# Patient Record
Sex: MALE | Race: WHITE | NOT HISPANIC OR LATINO | Employment: OTHER | URBAN - METROPOLITAN AREA
[De-identification: names, ages, dates, MRNs, and addresses within clinical notes are randomized per-mention and may not be internally consistent; named-entity substitution may affect disease eponyms.]

---

## 2017-06-01 ENCOUNTER — ALLSCRIPTS OFFICE VISIT (OUTPATIENT)
Dept: OTHER | Facility: OTHER | Age: 72
End: 2017-06-01

## 2017-06-29 ENCOUNTER — ANESTHESIA EVENT (OUTPATIENT)
Dept: PERIOP | Facility: HOSPITAL | Age: 72
End: 2017-06-29
Payer: MEDICARE

## 2017-06-29 RX ORDER — ASPIRIN 81 MG/1
162 TABLET ORAL ONCE
COMMUNITY
End: 2020-08-22 | Stop reason: HOSPADM

## 2017-06-29 RX ORDER — AMLODIPINE BESYLATE 10 MG/1
10 TABLET ORAL DAILY
COMMUNITY
End: 2018-09-11 | Stop reason: SDDI

## 2017-06-29 RX ORDER — MULTIVIT-MIN/IRON/FOLIC ACID/K 18-600-40
CAPSULE ORAL
COMMUNITY
End: 2021-11-01 | Stop reason: SDUPTHER

## 2017-06-29 RX ORDER — FOLIC ACID/MULTIVIT,IRON,MINER .4-18-35
1 TABLET,CHEWABLE ORAL DAILY
COMMUNITY

## 2017-06-29 RX ORDER — FENOFIBRATE 54 MG/1
54 TABLET ORAL DAILY
COMMUNITY
End: 2018-09-11 | Stop reason: ALTCHOICE

## 2017-06-29 RX ORDER — SIMVASTATIN 40 MG
40 TABLET ORAL
COMMUNITY
End: 2021-11-03 | Stop reason: SDUPTHER

## 2017-06-30 ENCOUNTER — HOSPITAL ENCOUNTER (OUTPATIENT)
Facility: HOSPITAL | Age: 72
Setting detail: OUTPATIENT SURGERY
Discharge: HOME | End: 2017-06-30
Attending: SURGERY | Admitting: SURGERY
Payer: MEDICARE

## 2017-06-30 ENCOUNTER — ANESTHESIA (OUTPATIENT)
Dept: PERIOP | Facility: HOSPITAL | Age: 72
End: 2017-06-30
Payer: MEDICARE

## 2017-06-30 VITALS
HEART RATE: 57 BPM | SYSTOLIC BLOOD PRESSURE: 131 MMHG | TEMPERATURE: 98 F | OXYGEN SATURATION: 94 % | RESPIRATION RATE: 18 BRPM | DIASTOLIC BLOOD PRESSURE: 79 MMHG | BODY MASS INDEX: 39.23 KG/M2 | HEIGHT: 71 IN | WEIGHT: 280.2 LBS

## 2017-06-30 DIAGNOSIS — K92.1 MELANOTIC STOOLS: ICD-10-CM

## 2017-06-30 DIAGNOSIS — R19.4 CHANGE IN BOWEL HABITS: ICD-10-CM

## 2017-06-30 PROBLEM — K21.00 GASTROESOPHAGEAL REFLUX DISEASE WITH ESOPHAGITIS: Chronic | Status: ACTIVE | Noted: 2017-06-30

## 2017-06-30 PROBLEM — D12.4 ADENOMATOUS POLYP OF DESCENDING COLON: Chronic | Status: ACTIVE | Noted: 2017-06-30

## 2017-06-30 PROBLEM — K29.31 CHRONIC SUPERFICIAL GASTRITIS WITH BLEEDING: Chronic | Status: ACTIVE | Noted: 2017-06-30

## 2017-06-30 LAB — GLUCOSE SERPL-MCNC: 129 MG/DL (ref 65–140)

## 2017-06-30 PROCEDURE — 88305 TISSUE EXAM BY PATHOLOGIST: CPT | Performed by: SURGERY

## 2017-06-30 PROCEDURE — 82948 REAGENT STRIP/BLOOD GLUCOSE: CPT

## 2017-06-30 PROCEDURE — 88342 IMHCHEM/IMCYTCHM 1ST ANTB: CPT | Performed by: SURGERY

## 2017-06-30 RX ORDER — LIDOCAINE HYDROCHLORIDE 10 MG/ML
INJECTION, SOLUTION INFILTRATION; PERINEURAL AS NEEDED
Status: DISCONTINUED | OUTPATIENT
Start: 2017-06-30 | End: 2017-06-30 | Stop reason: SURG

## 2017-06-30 RX ORDER — PROPOFOL 10 MG/ML
INJECTION, EMULSION INTRAVENOUS AS NEEDED
Status: DISCONTINUED | OUTPATIENT
Start: 2017-06-30 | End: 2017-06-30 | Stop reason: SURG

## 2017-06-30 RX ORDER — SODIUM CHLORIDE, SODIUM LACTATE, POTASSIUM CHLORIDE, CALCIUM CHLORIDE 600; 310; 30; 20 MG/100ML; MG/100ML; MG/100ML; MG/100ML
75 INJECTION, SOLUTION INTRAVENOUS CONTINUOUS
Status: DISCONTINUED | OUTPATIENT
Start: 2017-06-30 | End: 2017-06-30 | Stop reason: HOSPADM

## 2017-06-30 RX ORDER — ONDANSETRON 2 MG/ML
4 INJECTION INTRAMUSCULAR; INTRAVENOUS ONCE AS NEEDED
Status: DISCONTINUED | OUTPATIENT
Start: 2017-06-30 | End: 2017-06-30 | Stop reason: HOSPADM

## 2017-06-30 RX ORDER — METOCLOPRAMIDE HYDROCHLORIDE 5 MG/ML
10 INJECTION INTRAMUSCULAR; INTRAVENOUS ONCE AS NEEDED
Status: DISCONTINUED | OUTPATIENT
Start: 2017-06-30 | End: 2017-06-30 | Stop reason: HOSPADM

## 2017-06-30 RX ADMIN — PROPOFOL 100 MG: 10 INJECTION, EMULSION INTRAVENOUS at 07:34

## 2017-06-30 RX ADMIN — PROPOFOL 50 MG: 10 INJECTION, EMULSION INTRAVENOUS at 07:50

## 2017-06-30 RX ADMIN — PROPOFOL 50 MG: 10 INJECTION, EMULSION INTRAVENOUS at 07:45

## 2017-06-30 RX ADMIN — LIDOCAINE HYDROCHLORIDE 50 MG: 10 INJECTION, SOLUTION INFILTRATION; PERINEURAL at 07:34

## 2017-06-30 RX ADMIN — PROPOFOL 50 MG: 10 INJECTION, EMULSION INTRAVENOUS at 07:55

## 2017-06-30 RX ADMIN — SODIUM CHLORIDE, SODIUM LACTATE, POTASSIUM CHLORIDE, AND CALCIUM CHLORIDE 75 ML/HR: .6; .31; .03; .02 INJECTION, SOLUTION INTRAVENOUS at 06:56

## 2017-06-30 RX ADMIN — PROPOFOL 50 MG: 10 INJECTION, EMULSION INTRAVENOUS at 07:40

## 2017-07-18 ENCOUNTER — ALLSCRIPTS OFFICE VISIT (OUTPATIENT)
Dept: OTHER | Facility: OTHER | Age: 72
End: 2017-07-18

## 2018-01-13 VITALS
HEIGHT: 69 IN | WEIGHT: 272 LBS | HEART RATE: 80 BPM | TEMPERATURE: 97.9 F | RESPIRATION RATE: 14 BRPM | DIASTOLIC BLOOD PRESSURE: 78 MMHG | BODY MASS INDEX: 40.29 KG/M2 | SYSTOLIC BLOOD PRESSURE: 130 MMHG

## 2018-01-13 VITALS
TEMPERATURE: 98 F | BODY MASS INDEX: 40.73 KG/M2 | DIASTOLIC BLOOD PRESSURE: 80 MMHG | HEIGHT: 69 IN | WEIGHT: 275 LBS | SYSTOLIC BLOOD PRESSURE: 132 MMHG

## 2018-04-04 ENCOUNTER — TRANSCRIBE ORDERS (OUTPATIENT)
Dept: ADMINISTRATIVE | Facility: HOSPITAL | Age: 73
End: 2018-04-04

## 2018-04-04 DIAGNOSIS — J32.9 SINUSITIS, UNSPECIFIED CHRONICITY, UNSPECIFIED LOCATION: ICD-10-CM

## 2018-04-04 DIAGNOSIS — R06.02 SOB (SHORTNESS OF BREATH): Primary | ICD-10-CM

## 2018-04-04 DIAGNOSIS — R05.9 COUGHING: ICD-10-CM

## 2018-04-09 ENCOUNTER — HOSPITAL ENCOUNTER (OUTPATIENT)
Dept: CT IMAGING | Facility: HOSPITAL | Age: 73
Discharge: HOME | End: 2018-04-09
Payer: MEDICARE

## 2018-04-09 DIAGNOSIS — R06.02 SOB (SHORTNESS OF BREATH): ICD-10-CM

## 2018-04-09 DIAGNOSIS — J32.9 SINUSITIS, UNSPECIFIED CHRONICITY, UNSPECIFIED LOCATION: ICD-10-CM

## 2018-04-09 DIAGNOSIS — R05.9 COUGHING: ICD-10-CM

## 2018-04-09 PROCEDURE — 71260 CT THORAX DX C+: CPT

## 2018-04-09 PROCEDURE — 70486 CT MAXILLOFACIAL W/O DYE: CPT

## 2018-04-09 RX ADMIN — IOHEXOL 85 ML: 350 INJECTION, SOLUTION INTRAVENOUS at 16:55

## 2018-05-29 ENCOUNTER — APPOINTMENT (EMERGENCY)
Dept: RADIOLOGY | Facility: HOSPITAL | Age: 73
End: 2018-05-29
Payer: MEDICARE

## 2018-05-29 ENCOUNTER — HOSPITAL ENCOUNTER (EMERGENCY)
Facility: HOSPITAL | Age: 73
Discharge: HOME | End: 2018-05-29
Attending: EMERGENCY MEDICINE | Admitting: EMERGENCY MEDICINE
Payer: MEDICARE

## 2018-05-29 VITALS
BODY MASS INDEX: 37.22 KG/M2 | SYSTOLIC BLOOD PRESSURE: 166 MMHG | HEART RATE: 83 BPM | WEIGHT: 260 LBS | RESPIRATION RATE: 16 BRPM | DIASTOLIC BLOOD PRESSURE: 78 MMHG | OXYGEN SATURATION: 92 % | HEIGHT: 70 IN | TEMPERATURE: 98.8 F

## 2018-05-29 DIAGNOSIS — S20.212A CONTUSION OF LEFT CHEST WALL, INITIAL ENCOUNTER: Primary | ICD-10-CM

## 2018-05-29 PROCEDURE — 71101 X-RAY EXAM UNILAT RIBS/CHEST: CPT

## 2018-05-29 PROCEDURE — 99283 EMERGENCY DEPT VISIT LOW MDM: CPT

## 2018-05-29 PROCEDURE — 93005 ELECTROCARDIOGRAM TRACING: CPT

## 2018-05-29 RX ORDER — NAPROXEN 250 MG/1
250 TABLET ORAL ONCE
Status: DISCONTINUED | OUTPATIENT
Start: 2018-05-29 | End: 2018-05-29 | Stop reason: HOSPADM

## 2018-05-29 RX ORDER — LIDOCAINE 50 MG/G
1 PATCH TOPICAL ONCE
Status: DISCONTINUED | OUTPATIENT
Start: 2018-05-29 | End: 2018-05-29 | Stop reason: HOSPADM

## 2018-05-29 RX ORDER — ACETAMINOPHEN 325 MG/1
975 TABLET ORAL ONCE
Status: DISCONTINUED | OUTPATIENT
Start: 2018-05-29 | End: 2018-05-29 | Stop reason: HOSPADM

## 2018-05-29 RX ORDER — CYCLOBENZAPRINE HCL 5 MG
5 TABLET ORAL 3 TIMES DAILY PRN
Qty: 21 TABLET | Refills: 0 | Status: SHIPPED | OUTPATIENT
Start: 2018-05-29 | End: 2018-09-11 | Stop reason: SDDI

## 2018-05-29 RX ORDER — NAPROXEN 375 MG/1
375 TABLET ORAL 2 TIMES DAILY WITH MEALS
Qty: 20 TABLET | Refills: 0 | Status: SHIPPED | OUTPATIENT
Start: 2018-05-29 | End: 2018-09-11 | Stop reason: SDDI

## 2018-05-29 NOTE — ED NOTES
D/c reviewed with pt, pt verbalized understanding and has no further questions at this time  Pt ambulatory off unit with steady gait        Susana Sumner RN  05/29/18 0020

## 2018-05-29 NOTE — DISCHARGE INSTRUCTIONS
Please return if he developed fevers cough shortness of breath worsening symptoms or other concerning symptoms otherwise follow up as instructed as discussed      Rib Contusion   WHAT YOU NEED TO KNOW:   A rib contusion is a bruise on one or more of your ribs  DISCHARGE INSTRUCTIONS:   Return to the emergency department if:   · You have increased chest pain  · You have shortness of breath  · You start to cough up blood  · Your pain does not improve with pain medicine  Contact your healthcare provider if:   · You have a cough  · You have a fever  · You have questions or concerns about your condition or care  Medicines: You may need any of the following:  · NSAIDs , such as ibuprofen, help decrease swelling, pain, and fever  This medicine is available with or without a doctor's order  NSAIDs can cause stomach bleeding or kidney problems in certain people  If you take blood thinner medicine, always ask if NSAIDs are safe for you  Always read the medicine label and follow directions  Do not give these medicines to children under 10months of age without direction from your child's healthcare provider  · Prescription pain medicine  may be given  Ask how to take this medicine safely  · Take your medicine as directed  Contact your healthcare provider if you think your medicine is not helping or if you have side effects  Tell him of her if you are allergic to any medicine  Keep a list of the medicines, vitamins, and herbs you take  Include the amounts, and when and why you take them  Bring the list or the pill bottles to follow-up visits  Carry your medicine list with you in case of an emergency  Deep breathing:   · To help prevent pneumonia, take 10 deep breaths every hour, even when you wake up during the night  Brace your ribs with your hands or a pillow while you take deep breaths or cough  This will help decrease your pain      · You may need to use an incentive spirometer to help you take deeper breaths  Put the plastic piece into your mouth and take a very deep breath  Hold your breath as long as you can  Then let out your breath  Do this 10 times in a row every hour while you are awake  Rest:  Rest your ribs to decrease swelling and allow the injury to heal faster  Avoid activities that may cause more pain or damage to your ribs  As your pain decreases, begin movements slowly  Ice:  Ice helps decrease swelling and pain  Ice may also help prevent tissue damage  Use an ice pack or put crushed ice in a plastic bag  Cover it with a towel and place it on your bruised area for 15 to 20 minutes every hour as directed  Follow up with your healthcare provider as directed:  Write down your questions so you remember to ask them during your visits  © 2017 [facility] Information is for End User's use only and may not be sold, redistributed or otherwise used for commercial purposes  All illustrations and images included in CareNotes® are the copyrighted property of [employer]  or [employer]  The above information is an  only  It is not intended as medical advice for individual conditions or treatments  Talk to your doctor, nurse or pharmacist before following any medical regimen to see if it is safe and effective for you  Contusion in Adults   WHAT YOU NEED TO KNOW:   A contusion is a bruise that appears on your skin after an injury  A bruise happens when small blood vessels tear but skin does not  When blood vessels tear, blood leaks into nearby tissue, such as soft tissue or muscle  DISCHARGE INSTRUCTIONS:   Return to the emergency department if:   · You have new trouble moving the injured area  · You have tingling or numbness in or near the injured area  · Your hand or foot below the bruise gets cold or turns pale  Contact your healthcare provider if:   · You find a new lump in the injured area      · Your symptoms do not improve with treatment after 4 to 5 days  · You have questions or concerns about your condition or care  Medicines: You may need any of the following:  · NSAIDs  help decrease swelling and pain or fever  This medicine is available with or without a doctor's order  NSAIDs can cause stomach bleeding or kidney problems in certain people  If you take blood thinner medicine, always ask your healthcare provider if NSAIDs are safe for you  Always read the medicine label and follow directions  · Prescription pain medicine  may be given  Do not wait until the pain is severe before you take your medicine  · Take your medicine as directed  Contact your healthcare provider if you think your medicine is not helping or if you have side effects  Tell him of her if you are allergic to any medicine  Keep a list of the medicines, vitamins, and herbs you take  Include the amounts, and when and why you take them  Bring the list or the pill bottles to follow-up visits  Carry your medicine list with you in case of an emergency  Follow up with your healthcare provider as directed: You may need to return within a week to check your injury again  Write down your questions so you remember to ask them during your visits  Help a contusion heal:   · Rest the injured area  or use it less than usual  If you bruised your leg or foot, you may need crutches or a cane to help you walk  This will help you keep weight off your injured body part  · Apply ice  to decrease swelling and pain  Ice may also help prevent tissue damage  Use an ice pack, or put crushed ice in a plastic bag  Cover it with a towel and place it on your bruise for 15 to 20 minutes every hour or as directed  · Use compression  to support the area and decrease swelling  Wrap an elastic bandage around the area over the bruised muscle  Make sure the bandage is not too tight  You should be able to fit 1 finger between the bandage and your skin      · Elevate (raise) your injured body part above the level of your heart to help decrease pain and swelling  Use pillows, blankets, or rolled towels to elevate the area as often as you can  · Do not drink alcohol  as directed  Alcohol may slow healing  · Do not stretch injured muscles  right after your injury  Ask your healthcare provider when and how you may safely stretch after your injury  Gentle stretches can help increase your flexibility  · Do not massage the area or put heating pads  on the bruise right after your injury  Heat and massage may slow healing  Your healthcare provider may tell you to apply heat after several days  At that time, heat will start to help the injury heal   Prevent another contusion:   · Stretch and warm up before you play sports or exercise  · Wear protective gear when you play sports  Examples are shin guards and padding  · If you begin a new physical activity, start slowly to give your body a chance to adjust   © 2017 [address] Information is for End User's use only and may not be sold, redistributed or otherwise used for commercial purposes  All illustrations and images included in CareNotes® are the copyrighted property of A D [employer] M [employer] Inc  or Abdias Montaño  The above information is an  only  It is not intended as medical advice for individual conditions or treatments  Talk to your doctor, nurse or pharmacist before following any medical regimen to see if it is safe and effective for you

## 2018-05-30 LAB
ATRIAL RATE: 71 BPM
P AXIS: 56 DEGREES
PR INTERVAL: 164 MS
QRS AXIS: 141 DEGREES
QRSD INTERVAL: 110 MS
QT INTERVAL: 412 MS
QTC INTERVAL: 447 MS
T WAVE AXIS: 41 DEGREES
VENTRICULAR RATE: 71 BPM

## 2018-05-30 PROCEDURE — 93010 ELECTROCARDIOGRAM REPORT: CPT | Performed by: INTERNAL MEDICINE

## 2018-09-11 ENCOUNTER — APPOINTMENT (EMERGENCY)
Dept: RADIOLOGY | Facility: HOSPITAL | Age: 73
End: 2018-09-11
Payer: MEDICARE

## 2018-09-11 ENCOUNTER — HOSPITAL ENCOUNTER (EMERGENCY)
Facility: HOSPITAL | Age: 73
Discharge: HOME | End: 2018-09-11
Attending: EMERGENCY MEDICINE | Admitting: EMERGENCY MEDICINE
Payer: MEDICARE

## 2018-09-11 ENCOUNTER — APPOINTMENT (EMERGENCY)
Dept: ULTRASOUND IMAGING | Facility: HOSPITAL | Age: 73
End: 2018-09-11
Payer: MEDICARE

## 2018-09-11 VITALS
DIASTOLIC BLOOD PRESSURE: 63 MMHG | WEIGHT: 288.36 LBS | OXYGEN SATURATION: 93 % | BODY MASS INDEX: 41.28 KG/M2 | RESPIRATION RATE: 19 BRPM | TEMPERATURE: 98.6 F | SYSTOLIC BLOOD PRESSURE: 134 MMHG | HEIGHT: 70 IN | HEART RATE: 63 BPM

## 2018-09-11 DIAGNOSIS — M25.572 PAIN AND SWELLING OF LEFT ANKLE: Primary | ICD-10-CM

## 2018-09-11 DIAGNOSIS — M25.472 PAIN AND SWELLING OF LEFT ANKLE: Primary | ICD-10-CM

## 2018-09-11 PROCEDURE — 93971 EXTREMITY STUDY: CPT

## 2018-09-11 PROCEDURE — 93971 EXTREMITY STUDY: CPT | Performed by: SURGERY

## 2018-09-11 PROCEDURE — 99284 EMERGENCY DEPT VISIT MOD MDM: CPT

## 2018-09-11 PROCEDURE — 73610 X-RAY EXAM OF ANKLE: CPT

## 2018-09-11 RX ORDER — TRAMADOL HYDROCHLORIDE 50 MG/1
50 TABLET ORAL EVERY 6 HOURS PRN
Qty: 12 TABLET | Refills: 0 | Status: SHIPPED | OUTPATIENT
Start: 2018-09-11 | End: 2018-09-14

## 2018-09-11 NOTE — DISCHARGE INSTRUCTIONS
Please return if he developed worsening or other concerning symptoms or leg becomes much more swollen discolored developed redness fevers or other concerning symptoms otherwise please follow up with doctor in 3 days for re-evaluation as well as Orthopedics in 1 week if persistent symptoms as discussed her leg is persistently or progressively more swollen you will require repeat ultrasound in 1 week for re-evaluation as instructed      Arthralgia   WHAT YOU NEED TO KNOW:   Arthralgia is pain in one or more joints, with no inflammation  It may be short-term and get better within 6 to 8 weeks  Arthralgia can be an early sign of arthritis  Arthralgia may be caused by a medical condition, such as a hormone disorder or a tumor  It may also be caused by an infection or injury  DISCHARGE INSTRUCTIONS:   Medicines: The following medicines may  be ordered for you:  · Acetaminophen  decreases pain  Ask how much to take and how often to take it  Follow directions  Acetaminophen can cause liver damage if not taken correctly  · NSAIDs  decrease pain and prevent swelling  Ask your healthcare provider which medicine is right for you  Ask how much to take and when to take it  Take as directed  NSAIDs can cause stomach bleeding and kidney problems if not taken correctly  · Pain relief cream  decreases pain  Use this cream as directed  · Take your medicine as directed  Contact your healthcare provider if you think your medicine is not helping or if you have side effects  Tell him of her if you are allergic to any medicine  Keep a list of the medicines, vitamins, and herbs you take  Include the amounts, and when and why you take them  Bring the list or the pill bottles to follow-up visits  Carry your medicine list with you in case of an emergency  Follow up with your healthcare provider or specialist as directed:  Write down your questions so you remember to ask them during your visits     Self-care:   · Apply heat  to help decrease pain  Use a heating pad or heat wrap  Apply heat for 20 to 30 minutes every 2 hours for as many days as directed  · Rest  as much as possible  Avoid activities that cause joint pain  · Apply ice  to help decrease swelling and pain  Ice may also help prevent tissue damage  Use an ice pack, or put crushed ice in a plastic bag  Cover it with a towel and place it on your painful joint for 15 to 20 minutes every hour or as directed  · Support  the joint with a brace or elastic wrap as directed  · Elevate  your joint above the level of your heart as often as you can to help decrease swelling and pain  Prop your painful joint on pillows or blankets to keep it elevated comfortably  · Lose weight  if you are overweight  Extra weight can put pressure on your joints and cause more pain  Ask your healthcare provider how much you should weigh  Ask him to help you create a weight loss plan  · Exercise  regularly to help improve joint movement and to decrease pain  Ask about the best exercise plan for you  Low-impact exercises can help take the pressure off your joints  Examples are walking, swimming, and water aerobics  Physical therapy:  A physical therapist teaches you exercises to help improve movement and strength, and to decrease pain  Ask your healthcare provider if physical therapy is right for you  Contact your healthcare provider or specialist if:   · You have a fever  · You continue to have joint pain that cannot be relieved with heat, ice, or medicine  · You have pain and inflammation around your joint  · You have questions or concerns about your condition or care  Return to the emergency department if:   · You have sudden, severe pain when you move your joint  · You have a fever and shaking chills  · You cannot move your joint  · You lose feeling on the side of your body where you have the painful joint    © 2017 [address] Information is for End User's use only and may not be sold, redistributed or otherwise used for commercial purposes  All illustrations and images included in CareNotes® are the copyrighted property of [employer]  or Abdias Montaño  The above information is an  only  It is not intended as medical advice for individual conditions or treatments  Talk to your doctor, nurse or pharmacist before following any medical regimen to see if it is safe and effective for you  Leg Edema   WHAT YOU NEED TO KNOW:   Leg edema is swelling caused by fluid buildup  Your legs may swell if you sit or stand for long periods of time, are pregnant, or are injured  Swelling may also occur if you have heart failure or circulation problems  This means that your heart does not pump blood through your body as it should  DISCHARGE INSTRUCTIONS:   Self-care:   · Elevate your legs:  Raise your legs above the level of your heart as often as you can  This will help decrease swelling and pain  Prop your legs on pillows or blankets to keep them elevated comfortably  · Wear pressure stockings: These tight stockings put pressure on your legs to promote blood flow and prevent blood clots  Wear the stockings during the day  Do not wear them while you sleep  · Apply heat:  Heat helps decrease pain and swelling  Apply heat on the area for 20 to 30 minutes every 2 hours for as many days as directed  · Stay active:  Do not stand or sit for long periods of time  Ask your healthcare provider about the best exercise plan for you  · Eat healthy foods:  Healthy foods include fruits, vegetables, whole-grain breads, low-fat dairy products, beans, lean meats, and fish  Ask if you need to be on a special diet  Limit salt  Salt will make your body hold even more fluid  Follow up with your healthcare provider as directed:  Write down your questions so you remember to ask them during your visits     Contact your healthcare provider if:   · You have a fever or feel more tired than usual     · The veins in your legs look larger than usual  They may look full or bulging  · Your legs itch or feel heavy  · You have red or white areas or sores on your legs  The skin may also appear dimpled or have indentations  · You are gaining weight  · You have trouble moving your ankles  · The swelling does not go away, or other parts of your body swell  · You have questions or concerns about your condition or care  Return to the emergency department if:   · You cannot walk  · You feel faint or confused  · Your skin turns blue or gray  · Your leg feels warm, tender, and painful  It may be swollen and red  · You have chest pain or trouble breathing that is worse when you lie down  · You suddenly feel lightheaded and have trouble breathing  · You have new and sudden chest pain  You may have more pain when you take deep breaths or cough  You may also cough up blood  © 2017 [address]  Information is for End User's use only and may not be sold, redistributed or otherwise used for commercial purposes  All illustrations and images included in CareNotes® are the copyrighted property of [employer]  or [address]  The above information is an  only  It is not intended as medical advice for individual conditions or treatments  Talk to your doctor, nurse or pharmacist before following any medical regimen to see if it is safe and effective for you

## 2018-09-11 NOTE — ED NOTES
Pt verbalized understanding of discharge instructions, medications and f/u care if needed     Edgardo Salazar RN  09/11/18 5785

## 2018-09-11 NOTE — ED PROVIDER NOTES
History  Chief Complaint   Patient presents with    Ankle Swelling     Pt reports slamming the car door on his left ankle approx a month ago, pain worsening in the last few days, along with swelling  69-year-old male with a history of hyperlipidemia, diabetes presenting with chief complaint of left ankle swelling  Patient reports approximately ago is getting into his car and the car door accidentally closed catching his left ankle in the car door frame and had some mild discomfort localized to his left medial ankle since that time he states over the last week or so he has been on his feet more often than his left leg is been more swollen than usual and the pain in his left medial ankle and calf is gotten progressively worse over last 3 days or so  Patient localize his area of discomfort again to his left medial ankle, he notes some mild adjacent swelling to the distal leg and calf he has no weakness paresthesias or anesthesia no color changes however he notes again the distal leg is mildly swollen, he otherwise denies fevers chills headache chest pain cough shortness of breath nausea vomiting anorexia other joint involvement other rashes joint pain swelling or other associated symptoms denies all risk factors or other signs of symptoms of DVT or PE            Prior to Admission Medications   Prescriptions Last Dose Informant Patient Reported? Taking?    Cholecalciferol (VITAMIN D) 2000 units CAPS   Yes Yes   Sig: Take by mouth   Empagliflozin (JARDIANCE) 25 MG TABS   Yes Yes   Sig: Take 25 mg by mouth every morning   Insulin Degludec (TRESIBA FLEXTOUCH SC)   Yes Yes   Sig: Inject 20 Units under the skin daily at bedtime   Omega-3 Fatty Acids (FISH OIL CONCENTRATE PO)  Self Yes Yes   Sig: Take by mouth   aspirin (ECOTRIN LOW STRENGTH) 81 mg EC tablet   Yes Yes   Sig: Take by mouth 2 (two) times a day     insulin regular (HumuLIN R,NovoLIN R) 100 units/mL injection   Yes Yes   Sig: Inject 12 Units under the skin once   multivitamin-iron-minerals-folic acid (CENTRUM) chewable tablet   Yes Yes   Sig: Chew 1 tablet daily   simvastatin (ZOCOR) 40 mg tablet   Yes Yes   Sig: Take 40 mg by mouth daily at bedtime      Facility-Administered Medications: None       Past Medical History:   Diagnosis Date    Diabetes mellitus ([address] )     Hiatal hernia     Hyperlipidemia     Hypertension     Shortness of breath        Past Surgical History:   Procedure Laterality Date    JOINT REPLACEMENT Bilateral     knees    HI COLONOSCOPY FLX DX W/COLLJ SPEC WHEN PFRMD N/A 6/30/2017    Procedure: EGD AND COLONOSCOPY;  Surgeon: Nanci Carmichael MD;  Location: [facility]; Service: General    TONSILLECTOMY         History reviewed  No pertinent family history  I have reviewed and agree with the history as documented  Social History   Substance Use Topics    Smoking status: Never Smoker    Smokeless tobacco: Never Used    Alcohol use No        Review of Systems   Constitutional: Negative for activity change, appetite change, chills and fever  HENT: Negative for rhinorrhea and sore throat  Respiratory: Negative for cough and shortness of breath  Cardiovascular: Negative for chest pain and palpitations  Gastrointestinal: Negative for abdominal pain, diarrhea, nausea and vomiting  Genitourinary: Negative for dysuria, frequency and urgency  Musculoskeletal: Positive for joint swelling  Negative for arthralgias, back pain and myalgias  Left ankle pain and swelling   Skin: Negative for color change, rash and wound  Neurological: Negative for dizziness, weakness and numbness  Hematological: Negative for adenopathy  Does not bruise/bleed easily  Psychiatric/Behavioral: Negative for agitation and behavioral problems  All other systems reviewed and are negative  Physical Exam  Physical Exam   Constitutional: He is oriented to person, place, and time  He appears well-developed and well-nourished  No distress  Very well-appearing in no acute distress   HENT:   Head: Normocephalic and atraumatic  Eyes: EOM are normal  Pupils are equal, round, and reactive to light  Neck: Normal range of motion  Neck supple  No tracheal deviation present  Cardiovascular: Normal rate, regular rhythm and normal heart sounds  Exam reveals no gallop and no friction rub  No murmur heard  Pulmonary/Chest: Effort normal and breath sounds normal  He has no wheezes  He has no rales  Abdominal: Soft  Bowel sounds are normal  He exhibits no distension  There is no tenderness  There is no rebound and no guarding  Musculoskeletal:   Patient has mild tenderness over his medial ankle only he has no tenderness over the remainder of the midfoot or distal foot no plantar ecchymosis he has 2+ symmetric pulses, there is mild adjacent edema to the immediate medial ankle and the very distal tibia and fibula, there is no warmth no color changes, no lymphangitis erythema or induration there is no calf pain tenderness varicosities no other acute ischemic infectious inflammatory traumatic findings on exam   Neurological: He is alert and oriented to person, place, and time  No cranial nerve deficit  He exhibits normal muscle tone  Coordination normal    Skin: Skin is warm and dry  No rash noted  Psychiatric: He has a normal mood and affect  His behavior is normal    Nursing note and vitals reviewed        Vital Signs  ED Triage Vitals [09/11/18 1107]   Temperature Pulse Respirations Blood Pressure SpO2   98 6 °F (37 °C) 65 19 147/68 93 %      Temp Source Heart Rate Source Patient Position - Orthostatic VS BP Location FiO2 (%)   Oral Monitor Lying Right arm --      Pain Score       5           Vitals:    09/11/18 1107 09/11/18 1318   BP: 147/68 134/63   Pulse: 65 63   Patient Position - Orthostatic VS: Lying Sitting       Visual Acuity      ED Medications  Medications - No data to display    Diagnostic Studies  Results Reviewed     None VAS lower limb venous duplex study, unilateral/limited   ED Interpretation by Ana Moncada DO (09/11 1314)   Resulted as negative      Final Result by Grace Ganser, MD (09/11 1534)      XR ankle 3+ views LEFT   Final Result by Neo Obrien MD (09/11 1220)      No acute osseous abnormality              Workstation performed: [id number]                    Procedures  Procedures       Phone Contacts  ED Phone Contact    ED Course                               MDM  Number of Diagnoses or Management Options  Pain and swelling of left ankle:   Diagnosis management comments: 69-year-old male history of hyperlipidemia diabetes with chief complaint of left ankle swelling, relates this to closing his left foot in the door accidentally approximately a month ago had some pain and discomfort localized to his left ankle since that time progressively worse over the last 3 days with some mild adjacent swelling that he relates to being on his feet more than usual, no risk factors or other signs of symptoms of DVT or PE he has no weakness paresthesias or anesthesia no fevers chills or constitutional symptoms on exam he is afebrile normal vital signs clinically well-appearing he has some mild tenderness and mild adjacent edema is noted there are no other acute ischemic infectious inflammatory traumatic findings on exam he is able to bear weight, he has 2+ symmetric pulses, his right lower extremity is unremarkable without edema swelling or other acute findings, will check x-ray of his left ankle, duplex of his left lower extremity, if negative will discharge with orthopedic follow-up close return instructions will recommend repeat ultrasound of progressive symptoms or continued symptoms in 1 week, disposition pending further evaluation reassessment    CritCare Time    Disposition  Final diagnoses:   Pain and swelling of left ankle     Time reflects when diagnosis was documented in both MDM as applicable and the Disposition within this note     Time User Action Codes Description Comment    9/11/2018  1:15 PM Vinod Vera Add [[id number],  [id number]] Pain and swelling of left ankle       ED Disposition     ED Disposition Condition Comment    Discharge  Leonardovandana Cliff discharge to home/self care      Condition at discharge: Good        Follow-up Information     Follow up With Specialties Details Why Contact Info Additional Information    [address] [facility] Emergency Medicine  If symptoms worsen [address] [facility] [place] [facility], [address] [facility] Mellissa Ordaz MD Internal Medicine In 3 days  [address] [address]  [address] [facility] In 1 week  [address] [facility] [place] [place] [address]  [phone]           Discharge Medication List as of 9/11/2018  1:16 PM      START taking these medications    Details   traMADol (ULTRAM) 50 mg tablet Take 1 tablet (50 mg total) by mouth every 6 (six) hours as needed for moderate pain for up to 3 days, Starting Tue 9/11/2018, Until Fri 9/14/2018, Print         CONTINUE these medications which have NOT CHANGED    Details   aspirin (ECOTRIN LOW STRENGTH) 81 mg EC tablet Take by mouth 2 (two) times a day  , Historical Med      Cholecalciferol (VITAMIN D) 2000 units CAPS Take by mouth, Historical Med      Empagliflozin (JARDIANCE) 25 MG TABS Take 25 mg by mouth every morning, Historical Med      Insulin Degludec (TRESIBA FLEXTOUCH SC) Inject 20 Units under the skin daily at bedtime, Historical Med      insulin regular (HumuLIN R,NovoLIN R) 100 units/mL injection Inject 12 Units under the skin once, Historical Med      multivitamin-iron-minerals-folic acid (CENTRUM) chewable tablet Chew 1 tablet daily, Historical Med      Omega-3 Fatty Acids (FISH OIL CONCENTRATE PO) Take by mouth, Historical Med      simvastatin (ZOCOR) 40 mg tablet Take 40 mg by mouth daily at bedtime, Historical Med           No discharge procedures on file      ED Provider  Electronically Signed by           Paty Mcfarlane DO  09/12/18 0393

## 2018-09-17 ENCOUNTER — TRANSCRIBE ORDERS (OUTPATIENT)
Dept: ADMINISTRATIVE | Facility: HOSPITAL | Age: 73
End: 2018-09-17

## 2018-09-17 DIAGNOSIS — R60.0 LOCALIZED EDEMA: Primary | ICD-10-CM

## 2018-09-27 ENCOUNTER — HOSPITAL ENCOUNTER (OUTPATIENT)
Dept: MRI IMAGING | Facility: CLINIC | Age: 73
Discharge: HOME | End: 2018-09-27
Payer: MEDICARE

## 2018-09-27 DIAGNOSIS — R60.0 LOCALIZED EDEMA: ICD-10-CM

## 2018-09-27 PROCEDURE — 73721 MRI JNT OF LWR EXTRE W/O DYE: CPT

## 2019-02-22 ENCOUNTER — TRANSCRIBE ORDERS (OUTPATIENT)
Dept: ADMINISTRATIVE | Facility: HOSPITAL | Age: 74
End: 2019-02-22

## 2019-02-22 DIAGNOSIS — J18.9 UNRESOLVED PNEUMONIA: Primary | ICD-10-CM

## 2019-03-02 ENCOUNTER — APPOINTMENT (EMERGENCY)
Dept: RADIOLOGY | Facility: HOSPITAL | Age: 74
End: 2019-03-02
Payer: MEDICARE

## 2019-03-02 ENCOUNTER — HOSPITAL ENCOUNTER (OUTPATIENT)
Facility: HOSPITAL | Age: 74
Setting detail: OBSERVATION
Discharge: HOME | End: 2019-03-05
Attending: EMERGENCY MEDICINE | Admitting: GENERAL PRACTICE
Payer: MEDICARE

## 2019-03-02 ENCOUNTER — APPOINTMENT (EMERGENCY)
Dept: CT IMAGING | Facility: HOSPITAL | Age: 74
End: 2019-03-02
Payer: MEDICARE

## 2019-03-02 DIAGNOSIS — R07.9 CHEST PAIN: Primary | ICD-10-CM

## 2019-03-02 PROBLEM — D72.829 LEUKOCYTOSIS: Status: ACTIVE | Noted: 2019-03-02

## 2019-03-02 PROBLEM — R77.8 ELEVATED TROPONIN: Status: ACTIVE | Noted: 2019-03-02

## 2019-03-02 PROBLEM — G47.33 OSA (OBSTRUCTIVE SLEEP APNEA): Status: ACTIVE | Noted: 2019-03-02

## 2019-03-02 PROBLEM — I10 ESSENTIAL HYPERTENSION: Status: ACTIVE | Noted: 2019-03-02

## 2019-03-02 PROBLEM — E11.9 DIABETES MELLITUS (HCC): Status: ACTIVE | Noted: 2019-03-02

## 2019-03-02 PROBLEM — R05.9 COUGH: Status: ACTIVE | Noted: 2019-03-02

## 2019-03-02 PROBLEM — R79.89 ELEVATED TROPONIN: Status: ACTIVE | Noted: 2019-03-02

## 2019-03-02 PROBLEM — R05.3 CHRONIC COUGH: Status: ACTIVE | Noted: 2019-03-02

## 2019-03-02 LAB
ALBUMIN SERPL BCP-MCNC: 3.5 G/DL (ref 3.5–5)
ALP SERPL-CCNC: 58 U/L (ref 46–116)
ALT SERPL W P-5'-P-CCNC: 33 U/L (ref 12–78)
ANION GAP SERPL CALCULATED.3IONS-SCNC: 9 MMOL/L (ref 4–13)
AST SERPL W P-5'-P-CCNC: 21 U/L (ref 5–45)
BASOPHILS # BLD AUTO: 0.05 THOUSANDS/ΜL (ref 0–0.1)
BASOPHILS NFR BLD AUTO: 0 % (ref 0–1)
BILIRUB DIRECT SERPL-MCNC: 0.22 MG/DL (ref 0–0.2)
BILIRUB SERPL-MCNC: 1 MG/DL (ref 0.2–1)
BUN SERPL-MCNC: 12 MG/DL (ref 5–25)
CALCIUM SERPL-MCNC: 9.3 MG/DL (ref 8.3–10.1)
CHLORIDE SERPL-SCNC: 104 MMOL/L (ref 100–108)
CO2 SERPL-SCNC: 29 MMOL/L (ref 21–32)
CREAT SERPL-MCNC: 0.69 MG/DL (ref 0.6–1.3)
EOSINOPHIL # BLD AUTO: 0.45 THOUSAND/ΜL (ref 0–0.61)
EOSINOPHIL NFR BLD AUTO: 4 % (ref 0–6)
ERYTHROCYTE [DISTWIDTH] IN BLOOD BY AUTOMATED COUNT: 15 % (ref 11.6–15.1)
GFR SERPL CREATININE-BSD FRML MDRD: 94 ML/MIN/1.73SQ M
GLUCOSE SERPL-MCNC: 103 MG/DL (ref 65–140)
GLUCOSE SERPL-MCNC: 184 MG/DL (ref 65–140)
HCT VFR BLD AUTO: 49.2 % (ref 36.5–49.3)
HGB BLD-MCNC: 16 G/DL (ref 12–17)
IMM GRANULOCYTES # BLD AUTO: 0.02 THOUSAND/UL (ref 0–0.2)
IMM GRANULOCYTES NFR BLD AUTO: 0 % (ref 0–2)
LACTATE SERPL-SCNC: 0.8 MMOL/L (ref 0.5–2)
LYMPHOCYTES # BLD AUTO: 4.22 THOUSANDS/ΜL (ref 0.6–4.47)
LYMPHOCYTES NFR BLD AUTO: 36 % (ref 14–44)
MCH RBC QN AUTO: 29.7 PG (ref 26.8–34.3)
MCHC RBC AUTO-ENTMCNC: 32.5 G/DL (ref 31.4–37.4)
MCV RBC AUTO: 91 FL (ref 82–98)
MONOCYTES # BLD AUTO: 1.34 THOUSAND/ΜL (ref 0.17–1.22)
MONOCYTES NFR BLD AUTO: 12 % (ref 4–12)
NEUTROPHILS # BLD AUTO: 5.56 THOUSANDS/ΜL (ref 1.85–7.62)
NEUTS SEG NFR BLD AUTO: 48 % (ref 43–75)
NRBC BLD AUTO-RTO: 0 /100 WBCS
PLATELET # BLD AUTO: 171 THOUSANDS/UL (ref 149–390)
PLATELET # BLD AUTO: 185 THOUSANDS/UL (ref 149–390)
PMV BLD AUTO: 11.1 FL (ref 8.9–12.7)
PMV BLD AUTO: 11.2 FL (ref 8.9–12.7)
POTASSIUM SERPL-SCNC: 4.2 MMOL/L (ref 3.5–5.3)
PROT SERPL-MCNC: 7.5 G/DL (ref 6.4–8.2)
RBC # BLD AUTO: 5.38 MILLION/UL (ref 3.88–5.62)
SODIUM SERPL-SCNC: 142 MMOL/L (ref 136–145)
TROPONIN I SERPL-MCNC: 0.04 NG/ML
TROPONIN I SERPL-MCNC: 0.05 NG/ML
TROPONIN I SERPL-MCNC: 0.05 NG/ML
WBC # BLD AUTO: 11.64 THOUSAND/UL (ref 4.31–10.16)

## 2019-03-02 PROCEDURE — 80048 BASIC METABOLIC PNL TOTAL CA: CPT | Performed by: PHYSICIAN ASSISTANT

## 2019-03-02 PROCEDURE — 85049 AUTOMATED PLATELET COUNT: CPT | Performed by: PHYSICIAN ASSISTANT

## 2019-03-02 PROCEDURE — 84484 ASSAY OF TROPONIN QUANT: CPT | Performed by: PHYSICIAN ASSISTANT

## 2019-03-02 PROCEDURE — 87633 RESP VIRUS 12-25 TARGETS: CPT | Performed by: PHYSICIAN ASSISTANT

## 2019-03-02 PROCEDURE — 71275 CT ANGIOGRAPHY CHEST: CPT

## 2019-03-02 PROCEDURE — 85025 COMPLETE CBC W/AUTO DIFF WBC: CPT | Performed by: PHYSICIAN ASSISTANT

## 2019-03-02 PROCEDURE — 83605 ASSAY OF LACTIC ACID: CPT | Performed by: PHYSICIAN ASSISTANT

## 2019-03-02 PROCEDURE — 71046 X-RAY EXAM CHEST 2 VIEWS: CPT

## 2019-03-02 PROCEDURE — 82948 REAGENT STRIP/BLOOD GLUCOSE: CPT

## 2019-03-02 PROCEDURE — 99219 PR INITIAL OBSERVATION CARE/DAY 50 MINUTES: CPT | Performed by: PHYSICIAN ASSISTANT

## 2019-03-02 PROCEDURE — 36415 COLL VENOUS BLD VENIPUNCTURE: CPT | Performed by: PHYSICIAN ASSISTANT

## 2019-03-02 PROCEDURE — 83036 HEMOGLOBIN GLYCOSYLATED A1C: CPT | Performed by: PHYSICIAN ASSISTANT

## 2019-03-02 PROCEDURE — 80076 HEPATIC FUNCTION PANEL: CPT | Performed by: PHYSICIAN ASSISTANT

## 2019-03-02 PROCEDURE — 84145 PROCALCITONIN (PCT): CPT | Performed by: PHYSICIAN ASSISTANT

## 2019-03-02 PROCEDURE — 99285 EMERGENCY DEPT VISIT HI MDM: CPT

## 2019-03-02 PROCEDURE — 93005 ELECTROCARDIOGRAM TRACING: CPT

## 2019-03-02 RX ORDER — INSULIN GLARGINE 100 [IU]/ML
20 INJECTION, SOLUTION SUBCUTANEOUS
Status: CANCELLED | OUTPATIENT
Start: 2019-03-02

## 2019-03-02 RX ORDER — ASPIRIN 81 MG/1
81 TABLET ORAL DAILY
Status: DISCONTINUED | OUTPATIENT
Start: 2019-03-03 | End: 2019-03-05 | Stop reason: HOSPADM

## 2019-03-02 RX ORDER — CHLORAL HYDRATE 500 MG
1000 CAPSULE ORAL DAILY
Status: CANCELLED | OUTPATIENT
Start: 2019-03-03

## 2019-03-02 RX ORDER — PRAVASTATIN SODIUM 80 MG/1
80 TABLET ORAL
Status: CANCELLED | OUTPATIENT
Start: 2019-03-03

## 2019-03-02 RX ORDER — ECHINACEA PURPUREA EXTRACT 125 MG
1 TABLET ORAL AS NEEDED
Status: DISCONTINUED | OUTPATIENT
Start: 2019-03-02 | End: 2019-03-05 | Stop reason: HOSPADM

## 2019-03-02 RX ORDER — PRAVASTATIN SODIUM 80 MG/1
80 TABLET ORAL
Status: DISCONTINUED | OUTPATIENT
Start: 2019-03-03 | End: 2019-03-05 | Stop reason: HOSPADM

## 2019-03-02 RX ORDER — ASPIRIN 81 MG/1
81 TABLET ORAL DAILY
Status: CANCELLED | OUTPATIENT
Start: 2019-03-03

## 2019-03-02 RX ORDER — PANTOPRAZOLE SODIUM 40 MG/1
40 TABLET, DELAYED RELEASE ORAL
Status: DISCONTINUED | OUTPATIENT
Start: 2019-03-03 | End: 2019-03-05 | Stop reason: HOSPADM

## 2019-03-02 RX ORDER — PROMETHAZINE HYDROCHLORIDE AND CODEINE PHOSPHATE 6.25; 1 MG/5ML; MG/5ML
5 SYRUP ORAL EVERY 4 HOURS PRN
COMMUNITY
End: 2020-08-22 | Stop reason: HOSPADM

## 2019-03-02 RX ORDER — CHLORAL HYDRATE 500 MG
1000 CAPSULE ORAL DAILY
Status: DISCONTINUED | OUTPATIENT
Start: 2019-03-03 | End: 2019-03-05 | Stop reason: HOSPADM

## 2019-03-02 RX ORDER — INSULIN GLARGINE 100 [IU]/ML
26 INJECTION, SOLUTION SUBCUTANEOUS
Status: DISCONTINUED | OUTPATIENT
Start: 2019-03-02 | End: 2019-03-04

## 2019-03-02 RX ORDER — MELATONIN
2000 DAILY
Status: CANCELLED | OUTPATIENT
Start: 2019-03-03

## 2019-03-02 RX ORDER — ACETAMINOPHEN 325 MG/1
650 TABLET ORAL EVERY 6 HOURS PRN
Status: DISCONTINUED | OUTPATIENT
Start: 2019-03-02 | End: 2019-03-05 | Stop reason: HOSPADM

## 2019-03-02 RX ORDER — MELATONIN
2000 DAILY
Status: DISCONTINUED | OUTPATIENT
Start: 2019-03-03 | End: 2019-03-05 | Stop reason: HOSPADM

## 2019-03-02 RX ADMIN — IOHEXOL 100 ML: 350 INJECTION, SOLUTION INTRAVENOUS at 17:49

## 2019-03-02 RX ADMIN — INSULIN LISPRO 1 UNITS: 100 INJECTION, SOLUTION INTRAVENOUS; SUBCUTANEOUS at 22:01

## 2019-03-02 RX ADMIN — INSULIN GLARGINE 26 UNITS: 100 INJECTION, SOLUTION SUBCUTANEOUS at 21:50

## 2019-03-02 NOTE — ED PROVIDER NOTES
History  Chief Complaint   Patient presents with    Cough     pt reports having a "lung infection" for 1 month  pt was on levaquin for 10 days  a repeat xray was done after the medication and it was still there  pt has a CT scheduled 0900 Monday morning  pt started coughing last night  Kevin Russo is a 75 y o  male w PMH DM, HTN, HLD who presents for evaluation of shortness of breath  Pt reports he has a lung infection ongoing for the past month  Reports he has had 2 recent cxr that have showed a persistent infection  He was placed on levaquin for a 7 days course and felt slightly better but cxr continued to look abnormal  Overall he does not feel much better  He is intermittently having chest pain on the L side of the chest  Has been having twinges of pain there for the past few years but recently it has been more frequent  Seems to come on at random  There are no alleviating or exacerbating factors  Intermittently is having some episodes of sweating, not related to chest pain which he thinks are indicative of fever  He has not checked his temp  He has a dry cough  No prior hx of DVT / PE but reports recently did have some L side leg swelling that has now resolved  Denies asthma or copd but does wear cpap at night  Prior to Admission Medications   Prescriptions Last Dose Informant Patient Reported? Taking? Cholecalciferol (VITAMIN D) 2000 units CAPS   Yes No   Sig: Take by mouth   Empagliflozin (JARDIANCE) 25 MG TABS   Yes No   Sig: Take 25 mg by mouth every morning   Insulin Degludec (TRESIBA FLEXTOUCH SC)   Yes No   Sig: Inject 20 Units under the skin daily at bedtime   Omega-3 Fatty Acids (FISH OIL CONCENTRATE PO)  Self Yes No   Sig: Take by mouth   aspirin (ECOTRIN LOW STRENGTH) 81 mg EC tablet   Yes Yes   Sig: Take by mouth 2 (two) times a day     fluticasone-umeclidinium-vilanterol (TRELEGY ELLIPTA) 100-62 5-25 MCG/INH inhaler   Yes Yes   Sig: Inhale 1 puff daily Rinse mouth after use  insulin regular (HumuLIN R,NovoLIN R) 100 units/mL injection   Yes No   Sig: Inject 12 Units under the skin once   multivitamin-iron-minerals-folic acid (CENTRUM) chewable tablet   Yes No   Sig: Chew 1 tablet daily   promethazine-codeine (PHENERGAN WITH CODEINE) 6 25-10 mg/5 mL syrup   Yes Yes   Sig: Take 5 mL by mouth every 4 (four) hours as needed for cough   simvastatin (ZOCOR) 40 mg tablet   Yes No   Sig: Take 40 mg by mouth daily at bedtime      Facility-Administered Medications: None       Past Medical History:   Diagnosis Date    Diabetes mellitus ([facility] [address] )     Hiatal hernia     Hyperlipidemia     Hypertension     Shortness of breath        Past Surgical History:   Procedure Laterality Date    JOINT REPLACEMENT Bilateral     knees    LA COLONOSCOPY FLX DX W/COLLJ SPEC WHEN PFRMD N/A 6/30/2017    Procedure: EGD AND COLONOSCOPY;  Surgeon: Ellen Andrade MD;  Location: [facility]; Service: General    TONSILLECTOMY         History reviewed  No pertinent family history  I have reviewed and agree with the history as documented  Social History     Tobacco Use    Smoking status: Never Smoker    Smokeless tobacco: Never Used   Substance Use Topics    Alcohol use: No    Drug use: No        Review of Systems   Constitutional: Negative for activity change, chills, diaphoresis, fatigue and fever  HENT: Negative for congestion and rhinorrhea  Eyes: Negative for pain  Respiratory: Positive for cough, chest tightness and shortness of breath  Negative for wheezing  Cardiovascular: Negative for chest pain and palpitations  Gastrointestinal: Negative for abdominal distention, constipation, diarrhea, nausea and vomiting  Genitourinary: Negative for difficulty urinating and dysuria  Musculoskeletal: Negative for arthralgias and myalgias  Neurological: Negative for dizziness, weakness, light-headedness and headaches  Psychiatric/Behavioral: The patient is not nervous/anxious          Physical Exam  Physical Exam   Constitutional: He is oriented to person, place, and time  He appears well-developed and well-nourished  No distress  HENT:   Head: Normocephalic and atraumatic  Eyes: Pupils are equal, round, and reactive to light  Neck: Neck supple  No tracheal deviation present  Cardiovascular: Normal rate, regular rhythm and intact distal pulses  Exam reveals no gallop and no friction rub  No murmur heard  Pulmonary/Chest: Effort normal and breath sounds normal  No respiratory distress  He has no wheezes  He has no rales  Abdominal: Soft  Bowel sounds are normal  He exhibits no distension and no mass  There is no tenderness  There is no guarding  Musculoskeletal: He exhibits no edema or deformity  Neurological: He is alert and oriented to person, place, and time  Skin: Skin is warm and dry  He is not diaphoretic  Psychiatric: He has a normal mood and affect  His behavior is normal    Nursing note and vitals reviewed        Vital Signs  ED Triage Vitals [03/02/19 1453]   Temperature Pulse Respirations Blood Pressure SpO2   98 6 °F (37 °C) 64 16 154/72 91 %      Temp Source Heart Rate Source Patient Position - Orthostatic VS BP Location FiO2 (%)   Oral Monitor Sitting Left arm --      Pain Score       No Pain           Vitals:    03/02/19 1906 03/02/19 2130 03/02/19 2154 03/02/19 2325   BP: 146/67 112/62  145/65   Pulse: 61 62 60 66   Patient Position - Orthostatic VS: Lying Lying  Sitting       Visual Acuity      ED Medications  Medications   aspirin (ECOTRIN LOW STRENGTH) EC tablet 81 mg (has no administration in time range)   cholecalciferol (VITAMIN D3) tablet 2,000 Units (has no administration in time range)   insulin glargine (LANTUS) subcutaneous injection 26 Units 0 26 mL (26 Units Subcutaneous Given 3/2/19 2150)   multivitamin-minerals (CENTRUM) tablet 1 tablet (has no administration in time range)   fish oil capsule 1,000 mg (has no administration in time range)   pravastatin (PRAVACHOL) tablet 80 mg (has no administration in time range)   pantoprazole (PROTONIX) EC tablet 40 mg (has no administration in time range)   sodium chloride (OCEAN) 0 65 % nasal spray 1 spray (has no administration in time range)   insulin lispro (HumaLOG) 100 units/mL subcutaneous injection 2-12 Units (has no administration in time range)   insulin lispro (HumaLOG) 100 units/mL subcutaneous injection 1-6 Units (1 Units Subcutaneous Given 3/2/19 2201)   acetaminophen (TYLENOL) tablet 650 mg (has no administration in time range)   iohexol (OMNIPAQUE) 350 MG/ML injection (MULTI-DOSE) 100 mL (100 mL Intravenous Given 3/2/19 1749)       Diagnostic Studies  Results Reviewed     Procedure Component Value Units Date/Time    Troponin I [[id number]]  (Normal) Collected:  03/02/19 2128    Lab Status:  Final result Specimen:  Blood from Arm, Right Updated:  03/02/19 2158     Troponin I 0 04 ng/mL     Fingerstick Glucose (POCT) [[id number]]  (Abnormal) Collected:  03/02/19 2149    Lab Status:  Final result Updated:  03/02/19 2152     POC Glucose 184 mg/dl     Platelet count [[id number]]  (Normal) Collected:  03/02/19 2128    Lab Status:  Final result Specimen:  Blood from Arm, Right Updated:  03/02/19 2138     Platelets 934 Thousands/uL      MPV 11 1 fL     Procalcitonin [[id number]] Collected:  03/02/19 2128    Lab Status: In process Specimen:  Blood from Arm, Right Updated:  03/02/19 2135    Hemoglobin A1c w/EAG Estimation (Orders if not completed within the last 90 days) [[id number]] Collected:  03/02/19 2128    Lab Status: In process Specimen:  Blood from Arm, Right Updated:  03/02/19 2135    Troponin I [[id number]]  (Abnormal) Collected:  03/02/19 1900    Lab Status:  Final result Specimen:  Blood from Arm, Right Updated:  03/02/19 1940     Troponin I 0 05 ng/mL     Influenza A/B and RSV by PCR [[id number]] Collected:  03/02/19 1900    Lab Status:   In process Specimen:  Nasopharyngeal Swab Updated:  03/02/19 1916    Lactic acid, plasma [[id number]]  (Normal) Collected:  03/02/19 1552    Lab Status:  Final result Specimen:  Blood from Arm, Right Updated:  03/02/19 1629     LACTIC ACID 0 8 mmol/L     Narrative:       Result may be elevated if tourniquet was used during collection  Troponin I [[id number]]  (Abnormal) Collected:  03/02/19 1552    Lab Status:  Final result Specimen:  Blood from Arm, Right Updated:  03/02/19 1627     Troponin I 0 05 ng/mL     Basic metabolic panel [[id number]] Collected:  03/02/19 1552    Lab Status:  Final result Specimen:  Blood from Arm, Right Updated:  03/02/19 1625     Sodium 142 mmol/L      Potassium 4 2 mmol/L      Chloride 104 mmol/L      CO2 29 mmol/L      ANION GAP 9 mmol/L      BUN 12 mg/dL      Creatinine 0 69 mg/dL      Glucose 103 mg/dL      Calcium 9 3 mg/dL      eGFR 94 ml/min/1 73sq m     Narrative:       National Kidney Disease Education Program recommendations are as follows:  GFR calculation is accurate only with a steady state creatinine  Chronic Kidney disease less than 60 ml/min/1 73 sq  meters  Kidney failure less than 15 ml/min/1 73 sq  meters  Hepatic function panel [[id number]]  (Abnormal) Collected:  03/02/19 1552    Lab Status:  Final result Specimen:  Blood from Arm, Right Updated:  03/02/19 1625     Total Bilirubin 1 00 mg/dL      Bilirubin, Direct 0 22 mg/dL      Alkaline Phosphatase 58 U/L      AST 21 U/L      ALT 33 U/L      Total Protein 7 5 g/dL      Albumin 3 5 g/dL     Respiratory Pathogen Profile, PCR [[id number]] Collected:  03/02/19 1622    Lab Status:   In process Specimen:  Nasopharyngeal Updated:  03/02/19 1622    CBC and differential [[id number]]  (Abnormal) Collected:  03/02/19 1552    Lab Status:  Final result Specimen:  Blood from Arm, Right Updated:  03/02/19 1600     WBC 11 64 Thousand/uL      RBC 5 38 Million/uL      Hemoglobin 16 0 g/dL      Hematocrit 49 2 %      MCV 91 fL      MCH 29 7 pg      MCHC 32 5 g/dL      RDW 15 0 %      MPV 11 2 fL      Platelets 418 Thousands/uL      nRBC 0 /100 WBCs      Neutrophils Relative 48 %      Immat GRANS % 0 %      Lymphocytes Relative 36 %      Monocytes Relative 12 %      Eosinophils Relative 4 %      Basophils Relative 0 %      Neutrophils Absolute 5 56 Thousands/µL      Immature Grans Absolute 0 02 Thousand/uL      Lymphocytes Absolute 4 22 Thousands/µL      Monocytes Absolute 1 34 Thousand/µL      Eosinophils Absolute 0 45 Thousand/µL      Basophils Absolute 0 05 Thousands/µL                  CTA ED chest PE study   Final Result by Kali Menendez MD (03/02 1800)      No evidence of pulmonary embolus  Workstation performed: [id number]         XR chest 2 views   Final Result by Jeff Power MD (03/02 1556)      No acute consolidation or congestion   Cardiomegaly            Workstation performed: [id number]                    Procedures  Procedures       Phone Contacts  ED Phone Contact    ED Course  ED Course as of Mar 02 2332   Sat Mar 02, 2019   1821 EKG Interpretation    Rate: 60 BPM  Rhythm: NSR  Axis: normal  Intervals: Normal, no blocks, QTc 428ms  Q waves: III  T waves: flattened in III   ST segments: no dep / elevation     Impression: abnormal EKG  EKG for comparison: compared to 5/29/18 there are T wave changes in lead III   EKG interpreted by me                 HEART Risk Score      Most Recent Value   History  0 Filed at: 03/02/2019 1823   ECG  1 Filed at: 03/02/2019 1823   Age  2 Filed at: 03/02/2019 1823   Risk Factors  2 Filed at: 03/02/2019 1823   Troponin  1 Filed at: 03/02/2019 1823   Heart Score Risk Calculator   History  0 Filed at: 03/02/2019 1823   ECG  1 Filed at: 03/02/2019 1823   Age  2 Filed at: 03/02/2019 1823   Risk Factors  2 Filed at: 03/02/2019 1823   Troponin  1 Filed at: 03/02/2019 1823   HEART Score  6 Filed at: 03/02/2019 1823   HEART Score  6 Filed at: 03/02/2019 1823        Identification of Seniors at Risk      Most Recent Value   (ISAR) Identification of Seniors at Risk Before the illness or injury that brought you to the Emergency, did you need someone to help you on a regular basis? 0 Filed at: 03/02/2019 1454   In the last 24 hours, have you needed more help than usual?  0 Filed at: 03/02/2019 1454   Have you been hospitalized for one or more nights during the past 6 months? 0 Filed at: 03/02/2019 1454   In general, do you see well?  0 Filed at: 03/02/2019 1454   In general, do you have serious problems with your memory? 0 Filed at: 03/02/2019 1454   Do you take more than three different medications every day?   1 Filed at: 03/02/2019 1454   ISAR Score  1 Filed at: 03/02/2019 1454                    Wells' Criteria for PE      Most Recent Value   Wells' Criteria for PE   Clinical signs and symptoms of DVT  3 Filed at: 03/02/2019 1536   PE is primary diagnosis or equally likely  3 Filed at: 03/02/2019 1536   HR >100  0 Filed at: 03/02/2019 1536   Immobilization at least 3 days or Surgery in the previous 4 weeks  0 Filed at: 03/02/2019 1536   Previous, objectively diagnosed PE or DVT  0 Filed at: 03/02/2019 1536   Hemoptysis  1 Filed at: 03/02/2019 1536   Malignancy with treatment within 6 months or palliative  0 Filed at: 03/02/2019 1536   Wells' Criteria Total  7 Filed at: 03/02/2019 1536            MDM  Number of Diagnoses or Management Options  Chest pain:   Diagnosis management comments: DDX includes but not ltd to:   Consider pna / bronchitis / flu   Consider acs although chest pain seems somewhat more chronic   Consider PE     Plan is to obtain:  CBC to check for anemia, leukocytosis, hydration status  Chemistry panel to check for lyte abnormalities, organ function   EKG/trop to check for ischemic changes   CTA PE study for PE     Based on results:  Admit given elevated trop / heart score / chest pain and intermittent diaphoresis that may be related to cardiac etiology as does not seem to be an obvious infectious etiology at this time           Disposition  Final diagnoses:   Chest pain     Time reflects when diagnosis was documented in both MDM as applicable and the Disposition within this note     Time User Action Codes Description Comment    3/2/2019  6:50 PM Ngozi Sánchez [R07 [id number]] Chest pain       ED Disposition     ED Disposition Condition Date/Time Comment    Admit Stable Sat Mar 2, 2019  6:50 PM Case was discussed with Jaime Dove and the patient's admission status was agreed to be Admission Status: observation status to the service of Dr Jaime Dove   Follow-up Information    None         Patient's Medications   Discharge Prescriptions    No medications on file     No discharge procedures on file      ED Provider  Electronically Signed by           Radha Khan PA-C  03/02/19 4150

## 2019-03-03 ENCOUNTER — APPOINTMENT (OUTPATIENT)
Dept: NON INVASIVE DIAGNOSTICS | Facility: HOSPITAL | Age: 74
End: 2019-03-03
Payer: MEDICARE

## 2019-03-03 LAB
ANION GAP SERPL CALCULATED.3IONS-SCNC: 8 MMOL/L (ref 4–13)
BUN SERPL-MCNC: 12 MG/DL (ref 5–25)
CALCIUM SERPL-MCNC: 9.3 MG/DL (ref 8.3–10.1)
CHLORIDE SERPL-SCNC: 105 MMOL/L (ref 100–108)
CO2 SERPL-SCNC: 29 MMOL/L (ref 21–32)
CREAT SERPL-MCNC: 0.68 MG/DL (ref 0.6–1.3)
ERYTHROCYTE [DISTWIDTH] IN BLOOD BY AUTOMATED COUNT: 14.9 % (ref 11.6–15.1)
EST. AVERAGE GLUCOSE BLD GHB EST-MCNC: 160 MG/DL
FLUAV AG SPEC QL: NOT DETECTED
FLUBV AG SPEC QL: NOT DETECTED
GFR SERPL CREATININE-BSD FRML MDRD: 94 ML/MIN/1.73SQ M
GLUCOSE P FAST SERPL-MCNC: 121 MG/DL (ref 65–99)
GLUCOSE SERPL-MCNC: 104 MG/DL (ref 65–140)
GLUCOSE SERPL-MCNC: 119 MG/DL (ref 65–140)
GLUCOSE SERPL-MCNC: 121 MG/DL (ref 65–140)
GLUCOSE SERPL-MCNC: 142 MG/DL (ref 65–140)
GLUCOSE SERPL-MCNC: 176 MG/DL (ref 65–140)
GLUCOSE SERPL-MCNC: 38 MG/DL (ref 65–140)
GLUCOSE SERPL-MCNC: 57 MG/DL (ref 65–140)
GLUCOSE SERPL-MCNC: 74 MG/DL (ref 65–140)
HBA1C MFR BLD: 7.2 % (ref 4.2–6.3)
HCT VFR BLD AUTO: 49.6 % (ref 36.5–49.3)
HGB BLD-MCNC: 15.9 G/DL (ref 12–17)
MCH RBC QN AUTO: 29.6 PG (ref 26.8–34.3)
MCHC RBC AUTO-ENTMCNC: 32.1 G/DL (ref 31.4–37.4)
MCV RBC AUTO: 92 FL (ref 82–98)
PLATELET # BLD AUTO: 170 THOUSANDS/UL (ref 149–390)
PMV BLD AUTO: 11 FL (ref 8.9–12.7)
POTASSIUM SERPL-SCNC: 4 MMOL/L (ref 3.5–5.3)
PROCALCITONIN SERPL-MCNC: <0.05 NG/ML
RBC # BLD AUTO: 5.38 MILLION/UL (ref 3.88–5.62)
RSV B RNA SPEC QL NAA+PROBE: NOT DETECTED
SODIUM SERPL-SCNC: 142 MMOL/L (ref 136–145)
WBC # BLD AUTO: 10.25 THOUSAND/UL (ref 4.31–10.16)

## 2019-03-03 PROCEDURE — 85027 COMPLETE CBC AUTOMATED: CPT | Performed by: PHYSICIAN ASSISTANT

## 2019-03-03 PROCEDURE — 93306 TTE W/DOPPLER COMPLETE: CPT | Performed by: INTERNAL MEDICINE

## 2019-03-03 PROCEDURE — 82948 REAGENT STRIP/BLOOD GLUCOSE: CPT

## 2019-03-03 PROCEDURE — 93306 TTE W/DOPPLER COMPLETE: CPT

## 2019-03-03 PROCEDURE — 36415 COLL VENOUS BLD VENIPUNCTURE: CPT | Performed by: PHYSICIAN ASSISTANT

## 2019-03-03 PROCEDURE — 80048 BASIC METABOLIC PNL TOTAL CA: CPT | Performed by: PHYSICIAN ASSISTANT

## 2019-03-03 PROCEDURE — 99225 PR SBSQ OBSERVATION CARE/DAY 25 MINUTES: CPT | Performed by: INTERNAL MEDICINE

## 2019-03-03 PROCEDURE — 99204 OFFICE O/P NEW MOD 45 MIN: CPT | Performed by: INTERNAL MEDICINE

## 2019-03-03 RX ADMIN — Medication 1 TABLET: at 09:07

## 2019-03-03 RX ADMIN — INSULIN LISPRO 2 UNITS: 100 INJECTION, SOLUTION INTRAVENOUS; SUBCUTANEOUS at 17:04

## 2019-03-03 RX ADMIN — PRAVASTATIN SODIUM 80 MG: 80 TABLET ORAL at 17:03

## 2019-03-03 RX ADMIN — PANTOPRAZOLE SODIUM 40 MG: 40 TABLET, DELAYED RELEASE ORAL at 05:34

## 2019-03-03 RX ADMIN — VITAMIN D, TAB 1000IU (100/BT) 2000 UNITS: 25 TAB at 09:07

## 2019-03-03 RX ADMIN — INSULIN GLARGINE 26 UNITS: 100 INJECTION, SOLUTION SUBCUTANEOUS at 22:14

## 2019-03-03 RX ADMIN — Medication 1000 MG: at 09:08

## 2019-03-03 RX ADMIN — ASPIRIN 81 MG: 81 TABLET, COATED ORAL at 09:07

## 2019-03-03 NOTE — H&P
H&P- Hernán Dhaliwal 1945, 75 y o  male MRN: [id number]    Unit/Bed#: ED 10 Encounter: [id number]    Primary Care Provider: Tennie Krabbe, MD   Date and time admitted to hospital: 3/2/2019  3:18 PM       DOS: 3/2/2019    * Chest pain  Assessment & Plan  · Pt reports intermittent chest pain that feels like a "dull aching" x 3-4 months   · Not exertional, denies additional shortness of breath or diaphoresis  · EKG NSR without ST elevations noted  · Denies any previous personal history of heart disease, denies any fhx of heart disease  · Troponin x 2 (0 05 x 2, flatly elevated) continue to trend  · Telemetry monitoring  · Obtain ECHO  · Cardiology consultation   · Continue ASA, statin     Chronic cough  Assessment & Plan  · Pt reports history of chronic cough x many years  · Reports one month ago he was treated with Levaquin for a "lung infection", CXR and CT PE scan negative for consolidation/infiltrate, no need for abx at this time  · Obtain procalcitonin   · Pt with underlying COPD/JED, may have underlying pulmonary hypertension, obtain ECHO  · Does not appear to be in acute exacerbation of COPD at this time  · Respiratory protocol  · Supplemental O2 PRN  · Influenza/respiratory pathogen panel pending  · Trial PPI, could be secondary to reflux?   · PRN nasal spray    JED (obstructive sleep apnea)  Assessment & Plan  · Cpap at night    Elevated troponin  Assessment & Plan  · Troponin 0 05/0 05  · Continue to trend troponin  · Obtain ECHO  · Likely NSTEMI type II  · Cardiology consultation    Leukocytosis  Assessment & Plan  · Mild, WBC 11 64  · Likely reactive, no signs of infectious etiology currently on imaging   · Lactic acid negative  · Obtain procalcitonin  · Trend CBC    Essential hypertension  Assessment & Plan  · BP appears to be fairly controlled here  · Not currently on any medications, if sustained elevations may need to consider addition of antihypertensive   · Monitor    Diabetes mellitus [facility])  Assessment & Plan  · No results found for: HGBA1C    No results for input(s): POCGLU in the last 72 hours  Blood Sugar Average: Last 72 hrs:  · Obtain hgbA1c  · Pt is on Triseba 26 units at home, PRN humulin R   · Triseba 26 units has 1:1 ratio with lantus, continue Lantus while here  · SSI coverage  · QID glucose checks  · Hold PO medications while inpatient  · Monitor, adjust regimen as necessary      VTE Prophylaxis: Enoxaparin (Lovenox)  / sequential compression device   Code Status: Level I - full code, discussed with patient and patient's wife at bedside   POLST: There is no POLST form on file for this patient (pre-hospital)  Discussion with family:  Discussed with patient and patient's wife at bedside    Anticipated Length of Stay:  Patient will be admitted on an Observation basis with an anticipated length of stay of  < 2 midnights  Justification for Hospital Stay:  Chest pain and cough with elevated troponin, obtain echo and trend serial troponins    Total Time for Visit, including Counseling / Coordination of Care: 45 minutes  Greater than 50% of this total time spent on direct patient counseling and coordination of care  Chief Complaint:   "I was having a cough"    History of Present Illness:    Sandi Olson is a 75 y o  male with significant past medical history of diabetes mellitus, hyperlipidemia, hypertension JED on CPAP who presents with chronic cough and chest pain  Patient reports that he has had a chronic cough for many years  He report that it got worse over the last month when he was diagnosed with a "lung infection"for which she was treated with Levaquin and repeat imaging was taken 2 weeks later that showed the same lung infection  He reports he was not put on any additional antibiotics at that time  He reports history of chronic obstructive pulmonic disease and JED for which he is on CPAP at night    He reports he follows up with Pulmonary as an outpatient and recently saw his pulmonologist about 4 months ago  He continues to have significant cough, reports that it is a dry cough and at times will have some small production with white mucus  Denies any significant shortness of breath  Denies any use of oxygen or nebulizers at home  He also reports a few months history of chest pain that is intermittent  He reports that feels like a dull aching pain and states that is vertical starting from his shoulder down to the top of his abdomen  He denies any pain currently  He reports the reason he came in today was for his cough  Review of Systems:    Review of Systems   Constitutional: Negative for chills, diaphoresis, fatigue and fever  HENT: Negative for congestion, sinus pressure, sinus pain, sneezing, sore throat, tinnitus and trouble swallowing  Eyes: Negative  Negative for visual disturbance  Respiratory: Positive for cough  Negative for chest tightness, shortness of breath and wheezing  Cardiovascular: Positive for chest pain  Negative for palpitations and leg swelling  Gastrointestinal: Negative for abdominal pain, constipation, diarrhea, nausea and vomiting  Genitourinary: Negative for difficulty urinating, dysuria, hematuria and urgency  Musculoskeletal: Negative for back pain, gait problem, joint swelling and myalgias  Skin: Negative for color change, pallor and rash  Neurological: Negative for dizziness, syncope, light-headedness and headaches  Past Medical and Surgical History:     Past Medical History:   Diagnosis Date    Diabetes mellitus ([address] )     Hiatal hernia     Hyperlipidemia     Hypertension     Shortness of breath        Past Surgical History:   Procedure Laterality Date    JOINT REPLACEMENT Bilateral     knees    [place] COLONOSCOPY FLX DX W/COLLJ SPEC WHEN PFRMD N/A 6/30/2017    Procedure: EGD AND COLONOSCOPY;  Surgeon: Abdirahman Loomis MD;  Location: [facility];   Service: General    TONSILLECTOMY Meds/Allergies:    Prior to Admission medications    Medication Sig Start Date End Date Taking? Authorizing Provider   aspirin (ECOTRIN LOW STRENGTH) 81 mg EC tablet Take by mouth 2 (two) times a day     Yes Historical Provider, MD   fluticasone-umeclidinium-vilanterol (TRELEGY ELLIPTA) 100-62 5-25 MCG/INH inhaler Inhale 1 puff daily Rinse mouth after use  Yes Historical Provider, MD   promethazine-codeine (PHENERGAN WITH CODEINE) 6 25-10 mg/5 mL syrup Take 5 mL by mouth every 4 (four) hours as needed for cough   Yes Historical Provider, MD   Cholecalciferol (VITAMIN D) 2000 units CAPS Take by mouth    Historical Provider, MD   Empagliflozin (JARDIANCE) 25 MG TABS Take 25 mg by mouth every morning    Historical Provider, MD   Insulin Degludec (TRESIBA FLEXTOUCH SC) Inject 20 Units under the skin daily at bedtime    Historical Provider, MD   insulin regular (HumuLIN R,NovoLIN R) 100 units/mL injection Inject 12 Units under the skin once    Historical Provider, MD   multivitamin-iron-minerals-folic acid (CENTRUM) chewable tablet Chew 1 tablet daily    Historical Provider, MD   Omega-3 Fatty Acids (FISH OIL CONCENTRATE PO) Take by mouth    Historical Provider, MD   simvastatin (ZOCOR) 40 mg tablet Take 40 mg by mouth daily at bedtime    Historical Provider, MD     I have reviewed home medications with patient personally      Allergies: No Known Allergies    Social History:     Marital Status: /Civil Union   Occupation:   Patient Pre-hospital Living Situation:  Patient lives with his wife at home  Patient Pre-hospital Level of Mobility:  Full mobility  Patient Pre-hospital Diet Restrictions:  Diabetic  Substance Use History:   Social History     Substance and Sexual Activity   Alcohol Use No     Social History     Tobacco Use   Smoking Status Never Smoker   Smokeless Tobacco Never Used     Social History     Substance and Sexual Activity   Drug Use No       Family History:    non-contributory patient denies any cardiac history in the family    Physical Exam:     Vitals:   Blood Pressure: 146/67 (03/02/19 1906)  Pulse: 61 (03/02/19 1906)  Temperature: 98 6 °F (37 °C) (03/02/19 1453)  Temp Source: Oral (03/02/19 1453)  Respirations: 20 (03/02/19 1906)  Height: 5' 10" (177 8 cm) (03/02/19 1453)  Weight - Scale: 116 kg (255 lb) (03/02/19 1453)  SpO2: 92 % (03/02/19 1906)    Physical Exam   Constitutional: He appears well-nourished  No distress  Patient is an obese male who is in no acute distress sitting in his hospital bed resting comfortably  No conversational dyspnea or increased work of breathing noted  Accompanied by his wife  HENT:   Head: Normocephalic and atraumatic  Eyes: Pupils are equal, round, and reactive to light  Conjunctivae are normal    Cardiovascular: Normal rate, regular rhythm and intact distal pulses  Pulmonary/Chest: Effort normal and breath sounds normal  No respiratory distress  He has no wheezes  He has no rales  Abdominal: Soft  Bowel sounds are normal  He exhibits no distension  There is no tenderness  There is no guarding  Musculoskeletal: He exhibits no edema  Appears euvolemic   Neurological: He is alert  Skin: Skin is warm and dry  He is not diaphoretic  No erythema  Vitals reviewed  Additional Data:     Lab Results: I have personally reviewed pertinent reports        Results from last 7 days   Lab Units 03/02/19  1552   WBC Thousand/uL 11 64*   HEMOGLOBIN g/dL 16 0   HEMATOCRIT % 49 2   PLATELETS Thousands/uL 185   NEUTROS PCT % 48   LYMPHS PCT % 36   MONOS PCT % 12   EOS PCT % 4     Results from last 7 days   Lab Units 03/02/19  1552   SODIUM mmol/L 142   POTASSIUM mmol/L 4 2   CHLORIDE mmol/L 104   CO2 mmol/L 29   BUN mg/dL 12   CREATININE mg/dL 0 69   ANION GAP mmol/L 9   CALCIUM mg/dL 9 3   ALBUMIN g/dL 3 5   TOTAL BILIRUBIN mg/dL 1 00   ALK PHOS U/L 58   ALT U/L 33   AST U/L 21   GLUCOSE RANDOM mg/dL 103                 Results from last 7 days   Lab Units 03/02/19  1552   LACTIC ACID mmol/L 0 8       Imaging: I have personally reviewed pertinent reports  CTA ED chest PE study   Final Result by Amilcar Ellis MD (03/02 1800)      No evidence of pulmonary embolus  Workstation performed: [id number]         XR chest 2 views   Final Result by Carolyn Maguire MD (03/02 1556)      No acute consolidation or congestion   Cardiomegaly            Workstation performed: [id number]             EKG, Pathology, and Other Studies Reviewed on Admission:   · CTA and chest x-ray reviewed  · EKG NSR, no ST elevations noted    Allscripts / Epic Records Reviewed: Yes     ** Please Note: This note has been constructed using a voice recognition system   **

## 2019-03-03 NOTE — ASSESSMENT & PLAN NOTE
· BP appears to be fairly controlled here  · Not currently on any medications, if sustained elevations may need to consider addition of antihypertensive   · Monitor

## 2019-03-03 NOTE — ASSESSMENT & PLAN NOTE
· Pt reports intermittent chest pain that feels like a "dull aching" x 3-4 months   · Not exertional, denies additional shortness of breath or diaphoresis  · EKG NSR without ST elevations noted  · Denies any previous personal history of heart disease, denies any fhx of heart disease  · Troponin x 2 (0 05 x 2, flatly elevated) continue to trend  · Telemetry monitoring  · Obtain ECHO  · Cardiology consultation   · Continue ASA, statin

## 2019-03-03 NOTE — CONSULTS
Consultation - Cardiology  Zenaida Canavan 75 y o  male MRN: [id number]  Unit/Bed#: ED 10 Encounter: [id number]    Consults    Physician Requesting Consult: Ron Florez MD  Reason for Consult / Principal Problem:  Chest discomfort and shortness of breath    Chief Complaint   Patient presents with    Cough     pt reports having a "lung infection" for 1 month  pt was on levaquin for 10 days  a repeat xray was done after the medication and it was still there  pt has a CT scheduled 0900 Monday morning  pt started coughing last night  HPI: Cardiologist Dr Asia Jerez is a 75y o  year old male who presents with symptoms of shortness of breath and chest pain  Patient has been treated for a pulmonary infection in the recent past   Patient was on antibiotics  Patient had a repeat chest x-ray done  Patient also has cough  Patient was scheduled to have further evaluation  Patient in the interim has had symptoms of intermittent chest discomfort as well as shortness of breath with exertion  Patient states that he has lost at least 30 lb in the past 1 to 2 years  Patient in spite of this has shortness of breath with exertion  Patient does not remember having a cardiac evaluation in the recent past   Last stress test was many years ago  Patient denies any history of coronary artery disease or MI in the past   Patient does have history of sleep apnea and is on CPAP        REVIEW OF SYSTEMS:  Constitutional:  Denies fever or chills   Eyes:  Denies change in visual acuity   HENT:  Denies nasal congestion or sore throat   Respiratory:  shortness of breath   Cardiovascular:  Chest discomfort  GI:  Denies abdominal pain, nausea, vomiting, bloody stools or diarrhea   :  Denies dysuria, frequency, difficulty in micturition and nocturia  Musculoskeletal:  Denies back pain or joint pain   Neurologic:  Denies headache, focal weakness or sensory changes   Endocrine:  Denies polyuria or polydipsia Lymphatic:  Denies swollen glands   Psychiatric:  Denies depression or anxiety     Historical Information   Past Medical History:   Diagnosis Date    Diabetes mellitus ([address] )     Hiatal hernia     Hyperlipidemia     Hypertension     Shortness of breath      Past Surgical History:   Procedure Laterality Date    JOINT REPLACEMENT Bilateral     knees    [place] COLONOSCOPY FLX DX W/COLLJ SPEC WHEN PFRMD N/A 6/30/2017    Procedure: EGD AND COLONOSCOPY;  Surgeon: Ellen Andrade MD;  Location: [facility]; Service: General    TONSILLECTOMY       Social History     Substance and Sexual Activity   Alcohol Use No     Social History     Substance and Sexual Activity   Drug Use No     Social History     Tobacco Use   Smoking Status Never Smoker   Smokeless Tobacco Never Used     Family History: non-contributory    MEDS & ALLERGIES:  all current active meds have been reviewed     No Known Allergies    OBJECTIVE:  Vitals:   Vitals:    03/03/19 1247   BP: 112/57   Pulse: 63   Resp: 16   Temp:    SpO2: 92%     Body mass index is 36 59 kg/m²  Systolic (79OGR), SBQ:432 , Min:112 , IIA:115     Diastolic (58OEB), SAW:09, Min:57, Max:79    No intake or output data in the 24 hours ending 03/03/19 1341  Weight (last 2 days)     Date/Time   Weight    03/02/19 1453   116 (255)            Invasive Devices     Peripheral Intravenous Line            Peripheral IV 03/03/19 Left Arm less than 1 day                PHYSICAL EXAMS:  General:  Patient is not in acute distress, laying in the bed comfortably, awake, alert responding to commands  Head: Normocephalic, Atraumatic  HEENT:  Both pupils normal-size atraumatic, normocephalic, nonicteric  Neck:  JVP not raised  Trachea central  Respiratory:  Decreased breath sounds bilaterally  Cardiovascular:  S1-S2 normal without any murmur   GI:  Abdomen soft nontender   Liver and spleen normal size    Integument:  No skin rashes or ulceration  Lymphatic:  No cervical or inguinal lymphadenopathy  Neurologic:  Patient is awake alert, responding to command, well-oriented to time and place and person  Extremities reveal trace edema    LABORATORY RESULTS:  Results from last 7 days   Lab Units 198 19  1900 19  1552   TROPONIN I ng/mL 0 04 0 05* 0 05*     CBC with diff:   Results from last 7 days   Lab Units 19  0540 198 19  1552   WBC Thousand/uL 10 25*  --  11 64*   HEMOGLOBIN g/dL 15 9  --  16 0   HEMATOCRIT % 49 6*  --  49 2   MCV fL 92  --  91   PLATELETS Thousands/uL 170 171 185   MCH pg 29 6  --  29 7   MCHC g/dL 32 1  --  32 5   RDW % 14 9  --  15 0   MPV fL 11 0 11 1 11 2   NRBC AUTO /100 WBCs  --   --  0       CMP:  Results from last 7 days   Lab Units 19  0541 19  1552   POTASSIUM mmol/L 4 0 4 2   CHLORIDE mmol/L 105 104   CO2 mmol/L 29 29   BUN mg/dL 12 12   CREATININE mg/dL 0 68 0 69   CALCIUM mg/dL 9 3 9 3   AST U/L  --  21   ALT U/L  --  33   ALK PHOS U/L  --  58   EGFR ml/min/1 73sq m 94 94       BMP:  Results from last 7 days   Lab Units 19  0541 19  1552   POTASSIUM mmol/L 4 0 4 2   CHLORIDE mmol/L 105 104   CO2 mmol/L 29 29   BUN mg/dL 12 12   CREATININE mg/dL 0 68 0 69   CALCIUM mg/dL 9 3 9 3                              Lipid Profile:   No results found for: CHOL  No results found for: HDL  No results found for: LDLCALC  No results found for: TRIG    Cardiac testing:   Results for orders placed during the hospital encounter of 19   Echo complete with contrast if indicated    Narrative [address] [place] [address] A [place], [place] [address] [phone]    Transthoracic Echocardiogram  2D, M-mode, and Color Doppler    Study date:  03-Mar-2019    Patient: Denny Simmons  MR number: [id number]  Account number: [de-identified]  : 1945  Age: 75 years  Gender: Male  Status: Outpatient  Location: Emergency room  Height: 70 in  Weight: 255 lb  BP: 146/ 67 mmHg    Indications: Dyspnea  Diagnoses: R06 00 - Dyspnea, unspecified    Sonographer:  Devine RCS  Referring Physician:  Claudia Ontiveros PA-C  Group:  [facility]  Interpreting Physician:  Eva Griffith MD    SUMMARY    LEFT VENTRICLE:  Ejection fraction was estimated to be 55 %  There were no regional wall motion abnormalities  RIGHT VENTRICLE:  The ventricle was dilated  Systolic function was mildly reduced  Wall thickness was mildly increased  LEFT ATRIUM:  The atrium was mildly dilated  MITRAL VALVE:  There was trace regurgitation  TRICUSPID VALVE:  There was trace regurgitation  PULMONIC VALVE:  There was trace regurgitation  PERICARDIUM:  A trace pericardial effusion was identified  HISTORY: PRIOR HISTORY: Chest pain  Elevated troponin  Risk factors: hypertension, diabetes, and morbid obesity  Chronic lung disease  PROCEDURE: The procedure was performed in the emergency room  This was a routine study  The transthoracic approach was used  The study included complete 2D imaging, M-mode, and color Doppler  The heart rate was 55 bpm, at the start of the  study  Images were obtained from the parasternal, apical, and subcostal acoustic windows  Image quality was adequate  LEFT VENTRICLE: Size was normal  Ejection fraction was estimated to be 55 %  There were no regional wall motion abnormalities  RIGHT VENTRICLE: The ventricle was dilated  Systolic function was mildly reduced  Wall thickness was mildly increased  LEFT ATRIUM: The atrium was mildly dilated  RIGHT ATRIUM: Size was normal     MITRAL VALVE: There was annular calcification  Valve structure was normal  There was normal leaflet separation  DOPPLER: The transmitral velocity was within the normal range  There was no evidence for stenosis  There was trace  regurgitation  AORTIC VALVE: The valve was not well visualized      TRICUSPID VALVE: The valve structure was normal  There was normal leaflet separation  DOPPLER: The transtricuspid velocity was within the normal range  There was no evidence for stenosis  There was trace regurgitation  PULMONIC VALVE: Leaflets exhibited normal thickness, no calcification, and normal cuspal separation  DOPPLER: The transpulmonic velocity was within the normal range  There was trace regurgitation  PERICARDIUM: A trace pericardial effusion was identified  AORTA: The root exhibited normal size  SYSTEM MEASUREMENT TABLES    2D  %FS: 37 %  Ao Diam: 3 7 cm  EDV(Teich): 128 3 ml  EF(Teich): 66 6 %  ESV(Teich): 42 9 ml  IVSd: 1 1 cm  LA Area: 20 3 cm2  LA Diam: 4 3 cm  LVEDV MOD A4C: 96 4 ml  LVEF MOD A4C: 65 8 %  LVESV MOD A4C: 33 ml  LVIDd: 5 2 cm  LVIDs: 3 3 cm  LVLd A4C: 7 4 cm  LVLs A4C: 5 7 cm  LVPWd: 1 3 cm  RA Area: 13 6 cm2  RVIDd: 3 6 cm  SV MOD A4C: 63 5 ml  SV(Teich): 85 4 ml    CW  AV Env  Ti: 315 9 ms  AV VTI: 40 8 cm  AV Vmax: 1 8 m/s  AV Vmean: 1 3 m/s  AV maxP 6 mmHg  AV meanP 2 mmHg    MM  TAPSE: 2 1 cm    PW  E': 0 1 m/s  E/E': 9 9  LVOT Env  Ti: 315 9 ms  LVOT VTI: 27 7 cm  LVOT Vmax: 1 3 m/s  LVOT Vmean: 0 9 m/s  LVOT maxP 4 mmHg  LVOT meanPG: 3 6 mmHg  MV A Paul: 0 8 m/s  MV Dec [place]: 4 4 m/s2  MV DecT: 192 5 ms  MV E Paul: 0 8 m/s  MV E/A Ratio: 1  MV PHT: 55 8 ms  MVA By PHT: 3 9 cm2    [employer] Accredited [facility]chocardi[facility]    Prepared and electronically signed by    Estrella Moreno MD  Signed 03-Mar-2019 09:03:08       No results found for this or any previous visit  No procedure found  No results found for this or any previous visit  Imaging: I have personally reviewed pertinent reports              Assessment/Plan:  Principal Problem:    Chest pain  Active Problems:    Diabetes mellitus (HCC)    Essential hypertension    Chronic cough    Leukocytosis    Elevated troponin    JED (obstructive sleep apnea)      Code Status: Level 1 - Full Code    Patient with recent respiratory illness with symptoms of intermittent chest pain and shortness of breath with exertion of unclear etiology  Patient has borderline elevated troponins of questionable significance  Patient does have significant risk factors for coronary artery disease  Shortness of breath with exertion in spite of losing weight in the recent past is of some concern  Patient will be scheduled for a pharmacological nuclear stress test to assess for ischemia  Sensitivity and specificity of stress test discussed with the patient  Symptoms to watch out from cardiac standpoint which would indicate the need for further cardiac evaluation also discussed with the patient  Patient is agreeable with the plan of care  Patient will follow up with his primary care physician on discharge  Patient had a few questions which were answered  Dietary and risk factor modification to continue  Counseling / Coordination of Care  Total floor / unit time spent today 35  minutes  Greater than 50% of total time was spent with the patient and / or family counseling and / or coordination of care  A description of the counseling / coordination of care: Review of history, current assessment, development of a plan  Malcolm Eagle MD  3/3/2019,1:41 PM    Portions of the record may have been created with voice recognition software   Occasional wrong word or "sound a like" substitutions may have occurred due to the inherent limitations of voice recognition software   Read the chart carefully and recognize, using context, where substitutions have occurred

## 2019-03-03 NOTE — ASSESSMENT & PLAN NOTE
· Mild, WBC 11 64  · Likely reactive, no signs of infectious etiology currently on imaging   · Lactic acid negative  · Obtain procalcitonin  · Trend CBC

## 2019-03-03 NOTE — ASSESSMENT & PLAN NOTE
· Troponin 0 05/0 05  · Continue to trend troponin  · Obtain ECHO  · Likely NSTEMI type II  · Cardiology consultation

## 2019-03-03 NOTE — QUICK NOTE
Patient seen and examined  Patient has had a 20 year history of cough with some shortness of breath he has over the last several months had some left-sided vertical chest pain  He has been on multiple medications for his cough seems to be worse today  He was noted on laboratory testing to have an elevated troponin  His chest x-ray is negative for infiltrate as a CT angio of the chest for pulmonary embolism  Given his elevated troponin will do serial cardiac enzymes monitor him on telemetry and check echocardiogram   Can consider cardiology consult

## 2019-03-03 NOTE — UTILIZATION REVIEW
Initial Clinical Review    Admission: Date/Time/Statement: 3/2/19 @ [facility] This Encounter   Procedures    Place in Observation     Standing Status:   Standing     Number of Occurrences:   1     Order Specific Question:   Admitting Physician     Answer:   Omar Ram [[id number]]     Order Specific Question:   Level of Care     Answer:   Med Surg [16]     ED: Date/Time/Mode of Arrival:   ED Arrival Information     Expected Arrival Acuity Means of Arrival Escorted By Service Admission Type    - 3/2/2019 14:38 Urgent Walk-In Family Member General Medicine Urgent    Arrival Complaint    COUGH        Chief Complaint:   Chief Complaint   Patient presents with    Cough     pt reports having a "lung infection" for 1 month  pt was on levaquin for 10 days  a repeat xray was done after the medication and it was still there  pt has a CT scheduled 0900 Monday morning  pt started coughing last night  History of Illness: Jacqui West is a 75 y o  male w PMH DM, HTN, HLD who presents for evaluation of shortness of breath  Pt reports he has a lung infection ongoing for the past month  Reports he has had 2 recent cxr that have showed a persistent infection  He was placed on levaquin for a 7 days course and felt slightly better but cxr continued to look abnormal  Overall he does not feel much better  He is intermittently having chest pain on the L side of the chest  Has been having twinges of pain there for the past few years but recently it has been more frequent  Denies asthma or copd but does wear cpap at night         ED Vital Signs:   ED Triage Vitals [03/02/19 1453]   Temperature Pulse Respirations Blood Pressure SpO2   98 6 °F (37 °C) 64 16 154/72 91 % on Room Air      Temp Source Heart Rate Source Patient Position - Orthostatic VS BP Location FiO2 (%)   Oral Monitor Sitting Left arm --      Pain Score       No Pain        Wt Readings from Last 1 Encounters:   03/02/19 116 kg (255 lb) Vital Signs (abnormal):     /Time  Temp  Pulse  Resp  BP  SpO2  O2 Device    03/02/19 2325  --  66  23Abnormal   145/65  92 %  Nasal cannula 2L O2    03/02/19 2132  --  --  --  --  91 %  Nasal cannula 2L O2    03/02/19 2130  --  62  16  112/62  89 %  None (Room air)    03/02/19 1906  --  61  20  146/67  92 %  None (Room air)    03/02/19 1748  --  58  19  139/65  95 %  Nasal cannula    03/02/19 1608  --  --  --  --  92 %  Nasal cannula 2L O2    03/02/19 1606  --  61  18  137/79  90 %   None (Room air)      Pertinent Labs/Diagnostic Test Results:     Troponin = 0 05, 0 05, 0 04    WBC = 11 64    CTA chest: no evidence of pulmonary embolus    Chest x-ray: Pleural thickening seen on the right side well-circumscribed margin, stable due to pleural lipomatosis  No acute consolidation or congestion  Cardiomegaly  EKG: NSR 60 BPM, T wave changes when compared with previous EKG      ED Treatment:   Medication Administration from 03/02/2019 1438 to 03/03/2019 1103       Date/Time Order Dose Route Action     03/02/2019 1749 iohexol (OMNIPAQUE) 350 MG/ML injection (MULTI-DOSE) 100 mL 100 mL Intravenous Given     03/03/2019 0907 aspirin (ECOTRIN LOW STRENGTH) EC tablet 81 mg 81 mg Oral Given     03/03/2019 0907 cholecalciferol (VITAMIN D3) tablet 2,000 Units 2,000 Units Oral Given     03/02/2019 2150 insulin glargine (LANTUS) subcutaneous injection 26 Units 0 26 mL 26 Units Subcutaneous Given     03/03/2019 0907 multivitamin-minerals (CENTRUM) tablet 1 tablet 1 tablet Oral Given     03/03/2019 0908 fish oil capsule 1,000 mg 1,000 mg Oral Given     03/03/2019 0534 pantoprazole (PROTONIX) EC tablet 40 mg 40 mg Oral Given     03/03/2019 0852 insulin lispro (HumaLOG) 100 units/mL subcutaneous injection 2-12 Units 0 Units Subcutaneous Not Given     03/02/2019 2201 insulin lispro (HumaLOG) 100 units/mL subcutaneous injection 1-6 Units 1 Units Subcutaneous Given        Past Medical/Surgical History:     Past Medical History: Diagnosis Date    Diabetes mellitus ([facility] [address] )     Hiatal hernia     Hyperlipidemia     Hypertension     Shortness of breath      Admitting Diagnosis: Cough [R05]  Age/Sex: 75 y o  male     Assessment/Plan:     75 y o  male with significant past medical history of diabetes mellitus, hyperlipidemia, hypertension JED on CPAP who presented to the ED from home  with chronic cough and chest pain  He reports history of chronic obstructive pulmonic disease and JED for which he is on CPAP at night  Denies any use of oxygen or nebulizers at home  Plan: trend troponin, ECHO, Cardiology consult, obtain procalcitonin, supplemental O2 PRN, influenza/respiratory pathogen panel pending, CPAP at night, QID accuchecks  Admission Orders: telemetry monitoring, OOB as tolerated, Cardiology consult, accu-checks QID, incentive spirometry        Scheduled Meds:   Current Facility-Administered Medications:  acetaminophen 650 mg Oral Q6H PRN   aspirin 81 mg Oral Daily   cholecalciferol 2,000 Units Oral Daily   fish oil 1,000 mg Oral Daily   insulin glargine 26 Units Subcutaneous HS   insulin lispro 1-6 Units Subcutaneous HS   insulin lispro 2-12 Units Subcutaneous TID AC   multivitamin-minerals 1 tablet Oral Daily   pantoprazole 40 mg Oral Early Morning   pravastatin 80 mg Oral Daily With Dinner   sodium chloride 1 spray Each Nare PRN     ==========================================================  Continued Stay Review    Date: 3/3/19 @ 1700    Vital Signs: /74 (BP Location: Right arm)   Pulse 68   Temp 98 6 °F (37 °C) (Oral)   Resp 20   Ht 5' 10" (1 778 m)   Wt 116 kg (255 lb)   SpO2 94% on Room Air  BMI 36 59 kg/m²  No Pain    Assessment/Plan:   Medications:   Scheduled Meds:   Current Facility-Administered Medications:  acetaminophen 650 mg Oral Q6H PRN   aspirin 81 mg Oral Daily   cholecalciferol 2,000 Units Oral Daily   fish oil 1,000 mg Oral Daily   insulin glargine 26 Units Subcutaneous HS   insulin lispro 1-6 Units Subcutaneous HS   insulin lispro 2-12 Units Subcutaneous TID AC   multivitamin-minerals 1 tablet Oral Daily   pantoprazole 40 mg Oral Early Morning   pravastatin 80 mg Oral Daily With Dinner   sodium chloride 1 spray Each Nare PRN     Pertinent Labs/Diagnostic Results:     Procalcitonin = <0 05    ECHO:LEFT VENTRICLE: Size was normal   Ejection fraction was estimated to be 55 %  There were no regional wall motion abnormalities  RIGHT VENTRICLE: The ventricle was dilated  Systolic function was mildly reduced  Wall thickness was mildly increased  PERICARDIUM: A trace pericardial effusion was identified  Age/Sex: 75 y o  male     Discharge Plan: TBD                  Network Utilization Review Department  Phone: [phone]; Fax [phone]  [email or web address]  ATTENTION: Please call with any questions or concerns to [phone]  and carefully listen to the prompts so that you are directed to the right person  Send all requests for admission clinical reviews, approved or denied determinations and any other requests to fax [phone]   All voicemails are confidential

## 2019-03-03 NOTE — ASSESSMENT & PLAN NOTE
· Pt reports history of chronic cough x many years  · Reports one month ago he was treated with Levaquin for a "lung infection", CXR and CT PE scan negative for consolidation/infiltrate, no need for abx at this time  · Obtain procalcitonin   · Pt with underlying COPD/JED, may have underlying pulmonary hypertension, obtain ECHO  · Does not appear to be in acute exacerbation of COPD at this time  · Respiratory protocol  · Supplemental O2 PRN  · Influenza/respiratory pathogen panel pending  · Trial PPI, could be secondary to reflux?   · PRN nasal spray

## 2019-03-03 NOTE — PLAN OF CARE
Problem: Potential for Falls  Goal: Patient will remain free of falls  Description  INTERVENTIONS:  - Assess patient frequently for physical needs  -  Identify cognitive and physical deficits and behaviors that affect risk of falls    -  [place] fall precautions as indicated by assessment   - Educate patient/family on patient safety including physical limitations  - Instruct patient to call for assistance with activity based on assessment  - Modify environment to reduce risk of injury  - Consider OT/PT consult to assist with strengthening/mobility  Outcome: Progressing     Problem: PAIN - ADULT  Goal: Verbalizes/displays adequate comfort level or baseline comfort level  Description  Interventions:  - Encourage patient to monitor pain and request assistance  - Assess pain using appropriate pain scale  - Administer analgesics based on type and severity of pain and evaluate response  - Implement non-pharmacological measures as appropriate and evaluate response  - Consider cultural and social influences on pain and pain management  - Notify physician/advanced practitioner if interventions unsuccessful or patient reports new pain  Outcome: Progressing     Problem: INFECTION - ADULT  Goal: Absence or prevention of progression during hospitalization  Description  INTERVENTIONS:  - Assess and monitor for signs and symptoms of infection  - Monitor lab/diagnostic results  - Monitor all insertion sites, i e  indwelling lines, tubes, and drains  - Monitor endotracheal (as able) and nasal secretions for changes in amount and color  - [place] appropriate cooling/warming therapies per order  - Administer medications as ordered  - Instruct and encourage patient and family to use good hand hygiene technique  - Identify and instruct in appropriate isolation precautions for identified infection/condition  Outcome: Progressing  Goal: Absence of fever/infection during neutropenic period  Description  INTERVENTIONS:  - Monitor WBC  - Implement neutropenic guidelines  Outcome: Progressing     Problem: SAFETY ADULT  Goal: Maintain or return to baseline ADL function  Description  INTERVENTIONS:  -  Assess patient's ability to carry out ADLs; assess patient's baseline for ADL function and identify physical deficits which impact ability to perform ADLs (bathing, care of mouth/teeth, toileting, grooming, dressing, etc )  - Assess/evaluate cause of self-care deficits   - Assess range of motion  - Assess patient's mobility; develop plan if impaired  - Assess patient's need for assistive devices and provide as appropriate  - Encourage maximum independence but intervene and supervise when necessary  ¯ Involve family in performance of ADLs  ¯ Assess for home care needs following discharge   ¯ Request OT consult to assist with ADL evaluation and planning for discharge  ¯ Provide patient education as appropriate  Outcome: Progressing  Goal: Maintain or return mobility status to optimal level  Description  INTERVENTIONS:  - Assess patient's baseline mobility status (ambulation, transfers, stairs, etc )    - Identify cognitive and physical deficits and behaviors that affect mobility  - Identify mobility aids required to assist with transfers and/or ambulation (gait belt, sit-to-stand, lift, walker, cane, etc )  - [place] fall precautions as indicated by assessment  - Record patient progress and toleration of activity level on Mobility SBAR; progress patient to next Phase/Stage  - Instruct patient to call for assistance with activity based on assessment  - Request Rehabilitation consult to assist with strengthening/weightbearing, etc   Outcome: Progressing     Problem: DISCHARGE PLANNING  Goal: Discharge to home or other facility with appropriate resources  Description  INTERVENTIONS:  - Identify barriers to discharge w/patient and caregiver  - Arrange for needed discharge resources and transportation as appropriate  - Identify discharge learning needs (meds, wound care, etc )  - Arrange for interpretive services to assist at discharge as needed  - Refer to Case Management Department for coordinating discharge planning if the patient needs post-hospital services based on physician/advanced practitioner order or complex needs related to functional status, cognitive ability, or social support system  Outcome: Progressing     Problem: Knowledge Deficit  Goal: Patient/family/caregiver demonstrates understanding of disease process, treatment plan, medications, and discharge instructions  Description  Complete learning assessment and assess knowledge base    Interventions:  - Provide teaching at level of understanding  - Provide teaching via preferred learning methods  Outcome: Progressing     Problem: RESPIRATORY - ADULT  Goal: Achieves optimal ventilation and oxygenation  Description  INTERVENTIONS:  - Assess for changes in respiratory status  - Assess for changes in mentation and behavior  - Position to facilitate oxygenation and minimize respiratory effort  - Oxygen administration by appropriate delivery method based on oxygen saturation (per order) or ABGs  - Initiate smoking cessation education as indicated  - Encourage broncho-pulmonary hygiene including cough, deep breathe, Incentive Spirometry  - Assess the need for suctioning and aspirate as needed  - Assess and instruct to report SOB or any respiratory difficulty  - Respiratory Therapy support as indicated  Outcome: Progressing

## 2019-03-03 NOTE — PROGRESS NOTES
[facility] Internal Medicine Progress Note  Patient: Man Anna 75 y o  male   MRN: [id number]  PCP: Chicho Womack MD  Unit/Bed#: ED 10 Encounter: [id number]  Date Of Visit: 19    Assessment:    Principal Problem:    Chest pain  Active Problems:    Diabetes mellitus ([address] )    Essential hypertension    Chronic cough    Leukocytosis    Elevated troponin    JED (obstructive sleep apnea)      Plan:    · 1  Cough/SOB- CXR negative  No signs of infection  Will r/o flu  · 2  Chest pain- trop 0 05  Will f/u 2d echo  Cardiology to evaluate  · 3  DM- on lantus ISS  · 4  JED- on CPAP  · 5  HTN- stable       VTE Pharmacologic Prophylaxis:   Pharmacologic:   Mechanical VTE Prophylaxis in Place: Yes    Patient Centered Rounds: I have performed bedside rounds with nursing staff today  Discussions with Specialists or Other Care Team Provider:     Education and Discussions with Family / Patient:     Time Spent for Care: 20 minutes  More than 50% of total time spent on counseling and coordination of care as described above  Current Length of Stay: 0 day(s)    Current Patient Status: Observation   Certification Statement: The patient will continue to require additional inpatient hospital stay due to SOB, chest pain    Discharge Plan / Estimated Discharge Date: am    Code Status: Level 1 - Full Code      Subjective:   Patient seen and examined at bedside  Patient has no new complaints  Objective:     Vitals:   Temp (24hrs), Av 3 °F (36 8 °C), Min:98 °F (36 7 °C), Max:98 6 °F (37 °C)    Temp:  [98 °F (36 7 °C)-98 6 °F (37 °C)] 98 °F (36 7 °C)  HR:  [58-67] 67  Resp:  [16-23] 18  BP: (112-154)/(61-79) 131/61  SpO2:  [89 %-95 %] 94 %  Body mass index is 36 59 kg/m²  Input and Output Summary (last 24 hours):     No intake or output data in the 24 hours ending 19 1244    Physical Exam:     Physical Exam   Constitutional: He is oriented to person, place, and time   He appears well-developed and well-nourished  HENT:   Head: Normocephalic and atraumatic  Eyes: Pupils are equal, round, and reactive to light  EOM are normal    Neck: Normal range of motion  Neck supple  No JVD present  No tracheal deviation present  No thyromegaly present  Cardiovascular: Normal rate, regular rhythm and normal heart sounds  Exam reveals no gallop and no friction rub  No murmur heard  Pulmonary/Chest: Effort normal and breath sounds normal  No stridor  No respiratory distress  He has no wheezes  Abdominal: Soft  Bowel sounds are normal  He exhibits no distension  There is no tenderness  There is no guarding  Musculoskeletal: Normal range of motion  He exhibits no edema  Neurological: He is alert and oriented to person, place, and time  Skin: Skin is warm and dry  Vitals reviewed  Additional Data:     Labs:    Results from last 7 days   Lab Units 03/03/19  0540  03/02/19  1552   WBC Thousand/uL 10 25*  --  11 64*   HEMOGLOBIN g/dL 15 9  --  16 0   HEMATOCRIT % 49 6*  --  49 2   PLATELETS Thousands/uL 170   < > 185   NEUTROS PCT %  --   --  48   LYMPHS PCT %  --   --  36   MONOS PCT %  --   --  12   EOS PCT %  --   --  4    < > = values in this interval not displayed  Results from last 7 days   Lab Units 03/03/19  0541 03/02/19  1552   POTASSIUM mmol/L 4 0 4 2   CHLORIDE mmol/L 105 104   CO2 mmol/L 29 29   BUN mg/dL 12 12   CREATININE mg/dL 0 68 0 69   CALCIUM mg/dL 9 3 9 3   ALK PHOS U/L  --  58   ALT U/L  --  33   AST U/L  --  21           * I Have Reviewed All Lab Data Listed Above  * Additional Pertinent Lab Tests Reviewed:  [facility] Admission Reviewed    Imaging:    Imaging Reports Reviewed Today Include:   Imaging Personally Reviewed by Myself Includes:      Recent Cultures (last 7 days):     Results from last 7 days   Lab Units 03/02/19  1900   INFLUENZA B PCR  Not Detected   RSV PCR  Not Detected       Last 24 Hours Medication List:     Current Facility-Administered Medications:  acetaminophen 650 mg Oral Q6H PRN Bluford Hashimoto, PA-C   aspirin 81 mg Oral Daily Bluford Hashimoto, PA-C   cholecalciferol 2,000 Units Oral Daily Praveena Barber PA-C   fish oil 1,000 mg Oral Daily Praveena Barber PA-C   insulin glargine 26 Units Subcutaneous HS Praveena Barber PA-C   insulin lispro 1-6 Units Subcutaneous HS Praveena Barber PA-C   insulin lispro 2-12 Units Subcutaneous TID AC Bluford Hashimoto, PA-C   multivitamin-minerals 1 tablet Oral Daily Praveena Barber PA-C   pantoprazole 40 mg Oral Early Morning Praveena Barber PA-C   pravastatin 80 mg Oral Daily With AutoLuna Barber PA-C   sodium chloride 1 spray Each Nare PRN Bluford Hashimoto, PA-C        Today, Patient Was Seen By: Jason Perry MD    ** Please Note: This note has been constructed using a voice recognition system   **

## 2019-03-03 NOTE — RESPIRATORY THERAPY NOTE
RT Protocol Note  Verónica Morocho 75 y o  male MRN: [id number]  Unit/Bed#: ED 10 Encounter: [id number]    Assessment    Principal Problem:    Chest pain  Active Problems:    Diabetes mellitus (HCC)    Essential hypertension    Chronic cough    Leukocytosis    Elevated troponin    JED (obstructive sleep apnea)      Home Pulmonary Medications:  trelegy  Home Devices/Therapy: BiPAP/CPAP   CPAP HS    Past Medical History:   Diagnosis Date    Diabetes mellitus ([address] )     Hiatal hernia     Hyperlipidemia     Hypertension     Shortness of breath      Social History     Socioeconomic History    Marital status: /Civil Union     Spouse name: None    Number of children: None    Years of education: None    Highest education level: None   Occupational History    None   Social Needs    Financial resource strain: None    Food insecurity:     Worry: None     Inability: None    Transportation needs:     Medical: None     Non-medical: None   Tobacco Use    Smoking status: Never Smoker    Smokeless tobacco: Never Used   Substance and Sexual Activity    Alcohol use: No    Drug use: No    Sexual activity: None   Lifestyle    Physical activity:     Days per week: None     Minutes per session: None    Stress: None   Relationships    Social connections:     Talks on phone: None     Gets together: None     Attends Sikh service: None     Active member of club or organization: None     Attends meetings of clubs or organizations: None     Relationship status: None    Intimate partner violence:     Fear of current or ex partner: None     Emotionally abused: None     Physically abused: None     Forced sexual activity: None   Other Topics Concern    None   Social History Narrative    None       Subjective  Pt offers no respiratory complaints at this time         Objective    Physical Exam:   Assessment Type: Assess only  General Appearance: Awake, Alert  Respiratory Pattern: Normal  Chest Assessment: Chest expansion symmetrical  Bilateral Breath Sounds: Clear, Diminished  O2 Device: 2L NC    Vitals:  Blood pressure 112/62, pulse 60, temperature 98 6 °F (37 °C), temperature source Oral, resp  rate 18, height 5' 10" (1 778 m), weight 116 kg (255 lb), SpO2 93 %  Imaging and other studies: I have personally reviewed pertinent reports  O2 Device: 2L NC     Plan    Respiratory Plan: Home Bronchodilator Patient pathway   Pt declining CPAP at this time  Will continue to monitor

## 2019-03-03 NOTE — ASSESSMENT & PLAN NOTE
· No results found for: HGBA1C    No results for input(s): POCGLU in the last 72 hours      Blood Sugar Average: Last 72 hrs:  · Obtain hgbA1c  · Pt is on Triseba 26 units at home, PRN humulin R   · Triseba 26 units has 1:1 ratio with lantus, continue Lantus while here  · SSI coverage  · QID glucose checks  · Hold PO medications while inpatient  · Monitor, adjust regimen as necessary

## 2019-03-04 ENCOUNTER — APPOINTMENT (OUTPATIENT)
Dept: NUCLEAR MEDICINE | Facility: HOSPITAL | Age: 74
End: 2019-03-04
Payer: MEDICARE

## 2019-03-04 ENCOUNTER — APPOINTMENT (OUTPATIENT)
Dept: NON INVASIVE DIAGNOSTICS | Facility: HOSPITAL | Age: 74
End: 2019-03-04
Payer: MEDICARE

## 2019-03-04 LAB
ARRHY DURING EX: NORMAL
CHEST PAIN STATEMENT: NORMAL
GLUCOSE SERPL-MCNC: 132 MG/DL (ref 65–140)
GLUCOSE SERPL-MCNC: 148 MG/DL (ref 65–140)
GLUCOSE SERPL-MCNC: 159 MG/DL (ref 65–140)
GLUCOSE SERPL-MCNC: 173 MG/DL (ref 65–140)
GLUCOSE SERPL-MCNC: 187 MG/DL (ref 65–140)
GLUCOSE SERPL-MCNC: 196 MG/DL (ref 65–140)
GLUCOSE SERPL-MCNC: 211 MG/DL (ref 65–140)
MAX DIASTOLIC BP: 67 MMHG
MAX HEART RATE: 87 BPM
MAX PREDICTED HEART RATE: 146 BPM
MAX. SYSTOLIC BP: 155 MMHG
PROTOCOL NAME: NORMAL
REASON FOR TERMINATION: NORMAL
TARGET HR FORMULA: NORMAL
TEST INDICATION: NORMAL
TIME IN EXERCISE PHASE: NORMAL

## 2019-03-04 PROCEDURE — 99214 OFFICE O/P EST MOD 30 MIN: CPT | Performed by: INTERNAL MEDICINE

## 2019-03-04 PROCEDURE — 82948 REAGENT STRIP/BLOOD GLUCOSE: CPT

## 2019-03-04 PROCEDURE — 93017 CV STRESS TEST TRACING ONLY: CPT

## 2019-03-04 PROCEDURE — 94660 CPAP INITIATION&MGMT: CPT

## 2019-03-04 PROCEDURE — 78452 HT MUSCLE IMAGE SPECT MULT: CPT | Performed by: INTERNAL MEDICINE

## 2019-03-04 PROCEDURE — 78452 HT MUSCLE IMAGE SPECT MULT: CPT

## 2019-03-04 PROCEDURE — 93018 CV STRESS TEST I&R ONLY: CPT | Performed by: INTERNAL MEDICINE

## 2019-03-04 PROCEDURE — 94760 N-INVAS EAR/PLS OXIMETRY 1: CPT

## 2019-03-04 PROCEDURE — 99225 PR SBSQ OBSERVATION CARE/DAY 25 MINUTES: CPT | Performed by: INTERNAL MEDICINE

## 2019-03-04 PROCEDURE — 93016 CV STRESS TEST SUPVJ ONLY: CPT | Performed by: INTERNAL MEDICINE

## 2019-03-04 PROCEDURE — A9502 TC99M TETROFOSMIN: HCPCS

## 2019-03-04 RX ORDER — INSULIN GLARGINE 100 [IU]/ML
12 INJECTION, SOLUTION SUBCUTANEOUS
Status: DISCONTINUED | OUTPATIENT
Start: 2019-03-04 | End: 2019-03-05 | Stop reason: HOSPADM

## 2019-03-04 RX ADMIN — REGADENOSON 0.4 MG: 0.08 INJECTION, SOLUTION INTRAVENOUS at 11:06

## 2019-03-04 RX ADMIN — INSULIN LISPRO 1 UNITS: 100 INJECTION, SOLUTION INTRAVENOUS; SUBCUTANEOUS at 21:27

## 2019-03-04 RX ADMIN — PRAVASTATIN SODIUM 80 MG: 80 TABLET ORAL at 17:18

## 2019-03-04 RX ADMIN — Medication 1000 MG: at 08:06

## 2019-03-04 RX ADMIN — INSULIN GLARGINE 12 UNITS: 100 INJECTION, SOLUTION SUBCUTANEOUS at 21:27

## 2019-03-04 RX ADMIN — VITAMIN D, TAB 1000IU (100/BT) 2000 UNITS: 25 TAB at 08:06

## 2019-03-04 RX ADMIN — INSULIN LISPRO 2 UNITS: 100 INJECTION, SOLUTION INTRAVENOUS; SUBCUTANEOUS at 17:20

## 2019-03-04 RX ADMIN — ASPIRIN 81 MG: 81 TABLET, COATED ORAL at 08:06

## 2019-03-04 RX ADMIN — Medication 1 TABLET: at 08:06

## 2019-03-04 RX ADMIN — PANTOPRAZOLE SODIUM 40 MG: 40 TABLET, DELAYED RELEASE ORAL at 06:11

## 2019-03-04 NOTE — PROGRESS NOTES
[facility] Progress Note  Patient: Octavio Petit 75 y o  male   MRN: [id number]  PCP: Letitia Camargo MD  Unit/Bed#: -01 Encounter: [id number]  Date Of Visit: 19    Assessment:    Principal Problem:    Chest pain  Active Problems:    Diabetes mellitus ([address] )    Essential hypertension    Chronic cough    Leukocytosis    Elevated troponin    JED (obstructive sleep apnea)      Plan:    · 1  Cough/SOB- CXR negative  No signs of infection  Flu negative  · 2  Chest pain- trop 0 05   2d echo EF 55%  Cardiology following  Patient for nuclear stress test today  · 3  DM- on lantus ISS  · 4  JED- on CPAP  · 5  HTN- stable       VTE Pharmacologic Prophylaxis:   Pharmacologic:   Mechanical VTE Prophylaxis in Place: Yes    Patient Centered Rounds: I have performed bedside rounds with nursing staff today  Discussions with Specialists or Other Care Team Provider:     Education and Discussions with Family / Patient:     Time Spent for Care: 20 minutes  More than 50% of total time spent on counseling and coordination of care as described above  Current Length of Stay: 0 day(s)    Current Patient Status: Observation   Certification Statement: The patient will continue to require additional inpatient hospital stay due to SOB, chest pain    Discharge Plan / Estimated Discharge Date: am    Code Status: Level 1 - Full Code      Subjective:   Patient seen and examined at bedside  Patient has no new complaints  Objective:     Vitals:   Temp (24hrs), Av 4 °F (36 9 °C), Min:97 4 °F (36 3 °C), Max:99 1 °F (37 3 °C)    Temp:  [97 4 °F (36 3 °C)-99 1 °F (37 3 °C)] 99 1 °F (37 3 °C)  HR:  [65-89] 68  Resp:  [17-20] 20  BP: (111-133)/(54-74) 122/61  SpO2:  [90 %-96 %] 90 %  Body mass index is 36 59 kg/m²  Input and Output Summary (last 24 hours):        Intake/Output Summary (Last 24 hours) at 3/4/2019 1313  Last data filed at 3/4/2019 0841  Gross per 24 hour   Intake 120 ml   Output --   Net 120 ml       Physical Exam:     Physical Exam   Constitutional: He is oriented to person, place, and time  He appears well-developed and well-nourished  HENT:   Head: Normocephalic and atraumatic  Eyes: Pupils are equal, round, and reactive to light  EOM are normal    Neck: Normal range of motion  Neck supple  No JVD present  No tracheal deviation present  No thyromegaly present  Cardiovascular: Normal rate, regular rhythm and normal heart sounds  Exam reveals no gallop and no friction rub  No murmur heard  Pulmonary/Chest: Effort normal and breath sounds normal  No stridor  No respiratory distress  He has no wheezes  Abdominal: Soft  Bowel sounds are normal  He exhibits no distension  There is no tenderness  There is no guarding  Musculoskeletal: Normal range of motion  He exhibits no edema  Neurological: He is alert and oriented to person, place, and time  Skin: Skin is warm and dry  Vitals reviewed  Additional Data:     Labs:    Results from last 7 days   Lab Units 03/03/19  0540  03/02/19  1552   WBC Thousand/uL 10 25*  --  11 64*   HEMOGLOBIN g/dL 15 9  --  16 0   HEMATOCRIT % 49 6*  --  49 2   PLATELETS Thousands/uL 170   < > 185   NEUTROS PCT %  --   --  48   LYMPHS PCT %  --   --  36   MONOS PCT %  --   --  12   EOS PCT %  --   --  4    < > = values in this interval not displayed  Results from last 7 days   Lab Units 03/03/19  0541 03/02/19  1552   POTASSIUM mmol/L 4 0 4 2   CHLORIDE mmol/L 105 104   CO2 mmol/L 29 29   BUN mg/dL 12 12   CREATININE mg/dL 0 68 0 69   CALCIUM mg/dL 9 3 9 3   ALK PHOS U/L  --  58   ALT U/L  --  33   AST U/L  --  21           * I Have Reviewed All Lab Data Listed Above  * Additional Pertinent Lab Tests Reviewed:  [facility] Admission Reviewed    Imaging:    Imaging Reports Reviewed Today Include:   Imaging Personally Reviewed by Myself Includes:      Recent Cultures (last 7 days):     Results from last 7 days   Lab Units 03/02/19  1900   INFLUENZA B PCR  Not Detected   RSV PCR  Not Detected       Last 24 Hours Medication List:     Current Facility-Administered Medications:  acetaminophen 650 mg Oral Q6H PRN Gae Meter, STANLEY   aspirin 81 mg Oral Daily JONATHAN Guzman-MARTINEZ   cholecalciferol 2,000 Units Oral Daily Praveena Barber, PA-MARTINEZ   fish oil 1,000 mg Oral Daily Praveena Barber PA-C   insulin glargine 26 Units Subcutaneous HS JONATHAN Guzman-MARTINEZ   insulin lispro 1-6 Units Subcutaneous HS Praveena Barber, STANLEY   insulin lispro 2-12 Units Subcutaneous TID AC Gae Meter, PAAMADO   multivitamin-minerals 1 tablet Oral Daily Praveena Barber PA-C   pantoprazole 40 mg Oral Early Morning Praveena Barber PA-C   pravastatin 80 mg Oral Daily With [employer] CLARE Barber, STANLEY   sodium chloride 1 spray Each Nare PRN Gae Meter, STANLEY        Today, Patient Was Seen By: Corrina Spicer MD    ** Please Note: This note has been constructed using a voice recognition system   **

## 2019-03-04 NOTE — PLAN OF CARE
Problem: Potential for Falls  Goal: Patient will remain free of falls  Description  INTERVENTIONS:  - Assess patient frequently for physical needs  -  Identify cognitive and physical deficits and behaviors that affect risk of falls    -  Jacob fall precautions as indicated by assessment   - Educate patient/family on patient safety including physical limitations  - Instruct patient to call for assistance with activity based on assessment  - Modify environment to reduce risk of injury  - Consider OT/PT consult to assist with strengthening/mobility  Outcome: Progressing     Problem: PAIN - ADULT  Goal: Verbalizes/displays adequate comfort level or baseline comfort level  Description  Interventions:  - Encourage patient to monitor pain and request assistance  - Assess pain using appropriate pain scale  - Administer analgesics based on type and severity of pain and evaluate response  - Implement non-pharmacological measures as appropriate and evaluate response  - Consider cultural and social influences on pain and pain management  - Notify physician/advanced practitioner if interventions unsuccessful or patient reports new pain  Outcome: Progressing     Problem: INFECTION - ADULT  Goal: Absence or prevention of progression during hospitalization  Description  INTERVENTIONS:  - Assess and monitor for signs and symptoms of infection  - Monitor lab/diagnostic results  - Monitor all insertion sites, i e  indwelling lines, tubes, and drains  - Monitor endotracheal (as able) and nasal secretions for changes in amount and color  - Jacob appropriate cooling/warming therapies per order  - Administer medications as ordered  - Instruct and encourage patient and family to use good hand hygiene technique  - Identify and instruct in appropriate isolation precautions for identified infection/condition  Outcome: Progressing  Goal: Absence of fever/infection during neutropenic period  Description  INTERVENTIONS:  - Monitor WBC  - Implement neutropenic guidelines  Outcome: Progressing     Problem: SAFETY ADULT  Goal: Maintain or return to baseline ADL function  Description  INTERVENTIONS:  -  Assess patient's ability to carry out ADLs; assess patient's baseline for ADL function and identify physical deficits which impact ability to perform ADLs (bathing, care of mouth/teeth, toileting, grooming, dressing, etc )  - Assess/evaluate cause of self-care deficits   - Assess range of motion  - Assess patient's mobility; develop plan if impaired  - Assess patient's need for assistive devices and provide as appropriate  - Encourage maximum independence but intervene and supervise when necessary  ¯ Involve family in performance of ADLs  ¯ Assess for home care needs following discharge   ¯ Request OT consult to assist with ADL evaluation and planning for discharge  ¯ Provide patient education as appropriate  Outcome: Progressing  Goal: Maintain or return mobility status to optimal level  Description  INTERVENTIONS:  - Assess patient's baseline mobility status (ambulation, transfers, stairs, etc )    - Identify cognitive and physical deficits and behaviors that affect mobility  - Identify mobility aids required to assist with transfers and/or ambulation (gait belt, sit-to-stand, lift, walker, cane, etc )  - Mineral fall precautions as indicated by assessment  - Record patient progress and toleration of activity level on Mobility SBAR; progress patient to next Phase/Stage  - Instruct patient to call for assistance with activity based on assessment  - Request Rehabilitation consult to assist with strengthening/weightbearing, etc   Outcome: Progressing     Problem: DISCHARGE PLANNING  Goal: Discharge to home or other facility with appropriate resources  Description  INTERVENTIONS:  - Identify barriers to discharge w/patient and caregiver  - Arrange for needed discharge resources and transportation as appropriate  - Identify discharge learning needs (meds, wound care, etc )  - Arrange for interpretive services to assist at discharge as needed  - Refer to Case Management Department for coordinating discharge planning if the patient needs post-hospital services based on physician/advanced practitioner order or complex needs related to functional status, cognitive ability, or social support system  Outcome: Progressing     Problem: Knowledge Deficit  Goal: Patient/family/caregiver demonstrates understanding of disease process, treatment plan, medications, and discharge instructions  Description  Complete learning assessment and assess knowledge base    Interventions:  - Provide teaching at level of understanding  - Provide teaching via preferred learning methods  Outcome: Progressing     Problem: RESPIRATORY - ADULT  Goal: Achieves optimal ventilation and oxygenation  Description  INTERVENTIONS:  - Assess for changes in respiratory status  - Assess for changes in mentation and behavior  - Position to facilitate oxygenation and minimize respiratory effort  - Oxygen administration by appropriate delivery method based on oxygen saturation (per order) or ABGs  - Initiate smoking cessation education as indicated  - Encourage broncho-pulmonary hygiene including cough, deep breathe, Incentive Spirometry  - Assess the need for suctioning and aspirate as needed  - Assess and instruct to report SOB or any respiratory difficulty  - Respiratory Therapy support as indicated  Outcome: Progressing

## 2019-03-04 NOTE — UTILIZATION REVIEW
Continued Stay Review    Date: 3/4  Vital Signs: /61   Pulse 68   Temp 99 1 °F (37 3 °C) (Oral)   Resp 20   Ht 5' 10" (1 778 m)   Wt 116 kg (255 lb)   SpO2 90%   BMI 36 59 kg/m²   Assessment/Plan: 76 yo male admitted w/ cough and SOB   Cp trop  0 5, Echo EF 55 % , cardiology following , for nuclear stress test today   D< SSI coverage   JED Cpap , HTN stable   Medications:   Scheduled Meds:   Current Facility-Administered Medications:  acetaminophen 650 mg Oral Q6H PRN    aspirin 81 mg Oral Daily    cholecalciferol 2,000 Units Oral Daily    fish oil 1,000 mg Oral Daily    insulin glargine 26 Units Subcutaneous HS    insulin lispro 1-6 Units Subcutaneous HS    insulin lispro 2-12 Units Subcutaneous TID AC    multivitamin-minerals 1 tablet Oral Daily    pantoprazole 40 mg Oral Early Morning    pravastatin 80 mg Oral Daily With Dinner    sodium chloride 1 spray Each Nare PRN      Pertinent Labs/Diagnostic Results: stress test - pending   Age/Sex: 75 y o  male   Discharge Plan: TBD       Cardiology note  3/4  - Chest pain, unknown etiology  Peak troponin 0 05; on telemetry without arrhythmias  ECHO performed on 03/03/2019 shows EF of 55%, dilated right ventricle with mildly reduced systolic function, trace MR, trace TR, trace VA  Patient received pharmacological stress test to assess for reversible ischemia  Further recommendation will be based upon pharmacologic stress test results  Continue aspirin, statin   2- Hypertension  Stable, continue all medications   2- Hyperlipidemia  Continue statin   3- Diabetes  Hemoglobin A1c 7 2%  Management per [facility]   4- JED  Confirms CPAP use daily

## 2019-03-04 NOTE — PROGRESS NOTES
General Cardiology   Progress Note   Zenaida Canavan 75 y o  male MRN: [id number]  Unit/Bed#: -01 Encounter: [id number]        Subjective:   Chest pain and shortness of breath getting better  Denies any headache, blurred vision, dizziness, syncope, pain or swelling extremities  REVIEW OF SYSTEMS:  Constitutional:  Denies fever or chills   Eyes:  Denies change in visual acuity   HENT:  Denies nasal congestion or sore throat   Respiratory:  Denies cough or shortness of breath   Cardiovascular:  Denies chest pain or edema   GI:  Denies abdominal pain, nausea, vomiting, bloody stools or diarrhea   :  Denies dysuria, frequency, difficulty in micturition and nocturia  Musculoskeletal:  Denies back pain or joint pain   Neurologic:  Denies headache, focal weakness or sensory changes   Endocrine:  Denies polyuria or polydipsia   Lymphatic:  Denies swollen glands   Psychiatric:  Denies depression or anxiety     Objective:   Vitals:  Vitals:    03/04/19 0700   BP: 122/61   Pulse: 68   Resp: 20   Temp: 99 1 °F (37 3 °C)   SpO2: 90%       Body mass index is 36 59 kg/m²  Systolic (08VGQ), OYV:616 , Min:111 , FWP:048     Diastolic (57NVF), MEO:43, Min:54, Max:74      Intake/Output Summary (Last 24 hours) at 3/4/2019 1241  Last data filed at 3/4/2019 0841  Gross per 24 hour   Intake 120 ml   Output --   Net 120 ml     Weight (last 2 days)     Date/Time   Weight    03/02/19 1453   116 (255)            PHYSICAL EXAMS:  General:  Patient is not in acute distress, laying in the bed comfortably, awake, alert responding to commands  Head: Normocephalic, Atraumatic  HEENT: White sclera, pink conjunctiva,  PERRLA,pharynx benign  Neck:  Supple, no neck vein distention, carotids+2/+2 no bruits, thyromegaly, adenopathy  Respiratory: clear to P/A  Cardiovascular:  PMI normal, S1-S2 normal, No  Murmurs, thrills, gallops, rubs   Regular rhythm  GI:  Abdomen soft nontender   No hepatosplenomegaly, adenopathy, ascites,or rebound tenderness  Extremities: No edema, normal pulses, no calf tenderness, no joint deformities, no venous disease   Integument:  No skin rashes or ulceration  Lymphatic:  No cervical or inguinal lymphadenopathy  Neurologic:  Patient is awake alert, responding to command, well-oriented to time and place and person moving all extremities      LABORATORY RESULTS:  Results from last 7 days   Lab Units 03/02/19 2128 03/02/19  1900 03/02/19  1552   TROPONIN I ng/mL 0 04 0 05* 0 05*     CBC with diff:   Results from last 7 days   Lab Units 03/03/19  0540 03/02/19 2128 03/02/19  1552   WBC Thousand/uL 10 25*  --  11 64*   HEMOGLOBIN g/dL 15 9  --  16 0   HEMATOCRIT % 49 6*  --  49 2   MCV fL 92  --  91   PLATELETS Thousands/uL 170 171 185   MCH pg 29 6  --  29 7   MCHC g/dL 32 1  --  32 5   RDW % 14 9  --  15 0   MPV fL 11 0 11 1 11 2   NRBC AUTO /100 WBCs  --   --  0       CMP:  Results from last 7 days   Lab Units 03/03/19  0541 03/02/19  1552   POTASSIUM mmol/L 4 0 4 2   CHLORIDE mmol/L 105 104   CO2 mmol/L 29 29   BUN mg/dL 12 12   CREATININE mg/dL 0 68 0 69   CALCIUM mg/dL 9 3 9 3   AST U/L  --  21   ALT U/L  --  33   ALK PHOS U/L  --  58   EGFR ml/min/1 73sq m 94 94       BMP:  Results from last 7 days   Lab Units 03/03/19  0541 03/02/19  1552   POTASSIUM mmol/L 4 0 4 2   CHLORIDE mmol/L 105 104   CO2 mmol/L 29 29   BUN mg/dL 12 12   CREATININE mg/dL 0 68 0 69   CALCIUM mg/dL 9 3 9 3                  Results from last 7 days   Lab Units 03/02/19 2128   HEMOGLOBIN A1C % 7 2*               Lipid Profile:   No results found for: CHOL  No results found for: HDL  No results found for: LDLCALC  No results found for: TRIG    Cardiac testing:   Results for orders placed during the hospital encounter of 03/02/19   Echo complete with contrast if indicated    Narrative [address] [place], [place] [address] [phone]    Transthoracic Echocardiogram  2D, M-mode, and Color Doppler    Study date:  03-Mar-2019    Patient: Sachin Jara  MR number: [id number]  Account number: [de-identified]  : 1945  Age: 75 years  Gender: Male  Status: Outpatient  Location: Emergency room  Height: 70 in  Weight: 255 lb  BP: 146/ 67 mmHg    Indications: Dyspnea  Diagnoses: R06 00 - Dyspnea, unspecified    Sonographer:  Tucker RCS  Referring Physician:  Amy Montelongo PA-C  Group:  [facility]  Interpreting Physician:  Susan Giles MD    SUMMARY    LEFT VENTRICLE:  Ejection fraction was estimated to be 55 %  There were no regional wall motion abnormalities  RIGHT VENTRICLE:  The ventricle was dilated  Systolic function was mildly reduced  Wall thickness was mildly increased  LEFT ATRIUM:  The atrium was mildly dilated  MITRAL VALVE:  There was trace regurgitation  TRICUSPID VALVE:  There was trace regurgitation  PULMONIC VALVE:  There was trace regurgitation  PERICARDIUM:  A trace pericardial effusion was identified  HISTORY: PRIOR HISTORY: Chest pain  Elevated troponin  Risk factors: hypertension, diabetes, and morbid obesity  Chronic lung disease  PROCEDURE: The procedure was performed in the emergency room  This was a routine study  The transthoracic approach was used  The study included complete 2D imaging, M-mode, and color Doppler  The heart rate was 55 bpm, at the start of the  study  Images were obtained from the parasternal, apical, and subcostal acoustic windows  Image quality was adequate  LEFT VENTRICLE: Size was normal  Ejection fraction was estimated to be 55 %  There were no regional wall motion abnormalities  RIGHT VENTRICLE: The ventricle was dilated  Systolic function was mildly reduced  Wall thickness was mildly increased  LEFT ATRIUM: The atrium was mildly dilated  RIGHT ATRIUM: Size was normal     MITRAL VALVE: There was annular calcification   Valve structure was normal  There was normal leaflet separation  DOPPLER: The transmitral velocity was within the normal range  There was no evidence for stenosis  There was trace  regurgitation  AORTIC VALVE: The valve was not well visualized  TRICUSPID VALVE: The valve structure was normal  There was normal leaflet separation  DOPPLER: The transtricuspid velocity was within the normal range  There was no evidence for stenosis  There was trace regurgitation  PULMONIC VALVE: Leaflets exhibited normal thickness, no calcification, and normal cuspal separation  DOPPLER: The transpulmonic velocity was within the normal range  There was trace regurgitation  PERICARDIUM: A trace pericardial effusion was identified  AORTA: The root exhibited normal size  SYSTEM MEASUREMENT TABLES    2D  %FS: 37 %  Ao Diam: 3 7 cm  EDV(Teich): 128 3 ml  EF(Teich): 66 6 %  ESV(Teich): 42 9 ml  IVSd: 1 1 cm  LA Area: 20 3 cm2  LA Diam: 4 3 cm  LVEDV MOD A4C: 96 4 ml  LVEF MOD A4C: 65 8 %  LVESV MOD A4C: 33 ml  LVIDd: 5 2 cm  LVIDs: 3 3 cm  LVLd A4C: 7 4 cm  LVLs A4C: 5 7 cm  LVPWd: 1 3 cm  RA Area: 13 6 cm2  RVIDd: 3 6 cm  SV MOD A4C: 63 5 ml  SV(Teich): 85 4 ml    CW  AV Env  Ti: 315 9 ms  AV VTI: 40 8 cm  AV Vmax: 1 8 m/s  AV Vmean: 1 3 m/s  AV maxP 6 mmHg  AV meanP 2 mmHg    MM  TAPSE: 2 1 cm    PW  E': 0 1 m/s  E/E': 9 9  LVOT Env  Ti: 315 9 ms  LVOT VTI: 27 7 cm  LVOT Vmax: 1 3 m/s  LVOT Vmean: 0 9 m/s  LVOT maxP 4 mmHg  LVOT meanPG: 3 6 mmHg  MV A Paul: 0 8 m/s  MV Dec [place]: 4 4 m/s2  MV DecT: 192 5 ms  MV E Paul: 0 8 m/s  MV E/A Ratio: 1  MV PHT: 55 8 ms  MVA By PHT: 3 9 cm2    [employer] Accredited Echocardiography Laboratory    Prepared and electronically signed by    Camille Antoine MD  Signed 03-Mar-2019 09:03:08       Meds/Allergies   current meds:   Current Facility-Administered Medications   Medication Dose Route Frequency    acetaminophen (TYLENOL) tablet 650 mg  650 mg Oral Q6H PRN    aspirin (ECOTRIN LOW STRENGTH) EC tablet 81 mg 81 mg Oral Daily    cholecalciferol (VITAMIN D3) tablet 2,000 Units  2,000 Units Oral Daily    fish oil capsule 1,000 mg  1,000 mg Oral Daily    insulin glargine (LANTUS) subcutaneous injection 26 Units 0 26 mL  26 Units Subcutaneous HS    insulin lispro (HumaLOG) 100 units/mL subcutaneous injection 1-6 Units  1-6 Units Subcutaneous HS    insulin lispro (HumaLOG) 100 units/mL subcutaneous injection 2-12 Units  2-12 Units Subcutaneous TID AC    multivitamin-minerals (CENTRUM) tablet 1 tablet  1 tablet Oral Daily    pantoprazole (PROTONIX) EC tablet 40 mg  40 mg Oral Early Morning    pravastatin (PRAVACHOL) tablet 80 mg  80 mg Oral Daily With Dinner    sodium chloride (OCEAN) 0 65 % nasal spray 1 spray  1 spray Each Nare PRN     Medications Prior to Admission   Medication    aspirin (ECOTRIN LOW STRENGTH) 81 mg EC tablet    fluticasone-umeclidinium-vilanterol (TRELEGY ELLIPTA) 100-62 5-25 MCG/INH inhaler    promethazine-codeine (PHENERGAN WITH CODEINE) 6 25-10 mg/5 mL syrup    Cholecalciferol (VITAMIN D) 2000 units CAPS    Empagliflozin (JARDIANCE) 25 MG TABS    Insulin Degludec (TRESIBA FLEXTOUCH SC)    insulin regular (HumuLIN R,NovoLIN R) 100 units/mL injection    multivitamin-iron-minerals-folic acid (CENTRUM) chewable tablet    Omega-3 Fatty Acids (FISH OIL CONCENTRATE PO)    simvastatin (ZOCOR) 40 mg tablet            Assessment/Plan:  1- Chest pain, unknown etiology  Peak troponin 0 05; on telemetry without arrhythmias  ECHO performed on 03/03/2019 shows EF of 55%, dilated right ventricle with mildly reduced systolic function, trace MR, trace TR, trace MT  Patient received pharmacological stress test to assess for reversible ischemia  Further recommendation will be based upon pharmacologic stress test results  Continue aspirin, statin    2- Hypertension  Stable, continue all medications    2- Hyperlipidemia  Continue statin    3- Diabetes  Hemoglobin A1c 7 2%  Management per [facility]    4- JED  Confirms CPAP use daily    Counseling / Coordination of Care  Total floor / unit time spent today 20 minutes  Greater than 50% of total time was spent with the patient and / or family counseling and / or coordination of care  ** Please Note: Dragon 360 Dictation voice to text software may have been used in the creation of this document   **

## 2019-03-04 NOTE — NURSING NOTE
Patient received Lantus 26 units at bedtime glucose 142, patient later called and stated he felt like his sugar was low  Patient alert and oriented, diaphoretic in bed,  Fingerstick glucose  result 38, treated with orange juice and snack,repeated 57 - 74   SLIM provider notify will monitor Q1H until > 100 twice  Patient is resting in bed without distress, glucose currently 173

## 2019-03-05 VITALS
WEIGHT: 255 LBS | TEMPERATURE: 98 F | RESPIRATION RATE: 18 BRPM | SYSTOLIC BLOOD PRESSURE: 116 MMHG | OXYGEN SATURATION: 92 % | HEART RATE: 62 BPM | HEIGHT: 70 IN | DIASTOLIC BLOOD PRESSURE: 62 MMHG | BODY MASS INDEX: 36.51 KG/M2

## 2019-03-05 LAB
ADENOVIRUS: NOT DETECTED
ANION GAP SERPL CALCULATED.3IONS-SCNC: 4 MMOL/L (ref 4–13)
ATRIAL RATE: 60 BPM
BASOPHILS # BLD AUTO: 0.03 THOUSANDS/ΜL (ref 0–0.1)
BASOPHILS NFR BLD AUTO: 0 % (ref 0–1)
BUN SERPL-MCNC: 14 MG/DL (ref 5–25)
C PNEUM DNA SPEC QL NAA+PROBE: NOT DETECTED
CALCIUM SERPL-MCNC: 9.1 MG/DL (ref 8.3–10.1)
CHLORIDE SERPL-SCNC: 103 MMOL/L (ref 100–108)
CO2 SERPL-SCNC: 33 MMOL/L (ref 21–32)
CREAT SERPL-MCNC: 0.76 MG/DL (ref 0.6–1.3)
EOSINOPHIL # BLD AUTO: 0.44 THOUSAND/ΜL (ref 0–0.61)
EOSINOPHIL NFR BLD AUTO: 5 % (ref 0–6)
ERYTHROCYTE [DISTWIDTH] IN BLOOD BY AUTOMATED COUNT: 14.6 % (ref 11.6–15.1)
FLUAV H1 RNA SPEC QL NAA+PROBE: NOT DETECTED
FLUAV H3 RNA SPEC QL NAA+PROBE: NOT DETECTED
FLUAV RNA SPEC QL NAA+PROBE: NOT DETECTED
FLUBV RNA SPEC QL NAA+PROBE: NOT DETECTED
GFR SERPL CREATININE-BSD FRML MDRD: 90 ML/MIN/1.73SQ M
GLUCOSE P FAST SERPL-MCNC: 145 MG/DL (ref 65–99)
GLUCOSE SERPL-MCNC: 145 MG/DL (ref 65–140)
GLUCOSE SERPL-MCNC: 146 MG/DL (ref 65–140)
GLUCOSE SERPL-MCNC: 173 MG/DL (ref 65–140)
HBOV DNA SPEC QL NAA+PROBE: NOT DETECTED
HCOV 229E RNA SPEC QL NAA+PROBE: NOT DETECTED
HCOV HKU1 RNA SPEC QL NAA+PROBE: NOT DETECTED
HCOV NL63 RNA SPEC QL NAA+PROBE: NOT DETECTED
HCOV OC43 RNA SPEC QL NAA+PROBE: NOT DETECTED
HCT VFR BLD AUTO: 50.9 % (ref 36.5–49.3)
HGB BLD-MCNC: 16.5 G/DL (ref 12–17)
HPIV1 RNA SPEC QL NAA+PROBE: NOT DETECTED
HPIV2 RNA SPEC QL NAA+PROBE: NOT DETECTED
HPIV3 RNA SPEC QL NAA+PROBE: NOT DETECTED
HPIV4 RNA SPEC QL NAA+PROBE: NOT DETECTED
IMM GRANULOCYTES # BLD AUTO: 0.02 THOUSAND/UL (ref 0–0.2)
IMM GRANULOCYTES NFR BLD AUTO: 0 % (ref 0–2)
LYMPHOCYTES # BLD AUTO: 3.65 THOUSANDS/ΜL (ref 0.6–4.47)
LYMPHOCYTES NFR BLD AUTO: 46 % (ref 14–44)
M PNEUMO DNA SPEC QL NAA+PROBE: NOT DETECTED
MCH RBC QN AUTO: 29.7 PG (ref 26.8–34.3)
MCHC RBC AUTO-ENTMCNC: 32.4 G/DL (ref 31.4–37.4)
MCV RBC AUTO: 92 FL (ref 82–98)
METAPNEUMOVIRUS: NOT DETECTED
MONOCYTES # BLD AUTO: 0.89 THOUSAND/ΜL (ref 0.17–1.22)
MONOCYTES NFR BLD AUTO: 11 % (ref 4–12)
NEUTROPHILS # BLD AUTO: 3.14 THOUSANDS/ΜL (ref 1.85–7.62)
NEUTS SEG NFR BLD AUTO: 38 % (ref 43–75)
NRBC BLD AUTO-RTO: 0 /100 WBCS
P AXIS: 68 DEGREES
PLATELET # BLD AUTO: 172 THOUSANDS/UL (ref 149–390)
PMV BLD AUTO: 11 FL (ref 8.9–12.7)
POTASSIUM SERPL-SCNC: 4.4 MMOL/L (ref 3.5–5.3)
PR INTERVAL: 162 MS
QRS AXIS: 85 DEGREES
QRSD INTERVAL: 116 MS
QT INTERVAL: 428 MS
QTC INTERVAL: 428 MS
RBC # BLD AUTO: 5.55 MILLION/UL (ref 3.88–5.62)
RHINOVIRUS RNA SPEC QL NAA+PROBE: DETECTED
RSV A RNA SPEC QL NAA+PROBE: NOT DETECTED
RSV B RNA SPEC QL NAA+PROBE: NOT DETECTED
SODIUM SERPL-SCNC: 140 MMOL/L (ref 136–145)
T WAVE AXIS: 43 DEGREES
VENTRICULAR RATE: 60 BPM
WBC # BLD AUTO: 8.17 THOUSAND/UL (ref 4.31–10.16)

## 2019-03-05 PROCEDURE — 99214 OFFICE O/P EST MOD 30 MIN: CPT | Performed by: INTERNAL MEDICINE

## 2019-03-05 PROCEDURE — 80048 BASIC METABOLIC PNL TOTAL CA: CPT | Performed by: INTERNAL MEDICINE

## 2019-03-05 PROCEDURE — 85025 COMPLETE CBC W/AUTO DIFF WBC: CPT | Performed by: INTERNAL MEDICINE

## 2019-03-05 PROCEDURE — 93010 ELECTROCARDIOGRAM REPORT: CPT | Performed by: INTERNAL MEDICINE

## 2019-03-05 PROCEDURE — 82948 REAGENT STRIP/BLOOD GLUCOSE: CPT

## 2019-03-05 PROCEDURE — 99217 PR OBSERVATION CARE DISCHARGE MANAGEMENT: CPT | Performed by: INTERNAL MEDICINE

## 2019-03-05 RX ADMIN — Medication 1000 MG: at 08:42

## 2019-03-05 RX ADMIN — INSULIN LISPRO 2 UNITS: 100 INJECTION, SOLUTION INTRAVENOUS; SUBCUTANEOUS at 11:51

## 2019-03-05 RX ADMIN — ASPIRIN 81 MG: 81 TABLET, COATED ORAL at 08:42

## 2019-03-05 RX ADMIN — VITAMIN D, TAB 1000IU (100/BT) 2000 UNITS: 25 TAB at 08:42

## 2019-03-05 RX ADMIN — PANTOPRAZOLE SODIUM 40 MG: 40 TABLET, DELAYED RELEASE ORAL at 05:05

## 2019-03-05 RX ADMIN — Medication 1 TABLET: at 08:42

## 2019-03-05 NOTE — DISCHARGE SUMMARY
Discharge Summary - [address] [facility]    Patient Information: Ashvin Huertas 75 y o  male MRN: [id number]  Unit/Bed#: -01 Encounter: [id number]    Discharging Physician / Practitioner: Yunior Jang MD  PCP: Mike Haile MD  Admission Date: 3/2/2019  Discharge Date: 03/05/19    Disposition:     Home    Reason for Admission:  Chest pain    Discharge Diagnoses:     Principal Problem:    Chest pain  Active Problems:    Diabetes mellitus ([address] )    Essential hypertension    Chronic cough    Leukocytosis    Elevated troponin    JED (obstructive sleep apnea)  Resolved Problems:    * No resolved hospital problems  *      Consultations During Hospital Stay:  · Cardiology    Procedures Performed:  None    Significant Findings / Test Results:   CT of the chest PE study no evidence of PE  Chest x-ray no acute consolidation or congestion  Cardiomegaly    Nuclear stress test  Impression copied from the report  Equivocal study after pharmacologic stress due to poor quality of images  Images were of poor quality making it difficult for the study to be interpreted  Multiple mixed (predminantly fixed) perfusion defects were seen in the  anterior, septal and inferior walls of the LV  Most of these defects were not seen on the prone stress images and also the LV was seen to be denis normally in the gated stress images indicating that most likely these were attenuation  artifacts  But a small reversible perfusion defect can not be ruled out  Please correlate clinically  Left ventricular systolic function was normal      Incidental Findings:   · none    Test Results Pending at Discharge (will require follow up):   · none     Outpatient Tests Requested:  · none    Complications:  none    Hospital Course:      Ashvin Huertas is a 75 y o  male patient with past medical history of diabetes, hypertension sleep apnea on CPAP who originally presented to the hospital on 3/2/2019 due to symptoms of shortness of breath and chest pain  He also had intermittent chest discomfort as well as shortness of breath on exertion  CTA of the chest was performed and negative for PE   EKG showed no evidence of acute ischemia  He had borderline elevated troponins  Patient was evaluated by Cardiology, later underwent nuclear stress test   However the nuclear stress test was equivovocal due to poor quality of images  I did discuss with Cardiology who recommended to continue medical management at this time, have a close outpatient (2 weeks)  follow-up after discharge  Patient's symptoms resolved  Other chronic medical problems have been stable  Condition at Discharge: stable     Discharge Day Visit / Exam:     Subjective:  Patient seen and examined  No complaints at this  No chest pain  Vitals: Blood Pressure: 116/62 (03/05/19 0722)  Pulse: 62 (03/05/19 0722)  Temperature: 98 °F (36 7 °C) (03/05/19 0722)  Temp Source: Oral (03/05/19 0722)  Respirations: 18 (03/05/19 0722)  Height: 5' 10" (177 8 cm) (03/02/19 1453)  Weight - Scale: 116 kg (255 lb) (03/02/19 1453)  SpO2: 92 % (03/05/19 0722)  Exam:   Physical Exam   Constitutional: He is oriented to person, place, and time  He appears well-developed and well-nourished  No distress  HENT:   Head: Normocephalic and atraumatic  Eyes: Pupils are equal, round, and reactive to light  EOM are normal    Neck: Normal range of motion  Neck supple  Cardiovascular: Normal rate, regular rhythm and normal heart sounds  Pulmonary/Chest: Effort normal and breath sounds normal    Abdominal: Soft  Bowel sounds are normal    Musculoskeletal: Normal range of motion  Neurological: He is alert and oriented to person, place, and time  Skin: Skin is warm and dry  Discussion with Family: patient    Discharge instructions/Information to patient and family:   See after visit summary for information provided to patient and family        Provisions for Follow-Up Care:  See after visit summary for information related to follow-up care and any pertinent home health orders  Planned Readmission: none     Discharge Statement:  I spent 25 minutes discharging the patient  This time was spent on the day of discharge  I had direct contact with the patient on the day of discharge  Greater than 50% of the total time was spent examining patient, answering all patient questions, arranging and discussing plan of care with patient as well as directly providing post-discharge instructions  Additional time then spent on discharge activities  Discharge Medications:  See after visit summary for reconciled discharge medications provided to patient and family        ** Please Note: This note has been constructed using a voice recognition system **

## 2019-03-05 NOTE — PROGRESS NOTES
General Cardiology   Progress Note   Killian Vargas 75 y o  male MRN: [id number]  Unit/Bed#: -01 Encounter: [id number]        Subjective:   Denies chest pain or discomfort, shortness of breath, orthopnea, PND, headache, blurred vision, syncope, pain or swelling extremities  REVIEW OF SYSTEMS:  Constitutional:  Denies fever or chills   Eyes:  Denies change in visual acuity   HENT:  Denies nasal congestion or sore throat   Respiratory:  Denies cough or shortness of breath   Cardiovascular:  Denies chest pain or edema   GI:  Denies abdominal pain, nausea, vomiting, bloody stools or diarrhea   :  Denies dysuria, frequency, difficulty in micturition and nocturia  Musculoskeletal:  Denies back pain or joint pain   Neurologic:  Denies headache, focal weakness or sensory changes   Endocrine:  Denies polyuria or polydipsia   Lymphatic:  Denies swollen glands   Psychiatric:  Denies depression or anxiety     Objective:   Vitals:  Vitals:    03/05/19 0722   BP: 116/62   Pulse: 62   Resp: 18   Temp: 98 °F (36 7 °C)   SpO2: 92%       Body mass index is 36 59 kg/m²  Systolic (02RBD), QPQ:814 , Min:116 , UQZ:492     Diastolic (61IHT), FCB:09, Min:62, Max:67      Intake/Output Summary (Last 24 hours) at 3/5/2019 1457  Last data filed at 3/5/2019 0900  Gross per 24 hour   Intake 120 ml   Output --   Net 120 ml     Weight (last 2 days) before discharge     None          Telemetry Review: No significant arrhythmias seen on telemetry review  PHYSICAL EXAMS:  General:  Patient is not in acute distress, laying in the bed comfortably, awake, alert responding to commands  Head: Normocephalic, Atraumatic  HEENT: White sclera, pink conjunctiva,  PERRLA,pharynx benign  Neck:  Supple, no neck vein distention, carotids+2/+2 no bruits, thyromegaly, adenopathy  Respiratory: clear to P/A  Cardiovascular:  PMI normal, S1-S2 normal, No  Murmurs, thrills, gallops, rubs   Regular rhythm  GI:  Abdomen soft nontender   No hepatosplenomegaly, adenopathy, ascites,or rebound tenderness  Extremities: No edema, normal pulses, no calf tenderness, no joint deformities, no venous disease   Integument:  No skin rashes or ulceration  Lymphatic:  No cervical or inguinal lymphadenopathy  Neurologic:  Patient is awake alert, responding to command, well-oriented to time and place and person moving all extremities      LABORATORY RESULTS:  Results from last 7 days   Lab Units 03/02/19 2128 03/02/19  1900 03/02/19  1552   TROPONIN I ng/mL 0 04 0 05* 0 05*     CBC with diff:   Results from last 7 days   Lab Units 03/05/19 0426 03/03/19  0540 03/02/19 2128 03/02/19  1552   WBC Thousand/uL 8 17 10 25*  --  11 64*   HEMOGLOBIN g/dL 16 5 15 9  --  16 0   HEMATOCRIT % 50 9* 49 6*  --  49 2   MCV fL 92 92  --  91   PLATELETS Thousands/uL 172 170 171 185   MCH pg 29 7 29 6  --  29 7   MCHC g/dL 32 4 32 1  --  32 5   RDW % 14 6 14 9  --  15 0   MPV fL 11 0 11 0 11 1 11 2   NRBC AUTO /100 WBCs 0  --   --  0       CMP:  Results from last 7 days   Lab Units 03/05/19 0426 03/03/19 0541 03/02/19  1552   POTASSIUM mmol/L 4 4 4 0 4 2   CHLORIDE mmol/L 103 105 104   CO2 mmol/L 33* 29 29   BUN mg/dL 14 12 12   CREATININE mg/dL 0 76 0 68 0 69   CALCIUM mg/dL 9 1 9 3 9 3   AST U/L  --   --  21   ALT U/L  --   --  33   ALK PHOS U/L  --   --  58   EGFR ml/min/1 73sq m 90 94 94       BMP:  Results from last 7 days   Lab Units 03/05/19 0426 03/03/19  0541 03/02/19  1552   POTASSIUM mmol/L 4 4 4 0 4 2   CHLORIDE mmol/L 103 105 104   CO2 mmol/L 33* 29 29   BUN mg/dL 14 12 12   CREATININE mg/dL 0 76 0 68 0 69   CALCIUM mg/dL 9 1 9 3 9 3                  Results from last 7 days   Lab Units 03/02/19 2128   HEMOGLOBIN A1C % 7 2*               Lipid Profile:   No results found for: CHOL  No results found for: HDL  No results found for: LDLCALC  No results found for: TRIG    Cardiac testing:   Results for orders placed during the hospital encounter of 03/02/19   Echo complete with contrast if indicated    Narrative [address] [place] [id number]  [phone]    Transthoracic Echocardiogram  2D, M-mode, and Color Doppler    Study date:  03-Mar-2019    Patient: Hortensia Mercado  MR number: [id number]  Account number: [de-identified]  : 1945  Age: 75 years  Gender: Male  Status: Outpatient  Location: Emergency room  Height: 70 in  Weight: 255 lb  BP: 146/ 67 mmHg    Indications: Dyspnea  Diagnoses: R06 00 - Dyspnea, unspecified    Sonographer:  Shukla RCS  Referring Physician:  Amelia Garnica PA-C  Group:  [facility]  Interpreting Physician:  Karlene Robb MD    SUMMARY    LEFT VENTRICLE:  Ejection fraction was estimated to be 55 %  There were no regional wall motion abnormalities  RIGHT VENTRICLE:  The ventricle was dilated  Systolic function was mildly reduced  Wall thickness was mildly increased  LEFT ATRIUM:  The atrium was mildly dilated  MITRAL VALVE:  There was trace regurgitation  TRICUSPID VALVE:  There was trace regurgitation  PULMONIC VALVE:  There was trace regurgitation  PERICARDIUM:  A trace pericardial effusion was identified  HISTORY: PRIOR HISTORY: Chest pain  Elevated troponin  Risk factors: hypertension, diabetes, and morbid obesity  Chronic lung disease  PROCEDURE: The procedure was performed in the emergency room  This was a routine study  The transthoracic approach was used  The study included complete 2D imaging, M-mode, and color Doppler  The heart rate was 55 bpm, at the start of the  study  Images were obtained from the parasternal, apical, and subcostal acoustic windows  Image quality was adequate  LEFT VENTRICLE: Size was normal  Ejection fraction was estimated to be 55 %  There were no regional wall motion abnormalities  RIGHT VENTRICLE: The ventricle was dilated  Systolic function was mildly reduced   Wall thickness was mildly increased  LEFT ATRIUM: The atrium was mildly dilated  RIGHT ATRIUM: Size was normal     MITRAL VALVE: There was annular calcification  Valve structure was normal  There was normal leaflet separation  DOPPLER: The transmitral velocity was within the normal range  There was no evidence for stenosis  There was trace  regurgitation  AORTIC VALVE: The valve was not well visualized  TRICUSPID VALVE: The valve structure was normal  There was normal leaflet separation  DOPPLER: The transtricuspid velocity was within the normal range  There was no evidence for stenosis  There was trace regurgitation  PULMONIC VALVE: Leaflets exhibited normal thickness, no calcification, and normal cuspal separation  DOPPLER: The transpulmonic velocity was within the normal range  There was trace regurgitation  PERICARDIUM: A trace pericardial effusion was identified  AORTA: The root exhibited normal size  SYSTEM MEASUREMENT TABLES    2D  %FS: 37 %  Ao Diam: 3 7 cm  EDV(Teich): 128 3 ml  EF(Teich): 66 6 %  ESV(Teich): 42 9 ml  IVSd: 1 1 cm  LA Area: 20 3 cm2  LA Diam: 4 3 cm  LVEDV MOD A4C: 96 4 ml  LVEF MOD A4C: 65 8 %  LVESV MOD A4C: 33 ml  LVIDd: 5 2 cm  LVIDs: 3 3 cm  LVLd A4C: 7 4 cm  LVLs A4C: 5 7 cm  LVPWd: 1 3 cm  RA Area: 13 6 cm2  RVIDd: 3 6 cm  SV MOD A4C: 63 5 ml  SV(Teich): 85 4 ml    CW  AV Env  Ti: 315 9 ms  AV VTI: 40 8 cm  AV Vmax: 1 8 m/s  AV Vmean: 1 3 m/s  AV maxP 6 mmHg  AV meanP 2 mmHg    MM  TAPSE: 2 1 cm    PW  E': 0 1 m/s  E/E': 9 9  LVOT Env  Ti: 315 9 ms  LVOT VTI: 27 7 cm  LVOT Vmax: 1 3 m/s  LVOT Vmean: 0 9 m/s  LVOT maxP 4 mmHg  LVOT meanPG: 3 6 mmHg  MV A Paul: 0 8 m/s  MV Dec [place]: 4 4 m/s2  MV DecT: 192 5 ms  MV E Paul: 0 8 m/s  MV E/A Ratio: 1  MV PHT: 55 8 ms  MVA By PHT: 3 9 cm2    [facility] Accredited Echocardiography Laboratory    Prepared and electronically signed by    Amol Arroyo MD  Signed 03-Mar-2019 09:03:08       Meds/Allergies current meds:   Current Facility-Administered Medications   Medication Dose Route Frequency    acetaminophen (TYLENOL) tablet 650 mg  650 mg Oral Q6H PRN    aspirin (ECOTRIN LOW STRENGTH) EC tablet 81 mg  81 mg Oral Daily    cholecalciferol (VITAMIN D3) tablet 2,000 Units  2,000 Units Oral Daily    fish oil capsule 1,000 mg  1,000 mg Oral Daily    insulin glargine (LANTUS) subcutaneous injection 12 Units 0 12 mL  12 Units Subcutaneous HS    insulin lispro (HumaLOG) 100 units/mL subcutaneous injection 1-6 Units  1-6 Units Subcutaneous HS    insulin lispro (HumaLOG) 100 units/mL subcutaneous injection 2-12 Units  2-12 Units Subcutaneous TID AC    multivitamin-minerals (CENTRUM) tablet 1 tablet  1 tablet Oral Daily    pantoprazole (PROTONIX) EC tablet 40 mg  40 mg Oral Early Morning    pravastatin (PRAVACHOL) tablet 80 mg  80 mg Oral Daily With Dinner    sodium chloride (OCEAN) 0 65 % nasal spray 1 spray  1 spray Each Nare PRN     No medications prior to admission  Assessment/Plan:  1-  Chest pain  Troponins essentially negative - 0 05/0 05/0 04  No arrhythmias on telemetry; denies any further episodes of chest pain since admission  ECHO performed on 03/03/2019 shows EF of 55%, dilated right ventricle with mildly reduced systolic function, trace MR, trace TR, trace ND  Pharmacologic stress test shows equivocal study after pharmacologic stress due to poor quality of images  Multiple mixed predominantly fixed perfusion defects were seen in the anterior, septal and inferior walls of the LV  Most of these defects were not seen on the prone stress images and also the LV was seen to be denis normally in the gated stress images indicating that most likely these were attenuation artifacts  However a small reversible perfusion defect cannot be ruled out  Does not wish to have cardiac catheterization    No further cardiac workup is necessary at this time will follow up with Dr Trice Ocampo in 2 weeks to determine necessity to repeat stress testing  Continue aspirin, statin     2- Hypertension  Stable, continue all medications     2- Hyperlipidemia  Continue statin     3- Diabetes  Hemoglobin A1c 7 2%  Management per [facility]     4- JED  Confirms CPAP use daily    Counseling / Coordination of Care  Total floor / unit time spent today 20 minutes  Greater than 50% of total time was spent with the patient and / or family counseling and / or coordination of care  ** Please Note: Dragon 360 Dictation voice to text software may have been used in the creation of this document   **

## 2019-03-05 NOTE — NURSING NOTE
Discharge order placed, IV removed    Instructions reviewed and given to patient, all questions answered

## 2019-03-06 NOTE — PHYSICIAN ADVISOR
Current patient class: Observation  The patient is currently on Hospital Day: 4 at [address]        The patient was admitted to the hospital  on N/A at N/A for the following diagnosis:  Cough [R05]  Chest pain [R07 9]     After review of the relevant documentation, labs, vital signs and test results, the patient is most appropriate for OBSERVATION STATUS  The patient was admitted to the hospital without an expected length of stay of at least 2 midnights  Given that the patient is subject to the 2 midnight benchmark as a CMS patient, they are appropriate for observation status at this time  Should the patient remain hospitalized for a second midnight the status should be reevaluated for medical necessity  Rationale is as follows: The patient is a 75 yrs   Male who presented to the ED at 3/2/2019  3:18 PM with a chief complaint of Cough (pt reports having a "lung infection" for 1 month  pt was on levaquin for 10 days  a repeat xray was done after the medication and it was still there  pt has a CT scheduled 0900 Monday morning  pt started coughing last night  )     The patients vitals on arrival were ED Triage Vitals [03/02/19 1453]   Temperature Pulse Respirations Blood Pressure SpO2   98 6 °F (37 °C) 64 16 154/72 91 %      Temp Source Heart Rate Source Patient Position - Orthostatic VS BP Location FiO2 (%)   Oral Monitor Sitting Left arm --      Pain Score       No Pain           Past Medical History:   Diagnosis Date    Diabetes mellitus ([address] )     Hiatal hernia     Hyperlipidemia     Hypertension     Shortness of breath      Past Surgical History:   Procedure Laterality Date    JOINT REPLACEMENT Bilateral     knees    AK COLONOSCOPY FLX DX W/COLLJ SPEC WHEN PFRMD N/A 6/30/2017    Procedure: EGD AND COLONOSCOPY;  Surgeon: Ed Smith MD;  Location: [facility];   Service: General    TONSILLECTOMY             Consults have been placed to:   IP CONSULT TO CARDIOLOGY    Vitals:    03/04/19 0700 03/04/19 1500 03/04/19 2300 03/05/19 0722   BP: 122/61 126/64 127/67 116/62   BP Location:  Left arm Right arm Right arm   Pulse: 68 65 58 62   Resp: 20 18 17 18   Temp: 99 1 °F (37 3 °C) 98 5 °F (36 9 °C) 97 9 °F (36 6 °C) 98 °F (36 7 °C)   TempSrc: Oral Oral Oral Oral   SpO2: 90% 90% 96% 92%   Weight:       Height:           Most recent labs:    Recent Labs     03/02/19 2128 03/05/19  0426   WBC  --    < > 8 17   HGB  --    < > 16 5   HCT  --    < > 50 9*      < > 172   K  --    < > 4 4   CALCIUM  --    < > 9 1   BUN  --    < > 14   CREATININE  --    < > 0 76   TROPONINI 0 04  --   --     < > = values in this interval not displayed  Scheduled Meds:  Continuous Infusions:  No current facility-administered medications for this encounter  PRN Meds:      Surgical procedures (if appropriate):

## 2019-06-04 ENCOUNTER — OFFICE VISIT (OUTPATIENT)
Dept: PODIATRY | Facility: CLINIC | Age: 74
End: 2019-06-04
Payer: MEDICARE

## 2019-06-04 VITALS
HEIGHT: 70 IN | BODY MASS INDEX: 36.65 KG/M2 | WEIGHT: 256 LBS | DIASTOLIC BLOOD PRESSURE: 65 MMHG | SYSTOLIC BLOOD PRESSURE: 132 MMHG

## 2019-06-04 DIAGNOSIS — E11.40 TYPE 2 DIABETES MELLITUS WITH DIABETIC NEUROPATHY, WITH LONG-TERM CURRENT USE OF INSULIN (HCC): Primary | ICD-10-CM

## 2019-06-04 DIAGNOSIS — M20.41 HAMMER TOES OF BOTH FEET: ICD-10-CM

## 2019-06-04 DIAGNOSIS — M20.42 HAMMER TOES OF BOTH FEET: ICD-10-CM

## 2019-06-04 DIAGNOSIS — Z79.4 TYPE 2 DIABETES MELLITUS WITH DIABETIC NEUROPATHY, WITH LONG-TERM CURRENT USE OF INSULIN (HCC): Primary | ICD-10-CM

## 2019-06-04 DIAGNOSIS — B35.3 TINEA PEDIS OF BOTH FEET: ICD-10-CM

## 2019-06-04 PROCEDURE — 99213 OFFICE O/P EST LOW 20 MIN: CPT | Performed by: PODIATRIST

## 2019-06-04 RX ORDER — BLOOD SUGAR DIAGNOSTIC
STRIP MISCELLANEOUS
Refills: 6 | COMMUNITY
Start: 2019-05-24

## 2019-06-04 RX ORDER — KETOCONAZOLE 20 MG/G
CREAM TOPICAL 2 TIMES DAILY
Qty: 60 G | Refills: 3 | Status: SHIPPED | OUTPATIENT
Start: 2019-06-04 | End: 2020-08-22 | Stop reason: HOSPADM

## 2019-06-04 RX ORDER — ASPIRIN 81 MG/1
81 TABLET ORAL DAILY
COMMUNITY
End: 2019-06-04 | Stop reason: SDUPTHER

## 2020-02-11 ENCOUNTER — OFFICE VISIT (OUTPATIENT)
Dept: PODIATRY | Facility: CLINIC | Age: 75
End: 2020-02-11
Payer: MEDICARE

## 2020-02-11 VITALS
WEIGHT: 264.2 LBS | SYSTOLIC BLOOD PRESSURE: 158 MMHG | HEART RATE: 64 BPM | BODY MASS INDEX: 37.82 KG/M2 | DIASTOLIC BLOOD PRESSURE: 77 MMHG | HEIGHT: 70 IN

## 2020-02-11 DIAGNOSIS — E11.40 TYPE 2 DIABETES MELLITUS WITH DIABETIC NEUROPATHY, WITH LONG-TERM CURRENT USE OF INSULIN (HCC): Primary | ICD-10-CM

## 2020-02-11 DIAGNOSIS — L97.929 CHRONIC VENOUS HYPERTENSION (IDIOPATHIC) WITH ULCER OF LEFT LOWER EXTREMITY (HCC): ICD-10-CM

## 2020-02-11 DIAGNOSIS — I87.312 CHRONIC VENOUS HYPERTENSION (IDIOPATHIC) WITH ULCER OF LEFT LOWER EXTREMITY (HCC): ICD-10-CM

## 2020-02-11 DIAGNOSIS — Z79.4 TYPE 2 DIABETES MELLITUS WITH DIABETIC NEUROPATHY, WITH LONG-TERM CURRENT USE OF INSULIN (HCC): Primary | ICD-10-CM

## 2020-02-11 DIAGNOSIS — M20.42 HAMMER TOES OF BOTH FEET: ICD-10-CM

## 2020-02-11 DIAGNOSIS — M20.41 HAMMER TOES OF BOTH FEET: ICD-10-CM

## 2020-02-11 PROCEDURE — 99214 OFFICE O/P EST MOD 30 MIN: CPT | Performed by: PODIATRIST

## 2020-02-11 RX ORDER — AMOXICILLIN 500 MG/1
2000 CAPSULE ORAL AS NEEDED
COMMUNITY
Start: 2019-11-25 | End: 2020-08-22 | Stop reason: HOSPADM

## 2020-02-11 RX ORDER — PEN NEEDLE, DIABETIC 31 GX5/16"
NEEDLE, DISPOSABLE MISCELLANEOUS
COMMUNITY
Start: 2020-01-31 | End: 2022-04-04

## 2020-02-11 NOTE — PROGRESS NOTES
Sandy [place]    1945    ASSESSMENT:     1  Type 2 diabetes mellitus with diabetic neuropathy, with long-term current use of insulin ([address] )     2  Hammer toes of both feet     3  Chronic venous hypertension (idiopathic) with ulcer of left lower extremity (HCC)           PLAN:  Patient was counseled on the condition and diagnosis  Educated disease prevention and risks related to diabetes  Educated proper daily foot care and exam   Instructed proper skin care / protection and footwear  Instructed to identify any signs of infection and related foot problem  Discussed proper blood glucose control with diet and exercise  He has non-healing wound on left leg for 3 months  Instructed him not to use non-adhesive bandaid due to excessive moist in the area  Instructed zinc oxide paste to periwound and use dry gauze to the wound  Tubigrip G was applied for compression  Discussed LE edema management with compression, elevation, and diet  Instructed him to consult with his PCP whether he needs wound care consultation  He will call my office if his PCP refers him to wound care  Otherwise, he will return in 6 months  Subjective:         HPI  The patient presents for diabetic foot evaluation  The blood glucose is under control  He does not recall the last HbA1c  The patient denied any history of diabetic foot ulcer, related foot infection, or amputation  Some paresthesia on his feet  Denied weakness or significant functional deficit  He has a wound on left shin in the last 3 months  It started after removing a skin lesion by his dermatologist   He had a trouble healing the wound  He had multiple courses of oral antibiotics for infection  Now it starts to heal better  He has compression stockings, but is not able to wear them due to the wound left leg           The following portions of the patient's history were reviewed and updated as appropriate: allergies, current medications, past family history, past medical history, past social history, past surgical history and problem list   All pertinent labs and images were reviewed  Past Medical History  Past Medical History:   Diagnosis Date    Diabetes mellitus ([address] )     Hiatal hernia     Hyperlipidemia     Hypertension     Neuropathy     Shortness of breath     Venous insufficiency (chronic) (peripheral)        Past Surgical History  Past Surgical History:   Procedure Laterality Date    JOINT REPLACEMENT Bilateral     knees    AK COLONOSCOPY FLX DX W/COLLJ SPEC WHEN PFRMD N/A 6/30/2017    Procedure: EGD AND COLONOSCOPY;  Surgeon: Isa Siemens, MD;  Location: [facility]; Service: General    TONSILLECTOMY          Allergies:  Patient has no known allergies      Medications:  Current Outpatient Medications   Medication Sig Dispense Refill    amoxicillin (AMOXIL) 500 mg capsule Take 2,000 mg by mouth as needed 1 hour prior to dental appointment      aspirin (ECOTRIN LOW STRENGTH) 81 mg EC tablet Take by mouth 2 (two) times a day        B-D ULTRAFINE III SHORT PEN 31G X 8 MM MISC USE TO INJECT INSULIN THREE TIMES DAILY      Cholecalciferol (VITAMIN D) 2000 units CAPS Take by mouth      Empagliflozin (JARDIANCE) 25 MG TABS Take 25 mg by mouth every morning      HUMALOG 100 UNIT/ML injection INJECT DAILY AS DIRECTED PER SLIDING SCALE  3    Insulin Degludec (TRESIBA FLEXTOUCH SC) Inject 20 Units under the skin daily at bedtime      insulin regular (HumuLIN R,NovoLIN R) 100 units/mL injection Inject 12 Units under the skin once      ketoconazole (NIZORAL) 2 % cream Apply topically 2 (two) times a day 60 g 3    multivitamin-iron-minerals-folic acid (CENTRUM) chewable tablet Chew 1 tablet daily      Omega-3 Fatty Acids (FISH OIL CONCENTRATE PO) Take by mouth      ONETOUCH VERIO test strip TEST 2 TIMES A DAY DX E11 9  6    promethazine-codeine (PHENERGAN WITH CODEINE) 6 25-10 mg/5 mL syrup Take 5 mL by mouth every 4 (four) hours as needed for cough      simvastatin (ZOCOR) 40 mg tablet Take 40 mg by mouth daily at bedtime      fluticasone-umeclidinium-vilanterol (TRELEGY ELLIPTA) 100-62 5-25 MCG/INH inhaler Inhale 1 puff daily Rinse mouth after use  No current facility-administered medications for this visit  Social History:  Social History     Socioeconomic History    Marital status: /Civil Union     Spouse name: None    Number of children: None    Years of education: None    Highest education level: None   Occupational History    None   Social Needs    Financial resource strain: None    Food insecurity:     Worry: None     Inability: None    Transportation needs:     Medical: None     Non-medical: None   Tobacco Use    Smoking status: Never Smoker    Smokeless tobacco: Never Used   Substance and Sexual Activity    Alcohol use: Never     Frequency: Never    Drug use: No    Sexual activity: None   Lifestyle    Physical activity:     Days per week: None     Minutes per session: None    Stress: None   Relationships    Social connections:     Talks on phone: None     Gets together: None     Attends Sabianist service: None     Active member of club or organization: None     Attends meetings of clubs or organizations: None     Relationship status: None    Intimate partner violence:     Fear of current or ex partner: None     Emotionally abused: None     Physically abused: None     Forced sexual activity: None   Other Topics Concern    None   Social History Narrative    None        Review of Systems   Constitutional: Negative for chills and fever  Respiratory: Negative for cough and shortness of breath  Cardiovascular: Positive for leg swelling  Negative for chest pain  Gastrointestinal: Negative for constipation, diarrhea, nausea and vomiting  Skin: Negative for wound  Neurological: Negative for dizziness and weakness  Psychiatric/Behavioral: Negative for confusion  Objective:      /77   Pulse 64   Ht 5' 10" (1 778 m) Comment: verbal  Wt 120 kg (264 lb 3 2 oz)   BMI 37 91 kg/m²          Physical Exam   Constitutional: He is oriented to person, place, and time  He appears well-developed and well-nourished  No distress  Cardiovascular: Normal rate and regular rhythm  Pulses are no weak pulses  Pulses:       Dorsalis pedis pulses are 1+ on the right side, and 1+ on the left side  Posterior tibial pulses are 1+ on the right side, and 1+ on the left side  Chronic LE edema with venous stasis / varicosity, left worse than right  Pulmonary/Chest: Effort normal and breath sounds normal    Musculoskeletal: He exhibits no tenderness  No acute joint pain or edema  No redness  Decreased ROM bilateral foot  Bunion and hammertoe noted  Feet:   Right Foot:   Skin Integrity: Positive for dry skin  Negative for ulcer, skin breakdown, erythema, warmth or callus  Left Foot:   Skin Integrity: Positive for ulcer and dry skin  Negative for skin breakdown, erythema, warmth or callus  Neurological: He is alert and oriented to person, place, and time  He exhibits normal muscle tone  Sensation intact to 10 gm monofilament  Decreased vibratory sensation in his feet  Skin: Skin is warm  Wound presents left shin  It is partial thick  It is about 1 0 X 0 8 cm  Chronic venous stasis noted  Periwound rash presents  Psychiatric: His behavior is normal    Vitals reviewed  Diabetic Foot Exam    Patient's shoes and socks removed  Right Foot/Ankle   Right Foot Inspection  Skin Exam: skin normal, skin intact, dry skin and abnormal color no warmth, no callus, no erythema, no maceration, no pre-ulcer, no ulcer and no callus                          Toe Exam: right toe deformityno swelling, no tenderness and erythema  Sensory   Vibration: diminished  Proprioception: intact   Monofilament testing: intact  Vascular  Capillary refills: < 3 seconds  The right DP pulse is 1+  The right PT pulse is 1+  Left Foot/Ankle  Left Foot Inspection  Skin Exam: skin normal, skin intact, dry skin, abnormal color and ulcerno warmth, no erythema, no maceration, no pre-ulcer and no callus                         Toe Exam: left toe deformityno swelling, no tenderness and no erythema                   Sensory   Vibration: diminished  Proprioception: intact  Monofilament: intact  Vascular  Capillary refills: < 3 seconds  The left DP pulse is 1+  The left PT pulse is 1+  Assign Risk Category:  Deformity present; No loss of protective sensation;  No weak pulses       Risk: 1

## 2020-02-11 NOTE — LETTER
February 11, 2020     Toshia Bergman, [id number]    Patient: Micky Whittington   YOB: 1945   Date of Visit: 2/11/2020       Dear Dr Vicki Snow: Thank you for referring Micky Whittington to me for evaluation  Below are my notes for this consultation  If you have questions, please do not hesitate to call me  I look forward to following your patient along with you  Sincerely,        Slade Johnson DPM        CC: No Recipients  ULISES [facility]  2/11/2020  6:43 PM  Sign at close encounter        PATIENT:  Micky Whittington    1945    ASSESSMENT:     1  Type 2 diabetes mellitus with diabetic neuropathy, with long-term current use of insulin ([facility] [address] )     2  Hammer toes of both feet     3  Chronic venous hypertension (idiopathic) with ulcer of left lower extremity (HCC)           PLAN:  Patient was counseled on the condition and diagnosis  Educated disease prevention and risks related to diabetes  Educated proper daily foot care and exam   Instructed proper skin care / protection and footwear  Instructed to identify any signs of infection and related foot problem  Discussed proper blood glucose control with diet and exercise  He has non-healing wound on left leg for 3 months  Instructed him not to use non-adhesive bandaid due to excessive moist in the area  Instructed zinc oxide paste to periwound and use dry gauze to the wound  Tubigrip G was applied for compression  Discussed LE edema management with compression, elevation, and diet  Instructed him to consult with his PCP whether he needs wound care consultation  He will call my office if his PCP refers him to wound care  Otherwise, he will return in 6 months  Subjective:         HPI  The patient presents for diabetic foot evaluation  The blood glucose is under control  He does not recall the last HbA1c    The patient denied any history of diabetic foot ulcer, related foot infection, or amputation  Some paresthesia on his feet  Denied weakness or significant functional deficit  He has a wound on left shin in the last 3 months  It started after removing a skin lesion by his dermatologist   He had a trouble healing the wound  He had multiple courses of oral antibiotics for infection  Now it starts to heal better  He has compression stockings, but is not able to wear them due to the wound left leg  The following portions of the patient's history were reviewed and updated as appropriate: allergies, current medications, past family history, past medical history, past social history, past surgical history and problem list   All pertinent labs and images were reviewed  Past Medical History  Past Medical History:   Diagnosis Date    Diabetes mellitus ([address] )     Hiatal hernia     Hyperlipidemia     Hypertension     Neuropathy     Shortness of breath     Venous insufficiency (chronic) (peripheral)        Past Surgical History  Past Surgical History:   Procedure Laterality Date    JOINT REPLACEMENT Bilateral     knees    MT COLONOSCOPY FLX DX W/COLLJ SPEC WHEN PFRMD N/A 6/30/2017    Procedure: EGD AND COLONOSCOPY;  Surgeon: Luna Callahan MD;  Location: [facility]; Service: General    TONSILLECTOMY          Allergies:  Patient has no known allergies      Medications:  Current Outpatient Medications   Medication Sig Dispense Refill    amoxicillin (AMOXIL) 500 mg capsule Take 2,000 mg by mouth as needed 1 hour prior to dental appointment      aspirin (ECOTRIN LOW STRENGTH) 81 mg EC tablet Take by mouth 2 (two) times a day        B-D ULTRAFINE III SHORT PEN 31G X 8 MM MISC USE TO INJECT INSULIN THREE TIMES DAILY      Cholecalciferol (VITAMIN D) 2000 units CAPS Take by mouth      Empagliflozin (JARDIANCE) 25 MG TABS Take 25 mg by mouth every morning      HUMALOG 100 UNIT/ML injection INJECT DAILY AS DIRECTED PER SLIDING SCALE  3    Insulin Degludec (TRESIBA FLEXTOUCH SC) Inject 20 Units under the skin daily at bedtime      insulin regular (HumuLIN R,NovoLIN R) 100 units/mL injection Inject 12 Units under the skin once      ketoconazole (NIZORAL) 2 % cream Apply topically 2 (two) times a day 60 g 3    multivitamin-iron-minerals-folic acid (CENTRUM) chewable tablet Chew 1 tablet daily      Omega-3 Fatty Acids (FISH OIL CONCENTRATE PO) Take by mouth      ONETOUCH VERIO test strip TEST 2 TIMES A DAY DX E11 9  6    promethazine-codeine (PHENERGAN WITH CODEINE) 6 25-10 mg/5 mL syrup Take 5 mL by mouth every 4 (four) hours as needed for cough      simvastatin (ZOCOR) 40 mg tablet Take 40 mg by mouth daily at bedtime      fluticasone-umeclidinium-vilanterol (TRELEGY ELLIPTA) 100-62 5-25 MCG/INH inhaler Inhale 1 puff daily Rinse mouth after use  No current facility-administered medications for this visit          Social History:  Social History     Socioeconomic History    Marital status: /Civil Union     Spouse name: None    Number of children: None    Years of education: None    Highest education level: None   Occupational History    None   Social Needs    Financial resource strain: None    Food insecurity:     Worry: None     Inability: None    Transportation needs:     Medical: None     Non-medical: None   Tobacco Use    Smoking status: Never Smoker    Smokeless tobacco: Never Used   Substance and Sexual Activity    Alcohol use: Never     Frequency: Never    Drug use: No    Sexual activity: None   Lifestyle    Physical activity:     Days per week: None     Minutes per session: None    Stress: None   Relationships    Social connections:     Talks on phone: None     Gets together: None     Attends Caodaism service: None     Active member of club or organization: None     Attends meetings of clubs or organizations: None     Relationship status: None    Intimate partner violence:     Fear of current or ex partner: None     Emotionally abused: None Physically abused: None     Forced sexual activity: None   Other Topics Concern    None   Social History Narrative    None        Review of Systems   Constitutional: Negative for chills and fever  Respiratory: Negative for cough and shortness of breath  Cardiovascular: Positive for leg swelling  Negative for chest pain  Gastrointestinal: Negative for constipation, diarrhea, nausea and vomiting  Skin: Negative for wound  Neurological: Negative for dizziness and weakness  Psychiatric/Behavioral: Negative for confusion  Objective:      /77   Pulse 64   Ht 5' 10" (1 778 m) Comment: verbal  Wt 120 kg (264 lb 3 2 oz)   BMI 37 91 kg/m²           Physical Exam   Constitutional: He is oriented to person, place, and time  He appears well-developed and well-nourished  No distress  Cardiovascular: Normal rate and regular rhythm  Pulses are no weak pulses  Pulses:       Dorsalis pedis pulses are 1+ on the right side, and 1+ on the left side  Posterior tibial pulses are 1+ on the right side, and 1+ on the left side  Chronic LE edema with venous stasis / varicosity, left worse than right  Pulmonary/Chest: Effort normal and breath sounds normal    Musculoskeletal: He exhibits no tenderness  No acute joint pain or edema  No redness  Decreased ROM bilateral foot  Bunion and hammertoe noted  Feet:   Right Foot:   Skin Integrity: Positive for dry skin  Negative for ulcer, skin breakdown, erythema, warmth or callus  Left Foot:   Skin Integrity: Positive for ulcer and dry skin  Negative for skin breakdown, erythema, warmth or callus  Neurological: He is alert and oriented to person, place, and time  He exhibits normal muscle tone  Sensation intact to 10 gm monofilament  Decreased vibratory sensation in his feet  Skin: Skin is warm  Wound presents left shin  It is partial thick  It is about 1 0 X 0 8 cm  Chronic venous stasis noted  Periwound rash presents  Psychiatric: His behavior is normal    Vitals reviewed  Diabetic Foot Exam    Patient's shoes and socks removed  Right Foot/Ankle   Right Foot Inspection  Skin Exam: skin normal, skin intact, dry skin and abnormal color no warmth, no callus, no erythema, no maceration, no pre-ulcer, no ulcer and no callus                          Toe Exam: right toe deformityno swelling, no tenderness and erythema  Sensory   Vibration: diminished  Proprioception: intact   Monofilament testing: intact  Vascular  Capillary refills: < 3 seconds  The right DP pulse is 1+  The right PT pulse is 1+  Left Foot/Ankle  Left Foot Inspection  Skin Exam: skin normal, skin intact, dry skin, abnormal color and ulcerno warmth, no erythema, no maceration, no pre-ulcer and no callus                         Toe Exam: left toe deformityno swelling, no tenderness and no erythema                   Sensory   Vibration: diminished  Proprioception: intact  Monofilament: intact  Vascular  Capillary refills: < 3 seconds  The left DP pulse is 1+  The left PT pulse is 1+  Assign Risk Category:  Deformity present; No loss of protective sensation;  No weak pulses       Risk: 1

## 2020-02-27 ENCOUNTER — OFFICE VISIT (OUTPATIENT)
Dept: PODIATRY | Facility: CLINIC | Age: 75
End: 2020-02-27
Payer: MEDICARE

## 2020-02-27 VITALS
DIASTOLIC BLOOD PRESSURE: 71 MMHG | WEIGHT: 282.2 LBS | SYSTOLIC BLOOD PRESSURE: 148 MMHG | HEIGHT: 68 IN | BODY MASS INDEX: 42.77 KG/M2 | HEART RATE: 71 BPM

## 2020-02-27 DIAGNOSIS — L97.929 CHRONIC VENOUS HYPERTENSION (IDIOPATHIC) WITH ULCER OF LEFT LOWER EXTREMITY (HCC): Primary | ICD-10-CM

## 2020-02-27 DIAGNOSIS — Z79.4 TYPE 2 DIABETES MELLITUS WITH DIABETIC NEUROPATHY, WITH LONG-TERM CURRENT USE OF INSULIN (HCC): ICD-10-CM

## 2020-02-27 DIAGNOSIS — E11.40 TYPE 2 DIABETES MELLITUS WITH DIABETIC NEUROPATHY, WITH LONG-TERM CURRENT USE OF INSULIN (HCC): ICD-10-CM

## 2020-02-27 DIAGNOSIS — I87.312 CHRONIC VENOUS HYPERTENSION (IDIOPATHIC) WITH ULCER OF LEFT LOWER EXTREMITY (HCC): Primary | ICD-10-CM

## 2020-02-27 PROCEDURE — 99213 OFFICE O/P EST LOW 20 MIN: CPT | Performed by: PODIATRIST

## 2020-02-27 NOTE — PROGRESS NOTES
MARTÍNCualivia Rivera    1945    ASSESSMENT:     1  Chronic venous hypertension (idiopathic) with ulcer of left lower extremity (HCC)  cephalexin (KEFLEX) 500 mg capsule   2  Type 2 diabetes mellitus with diabetic neuropathy, with long-term current use of insulin ([address] )           PLAN:  Patient was counseled and educated on the condition and the diagnosis  The diagnosis, treatment options and prognosis were discussed with the patient  Previous ulcer is healed  He has new wound in the left calf  Will try unna boot compression  Start him on oral antibiotics to control bioburden  Adaptic and dry dressing applied  Then, compression dressing applied with econopaste  Instructed elevation of left leg  RA in 1 week  Subjective:         HPI  The patient presents for chief complaint of left leg ulcer  He developed new ulcer on left calf a few days ago  Previous wound is healed  He complained of some burning sensation  BS is under control  The following portions of the patient's history were reviewed and updated as appropriate: allergies, current medications, past family history, past medical history, past social history, past surgical history and problem list   All pertinent labs and images were reviewed  Past Medical History  Past Medical History:   Diagnosis Date    Diabetes mellitus ([address] )     Hiatal hernia     Hyperlipidemia     Hypertension     Neuropathy     Shortness of breath     Venous insufficiency (chronic) (peripheral)        Past Surgical History  Past Surgical History:   Procedure Laterality Date    JOINT REPLACEMENT Bilateral     knees    DE COLONOSCOPY FLX DX W/COLLJ SPEC WHEN PFRMD N/A 6/30/2017    Procedure: EGD AND COLONOSCOPY;  Surgeon: Kimmie Joshua MD;  Location: [facility]; Service: General    TONSILLECTOMY          Allergies:  Patient has no known allergies      Medications:  Current Outpatient Medications   Medication Sig Dispense Refill    amoxicillin (AMOXIL) 500 mg capsule Take 2,000 mg by mouth as needed 1 hour prior to dental appointment      aspirin (ECOTRIN LOW STRENGTH) 81 mg EC tablet Take by mouth 2 (two) times a day        B-D ULTRAFINE III SHORT PEN 31G X 8 MM MISC USE TO INJECT INSULIN THREE TIMES DAILY      Cholecalciferol (VITAMIN D) 2000 units CAPS Take by mouth      Empagliflozin (JARDIANCE) 25 MG TABS Take 25 mg by mouth every morning      fluticasone-umeclidinium-vilanterol (TRELEGY ELLIPTA) 100-62 5-25 MCG/INH inhaler Inhale 1 puff daily Rinse mouth after use   HUMALOG 100 UNIT/ML injection INJECT DAILY AS DIRECTED PER SLIDING SCALE  3    Insulin Degludec (TRESIBA FLEXTOUCH SC) Inject 20 Units under the skin daily at bedtime      insulin regular (HumuLIN R,NovoLIN R) 100 units/mL injection Inject 12 Units under the skin once      ketoconazole (NIZORAL) 2 % cream Apply topically 2 (two) times a day 60 g 3    multivitamin-iron-minerals-folic acid (CENTRUM) chewable tablet Chew 1 tablet daily      Omega-3 Fatty Acids (FISH OIL CONCENTRATE PO) Take by mouth      ONETOUCH VERIO test strip TEST 2 TIMES A DAY DX E11 9  6    promethazine-codeine (PHENERGAN WITH CODEINE) 6 25-10 mg/5 mL syrup Take 5 mL by mouth every 4 (four) hours as needed for cough      simvastatin (ZOCOR) 40 mg tablet Take 40 mg by mouth daily at bedtime      cephalexin (KEFLEX) 500 mg capsule Take 1 capsule (500 mg total) by mouth every 8 (eight) hours for 7 days 21 capsule 0     No current facility-administered medications for this visit          Social History:  Social History     Socioeconomic History    Marital status: /Civil Union     Spouse name: None    Number of children: None    Years of education: None    Highest education level: None   Occupational History    None   Social Needs    Financial resource strain: None    Food insecurity:     Worry: None     Inability: None    Transportation needs:     Medical: None     Non-medical: None Tobacco Use    Smoking status: Never Smoker    Smokeless tobacco: Never Used   Substance and Sexual Activity    Alcohol use: Never     Frequency: Never    Drug use: No    Sexual activity: None   Lifestyle    Physical activity:     Days per week: None     Minutes per session: None    Stress: None   Relationships    Social connections:     Talks on phone: None     Gets together: None     Attends Latter-day service: None     Active member of club or organization: None     Attends meetings of clubs or organizations: None     Relationship status: None    Intimate partner violence:     Fear of current or ex partner: None     Emotionally abused: None     Physically abused: None     Forced sexual activity: None   Other Topics Concern    None   Social History Narrative    None        Review of Systems   Constitutional: Negative for chills and fever  Respiratory: Negative for cough and shortness of breath  Cardiovascular: Positive for leg swelling  Negative for chest pain  Gastrointestinal: Negative for nausea and vomiting  Skin: Positive for wound  Neurological: Negative for weakness  Objective:      /71   Pulse 71   Ht 5' 8" (1 727 m)   Wt 128 kg (282 lb 3 2 oz)   BMI 42 91 kg/m²          Physical Exam   Constitutional: He is oriented to person, place, and time  He appears well-developed and well-nourished  No distress  Cardiovascular: Normal rate and regular rhythm  Pulses are no weak pulses  Pulses:       Dorsalis pedis pulses are 1+ on the right side, and 1+ on the left side  Posterior tibial pulses are 1+ on the right side, and 1+ on the left side  Chronic LE edema with venous stasis / varicosity, left worse than right  Pulmonary/Chest: Effort normal and breath sounds normal    Musculoskeletal: He exhibits no tenderness  No acute joint pain or edema  No redness  Decreased ROM bilateral foot  Bunion and hammertoe noted     Feet:   Right Foot:   Skin Integrity: Positive for dry skin  Negative for ulcer, skin breakdown, erythema, warmth or callus  Left Foot:   Skin Integrity: Positive for ulcer and dry skin  Negative for skin breakdown, erythema, warmth or callus  Neurological: He is alert and oriented to person, place, and time  He exhibits normal muscle tone  Sensation intact to 10 gm monofilament  Decreased vibratory sensation in his feet  Skin: Skin is warm  Wound on left shin is healed  New ulcer on left calf  It is 0 8 X 0 3 X 0 1 cm  Mild periwound redenss  No pus  Chronic venous stasis noted  Psychiatric: His behavior is normal    Vitals reviewed

## 2020-02-28 RX ORDER — CEPHALEXIN 500 MG/1
500 CAPSULE ORAL EVERY 8 HOURS SCHEDULED
Qty: 21 CAPSULE | Refills: 0 | Status: SHIPPED | OUTPATIENT
Start: 2020-02-28 | End: 2020-03-06

## 2020-03-05 ENCOUNTER — PROCEDURE VISIT (OUTPATIENT)
Dept: PODIATRY | Facility: CLINIC | Age: 75
End: 2020-03-05
Payer: MEDICARE

## 2020-03-05 VITALS
DIASTOLIC BLOOD PRESSURE: 69 MMHG | HEART RATE: 66 BPM | HEIGHT: 68 IN | BODY MASS INDEX: 42.89 KG/M2 | SYSTOLIC BLOOD PRESSURE: 137 MMHG | WEIGHT: 283 LBS

## 2020-03-05 DIAGNOSIS — Z79.4 TYPE 2 DIABETES MELLITUS WITH DIABETIC NEUROPATHY, WITH LONG-TERM CURRENT USE OF INSULIN (HCC): ICD-10-CM

## 2020-03-05 DIAGNOSIS — I87.312 CHRONIC VENOUS HYPERTENSION (IDIOPATHIC) WITH ULCER OF LEFT LOWER EXTREMITY (HCC): Primary | ICD-10-CM

## 2020-03-05 DIAGNOSIS — E11.40 TYPE 2 DIABETES MELLITUS WITH DIABETIC NEUROPATHY, WITH LONG-TERM CURRENT USE OF INSULIN (HCC): ICD-10-CM

## 2020-03-05 DIAGNOSIS — L97.929 CHRONIC VENOUS HYPERTENSION (IDIOPATHIC) WITH ULCER OF LEFT LOWER EXTREMITY (HCC): Primary | ICD-10-CM

## 2020-03-05 PROCEDURE — 99213 OFFICE O/P EST LOW 20 MIN: CPT | Performed by: PODIATRIST

## 2020-03-05 RX ORDER — COLLAGENASE SANTYL 250 [ARB'U]/G
OINTMENT TOPICAL
COMMUNITY
Start: 2020-02-17 | End: 2021-05-17 | Stop reason: ALTCHOICE

## 2020-03-05 NOTE — PROGRESS NOTES
PATIENTMora All    1945    ASSESSMENT:     1  Chronic venous hypertension (idiopathic) with ulcer of left lower extremity (HCC)     2  Type 2 diabetes mellitus with diabetic neuropathy, with long-term current use of insulin ([facility] )           PLAN:  Patient was counseled and educated on the condition and the diagnosis  The diagnosis, treatment options and prognosis were discussed with the patient  Wound is healed well  Educated management of venous disease and LE edema with compression, elevation, diet, and weight loss  Educated for proper skin care and protection  Instructed daily foot exam   RA in August for DM foot exam       Subjective:         HPI  The patient presents for left leg ulcer  He feels well with no pain  Tolerated compression dressing well without an issue  No new complaint  BS is under control  The following portions of the patient's history were reviewed and updated as appropriate: allergies, current medications, past family history, past medical history, past social history, past surgical history and problem list   All pertinent labs and images were reviewed  Past Medical History  Past Medical History:   Diagnosis Date    Diabetes mellitus ([facility] )     Hiatal hernia     Hyperlipidemia     Hypertension     Neuropathy     Shortness of breath     Venous insufficiency (chronic) (peripheral)        Past Surgical History  Past Surgical History:   Procedure Laterality Date    JOINT REPLACEMENT Bilateral     knees    [place] COLONOSCOPY FLX DX W/COLLJ SPEC WHEN PFRMD N/A 6/30/2017    Procedure: EGD AND COLONOSCOPY;  Surgeon: Rajinder Mills MD;  Location: [facility]; Service: General    TONSILLECTOMY          Allergies:  Patient has no known allergies      Medications:  Current Outpatient Medications   Medication Sig Dispense Refill    aspirin (ECOTRIN LOW STRENGTH) 81 mg EC tablet Take by mouth 2 (two) times a day        B-D ULTRAFINE III SHORT PEN 31G X 8 MM MISC USE TO INJECT INSULIN THREE TIMES DAILY      Cholecalciferol (VITAMIN D) 2000 units CAPS Take by mouth      Empagliflozin (JARDIANCE) 25 MG TABS Take 25 mg by mouth every morning      fluticasone-umeclidinium-vilanterol (TRELEGY ELLIPTA) 100-62 5-25 MCG/INH inhaler Inhale 1 puff daily Rinse mouth after use   HUMALOG 100 UNIT/ML injection INJECT DAILY AS DIRECTED PER SLIDING SCALE  3    Insulin Degludec (TRESIBA FLEXTOUCH SC) Inject 20 Units under the skin daily at bedtime      insulin regular (HumuLIN R,NovoLIN R) 100 units/mL injection Inject 12 Units under the skin once      ketoconazole (NIZORAL) 2 % cream Apply topically 2 (two) times a day 60 g 3    multivitamin-iron-minerals-folic acid (CENTRUM) chewable tablet Chew 1 tablet daily      Omega-3 Fatty Acids (FISH OIL CONCENTRATE PO) Take by mouth      ONETOUCH VERIO test strip TEST 2 TIMES A DAY DX E11 9  6    SANTYL ointment       simvastatin (ZOCOR) 40 mg tablet Take 40 mg by mouth daily at bedtime      amoxicillin (AMOXIL) 500 mg capsule Take 2,000 mg by mouth as needed 1 hour prior to dental appointment      cephalexin (KEFLEX) 500 mg capsule Take 1 capsule (500 mg total) by mouth every 8 (eight) hours for 7 days (Patient not taking: Reported on 3/5/2020) 21 capsule 0    promethazine-codeine (PHENERGAN WITH CODEINE) 6 25-10 mg/5 mL syrup Take 5 mL by mouth every 4 (four) hours as needed for cough        No current facility-administered medications for this visit          Social History:  Social History     Socioeconomic History    Marital status: /Civil Union     Spouse name: None    Number of children: None    Years of education: None    Highest education level: None   Occupational History    None   Social Needs    Financial resource strain: None    Food insecurity:     Worry: None     Inability: None    Transportation needs:     Medical: None     Non-medical: None   Tobacco Use    Smoking status: Never Smoker    Smokeless tobacco: Never Used   Substance and Sexual Activity    Alcohol use: Never     Frequency: Never    Drug use: No    Sexual activity: None   Lifestyle    Physical activity:     Days per week: None     Minutes per session: None    Stress: None   Relationships    Social connections:     Talks on phone: None     Gets together: None     Attends Religion service: None     Active member of club or organization: None     Attends meetings of clubs or organizations: None     Relationship status: None    Intimate partner violence:     Fear of current or ex partner: None     Emotionally abused: None     Physically abused: None     Forced sexual activity: None   Other Topics Concern    None   Social History Narrative    None        Review of Systems   Constitutional: Negative for chills and fever  Respiratory: Negative for cough and shortness of breath  Cardiovascular: Positive for leg swelling  Negative for chest pain  Gastrointestinal: Negative for nausea and vomiting  Neurological: Negative for weakness  Objective:      /69   Pulse 66   Ht 5' 8" (1 727 m)   Wt 128 kg (283 lb)   BMI 43 03 kg/m²          Physical Exam   Constitutional: He is oriented to person, place, and time  He appears well-developed and well-nourished  No distress  Cardiovascular: Normal rate and regular rhythm  Pulses:       Dorsalis pedis pulses are 1+ on the right side, and 1+ on the left side  Posterior tibial pulses are 1+ on the right side, and 1+ on the left side  Chronic LE edema with venous stasis / varicosity, left worse than right  Pulmonary/Chest: Effort normal and breath sounds normal    Musculoskeletal: He exhibits no tenderness  No acute joint pain or edema  No redness  Decreased ROM bilateral foot  Bunion and hammertoe noted  Feet:   Right Foot:   Skin Integrity: Positive for dry skin  Negative for ulcer, skin breakdown, erythema, warmth or callus     Left Foot:   Skin Integrity: Positive for ulcer and dry skin  Negative for skin breakdown, erythema, warmth or callus  Neurological: He is alert and oriented to person, place, and time  He exhibits normal muscle tone  Sensation intact to 10 gm monofilament  Decreased vibratory sensation in his feet  Skin: Skin is warm  Ulcer on left calf is healed  Decreased edema  No redness or warmth  No cellulitis  Psychiatric: His behavior is normal    Vitals reviewed

## 2020-08-17 ENCOUNTER — APPOINTMENT (EMERGENCY)
Dept: CT IMAGING | Facility: HOSPITAL | Age: 75
End: 2020-08-17
Payer: MEDICARE

## 2020-08-17 ENCOUNTER — HOSPITAL ENCOUNTER (EMERGENCY)
Facility: HOSPITAL | Age: 75
End: 2020-08-17
Attending: EMERGENCY MEDICINE | Admitting: EMERGENCY MEDICINE
Payer: MEDICARE

## 2020-08-17 ENCOUNTER — APPOINTMENT (EMERGENCY)
Dept: RADIOLOGY | Facility: HOSPITAL | Age: 75
End: 2020-08-17
Payer: MEDICARE

## 2020-08-17 ENCOUNTER — HOSPITAL ENCOUNTER (INPATIENT)
Facility: HOSPITAL | Age: 75
LOS: 5 days | Discharge: HOME | DRG: 175 | End: 2020-08-22
Attending: EMERGENCY MEDICINE | Admitting: EMERGENCY MEDICINE
Payer: MEDICARE

## 2020-08-17 VITALS
DIASTOLIC BLOOD PRESSURE: 62 MMHG | TEMPERATURE: 98.3 F | OXYGEN SATURATION: 95 % | RESPIRATION RATE: 25 BRPM | HEART RATE: 84 BPM | SYSTOLIC BLOOD PRESSURE: 125 MMHG

## 2020-08-17 DIAGNOSIS — I26.99 PE (PULMONARY THROMBOEMBOLISM) (HCC): Primary | ICD-10-CM

## 2020-08-17 DIAGNOSIS — S82.112A DISPLACED FRACTURE OF LEFT TIBIAL SPINE, INITIAL ENCOUNTER FOR CLOSED FRACTURE: ICD-10-CM

## 2020-08-17 DIAGNOSIS — I26.99 PULMONARY EMBOLISM (HCC): Primary | ICD-10-CM

## 2020-08-17 DIAGNOSIS — I26.99 ACUTE PULMONARY EMBOLISM, UNSPECIFIED PULMONARY EMBOLISM TYPE, UNSPECIFIED WHETHER ACUTE COR PULMONALE PRESENT (HCC): ICD-10-CM

## 2020-08-17 DIAGNOSIS — A41.9 SEPSIS (HCC): ICD-10-CM

## 2020-08-17 DIAGNOSIS — I27.20 PULMONARY HYPERTENSION (HCC): ICD-10-CM

## 2020-08-17 DIAGNOSIS — S82.302A CLOSED FRACTURE OF DISTAL END OF LEFT TIBIA, UNSPECIFIED FRACTURE MORPHOLOGY, INITIAL ENCOUNTER: ICD-10-CM

## 2020-08-17 DIAGNOSIS — M79.606 PAIN AND SWELLING OF LOWER EXTREMITY, UNSPECIFIED LATERALITY: ICD-10-CM

## 2020-08-17 DIAGNOSIS — R05.9 COUGH: ICD-10-CM

## 2020-08-17 DIAGNOSIS — L03.116 CELLULITIS OF LEFT LOWER EXTREMITY: ICD-10-CM

## 2020-08-17 DIAGNOSIS — M79.89 PAIN AND SWELLING OF LOWER EXTREMITY, UNSPECIFIED LATERALITY: ICD-10-CM

## 2020-08-17 LAB
ALBUMIN SERPL BCP-MCNC: 3.4 G/DL (ref 3.5–5)
ALP SERPL-CCNC: 63 U/L (ref 46–116)
ALT SERPL W P-5'-P-CCNC: 26 U/L (ref 12–78)
ANION GAP SERPL CALCULATED.3IONS-SCNC: 12 MMOL/L (ref 4–13)
APTT PPP: 33 SECONDS (ref 23–37)
AST SERPL W P-5'-P-CCNC: 19 U/L (ref 5–45)
BASOPHILS # BLD AUTO: 0.07 THOUSANDS/ΜL (ref 0–0.1)
BASOPHILS NFR BLD AUTO: 0 % (ref 0–1)
BILIRUB SERPL-MCNC: 1.8 MG/DL (ref 0.2–1)
BUN SERPL-MCNC: 16 MG/DL (ref 5–25)
CALCIUM SERPL-MCNC: 9.2 MG/DL (ref 8.3–10.1)
CHLORIDE SERPL-SCNC: 102 MMOL/L (ref 100–108)
CO2 SERPL-SCNC: 26 MMOL/L (ref 21–32)
CREAT SERPL-MCNC: 0.89 MG/DL (ref 0.6–1.3)
EOSINOPHIL # BLD AUTO: 0.07 THOUSAND/ΜL (ref 0–0.61)
EOSINOPHIL NFR BLD AUTO: 0 % (ref 0–6)
ERYTHROCYTE [DISTWIDTH] IN BLOOD BY AUTOMATED COUNT: 14.4 % (ref 11.6–15.1)
GFR SERPL CREATININE-BSD FRML MDRD: 84 ML/MIN/1.73SQ M
GLUCOSE SERPL-MCNC: 161 MG/DL (ref 65–140)
HCT VFR BLD AUTO: 50 % (ref 36.5–49.3)
HGB BLD-MCNC: 16.2 G/DL (ref 12–17)
IMM GRANULOCYTES # BLD AUTO: 0.08 THOUSAND/UL (ref 0–0.2)
IMM GRANULOCYTES NFR BLD AUTO: 1 % (ref 0–2)
INR PPP: 1.02 (ref 0.84–1.19)
LACTATE SERPL-SCNC: 1.6 MMOL/L (ref 0.5–2)
LYMPHOCYTES # BLD AUTO: 4.1 THOUSANDS/ΜL (ref 0.6–4.47)
LYMPHOCYTES NFR BLD AUTO: 25 % (ref 14–44)
MCH RBC QN AUTO: 30.1 PG (ref 26.8–34.3)
MCHC RBC AUTO-ENTMCNC: 32.4 G/DL (ref 31.4–37.4)
MCV RBC AUTO: 93 FL (ref 82–98)
MONOCYTES # BLD AUTO: 1.81 THOUSAND/ΜL (ref 0.17–1.22)
MONOCYTES NFR BLD AUTO: 11 % (ref 4–12)
NEUTROPHILS # BLD AUTO: 10.56 THOUSANDS/ΜL (ref 1.85–7.62)
NEUTS SEG NFR BLD AUTO: 63 % (ref 43–75)
NRBC BLD AUTO-RTO: 0 /100 WBCS
NT-PROBNP SERPL-MCNC: 1794 PG/ML
PLATELET # BLD AUTO: 177 THOUSANDS/UL (ref 149–390)
PMV BLD AUTO: 10.9 FL (ref 8.9–12.7)
POTASSIUM SERPL-SCNC: 4.5 MMOL/L (ref 3.5–5.3)
PROT SERPL-MCNC: 7.6 G/DL (ref 6.4–8.2)
PROTHROMBIN TIME: 13.6 SECONDS (ref 11.6–14.5)
RBC # BLD AUTO: 5.38 MILLION/UL (ref 3.88–5.62)
SARS-COV-2 RNA RESP QL NAA+PROBE: NEGATIVE
SODIUM SERPL-SCNC: 140 MMOL/L (ref 136–145)
TROPONIN I SERPL-MCNC: 2.14 NG/ML
TROPONIN I SERPL-MCNC: 2.21 NG/ML
WBC # BLD AUTO: 16.69 THOUSAND/UL (ref 4.31–10.16)

## 2020-08-17 PROCEDURE — 96367 TX/PROPH/DG ADDL SEQ IV INF: CPT

## 2020-08-17 PROCEDURE — 99291 CRITICAL CARE FIRST HOUR: CPT

## 2020-08-17 PROCEDURE — 99291 CRITICAL CARE FIRST HOUR: CPT | Performed by: EMERGENCY MEDICINE

## 2020-08-17 PROCEDURE — 85730 THROMBOPLASTIN TIME PARTIAL: CPT | Performed by: EMERGENCY MEDICINE

## 2020-08-17 PROCEDURE — 99291 CRITICAL CARE FIRST HOUR: CPT | Performed by: ANESTHESIOLOGY

## 2020-08-17 PROCEDURE — 83880 ASSAY OF NATRIURETIC PEPTIDE: CPT | Performed by: EMERGENCY MEDICINE

## 2020-08-17 PROCEDURE — 93010 ELECTROCARDIOGRAM REPORT: CPT | Performed by: INTERNAL MEDICINE

## 2020-08-17 PROCEDURE — 71045 X-RAY EXAM CHEST 1 VIEW: CPT

## 2020-08-17 PROCEDURE — 87635 SARS-COV-2 COVID-19 AMP PRB: CPT | Performed by: EMERGENCY MEDICINE

## 2020-08-17 PROCEDURE — 84145 PROCALCITONIN (PCT): CPT | Performed by: EMERGENCY MEDICINE

## 2020-08-17 PROCEDURE — 85025 COMPLETE CBC W/AUTO DIFF WBC: CPT | Performed by: EMERGENCY MEDICINE

## 2020-08-17 PROCEDURE — 83605 ASSAY OF LACTIC ACID: CPT | Performed by: EMERGENCY MEDICINE

## 2020-08-17 PROCEDURE — 93005 ELECTROCARDIOGRAM TRACING: CPT

## 2020-08-17 PROCEDURE — 84484 ASSAY OF TROPONIN QUANT: CPT | Performed by: EMERGENCY MEDICINE

## 2020-08-17 PROCEDURE — 87040 BLOOD CULTURE FOR BACTERIA: CPT | Performed by: EMERGENCY MEDICINE

## 2020-08-17 PROCEDURE — 71275 CT ANGIOGRAPHY CHEST: CPT

## 2020-08-17 PROCEDURE — 36415 COLL VENOUS BLD VENIPUNCTURE: CPT | Performed by: EMERGENCY MEDICINE

## 2020-08-17 PROCEDURE — 96375 TX/PRO/DX INJ NEW DRUG ADDON: CPT

## 2020-08-17 PROCEDURE — 85610 PROTHROMBIN TIME: CPT | Performed by: EMERGENCY MEDICINE

## 2020-08-17 PROCEDURE — G1004 CDSM NDSC: HCPCS

## 2020-08-17 PROCEDURE — 73590 X-RAY EXAM OF LOWER LEG: CPT

## 2020-08-17 PROCEDURE — 80053 COMPREHEN METABOLIC PANEL: CPT | Performed by: EMERGENCY MEDICINE

## 2020-08-17 PROCEDURE — 96365 THER/PROPH/DIAG IV INF INIT: CPT

## 2020-08-17 PROCEDURE — 99292 CRITICAL CARE ADDL 30 MIN: CPT | Performed by: EMERGENCY MEDICINE

## 2020-08-17 PROCEDURE — 70450 CT HEAD/BRAIN W/O DYE: CPT

## 2020-08-17 PROCEDURE — 96366 THER/PROPH/DIAG IV INF ADDON: CPT

## 2020-08-17 RX ORDER — HEPARIN SODIUM 1000 [USP'U]/ML
10000 INJECTION, SOLUTION INTRAVENOUS; SUBCUTANEOUS ONCE
Status: COMPLETED | OUTPATIENT
Start: 2020-08-17 | End: 2020-08-17

## 2020-08-17 RX ORDER — CHLORHEXIDINE GLUCONATE 0.12 MG/ML
15 RINSE ORAL EVERY 12 HOURS SCHEDULED
Status: DISCONTINUED | OUTPATIENT
Start: 2020-08-17 | End: 2020-08-18

## 2020-08-17 RX ORDER — HEPARIN SODIUM 10000 [USP'U]/100ML
3-30 INJECTION, SOLUTION INTRAVENOUS
Status: DISCONTINUED | OUTPATIENT
Start: 2020-08-17 | End: 2020-08-17 | Stop reason: HOSPADM

## 2020-08-17 RX ORDER — ACETAMINOPHEN 325 MG/1
975 TABLET ORAL ONCE
Status: COMPLETED | OUTPATIENT
Start: 2020-08-17 | End: 2020-08-17

## 2020-08-17 RX ORDER — HEPARIN SODIUM 1000 [USP'U]/ML
4600 INJECTION, SOLUTION INTRAVENOUS; SUBCUTANEOUS
Status: DISCONTINUED | OUTPATIENT
Start: 2020-08-17 | End: 2020-08-18

## 2020-08-17 RX ORDER — HEPARIN SODIUM 1000 [USP'U]/ML
9200 INJECTION, SOLUTION INTRAVENOUS; SUBCUTANEOUS ONCE
Status: DISCONTINUED | OUTPATIENT
Start: 2020-08-17 | End: 2020-08-18

## 2020-08-17 RX ORDER — HEPARIN SODIUM 10000 [USP'U]/100ML
3-30 INJECTION, SOLUTION INTRAVENOUS
Status: DISCONTINUED | OUTPATIENT
Start: 2020-08-17 | End: 2020-08-18

## 2020-08-17 RX ORDER — HEPARIN SODIUM 1000 [USP'U]/ML
9200 INJECTION, SOLUTION INTRAVENOUS; SUBCUTANEOUS
Status: DISCONTINUED | OUTPATIENT
Start: 2020-08-17 | End: 2020-08-18

## 2020-08-17 RX ORDER — ASPIRIN 81 MG/1
324 TABLET, CHEWABLE ORAL ONCE
Status: COMPLETED | OUTPATIENT
Start: 2020-08-17 | End: 2020-08-17

## 2020-08-17 RX ADMIN — ACETAMINOPHEN 975 MG: 325 TABLET, FILM COATED ORAL at 16:41

## 2020-08-17 RX ADMIN — VANCOMYCIN HYDROCHLORIDE 2000 MG: 1 INJECTION, POWDER, LYOPHILIZED, FOR SOLUTION INTRAVENOUS at 18:51

## 2020-08-17 RX ADMIN — ASPIRIN 81 MG 324 MG: 81 TABLET ORAL at 18:51

## 2020-08-17 RX ADMIN — HEPARIN SODIUM 10000 UNITS: 1000 INJECTION INTRAVENOUS; SUBCUTANEOUS at 19:38

## 2020-08-17 RX ADMIN — HEPARIN SODIUM AND DEXTROSE 18 UNITS/KG/HR: 10000; 5 INJECTION INTRAVENOUS at 19:37

## 2020-08-17 RX ADMIN — IOHEXOL 85 ML: 350 INJECTION, SOLUTION INTRAVENOUS at 18:25

## 2020-08-17 RX ADMIN — SODIUM CHLORIDE 1000 ML: 0.9 INJECTION, SOLUTION INTRAVENOUS at 16:59

## 2020-08-17 RX ADMIN — HEPARIN SODIUM AND DEXTROSE 18 UNITS/KG/HR: 10000; 5 INJECTION INTRAVENOUS at 23:20

## 2020-08-17 RX ADMIN — PIPERACILLIN AND TAZOBACTAM 4.5 G: 4; .5 INJECTION, POWDER, LYOPHILIZED, FOR SOLUTION INTRAVENOUS at 17:10

## 2020-08-17 NOTE — ED PROVIDER NOTES
Pt Name: Meera Alfaro  MRN: [id number]  [place] 1945  Age/Sex: 75 y o  male  Date of evaluation: 8/17/2020  PCP: Jacinta Alvarez MD    [address] [place] [address]    Chief Complaint   Patient presents with    Fever - 76 years or older     reports fever 101 starting last night, SOB, cough, eleveated BG  reports foot surgery on 7/12  HPI    75 y o  male presenting with fever  Patient has past medical history of diabetes, hypertension, obstructive sleep apnea  Recently had left distal tibia orthopedic repair at [facility] on July 15th  Since yesterday patient developed a fever, has had sweats  T-max of 102° F  No sick contacts  Denies runny nose, sore throat  Does have a chronic cough, not any worse  Mentions his oxygen level is normally around 89%, he states it has been like that since 4 years ago, he has had shortness of breath with exertion that is somewhat worse for the last 2 days  He states his oxygen has been on in the low 80s since yesterday as well  He measures at home with a pulse oximeter  Denies chest pain  He was on anticoagulants for 2 weeks after surgery, he did not miss any doses  Has been fairly sedentary due to recent lower extremity surgery and due to shortness of breath upon exertion  Past Medical and Surgical History    Past Medical History:   Diagnosis Date    Diabetes mellitus ([address] )     Hiatal hernia     Hyperlipidemia     Hypertension     Neuropathy     Shortness of breath     Venous insufficiency (chronic) (peripheral)        Past Surgical History:   Procedure Laterality Date    JOINT REPLACEMENT Bilateral     knees    LEG SURGERY      AR COLONOSCOPY FLX DX W/COLLJ SPEC WHEN PFRMD N/A 6/30/2017    Procedure: EGD AND COLONOSCOPY;  Surgeon: Jeff Edgar MD;  Location: [facility];   Service: General    TONSILLECTOMY         Family History   Problem Relation Age of Onset    Cancer Mother     Cancer Father        Social History     Tobacco Use    Smoking status: Never Smoker    Smokeless tobacco: Never Used   Substance Use Topics    Alcohol use: Never     Frequency: Never    Drug use: No           Allergies    No Known Allergies    Home Medications    Prior to Admission medications    Medication Sig Start Date End Date Taking? Authorizing Provider   amoxicillin (AMOXIL) 500 mg capsule Take 2,000 mg by mouth as needed 1 hour prior to dental appointment 11/25/19   Historical Provider, MD   aspirin (ECOTRIN LOW STRENGTH) 81 mg EC tablet Take by mouth 2 (two) times a day      Historical Provider, MD KOCH ULTRAFINE III SHORT PEN 31G X 8 MM MISC USE TO INJECT INSULIN THREE TIMES DAILY 1/31/20   Historical Provider, MD   Cholecalciferol (VITAMIN D) 2000 units CAPS Take by mouth    Historical Provider, MD   Empagliflozin (JARDIANCE) 25 MG TABS Take 25 mg by mouth every morning    Historical Provider, MD   fluticasone-umeclidinium-vilanterol (TRELEGY ELLIPTA) 100-62 5-25 MCG/INH inhaler Inhale 1 puff daily Rinse mouth after use      Historical Provider, MD   HUMALOG 100 UNIT/ML injection INJECT DAILY AS DIRECTED PER SLIDING SCALE 5/24/19   Historical Provider, MD   Insulin Degludec (TRESIBA FLEXTOUCH SC) Inject 20 Units under the skin daily at bedtime    Historical Provider, MD   insulin regular (HumuLIN R,NovoLIN R) 100 units/mL injection Inject 12 Units under the skin once    Historical Provider, MD   ketoconazole (NIZORAL) 2 % cream Apply topically 2 (two) times a day 6/4/19   Netta Ndiaye DPM   multivitamin-iron-minerals-folic acid (CENTRUM) chewable tablet Chew 1 tablet daily    Historical Provider, MD   Omega-3 Fatty Acids (FISH OIL CONCENTRATE PO) Take by mouth    Historical Provider, MD Ramon Polanco test strip TEST 2 TIMES A DAY DX E11 9 5/24/19   Historical Provider, MD   promethazine-codeine (PHENERGAN WITH CODEINE) 6 25-10 mg/5 mL syrup Take 5 mL by mouth every 4 (four) hours as needed for cough     Historical Provider, MD   SANTYL ointment  2/17/20 Historical Provider, MD   simvastatin (ZOCOR) 40 mg tablet Take 40 mg by mouth daily at bedtime    Historical Provider, MD           Review of Systems    Review of Systems   Constitutional: Positive for diaphoresis and fever  Negative for chills  HENT: Negative for rhinorrhea and sore throat  Eyes: Negative for pain and visual disturbance  Respiratory: Positive for shortness of breath  Chronic cough   Cardiovascular: Negative for chest pain and leg swelling  Gastrointestinal: Negative for abdominal pain, nausea and vomiting  Genitourinary: Negative for dysuria and hematuria  Musculoskeletal: Negative for back pain and myalgias  Skin: Positive for wound  Negative for rash  Neurological: Negative for syncope and headaches  All other systems reviewed and negative  Physical Exam      ED Triage Vitals   Temperature Pulse Respirations Blood Pressure SpO2   08/17/20 1618 08/17/20 1606 08/17/20 1606 08/17/20 1606 08/17/20 1615   (!) 102 1 °F (38 9 °C) (!) 106 18 143/74 (!) 86 %      Temp Source Heart Rate Source Patient Position - Orthostatic VS BP Location FiO2 (%)   08/17/20 1606 08/17/20 1606 08/17/20 1606 08/17/20 1606 --   Oral Monitor Sitting Left arm       Pain Score       08/17/20 1710       No Pain               Physical Exam  Constitutional:       General: He is not in acute distress  Appearance: He is ill-appearing  HENT:      Head: Normocephalic and atraumatic  Nose: Nose normal       Mouth/Throat:      Mouth: Mucous membranes are moist    Eyes:      Extraocular Movements: Extraocular movements intact  Pupils: Pupils are equal, round, and reactive to light  Neck:      Musculoskeletal: Normal range of motion and neck supple  Cardiovascular:      Rate and Rhythm: Normal rate and regular rhythm  Pulmonary:      Effort: No respiratory distress  Breath sounds: Normal breath sounds  No wheezing, rhonchi or rales     Abdominal:      General: Abdomen is flat  There is no distension  Tenderness: There is no abdominal tenderness  Musculoskeletal:         General: No tenderness  Comments: Mild left lower extremity swelling  Skin:     General: Skin is warm  Comments: Left lower extremity surgical incisions well healed, no purulence, minimal erythema the patient states has been there for a long time and is not any worse  Neurological:      Mental Status: He is alert and oriented to person, place, and time  Mental status is at baseline  Diagnostic Results      Labs:    Results Reviewed     Procedure Component Value Units Date/Time    Troponin I [[id number]]  (Abnormal) Collected:  08/17/20 1936    Lab Status:  Final result Specimen:  Blood from Arm, Left Updated:  08/17/20 2003     Troponin I 2 21 ng/mL     NT-BNP PRO [[id number]]  (Abnormal) Collected:  08/17/20 1654    Lab Status:  Final result Specimen:  Blood from Arm, Right Updated:  08/17/20 1815     NT-proBNP 1,794 pg/mL     Novel Coronavirus (Covid-19),PCR [facility] [[id number]]  (Normal) Collected:  08/17/20 1653    Lab Status:  Final result Specimen:  Nares from Nose Updated:  08/17/20 1800     SARS-CoV-2 Negative    Narrative: The specimen collection materials, transport medium, and/or testing methodology utilized in the production of these test results have been proven to be reliable in a limited validation with an abbreviated program under the Emergency Utilization Authorization provided by the FDA  Testing reported as "Presumptive positive" will be confirmed with secondary testing with a reference laboratory to ensure result accuracy  Clinical caution and judgement should be used with the interpretation of these results with consideration of the clinical impression and other laboratory testing  Testing reported as "Positive" or "Negative" has been proven to be accurate according to standard laboratory validation requirements    All testing is performed with control materials showing appropriate reactivity at standard intervals  Lactic acid [[id number]]  (Normal) Collected:  08/17/20 1654    Lab Status:  Final result Specimen:  Blood from Arm, Right Updated:  08/17/20 1740     LACTIC ACID 1 6 mmol/L     Narrative:       Result may be elevated if tourniquet was used during collection      Troponin I [[id number]]  (Abnormal) Collected:  08/17/20 1700    Lab Status:  Final result Specimen:  Blood from Arm, Right Updated:  08/17/20 1728     Troponin I 2 14 ng/mL     Comprehensive metabolic panel [[id number]]  (Abnormal) Collected:  08/17/20 1654    Lab Status:  Final result Specimen:  Blood from Arm, Right Updated:  08/17/20 1727     Sodium 140 mmol/L      Potassium 4 5 mmol/L      Chloride 102 mmol/L      CO2 26 mmol/L      ANION GAP 12 mmol/L      BUN 16 mg/dL      Creatinine 0 89 mg/dL      Glucose 161 mg/dL      Calcium 9 2 mg/dL      AST 19 U/L      ALT 26 U/L      Alkaline Phosphatase 63 U/L      Total Protein 7 6 g/dL      Albumin 3 4 g/dL      Total Bilirubin 1 80 mg/dL      eGFR 84 ml/min/1 73sq m     Narrative:       [place] guidelines for Chronic Kidney Disease (CKD):     Stage 1 with normal or high GFR (GFR > 90 mL/min/1 73 square meters)    Stage 2 Mild CKD (GFR = 60-89 mL/min/1 73 square meters)    Stage 3A Moderate CKD (GFR = 45-59 mL/min/1 73 square meters)    Stage 3B Moderate CKD (GFR = 30-44 mL/min/1 73 square meters)    Stage 4 Severe CKD (GFR = 15-29 mL/min/1 73 square meters)    Stage 5 End Stage CKD (GFR <15 mL/min/1 73 square meters)  Note: GFR calculation is accurate only with a steady state creatinine    Protime-INR [[id number]]  (Normal) Collected:  08/17/20 1654    Lab Status:  Final result Specimen:  Blood from Arm, Right Updated:  08/17/20 1721     Protime 13 6 seconds      INR 1 02    APTT [[id number]]  (Normal) Collected:  08/17/20 1654    Lab Status:  Final result Specimen:  Blood from Arm, Right Updated:  08/17/20 1721     PTT 33 seconds     CBC and differential [[id number]]  (Abnormal) Collected:  08/17/20 1654    Lab Status:  Final result Specimen:  Blood from Arm, Right Updated:  08/17/20 1708     WBC 16 69 Thousand/uL      RBC 5 38 Million/uL      Hemoglobin 16 2 g/dL      Hematocrit 50 0 %      MCV 93 fL      MCH 30 1 pg      MCHC 32 4 g/dL      RDW 14 4 %      MPV 10 9 fL      Platelets 755 Thousands/uL      nRBC 0 /100 WBCs      Neutrophils Relative 63 %      Immat GRANS % 1 %      Lymphocytes Relative 25 %      Monocytes Relative 11 %      Eosinophils Relative 0 %      Basophils Relative 0 %      Neutrophils Absolute 10 56 Thousands/µL      Immature Grans Absolute 0 08 Thousand/uL      Lymphocytes Absolute 4 10 Thousands/µL      Monocytes Absolute 1 81 Thousand/µL      Eosinophils Absolute 0 07 Thousand/µL      Basophils Absolute 0 07 Thousands/µL     Blood culture #1 [[id number]] Collected:  08/17/20 1654    Lab Status: In process Specimen:  Blood from Arm, Right Updated:  08/17/20 1705    Procalcitonin [[id number]] Collected:  08/17/20 1654    Lab Status: In process Specimen:  Blood from Arm, Right Updated:  08/17/20 1705    Blood culture #2 [[id number]] Collected:  08/17/20 1654    Lab Status: In process Specimen:  Blood from Arm, Left Updated:  08/17/20 1705    UA w Reflex to Microscopic w Reflex to Culture [[id number]]     Lab Status:  No result Specimen:  Urine           All labs reviewed and utilized in the medical decision making process    Radiology:    CT head without contrast   Final Result      No acute intracranial abnormality  Workstation performed: [id number]         CTA ED chest PE study   Final Result      Bilateral pulmonary emboli and right heart strain  I personally tiger this study with Alfred Nicholas on 8/17/2020 at 6:49 PM                            Workstation performed: [id number]         XR tibia fibula 2 views LEFT   Final Result      Displaced distal tibial fracture  Overlying medial plate and screws identified  The surgical screws second from the most superior/top appears inverted, possibly displaced  Overlying soft tissue swelling could relate to postsurgical or cellulitis  Orthopedic surgical consult recommended  Workstation performed: [id number]         XR chest portable   Final Result      No acute cardiopulmonary disease  Workstation performed: [id number]             All radiology studies independently viewed by me and interpreted by the radiologist     Procedure    CriticalCare Time  Performed by: Izzy Tom DO  Authorized by: Izzy Tom DO     Critical care provider statement:     Critical care time (minutes):  90    Critical care time was exclusive of:  Separately billable procedures and treating other patients    Critical care was necessary to treat or prevent imminent or life-threatening deterioration of the following conditions:  Respiratory failure, sepsis and cardiac failure    Critical care was time spent personally by me on the following activities:  Obtaining history from patient or surrogate, development of treatment plan with patient or surrogate, discussions with consultants, re-evaluation of patient's condition, evaluation of patient's response to treatment, ordering and performing treatments and interventions, ordering and review of laboratory studies, ordering and review of radiographic studies and examination of patient            MDM    Patient exam is concerning for sepsis, is febrile, requiring oxygen at 2 L via nasal cannula  Lungs clear to auscultation bilaterally  Unsure of source of infection, could be COVID-19, other viral respiratory infection, bacterial pneumonia, UTI, postsurgical infection of hardware  Obtain blood work and imaging  Will start antibiotics empirically  He is minimally tachycardic, blood pressure is stable        EKG shows sinus tachycardia heart rate 103  milliseconds, right bundle-branch block nonspecific ST-T changes, intervals within normal limits, overall fairly similar to prior EKG  ED Course as of Aug 17 2256   Mon Aug 17, 2020   1713 WBC(!): 16 69   1713 Hemoglobin: 16 2   1742 LACTIC ACID: 1 6   1753 This could be an NSTEMI or could be strain from a PE  Will obtain CT scan of chest for PE evaluation  Patient did not take any aspirin today  Will give aspirin  Troponin I(!): 2 14   1757 No acute cardiopulmonary processes  XR chest portable   1814 EXT SARS-COV-2: Negative   1817 NT-proBNP(!): 1,794   1850 CT scan is concerning for PE with strain  Discussed with internal medicine, recommending inpatient admission  Recommending heparin, will order  1916 IM will have ICU evaluate patient  1939 Left tib-fib x-ray shows displaced distal tibial fracture, the surgical screws 2nd from the most superior appears inverted, possibly displaced, overlying soft tissue swelling could relate to postsurgical changes or cellulitis  Will place orthopedic consult  Patient updated  1958 ICU attending discussing with critical care at [facility] to see if patient is an IR candidate for catheter directed thrombolysis  2054 Patient was accepted by Dr Andrei Molina and at [facility] critical care  Patient updated  EMTALA forms were signed  2112 Spoke with interventional radiologist at [facility], requesting CT head prior to transport, will obtain CT head  2141 IMPRESSION:     No acute intracranial abnormality  CT head without contrast      Patient's blood pressure has been stable throughout the emergency department course      Medications   vancomycin (VANCOCIN) 2,000 mg in sodium chloride 0 9 % 500 mL IVPB (0 mg/kg × 128 kg Intravenous Stopped 8/17/20 2059)   piperacillin-tazobactam (ZOSYN) 4 5 g in sodium chloride 0 9 % 100 mL IVPB (0 g Intravenous Stopped 8/17/20 1848)   acetaminophen (TYLENOL) tablet 975 mg (975 mg Oral Given 8/17/20 1641)   sodium chloride 0 9 % bolus 1,000 mL (0 mL Intravenous Stopped 8/17/20 1848)   aspirin chewable tablet 324 mg (324 mg Oral Given 8/17/20 1851)   iohexol (OMNIPAQUE) 350 MG/ML injection (MULTI-DOSE) 85 mL (85 mL Intravenous Given 8/17/20 1825)   heparin (porcine) injection 10,000 Units (10,000 Units Intravenous Given 8/17/20 1938)           FINAL IMPRESSION    Final diagnoses:   PE (pulmonary thromboembolism) ([facility] )   Sepsis ([place] [address] )   Cellulitis of left lower extremity   Closed fracture of distal end of left tibia, unspecified fracture morphology, initial encounter         DISPOSITION    Time reflects when diagnosis was documented in both MDM as applicable and the Disposition within this note     Time User Action Codes Description Comment    8/17/2020  6:56 PM Arvlux Nevarez [I26 99] PE (pulmonary thromboembolism) ([facility] )     8/17/2020  6:56 PM Loc Silver Add [A41 9] Sepsis ([place] [address] )     8/17/2020  7:51 PM Loc Silver Add [E56 580] Cellulitis of left lower extremity     8/17/2020  7:51 PM Loc Silver Add [S82 302A] Closed fracture of distal end of left tibia, unspecified fracture morphology, initial encounter       ED Disposition     ED Disposition Condition Date/Time Comment    Transfer to Another Facility-In Network  Mon Aug 17, 2020  8:45 PM         MD Documentation      Most Recent Value   Patient Condition  The patient has been stabilized such that within reasonable medical probability, no material deterioration of the patient condition or the condition of the unborn child(elaina) is likely to result from the transfer   Reason for Transfer  Level of Care needed not available at this facility   Benefits of Transfer  Specialized equipment and/or services available at the receiving facility (Include comment)________________________   Risks of Transfer  Potential for delay in receiving treatment   Accepting Physician  Dr Cleopatra Hurt Name, Leobardo Doyle   Provider Certification General risk, such as traffic hazards, adverse weather conditions, rough terrain or turbulence, possible failure of equipment (including vehicle or aircraft), or consequences of actions of persons outside the control of the transport personnel, Risk of worsening condition, Unanticipated needs of medical equipment and personnel during transport      RN Documentation      Most [address] Name, [address]      Follow-up Information    None           PATIENT REFERRED TO:    No follow-up provider specified      DISCHARGE MEDICATIONS:    Discharge Medication List as of 8/17/2020  9:50 PM      CONTINUE these medications which have NOT CHANGED    Details   aspirin (ECOTRIN LOW STRENGTH) 81 mg EC tablet Take by mouth 2 (two) times a day  , Historical Med      Cholecalciferol (VITAMIN D) 2000 units CAPS Take by mouth, Historical Med      Empagliflozin (JARDIANCE) 25 MG TABS Take 25 mg by mouth every morning, Historical Med      HUMALOG 100 UNIT/ML injection INJECT DAILY AS DIRECTED PER SLIDING SCALE, Historical Med      Insulin Degludec (TRESIBA FLEXTOUCH SC) Inject 20 Units under the skin daily at bedtime, Historical Med      multivitamin-iron-minerals-folic acid (CENTRUM) chewable tablet Chew 1 tablet daily, Historical Med      Omega-3 Fatty Acids (FISH OIL CONCENTRATE PO) Take by mouth, Historical Med      simvastatin (ZOCOR) 40 mg tablet Take 40 mg by mouth daily at bedtime, Historical Med      amoxicillin (AMOXIL) 500 mg capsule Take 2,000 mg by mouth as needed 1 hour prior to dental appointment, Starting Mon 11/25/2019, Historical Med      B-D ULTRAFINE III SHORT PEN 31G X 8 MM MISC USE TO INJECT INSULIN THREE TIMES DAILY, Historical Med      fluticasone-umeclidinium-vilanterol (TRELEGY ELLIPTA) 100-62 5-25 MCG/INH inhaler Inhale 1 puff daily Rinse mouth after use , Historical Med      insulin regular (HumuLIN R,NovoLIN R) 100 units/mL injection Inject 12 Units under the skin once, Historical Med      ketoconazole (NIZORAL) 2 % cream Apply topically 2 (two) times a day, Starting Tue 6/4/2019, Normal      ONETOUCH VERIO test strip TEST 2 TIMES A DAY DX E11 9, Historical Med      promethazine-codeine (PHENERGAN WITH CODEINE) 6 25-10 mg/5 mL syrup Take 5 mL by mouth every 4 (four) hours as needed for cough , Historical Med      SANTYL ointment Starting Mon 2/17/2020, Historical Med             No discharge procedures on file           Ronan Smith, [address] [facility], DO  08/17/20 1300

## 2020-08-17 NOTE — ED NOTES
Pt with spo2 86% in triage, brought to ED 15, primary nurse notified and pt placed on 2L NC per this RN         Calvin Thomas, EWA  08/17/20 3164

## 2020-08-17 NOTE — H&P (VIEW-ONLY)
Pt Name: Florian Elizabeth  MRN: [id number]  [place] 1945  Age/Sex: 75 y o  male  Date of evaluation: 8/17/2020  PCP: Ed Hernandez MD    [address] [place] [address]    Chief Complaint   Patient presents with    Fever - 76 years or older     reports fever 101 starting last night, SOB, cough, eleveated BG  reports foot surgery on 7/12  HPI    75 y o  male presenting with fever  Patient has past medical history of diabetes, hypertension, obstructive sleep apnea  Recently had left distal tibia orthopedic repair at [facility] on July 15th  Since yesterday patient developed a fever, has had sweats  T-max of 102° F  No sick contacts  Denies runny nose, sore throat  Does have a chronic cough, not any worse  Mentions his oxygen level is normally around 89%, he states it has been like that since 4 years ago, he has had shortness of breath with exertion that is somewhat worse for the last 2 days  He states his oxygen has been on in the low 80s since yesterday as well  He measures at home with a pulse oximeter  Denies chest pain  He was on anticoagulants for 2 weeks after surgery, he did not miss any doses  Has been fairly sedentary due to recent lower extremity surgery and due to shortness of breath upon exertion  Past Medical and Surgical History    Past Medical History:   Diagnosis Date    Diabetes mellitus ([address] )     Hiatal hernia     Hyperlipidemia     Hypertension     Neuropathy     Shortness of breath     Venous insufficiency (chronic) (peripheral)        Past Surgical History:   Procedure Laterality Date    JOINT REPLACEMENT Bilateral     knees    LEG SURGERY      CT COLONOSCOPY FLX DX W/COLLJ SPEC WHEN PFRMD N/A 6/30/2017    Procedure: EGD AND COLONOSCOPY;  Surgeon: Maki Scott MD;  Location: [facility];   Service: General    TONSILLECTOMY         Family History   Problem Relation Age of Onset    Cancer Mother     Cancer Father        Social History     Tobacco Use    Smoking status: Never Smoker    Smokeless tobacco: Never Used   Substance Use Topics    Alcohol use: Never     Frequency: Never    Drug use: No           Allergies    No Known Allergies    Home Medications    Prior to Admission medications    Medication Sig Start Date End Date Taking? Authorizing Provider   amoxicillin (AMOXIL) 500 mg capsule Take 2,000 mg by mouth as needed 1 hour prior to dental appointment 11/25/19   Historical Provider, MD   aspirin (ECOTRIN LOW STRENGTH) 81 mg EC tablet Take by mouth 2 (two) times a day      Historical Provider, MD KOCH ULTRAFINE III SHORT PEN 31G X 8 MM MISC USE TO INJECT INSULIN THREE TIMES DAILY 1/31/20   Historical Provider, MD   Cholecalciferol (VITAMIN D) 2000 units CAPS Take by mouth    Historical Provider, MD   Empagliflozin (JARDIANCE) 25 MG TABS Take 25 mg by mouth every morning    Historical Provider, MD   fluticasone-umeclidinium-vilanterol (TRELEGY ELLIPTA) 100-62 5-25 MCG/INH inhaler Inhale 1 puff daily Rinse mouth after use      Historical Provider, MD   HUMALOG 100 UNIT/ML injection INJECT DAILY AS DIRECTED PER SLIDING SCALE 5/24/19   Historical Provider, MD   Insulin Degludec (TRESIBA FLEXTOUCH SC) Inject 20 Units under the skin daily at bedtime    Historical Provider, MD   insulin regular (HumuLIN R,NovoLIN R) 100 units/mL injection Inject 12 Units under the skin once    Historical Provider, MD   ketoconazole (NIZORAL) 2 % cream Apply topically 2 (two) times a day 6/4/19   Shantanu Villafuerte, DPM   multivitamin-iron-minerals-folic acid (CENTRUM) chewable tablet Chew 1 tablet daily    Historical Provider, MD   Omega-3 Fatty Acids (FISH OIL CONCENTRATE PO) Take by mouth    Historical Provider, MD Meredith Lozoya test strip TEST 2 TIMES A DAY DX E11 9 5/24/19   Historical Provider, MD   promethazine-codeine (PHENERGAN WITH CODEINE) 6 25-10 mg/5 mL syrup Take 5 mL by mouth every 4 (four) hours as needed for cough     Historical Provider, MD   SANTYL ointment  2/17/20 Historical Provider, MD   simvastatin (ZOCOR) 40 mg tablet Take 40 mg by mouth daily at bedtime    Historical Provider, MD           Review of Systems    Review of Systems   Constitutional: Positive for diaphoresis and fever  Negative for chills  HENT: Negative for rhinorrhea and sore throat  Eyes: Negative for pain and visual disturbance  Respiratory: Positive for shortness of breath  Chronic cough   Cardiovascular: Negative for chest pain and leg swelling  Gastrointestinal: Negative for abdominal pain, nausea and vomiting  Genitourinary: Negative for dysuria and hematuria  Musculoskeletal: Negative for back pain and myalgias  Skin: Positive for wound  Negative for rash  Neurological: Negative for syncope and headaches  All other systems reviewed and negative  Physical Exam      ED Triage Vitals   Temperature Pulse Respirations Blood Pressure SpO2   08/17/20 1618 08/17/20 1606 08/17/20 1606 08/17/20 1606 08/17/20 1615   (!) 102 1 °F (38 9 °C) (!) 106 18 143/74 (!) 86 %      Temp Source Heart Rate Source Patient Position - Orthostatic VS BP Location FiO2 (%)   08/17/20 1606 08/17/20 1606 08/17/20 1606 08/17/20 1606 --   Oral Monitor Sitting Left arm       Pain Score       08/17/20 1710       No Pain               Physical Exam  Constitutional:       General: He is not in acute distress  Appearance: He is ill-appearing  HENT:      Head: Normocephalic and atraumatic  Nose: Nose normal       Mouth/Throat:      Mouth: Mucous membranes are moist    Eyes:      Extraocular Movements: Extraocular movements intact  Pupils: Pupils are equal, round, and reactive to light  Neck:      Musculoskeletal: Normal range of motion and neck supple  Cardiovascular:      Rate and Rhythm: Normal rate and regular rhythm  Pulmonary:      Effort: No respiratory distress  Breath sounds: Normal breath sounds  No wheezing, rhonchi or rales     Abdominal:      General: Abdomen is flat  There is no distension  Tenderness: There is no abdominal tenderness  Musculoskeletal:         General: No tenderness  Comments: Mild left lower extremity swelling  Skin:     General: Skin is warm  Comments: Left lower extremity surgical incisions well healed, no purulence, minimal erythema the patient states has been there for a long time and is not any worse  Neurological:      Mental Status: He is alert and oriented to person, place, and time  Mental status is at baseline  Diagnostic Results      Labs:    Results Reviewed     Procedure Component Value Units Date/Time    Troponin I [[id number]]  (Abnormal) Collected:  08/17/20 1936    Lab Status:  Final result Specimen:  Blood from Arm, Left Updated:  08/17/20 2003     Troponin I 2 21 ng/mL     NT-BNP PRO [[id number]]  (Abnormal) Collected:  08/17/20 1654    Lab Status:  Final result Specimen:  Blood from Arm, Right Updated:  08/17/20 1815     NT-proBNP 1,794 pg/mL     Novel Coronavirus (Covid-19),PCR [facility] [[id number]]  (Normal) Collected:  08/17/20 1653    Lab Status:  Final result Specimen:  Nares from Nose Updated:  08/17/20 1800     SARS-CoV-2 Negative    Narrative: The specimen collection materials, transport medium, and/or testing methodology utilized in the production of these test results have been proven to be reliable in a limited validation with an abbreviated program under the Emergency Utilization Authorization provided by the FDA  Testing reported as "Presumptive positive" will be confirmed with secondary testing with a reference laboratory to ensure result accuracy  Clinical caution and judgement should be used with the interpretation of these results with consideration of the clinical impression and other laboratory testing  Testing reported as "Positive" or "Negative" has been proven to be accurate according to standard laboratory validation requirements    All testing is performed with control materials showing appropriate reactivity at standard intervals  Lactic acid [[id number]]  (Normal) Collected:  08/17/20 1654    Lab Status:  Final result Specimen:  Blood from Arm, Right Updated:  08/17/20 1740     LACTIC ACID 1 6 mmol/L     Narrative:       Result may be elevated if tourniquet was used during collection      Troponin I [[id number]]  (Abnormal) Collected:  08/17/20 1700    Lab Status:  Final result Specimen:  Blood from Arm, Right Updated:  08/17/20 1728     Troponin I 2 14 ng/mL     Comprehensive metabolic panel [[id number]]  (Abnormal) Collected:  08/17/20 1654    Lab Status:  Final result Specimen:  Blood from Arm, Right Updated:  08/17/20 1727     Sodium 140 mmol/L      Potassium 4 5 mmol/L      Chloride 102 mmol/L      CO2 26 mmol/L      ANION GAP 12 mmol/L      BUN 16 mg/dL      Creatinine 0 89 mg/dL      Glucose 161 mg/dL      Calcium 9 2 mg/dL      AST 19 U/L      ALT 26 U/L      Alkaline Phosphatase 63 U/L      Total Protein 7 6 g/dL      Albumin 3 4 g/dL      Total Bilirubin 1 80 mg/dL      eGFR 84 ml/min/1 73sq m     Narrative:       [place] guidelines for Chronic Kidney Disease (CKD):     Stage 1 with normal or high GFR (GFR > 90 mL/min/1 73 square meters)    Stage 2 Mild CKD (GFR = 60-89 mL/min/1 73 square meters)    Stage 3A Moderate CKD (GFR = 45-59 mL/min/1 73 square meters)    Stage 3B Moderate CKD (GFR = 30-44 mL/min/1 73 square meters)    Stage 4 Severe CKD (GFR = 15-29 mL/min/1 73 square meters)    Stage 5 End Stage CKD (GFR <15 mL/min/1 73 square meters)  Note: GFR calculation is accurate only with a steady state creatinine    Protime-INR [[id number]]  (Normal) Collected:  08/17/20 1654    Lab Status:  Final result Specimen:  Blood from Arm, Right Updated:  08/17/20 1721     Protime 13 6 seconds      INR 1 02    APTT [[id number]]  (Normal) Collected:  08/17/20 1654    Lab Status:  Final result Specimen:  Blood from Arm, Right Updated:  08/17/20 1721     PTT 33 seconds     CBC and differential [[id number]]  (Abnormal) Collected:  08/17/20 1654    Lab Status:  Final result Specimen:  Blood from Arm, Right Updated:  08/17/20 1708     WBC 16 69 Thousand/uL      RBC 5 38 Million/uL      Hemoglobin 16 2 g/dL      Hematocrit 50 0 %      MCV 93 fL      MCH 30 1 pg      MCHC 32 4 g/dL      RDW 14 4 %      MPV 10 9 fL      Platelets 877 Thousands/uL      nRBC 0 /100 WBCs      Neutrophils Relative 63 %      Immat GRANS % 1 %      Lymphocytes Relative 25 %      Monocytes Relative 11 %      Eosinophils Relative 0 %      Basophils Relative 0 %      Neutrophils Absolute 10 56 Thousands/µL      Immature Grans Absolute 0 08 Thousand/uL      Lymphocytes Absolute 4 10 Thousands/µL      Monocytes Absolute 1 81 Thousand/µL      Eosinophils Absolute 0 07 Thousand/µL      Basophils Absolute 0 07 Thousands/µL     Blood culture #1 [[id number]] Collected:  08/17/20 1654    Lab Status: In process Specimen:  Blood from Arm, Right Updated:  08/17/20 1705    Procalcitonin [[id number]] Collected:  08/17/20 1654    Lab Status: In process Specimen:  Blood from Arm, Right Updated:  08/17/20 1705    Blood culture #2 [[id number]] Collected:  08/17/20 1654    Lab Status: In process Specimen:  Blood from Arm, Left Updated:  08/17/20 1705    UA w Reflex to Microscopic w Reflex to Culture [[id number]]     Lab Status:  No result Specimen:  Urine           All labs reviewed and utilized in the medical decision making process    Radiology:    CT head without contrast   Final Result      No acute intracranial abnormality  Workstation performed: [id number]         CTA ED chest PE study   Final Result      Bilateral pulmonary emboli and right heart strain  I personally tiger this study with Bruce Luna on 8/17/2020 at 6:49 PM                            Workstation performed: [id number]         XR tibia fibula 2 views LEFT   Final Result      Displaced distal tibial fracture  Overlying medial plate and screws identified  The surgical screws second from the most superior/top appears inverted, possibly displaced  Overlying soft tissue swelling could relate to postsurgical or cellulitis  Orthopedic surgical consult recommended  Workstation performed: [id number]         XR chest portable   Final Result      No acute cardiopulmonary disease  Workstation performed: [id number]             All radiology studies independently viewed by me and interpreted by the radiologist     Procedure    CriticalCare Time  Performed by: Kwame Gonsalves DO  Authorized by: Kwame Gonsalves DO     Critical care provider statement:     Critical care time (minutes):  90    Critical care time was exclusive of:  Separately billable procedures and treating other patients    Critical care was necessary to treat or prevent imminent or life-threatening deterioration of the following conditions:  Respiratory failure, sepsis and cardiac failure    Critical care was time spent personally by me on the following activities:  Obtaining history from patient or surrogate, development of treatment plan with patient or surrogate, discussions with consultants, re-evaluation of patient's condition, evaluation of patient's response to treatment, ordering and performing treatments and interventions, ordering and review of laboratory studies, ordering and review of radiographic studies and examination of patient            MDM    Patient exam is concerning for sepsis, is febrile, requiring oxygen at 2 L via nasal cannula  Lungs clear to auscultation bilaterally  Unsure of source of infection, could be COVID-19, other viral respiratory infection, bacterial pneumonia, UTI, postsurgical infection of hardware  Obtain blood work and imaging  Will start antibiotics empirically  He is minimally tachycardic, blood pressure is stable        EKG shows sinus tachycardia heart rate 103  milliseconds, right bundle-branch block nonspecific ST-T changes, intervals within normal limits, overall fairly similar to prior EKG  ED Course as of Aug 17 2256   Mon Aug 17, 2020   1713 WBC(!): 16 69   1713 Hemoglobin: 16 2   1742 LACTIC ACID: 1 6   1753 This could be an NSTEMI or could be strain from a PE  Will obtain CT scan of chest for PE evaluation  Patient did not take any aspirin today  Will give aspirin  Troponin I(!): 2 14   1757 No acute cardiopulmonary processes  XR chest portable   1814 EXT SARS-COV-2: Negative   1817 NT-proBNP(!): 1,794   1850 CT scan is concerning for PE with strain  Discussed with internal medicine, recommending inpatient admission  Recommending heparin, will order  1916 IM will have ICU evaluate patient  1939 Left tib-fib x-ray shows displaced distal tibial fracture, the surgical screws 2nd from the most superior appears inverted, possibly displaced, overlying soft tissue swelling could relate to postsurgical changes or cellulitis  Will place orthopedic consult  Patient updated  1958 ICU attending discussing with critical care at [facility] to see if patient is an IR candidate for catheter directed thrombolysis  2054 Patient was accepted by Dr America Crowe and at [facility] critical care  Patient updated  EMTALA forms were signed  2112 Spoke with interventional radiologist at [facility], requesting CT head prior to transport, will obtain CT head  2141 IMPRESSION:     No acute intracranial abnormality  CT head without contrast      Patient's blood pressure has been stable throughout the emergency department course      Medications   vancomycin (VANCOCIN) 2,000 mg in sodium chloride 0 9 % 500 mL IVPB (0 mg/kg × 128 kg Intravenous Stopped 8/17/20 2059)   piperacillin-tazobactam (ZOSYN) 4 5 g in sodium chloride 0 9 % 100 mL IVPB (0 g Intravenous Stopped 8/17/20 1848)   acetaminophen (TYLENOL) tablet 975 mg (975 mg Oral Given 8/17/20 1641)   sodium chloride 0 9 % bolus 1,000 mL (0 mL Intravenous Stopped 8/17/20 1848)   aspirin chewable tablet 324 mg (324 mg Oral Given 8/17/20 1851)   iohexol (OMNIPAQUE) 350 MG/ML injection (MULTI-DOSE) 85 mL (85 mL Intravenous Given 8/17/20 1825)   heparin (porcine) injection 10,000 Units (10,000 Units Intravenous Given 8/17/20 1938)           FINAL IMPRESSION    Final diagnoses:   PE (pulmonary thromboembolism) ([facility] )   Sepsis ([place] [address] )   Cellulitis of left lower extremity   Closed fracture of distal end of left tibia, unspecified fracture morphology, initial encounter         DISPOSITION    Time reflects when diagnosis was documented in both MDM as applicable and the Disposition within this note     Time User Action Codes Description Comment    8/17/2020  6:56 PM Elio Miracle [I26 99] PE (pulmonary thromboembolism) ([facility] )     8/17/2020  6:56 PM Ivana Tania Add [A41 9] Sepsis ([place] [address] )     8/17/2020  7:51 PM Ivana Tania Add [O45 378] Cellulitis of left lower extremity     8/17/2020  7:51 PM Ivana Tania Add [S82 302A] Closed fracture of distal end of left tibia, unspecified fracture morphology, initial encounter       ED Disposition     ED Disposition Condition Date/Time Comment    Transfer to Another Facility-In Network  Mon Aug 17, 2020  8:45 PM         MD Documentation      Most Recent Value   Patient Condition  The patient has been stabilized such that within reasonable medical probability, no material deterioration of the patient condition or the condition of the unborn child(elaina) is likely to result from the transfer   Reason for Transfer  Level of Care needed not available at this facility   Benefits of Transfer  Specialized equipment and/or services available at the receiving facility (Include comment)________________________   Risks of Transfer  Potential for delay in receiving treatment   Accepting Physician  Dr Carlos Albright Name, [address] [facility] MD Dr Virginia Knowles   Provider Certification General risk, such as traffic hazards, adverse weather conditions, rough terrain or turbulence, possible failure of equipment (including vehicle or aircraft), or consequences of actions of persons outside the control of the transport personnel, Risk of worsening condition, Unanticipated needs of medical equipment and personnel during transport      RN Documentation      Most [address] Name, [address]      Follow-up Information    None           PATIENT REFERRED TO:    No follow-up provider specified      DISCHARGE MEDICATIONS:    Discharge Medication List as of 8/17/2020  9:50 PM      CONTINUE these medications which have NOT CHANGED    Details   aspirin (ECOTRIN LOW STRENGTH) 81 mg EC tablet Take by mouth 2 (two) times a day  , Historical Med      Cholecalciferol (VITAMIN D) 2000 units CAPS Take by mouth, Historical Med      Empagliflozin (JARDIANCE) 25 MG TABS Take 25 mg by mouth every morning, Historical Med      HUMALOG 100 UNIT/ML injection INJECT DAILY AS DIRECTED PER SLIDING SCALE, Historical Med      Insulin Degludec (TRESIBA FLEXTOUCH SC) Inject 20 Units under the skin daily at bedtime, Historical Med      multivitamin-iron-minerals-folic acid (CENTRUM) chewable tablet Chew 1 tablet daily, Historical Med      Omega-3 Fatty Acids (FISH OIL CONCENTRATE PO) Take by mouth, Historical Med      simvastatin (ZOCOR) 40 mg tablet Take 40 mg by mouth daily at bedtime, Historical Med      amoxicillin (AMOXIL) 500 mg capsule Take 2,000 mg by mouth as needed 1 hour prior to dental appointment, Starting Mon 11/25/2019, Historical Med      B-D ULTRAFINE III SHORT PEN 31G X 8 MM MISC USE TO INJECT INSULIN THREE TIMES DAILY, Historical Med      fluticasone-umeclidinium-vilanterol (TRELEGY ELLIPTA) 100-62 5-25 MCG/INH inhaler Inhale 1 puff daily Rinse mouth after use , Historical Med      insulin regular (HumuLIN R,NovoLIN R) 100 units/mL injection Inject 12 Units under the skin once, Historical Med      ketoconazole (NIZORAL) 2 % cream Apply topically 2 (two) times a day, Starting Tue 6/4/2019, Normal      ONETOUCH VERIO test strip TEST 2 TIMES A DAY DX E11 9, Historical Med      promethazine-codeine (PHENERGAN WITH CODEINE) 6 25-10 mg/5 mL syrup Take 5 mL by mouth every 4 (four) hours as needed for cough , Historical Med      SANTYL ointment Starting Mon 2/17/2020, Historical Med             No discharge procedures on file           Rafiq Castilloe, [address] [facility] [address], DO  08/17/20 1958

## 2020-08-18 ENCOUNTER — APPOINTMENT (INPATIENT)
Dept: RADIOLOGY | Facility: HOSPITAL | Age: 75
DRG: 175 | End: 2020-08-18
Payer: MEDICARE

## 2020-08-18 ENCOUNTER — APPOINTMENT (INPATIENT)
Dept: NON INVASIVE DIAGNOSTICS | Facility: HOSPITAL | Age: 75
DRG: 175 | End: 2020-08-18
Payer: MEDICARE

## 2020-08-18 ENCOUNTER — ANESTHESIA EVENT (INPATIENT)
Dept: RADIOLOGY | Facility: HOSPITAL | Age: 75
DRG: 175 | End: 2020-08-18
Payer: MEDICARE

## 2020-08-18 ENCOUNTER — ANESTHESIA (INPATIENT)
Dept: RADIOLOGY | Facility: HOSPITAL | Age: 75
DRG: 175 | End: 2020-08-18
Payer: MEDICARE

## 2020-08-18 PROBLEM — E78.5 HLD (HYPERLIPIDEMIA): Status: ACTIVE | Noted: 2020-08-18

## 2020-08-18 PROBLEM — I26.99 ACUTE PULMONARY EMBOLISM (HCC): Status: ACTIVE | Noted: 2020-08-18

## 2020-08-18 LAB
ABO GROUP BLD: NORMAL
ABO GROUP BLD: NORMAL
APTT PPP: 170 SECONDS (ref 23–37)
APTT PPP: 35 SECONDS (ref 23–37)
APTT PPP: 36 SECONDS (ref 23–37)
APTT PPP: 40 SECONDS (ref 23–37)
APTT PPP: 81 SECONDS (ref 23–37)
ATRIAL RATE: 103 BPM
ATRIAL RATE: 81 BPM
BLD GP AB SCN SERPL QL: NEGATIVE
BLD GP AB SCN SERPL QL: NEGATIVE
ERYTHROCYTE [DISTWIDTH] IN BLOOD BY AUTOMATED COUNT: 14.4 % (ref 11.6–15.1)
ERYTHROCYTE [DISTWIDTH] IN BLOOD BY AUTOMATED COUNT: 14.5 % (ref 11.6–15.1)
ERYTHROCYTE [DISTWIDTH] IN BLOOD BY AUTOMATED COUNT: 14.6 % (ref 11.6–15.1)
ERYTHROCYTE [DISTWIDTH] IN BLOOD BY AUTOMATED COUNT: 14.6 % (ref 11.6–15.1)
FIBRINOGEN PPP-MCNC: 331 MG/DL (ref 227–495)
FIBRINOGEN PPP-MCNC: 388 MG/DL (ref 227–495)
FIBRINOGEN PPP-MCNC: 471 MG/DL (ref 227–495)
FIBRINOGEN PPP-MCNC: 583 MG/DL (ref 227–495)
FIBRINOGEN PPP-MCNC: 596 MG/DL (ref 227–495)
GLUCOSE SERPL-MCNC: 119 MG/DL (ref 65–140)
GLUCOSE SERPL-MCNC: 127 MG/DL (ref 65–140)
GLUCOSE SERPL-MCNC: 132 MG/DL (ref 65–140)
GLUCOSE SERPL-MCNC: 143 MG/DL (ref 65–140)
HCT VFR BLD AUTO: 43.3 % (ref 36.5–49.3)
HCT VFR BLD AUTO: 44.8 % (ref 36.5–49.3)
HCT VFR BLD AUTO: 45.5 % (ref 36.5–49.3)
HCT VFR BLD AUTO: 46 % (ref 36.5–49.3)
HGB BLD-MCNC: 14.3 G/DL (ref 12–17)
HGB BLD-MCNC: 14.8 G/DL (ref 12–17)
HGB BLD-MCNC: 15.1 G/DL (ref 12–17)
HGB BLD-MCNC: 15.2 G/DL (ref 12–17)
MCH RBC QN AUTO: 30.3 PG (ref 26.8–34.3)
MCH RBC QN AUTO: 30.6 PG (ref 26.8–34.3)
MCH RBC QN AUTO: 30.6 PG (ref 26.8–34.3)
MCH RBC QN AUTO: 30.8 PG (ref 26.8–34.3)
MCHC RBC AUTO-ENTMCNC: 32.8 G/DL (ref 31.4–37.4)
MCHC RBC AUTO-ENTMCNC: 33 G/DL (ref 31.4–37.4)
MCHC RBC AUTO-ENTMCNC: 33 G/DL (ref 31.4–37.4)
MCHC RBC AUTO-ENTMCNC: 33.4 G/DL (ref 31.4–37.4)
MCV RBC AUTO: 92 FL (ref 82–98)
MCV RBC AUTO: 92 FL (ref 82–98)
MCV RBC AUTO: 93 FL (ref 82–98)
MCV RBC AUTO: 93 FL (ref 82–98)
P AXIS: 78 DEGREES
P AXIS: 88 DEGREES
PLATELET # BLD AUTO: 149 THOUSANDS/UL (ref 149–390)
PLATELET # BLD AUTO: 154 THOUSANDS/UL (ref 149–390)
PLATELET # BLD AUTO: 155 THOUSANDS/UL (ref 149–390)
PLATELET # BLD AUTO: 163 THOUSANDS/UL (ref 149–390)
PMV BLD AUTO: 10.7 FL (ref 8.9–12.7)
PMV BLD AUTO: 10.7 FL (ref 8.9–12.7)
PMV BLD AUTO: 10.9 FL (ref 8.9–12.7)
PMV BLD AUTO: 10.9 FL (ref 8.9–12.7)
PR INTERVAL: 160 MS
PR INTERVAL: 167 MS
PROCALCITONIN SERPL-MCNC: 0.07 NG/ML
QRS AXIS: 131 DEGREES
QRS AXIS: 137 DEGREES
QRSD INTERVAL: 113 MS
QRSD INTERVAL: 118 MS
QT INTERVAL: 346 MS
QT INTERVAL: 425 MS
QTC INTERVAL: 453 MS
QTC INTERVAL: 494 MS
RBC # BLD AUTO: 4.67 MILLION/UL (ref 3.88–5.62)
RBC # BLD AUTO: 4.83 MILLION/UL (ref 3.88–5.62)
RBC # BLD AUTO: 4.94 MILLION/UL (ref 3.88–5.62)
RBC # BLD AUTO: 4.98 MILLION/UL (ref 3.88–5.62)
RH BLD: POSITIVE
RH BLD: POSITIVE
SPECIMEN EXPIRATION DATE: NORMAL
T WAVE AXIS: 122 DEGREES
T WAVE AXIS: 80 DEGREES
TROPONIN I SERPL-MCNC: 1.86 NG/ML
TROPONIN I SERPL-MCNC: 1.97 NG/ML
VENTRICULAR RATE: 103 BPM
VENTRICULAR RATE: 81 BPM
WBC # BLD AUTO: 11.59 THOUSAND/UL (ref 4.31–10.16)
WBC # BLD AUTO: 12.14 THOUSAND/UL (ref 4.31–10.16)
WBC # BLD AUTO: 14.09 THOUSAND/UL (ref 4.31–10.16)
WBC # BLD AUTO: 14.25 THOUSAND/UL (ref 4.31–10.16)

## 2020-08-18 PROCEDURE — C1751 CATH, INF, PER/CENT/MIDLINE: HCPCS

## 2020-08-18 PROCEDURE — 4A033B3 MEASUREMENT OF ARTERIAL PRESSURE, PULMONARY, PERCUTANEOUS APPROACH: ICD-10-PCS | Performed by: RADIOLOGY

## 2020-08-18 PROCEDURE — 93308 TTE F-UP OR LMTD: CPT | Performed by: EMERGENCY MEDICINE

## 2020-08-18 PROCEDURE — 84484 ASSAY OF TROPONIN QUANT: CPT | Performed by: EMERGENCY MEDICINE

## 2020-08-18 PROCEDURE — C1894 INTRO/SHEATH, NON-LASER: HCPCS

## 2020-08-18 PROCEDURE — 86850 RBC ANTIBODY SCREEN: CPT | Performed by: EMERGENCY MEDICINE

## 2020-08-18 PROCEDURE — C1769 GUIDE WIRE: HCPCS

## 2020-08-18 PROCEDURE — 94664 DEMO&/EVAL PT USE INHALER: CPT

## 2020-08-18 PROCEDURE — 85384 FIBRINOGEN ACTIVITY: CPT | Performed by: EMERGENCY MEDICINE

## 2020-08-18 PROCEDURE — 37211 THROMBOLYTIC ART THERAPY: CPT

## 2020-08-18 PROCEDURE — 02HQ33Z INSERTION OF INFUSION DEVICE INTO RIGHT PULMONARY ARTERY, PERCUTANEOUS APPROACH: ICD-10-PCS | Performed by: RADIOLOGY

## 2020-08-18 PROCEDURE — 37212 THROMBOLYTIC VENOUS THERAPY: CPT | Performed by: RADIOLOGY

## 2020-08-18 PROCEDURE — 94760 N-INVAS EAR/PLS OXIMETRY 1: CPT

## 2020-08-18 PROCEDURE — 93970 EXTREMITY STUDY: CPT | Performed by: SURGERY

## 2020-08-18 PROCEDURE — 76937 US GUIDE VASCULAR ACCESS: CPT | Performed by: RADIOLOGY

## 2020-08-18 PROCEDURE — 93010 ELECTROCARDIOGRAM REPORT: CPT | Performed by: INTERNAL MEDICINE

## 2020-08-18 PROCEDURE — 86900 BLOOD TYPING SEROLOGIC ABO: CPT | Performed by: EMERGENCY MEDICINE

## 2020-08-18 PROCEDURE — 99291 CRITICAL CARE FIRST HOUR: CPT | Performed by: EMERGENCY MEDICINE

## 2020-08-18 PROCEDURE — 02HR33Z INSERTION OF INFUSION DEVICE INTO LEFT PULMONARY ARTERY, PERCUTANEOUS APPROACH: ICD-10-PCS | Performed by: RADIOLOGY

## 2020-08-18 PROCEDURE — 84484 ASSAY OF TROPONIN QUANT: CPT | Performed by: NURSE PRACTITIONER

## 2020-08-18 PROCEDURE — 3E05317 INTRODUCTION OF OTHER THROMBOLYTIC INTO PERIPHERAL ARTERY, PERCUTANEOUS APPROACH: ICD-10-PCS | Performed by: RADIOLOGY

## 2020-08-18 PROCEDURE — 93970 EXTREMITY STUDY: CPT

## 2020-08-18 PROCEDURE — C8929 TTE W OR WO FOL WCON,DOPPLER: HCPCS

## 2020-08-18 PROCEDURE — 93306 TTE W/DOPPLER COMPLETE: CPT | Performed by: INTERNAL MEDICINE

## 2020-08-18 PROCEDURE — 36015 PLACE CATHETER IN ARTERY: CPT | Performed by: RADIOLOGY

## 2020-08-18 PROCEDURE — 86901 BLOOD TYPING SEROLOGIC RH(D): CPT | Performed by: EMERGENCY MEDICINE

## 2020-08-18 PROCEDURE — 85027 COMPLETE CBC AUTOMATED: CPT | Performed by: RADIOLOGY

## 2020-08-18 PROCEDURE — 93971 EXTREMITY STUDY: CPT | Performed by: EMERGENCY MEDICINE

## 2020-08-18 PROCEDURE — 99232 SBSQ HOSP IP/OBS MODERATE 35: CPT | Performed by: PHYSICIAN ASSISTANT

## 2020-08-18 PROCEDURE — 85384 FIBRINOGEN ACTIVITY: CPT | Performed by: RADIOLOGY

## 2020-08-18 PROCEDURE — 85730 THROMBOPLASTIN TIME PARTIAL: CPT | Performed by: EMERGENCY MEDICINE

## 2020-08-18 PROCEDURE — 85730 THROMBOPLASTIN TIME PARTIAL: CPT | Performed by: RADIOLOGY

## 2020-08-18 PROCEDURE — 82948 REAGENT STRIP/BLOOD GLUCOSE: CPT

## 2020-08-18 RX ORDER — LIDOCAINE HYDROCHLORIDE 10 MG/ML
INJECTION, SOLUTION EPIDURAL; INFILTRATION; INTRACAUDAL; PERINEURAL
Status: COMPLETED | OUTPATIENT
Start: 2020-08-18 | End: 2020-08-18

## 2020-08-18 RX ORDER — ONDANSETRON 2 MG/ML
4 INJECTION INTRAMUSCULAR; INTRAVENOUS EVERY 4 HOURS PRN
Status: DISCONTINUED | OUTPATIENT
Start: 2020-08-18 | End: 2020-08-22 | Stop reason: HOSPADM

## 2020-08-18 RX ORDER — LIDOCAINE WITH 8.4% SOD BICARB 0.9%(10ML)
SYRINGE (ML) INJECTION CODE/TRAUMA/SEDATION MEDICATION
Status: COMPLETED | OUTPATIENT
Start: 2020-08-18 | End: 2020-08-18

## 2020-08-18 RX ORDER — HEPARIN SODIUM 10000 [USP'U]/100ML
300-2000 INJECTION, SOLUTION INTRAVENOUS
Status: DISCONTINUED | OUTPATIENT
Start: 2020-08-18 | End: 2020-08-19

## 2020-08-18 RX ORDER — HEPARIN SODIUM 200 [USP'U]/100ML
20 INJECTION, SOLUTION INTRAVENOUS CONTINUOUS
Status: DISCONTINUED | OUTPATIENT
Start: 2020-08-18 | End: 2020-08-19

## 2020-08-18 RX ORDER — INSULIN GLARGINE 100 [IU]/ML
10 INJECTION, SOLUTION SUBCUTANEOUS
Status: DISCONTINUED | OUTPATIENT
Start: 2020-08-18 | End: 2020-08-20

## 2020-08-18 RX ORDER — ACETAMINOPHEN 325 MG/1
650 TABLET ORAL EVERY 4 HOURS PRN
Status: DISCONTINUED | OUTPATIENT
Start: 2020-08-18 | End: 2020-08-22 | Stop reason: HOSPADM

## 2020-08-18 RX ORDER — SODIUM CHLORIDE 9 MG/ML
INJECTION, SOLUTION INTRAVENOUS CONTINUOUS PRN
Status: DISCONTINUED | OUTPATIENT
Start: 2020-08-18 | End: 2020-08-18

## 2020-08-18 RX ADMIN — HEPARIN SODIUM IN SODIUM CHLORIDE 20 UNITS/HR: 200 INJECTION INTRAVENOUS at 02:45

## 2020-08-18 RX ADMIN — CHLORHEXIDINE GLUCONATE 0.12% ORAL RINSE 15 ML: 1.2 LIQUID ORAL at 08:54

## 2020-08-18 RX ADMIN — HEPARIN SODIUM IN SODIUM CHLORIDE 20 UNITS/HR: 200 INJECTION INTRAVENOUS at 02:51

## 2020-08-18 RX ADMIN — ALTEPLASE 0.3 MG/HR: 2.2 INJECTION, POWDER, LYOPHILIZED, FOR SOLUTION INTRAVENOUS at 11:10

## 2020-08-18 RX ADMIN — INSULIN GLARGINE 10 UNITS: 100 INJECTION, SOLUTION SUBCUTANEOUS at 22:58

## 2020-08-18 RX ADMIN — ALTEPLASE 0.3 MG/HR: 2.2 INJECTION, POWDER, LYOPHILIZED, FOR SOLUTION INTRAVENOUS at 02:45

## 2020-08-18 RX ADMIN — HEPARIN SODIUM AND DEXTROSE 350 UNITS/HR: 10000; 5 INJECTION INTRAVENOUS at 03:00

## 2020-08-18 RX ADMIN — SODIUM CHLORIDE: 9 INJECTION, SOLUTION INTRAVENOUS at 01:17

## 2020-08-18 RX ADMIN — PERFLUTREN 1 ML/MIN: 6.52 INJECTION, SUSPENSION INTRAVENOUS at 10:01

## 2020-08-18 RX ADMIN — IODIXANOL 15 ML: 320 INJECTION, SOLUTION INTRAVASCULAR at 08:16

## 2020-08-18 RX ADMIN — LIDOCAINE HYDROCHLORIDE 0.2 ML: 10 INJECTION, SOLUTION EPIDURAL; INFILTRATION; INTRACAUDAL; PERINEURAL at 01:35

## 2020-08-18 RX ADMIN — Medication 10 ML: at 01:46

## 2020-08-18 RX ADMIN — LIDOCAINE HYDROCHLORIDE 0.5 ML: 10 INJECTION, SOLUTION EPIDURAL; INFILTRATION; INTRACAUDAL; PERINEURAL at 01:52

## 2020-08-18 RX ADMIN — ALTEPLASE 0.3 MG/HR: 2.2 INJECTION, POWDER, LYOPHILIZED, FOR SOLUTION INTRAVENOUS at 12:41

## 2020-08-18 NOTE — SEDATION DOCUMENTATION
Pulmonary pressures  Left mean 43  Distal left PA 46/10, mean 24  Right PA 48/10, mean 25  Right distal 50/12, mean 24

## 2020-08-18 NOTE — QUICK NOTE
This is a 19-year-old male patient with past medical history significant for obstructive sleep apnea on CPAP, hypertension, diabetes who presented with complaints of shortness of breath and hypoxia  Bilateral PE ( R> L) with RV strain  A/P   Neuro:   · Pain controlled with: tylenol prn  · Delirium precautions  ? CAM-ICU daily  · Trend neuro exam     CV:   ? MAP goal > 65  · Rhythm: NSR  ? RBBB, right axis  ? Follow rhythm on telemetry  · Elevated troponin:  2 14-- 2 2--1 97  ? Likely due to submassive PE      Lung:   · Wean NC as able  · SpO2 goal >92%  · Sleep apnea  ? BiPap ordered qhs  1  Bilateral submassive PE-   · likely provoked in the setting of possible underlying hypercoagulable state given family history  ( sister had multiple PEs,grandmother blood clots )   · With RV strain on bedside echo, troponin leak  Plan  ? Right IJ access, catheter directed tPA running, started at 2:00 a m - for 24 hrs in both on right and left PA  ? IV heparin  ? Plan to transition to PO AC once tPA is finished and patient is stable      GI:   · Stress ulcer prophylaxis: Protonix IV and No prophylaxis needed  · Bowel regimen: PRN  · Zofran PRN for nausea     FEN:   · Nutrition/diet plan: Diabetic diet    · Replete electrolytes with goals: K >4 0, Mag >2 0, and Phos >3 0  ·    :   · Trend UOP and BUN/creat  · Strict I and O     ID:   · Trend temps and WBC count  · Maintain normothermia     Heme:   · Trend hgb and plts:  Hb 15, plt 163  ? Transfuse as needed for goal hgb >7     Endo:   · Glycemic control plan: maintain normoglycemia  · Diabetes- SubQ insulin, algorithm 4, start basal insulin lantus 10 units once p o  Intake resumes     MSK/Skin:   1  Left distal tibial fracture status post ORIF  XRAY- Displaced distal tibial fracture  Overlying medial plate and screws identified  The surgical screws second from the most superior/top appears inverted, possibly displaced    · Consult orthopedic team for possible displaced screws on X ray  · Frequent turning and pressure off-loading  · Local wound care as needed  · Surgical incisions on left lower extremity, well healed; monitor closely for bleeding

## 2020-08-18 NOTE — ED NOTES
[facility] ICU bed 9  [phone]  2115 SLETs pickup  Dr Robert Smith RN  08/17/20 2103

## 2020-08-18 NOTE — PLAN OF CARE
Problem: Potential for Falls  Goal: Patient will remain free of falls  Description: INTERVENTIONS:  - Assess patient frequently for physical needs  -  Identify cognitive and physical deficits and behaviors that affect risk of falls    -  [place] fall precautions as indicated by assessment   - Educate patient/family on patient safety including physical limitations  - Instruct patient to call for assistance with activity based on assessment  - Modify environment to reduce risk of injury  - Consider OT/PT consult to assist with strengthening/mobility  Outcome: Progressing     Problem: CARDIOVASCULAR - ADULT  Goal: Maintains optimal cardiac output and hemodynamic stability  Description: INTERVENTIONS:  - Monitor I/O, vital signs and rhythm  - Monitor for S/S and trends of decreased cardiac output  - Administer and titrate ordered vasoactive medications to optimize hemodynamic stability  - Assess quality of pulses, skin color and temperature  - Assess for signs of decreased coronary artery perfusion  - Instruct patient to report change in severity of symptoms  Outcome: Progressing     Problem: RESPIRATORY - ADULT  Goal: Achieves optimal ventilation and oxygenation  Description: INTERVENTIONS:  - Assess for changes in respiratory status  - Assess for changes in mentation and behavior  - Position to facilitate oxygenation and minimize respiratory effort  - Oxygen administered by appropriate delivery if ordered  - Initiate smoking cessation education as indicated  - Encourage broncho-pulmonary hygiene including cough, deep breathe, Incentive Spirometry  - Assess the need for suctioning and aspirate as needed  - Assess and instruct to report SOB or any respiratory difficulty  - Respiratory Therapy support as indicated  Outcome: Progressing     Problem: MUSCULOSKELETAL - ADULT  Goal: Maintain or return mobility to safest level of function  Description: INTERVENTIONS:  - Assess patient's ability to carry out ADLs; assess patient's baseline for ADL function and identify physical deficits which impact ability to perform ADLs (bathing, care of mouth/teeth, toileting, grooming, dressing, etc )  - Assess/evaluate cause of self-care deficits   - Assess range of motion  - Assess patient's mobility  - Assess patient's need for assistive devices and provide as appropriate  - Encourage maximum independence but intervene and supervise when necessary  - Involve family in performance of ADLs  - Assess for home care needs following discharge   - Consider OT consult to assist with ADL evaluation and planning for discharge  - Provide patient education as appropriate  Outcome: Progressing     Problem: MUSCULOSKELETAL - ADULT  Goal: Maintain proper alignment of affected body part  Description: INTERVENTIONS:  - Support, maintain and protect limb and body alignment  - Provide patient/ family with appropriate education  Outcome: Progressing     Problem: PAIN - ADULT  Goal: Verbalizes/displays adequate comfort level or baseline comfort level  Description: Interventions:  - Encourage patient to monitor pain and request assistance  - Assess pain using appropriate pain scale  - Administer analgesics based on type and severity of pain and evaluate response  - Implement non-pharmacological measures as appropriate and evaluate response  - Consider cultural and social influences on pain and pain management  - Notify physician/advanced practitioner if interventions unsuccessful or patient reports new pain  Outcome: Progressing     Problem: SAFETY ADULT  Goal: Maintain or return to baseline ADL function  Description: INTERVENTIONS:  -  Assess patient's ability to carry out ADLs; assess patient's baseline for ADL function and identify physical deficits which impact ability to perform ADLs (bathing, care of mouth/teeth, toileting, grooming, dressing, etc )  - Assess/evaluate cause of self-care deficits   - Assess range of motion  - Assess patient's mobility; develop plan if impaired  - Assess patient's need for assistive devices and provide as appropriate  - Encourage maximum independence but intervene and supervise when necessary  - Involve family in performance of ADLs  - Assess for home care needs following discharge   - Consider OT consult to assist with ADL evaluation and planning for discharge  - Provide patient education as appropriate  Outcome: Progressing
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
## 2020-08-18 NOTE — ANESTHESIA PROCEDURE NOTES
Arterial Line Insertion  Performed by: Joselito Teresa MD  Authorized by: Joselito Teresa MD   Consent: Verbal consent obtained  Written consent obtained  Consent given by: patient  Preparation: Patient was prepped and draped in the usual sterile fashion    Indications: multiple ABGs and hemodynamic monitoring  Orientation:  Left  Location: radial arterylidocaine (PF) (XYLOCAINE-MPF) 1 % infiltration, 0 2 mL  Procedure Details:  Needle gauge: 20  Seldinger technique: Seldinger technique used  Number of attempts: 1    Post-procedure:  Post-procedure: dressing applied  Waveform: good waveform  Post-procedure CNS: normal  Patient tolerance: Patient tolerated the procedure well with no immediate complications

## 2020-08-18 NOTE — BRIEF OP NOTE (RAD/CATH)
IR PULMONARY LYSIS Procedure Note    PATIENT NAME: Meera Alfaro  : 1945  MRN: [id number]    Pre-op Diagnosis:   1  Pulmonary embolism (HCC)      Post-op Diagnosis:   1   Pulmonary embolism ([address] )        Surgeon:   Alejandro Yi MD  Assistants:     No qualified resident was available, Resident is only observing    Estimated Blood Loss: minimal  Findings: right IJ access 6 [place] sheath x    20 cm lysis catheters into R lower lobe PA and left lower lobe PA     Systolic PA pressure is around 50, means in the mid 20s    Specimens: none    Complications:  None immediate    Anesthesia: Osiris Sims MD     Date: 2020  Time: 2:28 AM

## 2020-08-18 NOTE — PROGRESS NOTES
Progress Note -Interventional Radiology PA  Dunia Zabala 75 y o  male MRN: [id number]  Unit/Bed#: MICU 09 Encounter: [id number]    Assessment:    75year old male presenting with provoked submassive PE, recent left lower extremity surgery, s/p IR catheter directed lysis     Plan:  - TPA stopped at approx 3PM  - catheters removed at bedside  - patient satting 92-93% on 2L  - pulmonary pressures measuring 26 bilaterally, down from 50  - troponin slowly downtrending  2 21 at 1900, down to 1 86 1300 today  - no massive bleeding from leg incision  - anticoagulation per primary    Patient Active Problem List   Diagnosis    Gastroesophageal reflux disease with esophagitis    Adenomatous polyp of descending colon    Chronic superficial gastritis with bleeding    Diabetes mellitus ([address] )    Essential hypertension    Chronic cough    Chest pain    Leukocytosis    Elevated troponin    JED (obstructive sleep apnea)    Acute pulmonary embolism (HCC)    HLD (hyperlipidemia)          Subjective: Pt states breathing is better  He appears comfortable  Objective:    Vitals:  /68   Pulse 76   Temp 99 °F (37 2 °C)   Resp (!) 37   Ht 5' 10" (1 778 m)   Wt 116 kg (256 lb 9 9 oz)   SpO2 92%   BMI 36 82 kg/m²   Body mass index is 36 82 kg/m²  Weight (last 2 days)     Date/Time   Weight    08/17/20 2225   116 (256 62)              I/Os:    Intake/Output Summary (Last 24 hours) at 8/18/2020 1627  Last data filed at 8/18/2020 1200  Gross per 24 hour   Intake 398 54 ml   Output 1250 ml   Net -851 46 ml       Invasive Devices     Peripheral Intravenous Line            Peripheral IV 08/17/20 Dorsal (posterior); Left Forearm less than 1 day    Peripheral IV 08/17/20 Right Antecubital less than 1 day          Arterial Line            Arterial Line 08/18/20 Left Radial less than 1 day          Line            Arterial Sheath 6 Fr  Right Other (Comment) less than 1 day    Arterial Sheath 6 Fr   Right Other (Comment) less than 1 day                Physical Exam:  General appearance: alert and oriented, in no acute distress  Lungs: clear to auscultation bilaterally  Heart: regular rate and rhythm, S1, S2 normal, no murmur, click, rub or gallop  Abdomen: soft, non-tender; bowel sounds normal; no masses,  no organomegaly  Skin: right neck no evidence of hematoma, no erythema, c/d/i                Lab Results and Cultures:   CBC with diff:   Lab Results   Component Value Date    WBC 14 09 (H) 08/18/2020    HGB 15 1 08/18/2020    HCT 46 0 08/18/2020    MCV 92 08/18/2020     08/18/2020    MCH 30 3 08/18/2020    MCHC 32 8 08/18/2020    RDW 14 4 08/18/2020    MPV 10 7 08/18/2020    NRBC 0 08/17/2020      BMP/CMP:  Lab Results   Component Value Date    K 4 5 08/17/2020     08/17/2020    CO2 26 08/17/2020    BUN 16 08/17/2020    CREATININE 0 89 08/17/2020    CALCIUM 9 2 08/17/2020    AST 19 08/17/2020    ALT 26 08/17/2020    ALKPHOS 63 08/17/2020    EGFR 84 08/17/2020   ,     Coags:   Lab Results   Component Value Date    PTT 35 08/18/2020    INR 1 02 08/17/2020   ,   Results from last 7 days   Lab Units 08/18/20  0907 08/18/20  0301 08/18/20  0058 08/17/20  1654   PTT seconds 35 81* 170* 33   INR   --   --   --  1 02        Lipid Panel: No results found for: CHOL  No results found for: HDL  No results found for: HDL  No results found for: LDLCALC  No results found for: TRIG    HgbA1c:   Lab Results   Component Value Date    HGBA1C 7 2 (H) 03/02/2019       Blood Culture:   Lab Results   Component Value Date    BLOODCX Received in Microbiology Lab  Culture in Progress  08/17/2020    BLOODCX Received in Microbiology Lab  Culture in Progress   08/17/2020   ,   Urinalysis: No results found for: Cesar Moshe, SPECGRAV, PHUR, LEUKOCYTESUR, NITRITE, PROTEINUA, GLUCOSEU, KETONESU, BILIRUBINUR, BLOODU,   Urine Culture: No results found for: URINECX,   Wound Culure:  No results found for: WOUNDCULT    VTE Pharmacologic Prophylaxis: Heparin      Thank you for allowing me to participate in the care of [facility] [place]  Please don't hesitate to call, text, email, or TigerText with any questions  This text is generated with voice recognition software  There may be translation, syntax,  or grammatical errors  If you have any questions, please contact the dictating provider      Anahi Ca PA-C

## 2020-08-18 NOTE — PROGRESS NOTES
Critical Care Assessment Note:    Patient is a 76 yo man admitted with submassive PE  PMH of HLD, JED on CPAP, DM2, GERD, Morbid Obesity  Patient served in [facility] and per wife has had some unexplained shortness of breath on exertion for the last 3-4 years  He had a NM stress test and Echo in 3/2019 that showed a small reversible defect and some mild RV function but valves and LV function were normal     He had left distal tibial fracture repair on 7/13  Post operatively he remained on Lovenox BID x 3 weeks  He did not return to full ambulation because surgeon told him "he couldn't approximate the proximal tibia to his prior left knee replacement" so he has been using a walker for short distances and otherwise in a wheelchair since this time  No reported calf swelling or pain  No hx of stroke, ICH, SAH, or systemic anticoagulation use other than post surgery  Patient had sudden onset SOB at home  SpO2 was 80% on his home oximeter  He does not wear O2 at home  He presented to ED  CTA report as below  CTA 8/17:  Bilateral diffuse pulmonary emboli in the bilateral main pulmonary arteries and beyond  RV/LV ratio 1 5    On my read significant right main PE clot burden  Troponin 2 14  SpO2 86% on room air, improved to 96% on 2L O2  He does not appear distressed  Bedside TTE performed by me:  Dilated RVOT and underfilled LV with kissing walls seen on parasternal long axis  D sign present on short axis views  RV size greater than LV on apical 4 chamber    Patient was started on Heparin gtt in ED  Case discussed with [facility] attending at [facility]  Patient accepted for transfer for evaluation for catheter guided lysis vs thrombectomy  Plan relayed to ED attending on service      CC time: 45 minutes    Pam Dooley MD  [address] [facility] Attending

## 2020-08-18 NOTE — H&P
History and Physical - Critical Care    Sherif Morris 75 y o  male MRN: [id number]  [address] [facility]   Unit/Bed#: MICU 09 Encounter: [id number]      Reason for Admission / Principal Problem: Submassive pulmonary embolism    HPI: Sherif Morris is a 75 y o  male with history of HTN, DM, sleep apnea, recent left distal orthopedic repair at [facility] on 7/15, who presented at [facility] due to fever and shortness of breath  Patient states he woke up this morning and felt very short of breath and weak  He has a home pulse ox which he checks daily since his operation and noted this morning it read 80%  Patient states he has been relatively immobile since his surgery and was on lovenox for 2 weeks following his surgery, but nothing since then  At [facility], patient was noted to be 86% on RA at triage and improved with 2L NC  CTA showed bilateral pulmonary embolism with right heart strain  Troponin 2 14  Patient started on heparin and transferred to [facility] for catheter directed therapy with IR  Upon arrival in ICU, patient is pleasant without any current pain, dyspnea or pleurisy on 2L NC  AAO, no focal deficits  History obtained from chart review and the patient  PMH:   Past Medical History:   Diagnosis Date    Diabetes mellitus ([address] )     Hiatal hernia     Hyperlipidemia     Hypertension     Neuropathy     Shortness of breath     Venous insufficiency (chronic) (peripheral)        PSH:   Past Surgical History:   Procedure Laterality Date    JOINT REPLACEMENT Bilateral     knees    LEG SURGERY      FL COLONOSCOPY FLX DX W/COLLJ SPEC WHEN PFRMD N/A 6/30/2017    Procedure: EGD AND COLONOSCOPY;  Surgeon: Ally Hunter MD;  Location: [facility];   Service: General    TONSILLECTOMY         Family History:   Family History   Problem Relation Age of Onset   Isela Courser Cancer Mother     Cancer Father        Social History:   Social History     Tobacco Use   Smoking Status Never Smoker Smokeless Tobacco Never Used      Social History     Substance and Sexual Activity   Alcohol Use Never    Frequency: Never      Marital Status: /Civil Union    ROS: 14 point ROS is negative and/or as stated in the HPI  Allergies: No Known Allergies    Home Medications:   Prior to Admission medications    Medication Sig Start Date End Date Taking? Authorizing Provider   aspirin (ECOTRIN LOW STRENGTH) 81 mg EC tablet Take 162 mg by mouth once    Yes Historical Provider, MD   Cholecalciferol (VITAMIN D) 2000 units CAPS Take by mouth   Yes Historical Provider, MD   Empagliflozin (JARDIANCE) 25 MG TABS Take 25 mg by mouth every morning   Yes Historical Provider, MD   HUMALOG 100 UNIT/ML injection INJECT DAILY AS DIRECTED PER SLIDING SCALE 5/24/19  Yes Historical Provider, MD   Insulin Degludec (TRESIBA FLEXTOUCH SC) Inject 26 Units under the skin daily at bedtime    Yes Historical Provider, MD   multivitamin-iron-minerals-folic acid (CENTRUM) chewable tablet Chew 1 tablet daily   Yes Historical Provider, MD   Omega-3 Fatty Acids (FISH OIL CONCENTRATE PO) Take by mouth   Yes Historical Provider, MD   simvastatin (ZOCOR) 40 mg tablet Take 40 mg by mouth daily at bedtime   Yes Historical Provider, MD   amoxicillin (AMOXIL) 500 mg capsule Take 2,000 mg by mouth as needed 1 hour prior to dental appointment 11/25/19   Historical Provider, MD KOCH ULTRAFINE III SHORT PEN 31G X 8 MM MISC USE TO INJECT INSULIN THREE TIMES DAILY 1/31/20   Historical Provider, MD   fluticasone-umeclidinium-vilanterol (TRELEGY ELLIPTA) 100-62 5-25 MCG/INH inhaler Inhale 1 puff daily Rinse mouth after use      Historical Provider, MD   insulin regular (HumuLIN R,NovoLIN R) 100 units/mL injection Inject 12 Units under the skin once    Historical Provider, MD   ketoconazole (NIZORAL) 2 % cream Apply topically 2 (two) times a day  Patient not taking: Reported on 8/17/2020 6/4/19   Reyna Miller DPM   ONETOUCH VERIO test strip TEST 2 TIMES A DAY DX E11 9 19   Historical Provider, MD   promethazine-codeine (PHENERGAN WITH CODEINE) 6 25-10 mg/5 mL syrup Take 5 mL by mouth every 4 (four) hours as needed for cough     Historical Provider, MD   SANTYL ointment  20   Historical Provider, MD       Vitals:   Patsy Prima:    20 2225 20 2230 20 2300 20 0000   BP: 118/58 117/69 132/63 108/64   Pulse: 82 84 82 74   Resp: (!) 26 (!) 30 (!) 26 21   Temp: 98 1 °F (36 7 °C)      TempSrc: Oral      SpO2: 96% 96% 96% 96%   Weight: 116 kg (256 lb 9 9 oz)      Height: 5' 10" (1 778 m)          Arterial Line:          Respiratory:  SpO2: SpO2: 96 %       Temperature: Temp (24hrs), Av 5 °F (37 5 °C), Min:98 1 °F (36 7 °C), Max:102 1 °F (38 9 °C)  Current: Temperature: 98 1 °F (36 7 °C)    Weights: IBW: 73 kg  Body mass index is 36 82 kg/m²  Physical Exam:    Physical Exam  Vitals signs and nursing note reviewed  Constitutional:       Appearance: Normal appearance  HENT:      Head: Normocephalic and atraumatic  Eyes:      Extraocular Movements: Extraocular movements intact  Conjunctiva/sclera: Conjunctivae normal    Neck:      Musculoskeletal: Normal range of motion  Cardiovascular:      Rate and Rhythm: Normal rate and regular rhythm  Pulmonary:      Effort: Pulmonary effort is normal       Breath sounds: Normal breath sounds  No wheezing or rhonchi  Abdominal:      General: Abdomen is flat  Musculoskeletal: Normal range of motion  Skin:     General: Skin is warm  Findings: Bruising present  Comments: Surgical wounds c/d/i on LLE  No puss or crepitus   Neurological:      General: No focal deficit present  Mental Status: He is alert and oriented to person, place, and time  Sensory: No sensory deficit  Motor: No weakness  Psychiatric:         Mood and Affect: Mood normal          Behavior: Behavior normal          Thought Content:  Thought content normal          Judgment: Judgment normal  Labs:   Results from last 7 days   Lab Units 20  1654   WBC Thousand/uL 16 69*   HEMOGLOBIN g/dL 16 2   HEMATOCRIT % 50 0*   PLATELETS Thousands/uL 177   NEUTROS PCT % 63   MONOS PCT % 11     Results from last 7 days   Lab Units 20  1654   SODIUM mmol/L 140   POTASSIUM mmol/L 4 5   CHLORIDE mmol/L 102   CO2 mmol/L 26   BUN mg/dL 16   CREATININE mg/dL 0 89   CALCIUM mg/dL 9 2   ALK PHOS U/L 63   ALT U/L 26   AST U/L 19         Results from last 7 days   Lab Units 20  1654   INR  1 02   PTT seconds 33     Results from last 7 days   Lab Units 20  1654   LACTIC ACID mmol/L 1 6     Results from last 7 days   Lab Units 20  1936 20  1700   TROPONIN I ng/mL 2 21* 2 14*           Imagin20 CXR: No acute cardiopulmonary disease  I have personally reviewed pertinent reports  20 CTA: Bilateral pulmonary emboli and right heart strain  I have personally reviewed pertinent reports  Micro:  Blood Culture: No results found for: BLOODCX  Urine Culture: No results found for: URINECX  Sputum Culture: No components found for: SPUTUMCX  Wound Culure: No results found for: WOUNDCULT    Impression:  Active Problems:    * No active hospital problems   *      Plan:    Neuro:   · Pain controlled with: tylenol prn  · Delirium precautions  · CAM-ICU daily  · Trend neuro exam  · Right heart strain noted on CTA Chest  · Plan for catheter directed therapy with IR    CV:   · MAP goal > 65  · Rhythm: NSR  · RBBB, right axis  · Follow rhythm on telemetry  · Elevated troponin  · Likely due to submassive PE    Lung:   · Wean NC as able  · SpO2 goal >92%  · Sleep apnea  · BiPap ordered qhs  · Bilateral submassive PE  · Likely due to recent immobility following orthopedic procedure  · IV heparin  · Will go to IR for catheter directed therapy    GI:   · Stress ulcer prophylaxis: Protonix IV and No prophylaxis needed  · Bowel regimen: PRN  · Zofran PRN for nausea    FEN:   · Nutrition/diet plan: NPO prior to IR  · Replete electrolytes with goals: K >4 0, Mag >2 0, and Phos >3 0  ·   :   · Trend UOP and BUN/creat  · Strict I and O    ID:   · Trend temps and WBC count  · Maintain normothermia    Heme:   · Trend hgb and plts  · Transfuse as needed for goal hgb >7    Endo:   · Glycemic control plan: maintain normoglycemia  · Diabetes- SubQ insulin, algorithm 4    MSK/Skin:   · Frequent turning and pressure off-loading  · Local wound care as needed  · Surgical incisions on left lower extremity, well healed    Disposition: ICU admission  Given critical illness, patient length of stay will require greater than two midnights  VTE Pharmacologic Prophylaxis: Pharmacologic VTE Prophylaxis contraindicated due to currently on IV heparin  VTE Mechanical Prophylaxis: foot pump applied    Invasive lines and devices: Invasive Devices     Peripheral Intravenous Line            Peripheral IV 08/17/20 Dorsal (posterior) Forearm less than 1 day    Peripheral IV 08/17/20 Right Antecubital less than 1 day                Code Status: Level 1 - Full Code    Counseling / Coordination of Care  Total Critical Care time spent 30 minutes excluding procedures, teaching and family updates            SIGNATURE: Ximena Fox DO  DATE: August 18, 2020  TIME: 12:31 AM

## 2020-08-18 NOTE — SEDATION DOCUMENTATION
PE lysis started by Dr Londono General  Patient tolerated well  Report given to Severiano Overland, RN at bedside and patient to return to room

## 2020-08-18 NOTE — PROCEDURES
POC DVT/PE US    Date/Time: 8/18/2020 1:16 AM  Performed by: Pam Carrasco DO  Authorized by: Pam Carrasco DO     Patient location:  ICU  Performed by:   Attending  Procedure details:     Exam Type:  Diagnostic    Indications: shortness of breath      Assessment for:  DVT    Views obtained: common femoral vein, femoral vein and popliteal vein      Image quality: limited diagnostic      Image availability:  Images available in PACS  Interpretation:     DVT location:  None  Comments:      Images save under cardiac pocus study    Compressible popliteal and femoral bilateral    Procedure does not include cc time

## 2020-08-18 NOTE — DISCHARGE INSTRUCTIONS
Pulmonary Embolism   WHAT YOU NEED TO KNOW:   What is a pulmonary embolism? A pulmonary embolism (PE) is the sudden blockage of a blood vessel in the lungs by an embolus  An embolus is a small piece of blood clot, fat, air, or tumor cells  The embolus cuts off the blood supply to your lungs  A pulmonary embolism can become life-threatening  What increases my risk for a PE?   · Obesity    · Smoking    · Birth control pills    · Certain blood diseases, such as thrombophilia and hyperhomocysteinemia    · Medical conditions, such as a deep venous thrombosis (DVT) or cancer    · Pregnancy and childbirth    · Recent surgery    · Sitting or lying in one position for a long time, such as when you travel by plane  What are the signs and symptoms of a PE?   · Sudden shortness of breath or fast breathing    · Sudden chest pain that is worse when you take a deep breath    · Fast heartbeat    · Fever and coughing up blood    · Bluish nails    · Cold, pale, clammy skin    · Fainting  How is a PE diagnosed? Ask your healthcare provider about these and other tests you may need:  · Blood tests  may show signs of the PE or how well your organs are working  · An EKG  test records your heart rhythm and how fast your heart beats  It is used to check for abnormal heart function  · A chest x-ray  may show signs of a lung infection or other damage  · A CT scan  may show the PE  You may be given contrast liquid to help your lungs show up better in the pictures  Tell the healthcare provider if you have ever had an allergic reaction to contrast liquid  · A lung scan , or V/Q scan, may show how well blood and oxygen flow in your lungs  A small amount of contrast liquid is used to study your airflow (V) and blood flow (Q)  First, you breathe in medical gas  Then, contrast liquid is injected into a vein  Pictures are taken to see how well your lungs take in oxygen  How is a PE treated?    · Medicines:      ¨ Clot busters are emergency medicines that work to dissolve blood clots  They cannot be used during pregnancy or in people with medical conditions that increase their risk of bleeding  ¨ Blood thinners  help treat the PE and prevent new clots from forming  Examples of blood thinners include heparin, rivaroxaban, apixiban, and warfarin  The following are general safety guidelines to follow while you are taking a blood thinner:     § Watch for bleeding and bruising  Watch for bleeding from your gums or nose  Watch for blood in your urine and bowel movements  Use a soft washcloth on your skin, and a soft toothbrush to brush your teeth  This can keep your skin and gums from bleeding  If you shave, use an electric shaver  Do not play contact sports  § Tell your dentist and other healthcare providers that you take a blood thinner  Wear a bracelet or necklace that says you take this medicine  § Do not start or stop any medicines unless your healthcare provider tells you to  Many medicines cannot be used with blood thinners  § Tell your healthcare provider right away if you forget to take the blood thinner , or if you take too much  § Warfarin  is a blood thinner that you may need to take  The following are additional things you should be aware of if you take warfarin:    § Foods and medicines can affect the amount of warfarin in your blood  Do not make major changes to your diet  Warfarin works best when you eat about the same amount of vitamin K every day  Vitamin K is found in green leafy vegetables and certain other foods  Ask for more information about what to eat or not to eat  § You will need to see your healthcare provider for follow-up visits  You will need regular blood tests to decide how much warfarin you need  · A vena cava filter  may be placed inside your vena cava to prevent another PE  The vena cava is a large vein that brings blood from your lower body up to your heart   The filter traps blood clots and prevents them from going into your lungs  · Surgery , called a thrombectomy, may be done to remove the PE  A procedure called thrombolysis may instead be done to inject a clot buster that helps break the clot apart  How can I decrease my risk for another PE? · Wear pressure stockings  The stockings are tight and put pressure on your legs  This improves blood flow and helps prevent clots  Wear the stockings during the day  Do not wear them when you sleep  · Exercise regularly  Ask about the best exercise plan for you  When you travel by car or work at a desk, take breaks to stand up and move around as much as possible  Rotate your feet in circles often if you sit for a long period of time  · Maintain a healthy weight  Ask your healthcare provider how much you should weigh  Ask him to help you create a weight loss plan if you are overweight  · Do not smoke  Nicotine and other chemicals in cigarettes and cigars can damage blood vessels and increase your risk for another PE  Ask your healthcare provider for information if you currently smoke and need help to quit  E-cigarettes or smokeless tobacco still contain nicotine  Talk to your healthcare provider before you use these products  Call 911 for any of the following:   · You feel lightheaded, short of breath, and have chest pain  · You cough up blood  · You have a seizure  · You have slurred speech, increased sleepiness, or problems seeing, talking, or thinking  · You have weakness or cannot move your arm or leg on one side of your body  When should I seek immediate care? · You feel faint  · You have a severe headache  · Your heart is beating faster than normal   When should I contact my healthcare provider? · The skin on any part of your legs or hips turns purple  · Your gums or nose bleed  · You see blood in your urine or bowel movements      · Your bowel movements are black or darker than normal     · You have questions or concerns about your condition or care  CARE AGREEMENT:   You have the right to help plan your care  Learn about your health condition and how it may be treated  Discuss treatment options with your caregivers to decide what care you want to receive  You always have the right to refuse treatment  The above information is an  only  It is not intended as medical advice for individual conditions or treatments  Talk to your doctor, nurse or pharmacist before following any medical regimen to see if it is safe and effective for you  © 2017 [employer]  Information is for End User's use only and may not be sold, redistributed or otherwise used for commercial purposes  All illustrations and images included in CareNotes® are the copyrighted property of A D A M [employer] Inc  or [employer]di[employer] Montaño

## 2020-08-18 NOTE — ANESTHESIA PROCEDURE NOTES
Arterial Line Insertion  Performed by: Adam Geronimo MD  Authorized by: Adam Geronimo MD   Consent: The procedure was performed in an emergent situation  Verbal consent obtained  Written consent obtained  Risks and benefits: risks, benefits and alternatives were discussed  Consent given by: patient  Patient understanding: patient states understanding of the procedure being performed  Patient consent: the patient's understanding of the procedure matches consent given  Procedure consent: procedure consent matches procedure scheduled  Relevant documents: relevant documents present and verified  Test results: test results available and properly labeled  Site marked: the operative site was marked  Radiology Images: Radiology Images displayed and confirmed  If images not available, report reviewed  Required items: required blood products, implants, devices, and special equipment available  Patient identity confirmed: verbally with patient and arm band  Time out: Immediately prior to procedure a "time out" was called to verify the correct patient, procedure, equipment, support staff and site/side marked as required  Preparation: Patient was prepped and draped in the usual sterile fashion    Indications: hemodynamic monitoring  Orientation:  Left  Location: radial arterylidocaine (PF) (XYLOCAINE-MPF) 1 % infiltration, 0 5 mL  Sedation:  Patient sedated: no    Procedure Details:  Mike's test normal: yes  Needle gauge: 20  Number of attempts: 1    Post-procedure:  Post-procedure: dressing applied  Waveform: good waveform  Post-procedure CNS: normal  Patient tolerance: Patient tolerated the procedure well with no immediate complications

## 2020-08-18 NOTE — EMTALA/ACUTE CARE TRANSFER
[address] [place] [address] 20  [address] [address] [place] [address]  Dept: [phone]      EMTALA TRANSFER CONSENT    NAME Meera Alfaro                                         1945                              MRN [id number]    I have been informed of my rights regarding examination, treatment, and transfer   by Dr Leanna Walls DO    Benefits: Specialized equipment and/or services available at the receiving facility (Include comment)________________________    Risks: Potential for delay in receiving treatment      Transfer Request   I acknowledge that my medical condition has been evaluated and explained to me by the emergency department physician or other qualified medical person and/or my attending physician who has recommended and offered to me further medical examination and treatment  I understand the Hospital's obligation with respect to the treatment and stabilization of my emergency medical condition  I nevertheless request to be transferred  I release the Hospital, the doctor, and any other persons caring for me from all responsibility or liability for any injury or ill effects that may result from my transfer and agree to accept all responsibility for the consequences of my choice to transfer, rather than receive stabilizing treatment at the Hospital  I understand that because the transfer is my request, my insurance may not provide reimbursement for the services  The Hospital will assist and direct me and my family in how to make arrangements for transfer, but the hospital is not liable for any fees charged by the transport service  In spite of this understanding, I refuse to consent to further medical examination and treatment which has been offered to me, and request transfer to  [address], [address] : [facility]   I authorize the performance of emergency medical procedures and treatments upon me in both transit and upon arrival at the receiving facility  Additionally, I authorize the release of any and all medical records to the receiving facility and request they be transported with me, if possible  I authorize the performance of emergency medical procedures and treatments upon me in both transit and upon arrival at the receiving facility  Additionally, I authorize the release of any and all medical records to the receiving facility and request they be transported with me, if possible  I understand that the safest mode of transportation during a medical emergency is an ambulance and that the Hospital advocates the use of this mode of transport  Risks of traveling to the receiving facility by car, including absence of medical control, life sustaining equipment, such as oxygen, and medical personnel has been explained to me and I fully understand them  (HUNG CORRECT BOX BELOW)  [  ]  I consent to the stated transfer and to be transported by ambulance/helicopter  [  ]  I consent to the stated transfer, but refuse transportation by ambulance and accept full responsibility for my transportation by car  I understand the risks of non-ambulance transfers and I exonerate the Hospital and its staff from any deterioration in my condition that results from this refusal     X___________________________________________    DATE  20  TIME________  Signature of patient or legally responsible individual signing on patient behalf           RELATIONSHIP TO PATIENT_________________________          Provider Certification    NAME Augusto Germain                                        [facility] 1945                              MRN [id number]    A medical screening exam was performed on the above named patient  Based on the examination:    Condition Necessitating Transfer The primary encounter diagnosis was PE (pulmonary thromboembolism) ([facility] [address] )   Diagnoses of Sepsis ([facility] [address] ), Cellulitis of left lower extremity, and Closed fracture of distal end of left tibia, unspecified fracture morphology, initial encounter were also pertinent to this visit  Patient Condition: The patient has been stabilized such that within reasonable medical probability, no material deterioration of the patient condition or the condition of the unborn child(elaina) is likely to result from the transfer    Reason for Transfer: Level of Care needed not available at this facility    Transfer Requirements: [address]   · Space available and qualified personnel available for treatment as acknowledged by    · Agreed to accept transfer and to provide appropriate medical treatment as acknowledged by       Dr Dario Franklin  · Appropriate medical records of the examination and treatment of the patient are provided at the time of transfer   [address] [facility], [address] _______  · Transfer will be performed by qualified personnel from    and appropriate transfer equipment as required, including the use of necessary and appropriate life support measures      Provider Certification: I have examined the patient and explained the following risks and benefits of being transferred/refusing transfer to the patient/family:  General risk, such as traffic hazards, adverse weather conditions, rough terrain or turbulence, possible failure of equipment (including vehicle or aircraft), or consequences of actions of persons outside the control of the transport personnel, Risk of worsening condition, Unanticipated needs of medical equipment and personnel during transport      Based on these reasonable risks and benefits to the patient and/or the unborn child(elaina), and based upon the information available at the time of the patients examination, I certify that the medical benefits reasonably to be expected from the provision of appropriate medical treatments at another medical facility outweigh the increasing risks, if any, to the individuals medical condition, and in the case of labor to the unborn child, from effecting the transfer      X____________________________________________ DATE 08/17/20        TIME_______      ORIGINAL - SEND TO MEDICAL RECORDS   COPY - SEND WITH PATIENT DURING TRANSFER

## 2020-08-18 NOTE — PROCEDURES
POC Cardiac US    Date/Time: 8/18/2020 1:11 AM  Performed by: Capri Nix DO  Authorized by: Capri Nix DO     Patient location:  ICU  Procedure details:     Exam Type:  Diagnostic    Indications comment:  PE     Assessment / Evaluation for: cardiac function and right heart strain (suspected pulmonary embolism)      Exam Type: initial exam      Image quality: diagnostic      Image availability:  Images available in PACS  Patient Details:     Cardiac Rhythm:  Regular    Mechanical ventilation: No    Cardiac findings:     Echo technique: limited 2D      Views obtained: parasternal long axis, parasternal short axis, subcostal and apical      Pericardial effusion: absent      Tamponade physiology: absent      Wall motion: hypodynamic      Wall motion comment:  RV    LV systolic function: normal      RV dilation: severe    IVC findings:     IVC Size: dilated      IVC Inspiratory Collapse: absent    Interpretation:      RV dilation and strain  Limited DVT eval, bilateral popliteal and femoral compressible    Procedure does not include CC time

## 2020-08-18 NOTE — RESPIRATORY THERAPY NOTE
RT Protocol Note  Verónica Morocho 75 y o  male MRN: [id number]  Unit/Bed#: MICU 09 Encounter: [id number]    Assessment    Active Problems:    * No active hospital problems   *      Home Pulmonary Medications:  None reported from pt  Home Devices/Therapy: BiPAP/CPAP    Past Medical History:   Diagnosis Date    Diabetes mellitus ([address] )     Hiatal hernia     Hyperlipidemia     Hypertension     Neuropathy     Shortness of breath     Venous insufficiency (chronic) (peripheral)      Social History     Socioeconomic History    Marital status: /Civil Union     Spouse name: None    Number of children: None    Years of education: None    Highest education level: None   Occupational History    None   Social Needs    Financial resource strain: None    Food insecurity     Worry: None     Inability: None    Transportation needs     Medical: None     Non-medical: None   Tobacco Use    Smoking status: Never Smoker    Smokeless tobacco: Never Used   Substance and Sexual Activity    Alcohol use: Never     Frequency: Never    Drug use: No    Sexual activity: None   Lifestyle    Physical activity     Days per week: None     Minutes per session: None    Stress: None   Relationships    Social connections     Talks on phone: None     Gets together: None     Attends Sikhism service: None     Active member of club or organization: None     Attends meetings of clubs or organizations: None     Relationship status: None    Intimate partner violence     Fear of current or ex partner: None     Emotionally abused: None     Physically abused: None     Forced sexual activity: None   Other Topics Concern    None   Social History Narrative    None       Subjective         Objective    Physical Exam:   Assessment Type: Assess only  General Appearance: Alert, Awake  Respiratory Pattern: Normal, Dyspnea with exertion, Symmetrical, Spontaneous  Chest Assessment: Chest expansion symmetrical, Trachea midline  Bilateral Breath Sounds: Clear  R Breath Sounds: Diminished  L Breath Sounds: Diminished  Cough: None  O2 Device: 2L NC    Vitals:  Blood pressure 125/68, pulse 75, temperature 98 7 °F (37 1 °C), temperature source Oral, resp  rate 22, height 5' 10" (1 778 m), weight 116 kg (256 lb 9 9 oz), SpO2 93 %  Imaging and other studies: I have personally reviewed pertinent reports  O2 Device: 2L NC     Plan    Respiratory Plan: No distress/Pulmonary history        Resp Comments: Saw pt for resp protocol eval  No distress or SOB at thsi time  No reported cough  no home resp meds  Sleep apnea and cpap use at home at +10  States he is fine on just NC for tonight but will wear the Cpap overnight tomorrow

## 2020-08-18 NOTE — ANESTHESIA POSTPROCEDURE EVALUATION
Post-Op Assessment Note    CV Status:  Stable  Pain Score: 0    Pain management: adequate     Mental Status:  Alert and awake   Hydration Status:  Euvolemic   PONV Controlled:  Controlled   Airway Patency:  Patent      Post Op Vitals Reviewed: Yes      Staff: CRNA         No complications documented      BP      Temp      Pulse     Resp     SpO2

## 2020-08-18 NOTE — ANESTHESIA PREPROCEDURE EVALUATION
Procedure:  IR PULMONARY LYSIS    Relevant Problems   ANESTHESIA (within normal limits)      CARDIO   (+) Acute pulmonary embolism (HCC)   (+) Chest pain   (+) Essential hypertension      ENDO (within normal limits)      GI/HEPATIC (within normal limits)      /RENAL (within normal limits)      GYN (within normal limits)      HEMATOLOGY (within normal limits)      MUSCULOSKELETAL (within normal limits)      NEURO/PSYCH (within normal limits)      PULMONARY   (+) JED (obstructive sleep apnea)      Other   (+) Diabetes mellitus (HCC)   (+) Elevated troponin        Physical Exam    Airway    Mallampati score: II  TM Distance: >3 FB  Neck ROM: full     Dental       Cardiovascular      Pulmonary      Other Findings        Anesthesia Plan  ASA Score- 3     Anesthesia Type- IV sedation with anesthesia with ASA Monitors  Additional Monitors:   Airway Plan:           Plan Factors-    Chart reviewed  Induction- intravenous  Postoperative Plan-     Informed Consent- Anesthetic plan and risks discussed with patient  I personally reviewed this patient with the CRNA  Discussed and agreed on the Anesthesia Plan with the CRNA  Lino Stanton

## 2020-08-19 ENCOUNTER — APPOINTMENT (INPATIENT)
Dept: RADIOLOGY | Facility: HOSPITAL | Age: 75
DRG: 175 | End: 2020-08-19
Payer: MEDICARE

## 2020-08-19 PROBLEM — Z87.81 S/P ORIF (OPEN REDUCTION INTERNAL FIXATION) FRACTURE: Status: ACTIVE | Noted: 2020-08-19

## 2020-08-19 PROBLEM — Z98.890 S/P ORIF (OPEN REDUCTION INTERNAL FIXATION) FRACTURE: Status: ACTIVE | Noted: 2020-08-19

## 2020-08-19 PROBLEM — I27.20 PULMONARY HYPERTENSION (HCC): Status: ACTIVE | Noted: 2020-08-19

## 2020-08-19 LAB
ANION GAP SERPL CALCULATED.3IONS-SCNC: 5 MMOL/L (ref 4–13)
APTT PPP: 39 SECONDS (ref 23–37)
BUN SERPL-MCNC: 11 MG/DL (ref 5–25)
CALCIUM SERPL-MCNC: 8.4 MG/DL (ref 8.3–10.1)
CHLORIDE SERPL-SCNC: 108 MMOL/L (ref 100–108)
CO2 SERPL-SCNC: 28 MMOL/L (ref 21–32)
CREAT SERPL-MCNC: 0.55 MG/DL (ref 0.6–1.3)
ERYTHROCYTE [DISTWIDTH] IN BLOOD BY AUTOMATED COUNT: 14.3 % (ref 11.6–15.1)
ERYTHROCYTE [DISTWIDTH] IN BLOOD BY AUTOMATED COUNT: 14.4 % (ref 11.6–15.1)
FIBRINOGEN PPP-MCNC: 406 MG/DL (ref 227–495)
FIBRINOGEN PPP-MCNC: 459 MG/DL (ref 227–495)
GFR SERPL CREATININE-BSD FRML MDRD: 102 ML/MIN/1.73SQ M
GLUCOSE SERPL-MCNC: 137 MG/DL (ref 65–140)
GLUCOSE SERPL-MCNC: 143 MG/DL (ref 65–140)
GLUCOSE SERPL-MCNC: 157 MG/DL (ref 65–140)
GLUCOSE SERPL-MCNC: 167 MG/DL (ref 65–140)
GLUCOSE SERPL-MCNC: 168 MG/DL (ref 65–140)
HCT VFR BLD AUTO: 45.3 % (ref 36.5–49.3)
HCT VFR BLD AUTO: 46 % (ref 36.5–49.3)
HGB BLD-MCNC: 14.6 G/DL (ref 12–17)
HGB BLD-MCNC: 15 G/DL (ref 12–17)
MAGNESIUM SERPL-MCNC: 2.4 MG/DL (ref 1.6–2.6)
MCH RBC QN AUTO: 30 PG (ref 26.8–34.3)
MCH RBC QN AUTO: 30.8 PG (ref 26.8–34.3)
MCHC RBC AUTO-ENTMCNC: 31.7 G/DL (ref 31.4–37.4)
MCHC RBC AUTO-ENTMCNC: 33.1 G/DL (ref 31.4–37.4)
MCV RBC AUTO: 93 FL (ref 82–98)
MCV RBC AUTO: 95 FL (ref 82–98)
PHOSPHATE SERPL-MCNC: 3.2 MG/DL (ref 2.3–4.1)
PLATELET # BLD AUTO: 154 THOUSANDS/UL (ref 149–390)
PLATELET # BLD AUTO: 155 THOUSANDS/UL (ref 149–390)
PMV BLD AUTO: 10.6 FL (ref 8.9–12.7)
PMV BLD AUTO: 10.9 FL (ref 8.9–12.7)
POTASSIUM SERPL-SCNC: 4.1 MMOL/L (ref 3.5–5.3)
RBC # BLD AUTO: 4.87 MILLION/UL (ref 3.88–5.62)
RBC # BLD AUTO: 4.87 MILLION/UL (ref 3.88–5.62)
SODIUM SERPL-SCNC: 141 MMOL/L (ref 136–145)
WBC # BLD AUTO: 12.99 THOUSAND/UL (ref 4.31–10.16)
WBC # BLD AUTO: 13.13 THOUSAND/UL (ref 4.31–10.16)

## 2020-08-19 PROCEDURE — 82948 REAGENT STRIP/BLOOD GLUCOSE: CPT

## 2020-08-19 PROCEDURE — 80048 BASIC METABOLIC PNL TOTAL CA: CPT | Performed by: NURSE PRACTITIONER

## 2020-08-19 PROCEDURE — 85384 FIBRINOGEN ACTIVITY: CPT | Performed by: RADIOLOGY

## 2020-08-19 PROCEDURE — NC001 PR NO CHARGE: Performed by: ORTHOPAEDIC SURGERY

## 2020-08-19 PROCEDURE — NC001 PR NO CHARGE: Performed by: INTERNAL MEDICINE

## 2020-08-19 PROCEDURE — 85027 COMPLETE CBC AUTOMATED: CPT | Performed by: RADIOLOGY

## 2020-08-19 PROCEDURE — 83735 ASSAY OF MAGNESIUM: CPT | Performed by: INTERNAL MEDICINE

## 2020-08-19 PROCEDURE — 99233 SBSQ HOSP IP/OBS HIGH 50: CPT | Performed by: INTERNAL MEDICINE

## 2020-08-19 PROCEDURE — 71250 CT THORAX DX C-: CPT

## 2020-08-19 PROCEDURE — 84100 ASSAY OF PHOSPHORUS: CPT | Performed by: INTERNAL MEDICINE

## 2020-08-19 PROCEDURE — G1004 CDSM NDSC: HCPCS

## 2020-08-19 PROCEDURE — 85730 THROMBOPLASTIN TIME PARTIAL: CPT | Performed by: EMERGENCY MEDICINE

## 2020-08-19 PROCEDURE — 99222 1ST HOSP IP/OBS MODERATE 55: CPT | Performed by: ORTHOPAEDIC SURGERY

## 2020-08-19 RX ADMIN — INSULIN GLARGINE 10 UNITS: 100 INJECTION, SOLUTION SUBCUTANEOUS at 22:19

## 2020-08-19 RX ADMIN — HEPARIN SODIUM AND DEXTROSE 850 UNITS/HR: 10000; 5 INJECTION INTRAVENOUS at 00:10

## 2020-08-19 RX ADMIN — APIXABAN 10 MG: 5 TABLET, FILM COATED ORAL at 09:55

## 2020-08-19 RX ADMIN — APIXABAN 10 MG: 5 TABLET, FILM COATED ORAL at 17:15

## 2020-08-19 RX ADMIN — INSULIN LISPRO 2 UNITS: 100 INJECTION, SOLUTION INTRAVENOUS; SUBCUTANEOUS at 17:18

## 2020-08-19 NOTE — ASSESSMENT & PLAN NOTE
Bilateral submassive PE-   · likely provoked in the setting of possible underlying hypercoagulable state given family history  ( sister had multiple PEs,grandmother blood clots )   · With RV strain on bedside echo, troponin leak  · Completed CDT- no complications  Right IJ access removed   · DVT studies were negative  · Echo showed RV dilatation with systolic dysfunction estimated PA pressure 50  Likely patient has chronic pulmonary hypertension  Would benefit from further workup was outpatient  · Started  On Eliquis 10 mg BID for 7 days followed by 5 BID    · Patient may require long term AC  · Heparin drip discontinued

## 2020-08-19 NOTE — PROGRESS NOTES
Daily Progress Note - Critical Care   Samra Nunes 75 y o  male MRN: [id number]  Unit/Bed#: MICU 09 Encounter: [id number]        ----------------------------------------------------------------------------------------  HPI  This is a 60-year-old male patient with past medical history significant for obstructive sleep apnea on CPAP, hypertension, diabetes who presented with complaints of shortness of breath and hypoxia  Bilateral PE ( R> L) with RV strain   ----------------------------------------------------------------------------------------------------------------  24hr events:     V/S- tmax 100 7, -140s/50s, 2L NC mid 90s  Labs WBC 14 2, Hb 14 3, Plt 154  Fibrinogen 583 > 388  I/O- 1950/ 24 hr, negative 1184 ml  BM - x1   Imaging- ECHO- 55%, RV dilated and apex forming, systolic function moderately reduced, akinesis of mid apical free wall  Estimated PA pressure 50  Venous duplex- negtive  ---------------------------------------------------------------------------------------  SUBJECTIVE  Examined patient at the bedside this morning  No new complaints  Patient denies any shortness of breath or chest pain  He slept well overnight  No pain in the left leg  Had a bowel movement  No abdominal pain nausea or vomiting  Review of Systems  Review of systems was reviewed and negative unless stated above in HPI/24-hour events   ---------------------------------------------------------------------------------------  Assessment and Plan:  Neuro:   · Pain controlled with: tylenol prn  · Delirium precautions  ? CAM-ICU daily  · Trend neuro exam     CV:   ? MAP goal > 65  · Rhythm: NSR  ? RBBB, right axis  ? Follow rhythm on telemetry  · Elevated troponin:  2 14-- 2 2--1 97  ? Likely due to submassive PE        Lun  Bilateral submassive PE- Saddle embolus of pulmonary artery with acute cor pulmonale    · likely provoked in the setting of possible underlying hypercoagulable state given family history  ( sister had multiple PEs,grandmother blood clots )   · With RV strain on bedside echo, troponin leak  · Completed CDT- no complications  Right IJ access removed   · DVT studies were negative  · Echo showed RV dilatation with systolic dysfunction estimated PA pressure 50  Likely patient has chronic pulmonary hypertension  Would benefit from further workup was outpatient  Plan    ? IV heparin ggt  ? Plan to transition to oral anticoagulation  Different options, risk and benefits discussed with the patient, patient would like to go with DOACs  ? Consult case management for insurance coverage checkup  GI:   · Stress ulcer prophylaxis: No prophylaxis needed  · Bowel regimen: PRN  · Zofran PRN for nausea     FEN:   · Nutrition/diet plan: Diabetic diet    · Replete electrolytes with goals: K >4 0, Mag >2 0, and Phos >3 0    :   · Trend UOP and BUN/creat  · Strict I and O     ID:   · T max 100 7, WBC 13-- likely reactive due to recent PE  · Trend temps and WBC count  · Maintain normothermia     Heme:   · Trend hgb and plts:  Hb 14, plt 154  ? Transfuse as needed for goal hgb >7     Endo:   · Glycemic control plan: maintain normoglycemia  · Diabetes- SubQ insulin, algorithm 4, insulin lantus 10 units qhs     MSK/Skin:   1  Left distal tibial fracture status post ORIF  XRAY- Displaced distal tibial fracture  Overlying medial plate and screws identified  The surgical screws second from the most superior/top appears inverted, possibly displaced    Valente Hippo on board- o concern for displacement of screws   Plan  · Frequent turning and pressure off-loading  · Local wound care as needed  · Surgical incisions on left lower extremity, well healed; monitor closely for bleeding      Disposition: Transfer to Med Surg with Telemetry   Code Status: Level 1 - Full Code  ---------------------------------------------------------------------------------------  ICU CORE MEASURES    Prophylaxis   VTE Pharmacologic Prophylaxis: Heparin Drip  VTE Mechanical Prophylaxis: sequential compression device and foot pump applied  Stress Ulcer Prophylaxis: Prophylaxis Not Indicated     ABCDE Protocol (if indicated)  Plan to perform spontaneous awakening trial today? Not applicable  Plan to perform spontaneous breathing trial today? Not applicable  Obvious barriers to extubation? Not applicable  CAM-ICU: Negative    Invasive Devices Review  Invasive Devices     Peripheral Intravenous Line            Peripheral IV 20 Dorsal (posterior); Left Forearm 1 day    Peripheral IV 20 Right Antecubital 1 day          Arterial Line            Arterial Line 20 Left Radial 1 day              Can any invasive devices be discontinued today? Not applicable  ---------------------------------------------------------------------------------------  OBJECTIVE    Vitals   Vitals:    20 2305 20 0000 20 0100 20 0200   BP:       Pulse: 78 70 68 70   Resp: (!) 30 (!) 25 (!) 27 14   Temp: 99 1 °F (37 3 °C)      TempSrc: Oral      SpO2: 95% 94% 95% 92%   Weight:       Height:         Temp (24hrs), Av 4 °F (37 4 °C), Min:99 °F (37 2 °C), Max:100 7 °F (38 2 °C)  Current: Temperature: 99 1 °F (37 3 °C)    Respiratory:    Nasal Cannula O2 Flow Rate (L/min): 2 L/min    Invasive/non-invasive ventilation settings   Respiratory    Lab Data (Last 4 hours)    None         O2/Vent Data (Last 4 hours)    None                Physical Exam  Constitutional:       Appearance: Normal appearance  HENT:      Head: Normocephalic and atraumatic  Mouth/Throat:      Mouth: Mucous membranes are moist       Pharynx: Oropharynx is clear  Eyes:      Pupils: Pupils are equal, round, and reactive to light  Neck:      Musculoskeletal: Normal range of motion and neck supple  Cardiovascular:      Pulses: Normal pulses  Heart sounds: Normal heart sounds  Pulmonary:      Effort: Pulmonary effort is normal       Breath sounds: Normal breath sounds  Abdominal:      General: Bowel sounds are normal       Palpations: Abdomen is soft  Musculoskeletal:         General: Swelling present  Comments: Left medial distal leg- surgical wound , clean no bleeding   Skin:     General: Skin is warm and dry  Neurological:      General: No focal deficit present  Mental Status: He is alert and oriented to person, place, and time  Laboratory and Diagnostics:  Results from last 7 days   Lab Units 08/18/20  2305 08/18/20  1605 08/18/20  0907 08/18/20  0301 08/17/20  1654   WBC Thousand/uL 14 25* 14 09* 11 59* 12 14* 16 69*   HEMOGLOBIN g/dL 14 3 15 1 14 8 15 2 16 2   HEMATOCRIT % 43 3 46 0 44 8 45 5 50 0*   PLATELETS Thousands/uL 154 149 155 163 177   NEUTROS PCT %  --   --   --   --  63   MONOS PCT %  --   --   --   --  11     Results from last 7 days   Lab Units 08/17/20  1654   SODIUM mmol/L 140   POTASSIUM mmol/L 4 5   CHLORIDE mmol/L 102   CO2 mmol/L 26   ANION GAP mmol/L 12   BUN mg/dL 16   CREATININE mg/dL 0 89   CALCIUM mg/dL 9 2   GLUCOSE RANDOM mg/dL 161*   ALT U/L 26   AST U/L 19   ALK PHOS U/L 63   ALBUMIN g/dL 3 4*   TOTAL BILIRUBIN mg/dL 1 80*          Results from last 7 days   Lab Units 08/18/20  2305 08/18/20  1605 08/18/20  0907 08/18/20  0301 08/18/20  0058 08/17/20  1654   INR   --   --   --   --   --  1 02   PTT seconds 36 40* 35 81* 170* 33      Results from last 7 days   Lab Units 08/18/20  1257 08/18/20  0058 08/17/20  1936 08/17/20  1700   TROPONIN I ng/mL 1 86* 1 97* 2 21* 2 14*     Results from last 7 days   Lab Units 08/17/20  1654   LACTIC ACID mmol/L 1 6   :    :    Results from last 7 days   Lab Units 08/17/20  1654   PROCALCITONIN ng/ml 0 07       Micro  Results from last 7 days   Lab Units 08/17/20  1654   BLOOD CULTURE  No Growth at 24 hrs  No Growth at 24 hrs  Imaging: I have personally reviewed pertinent reports  Intake and Output  I/O       08/17 0701 - 08/18 0700 08/18 0701 - 08/19 0700    P  O   480    I V  (mL/kg) 235 6 (2) 142 6 (1 2)    IV Piggyback 18 8 142 5    Total Intake(mL/kg) 254 4 (2 2) 765 1 (6 6)    Urine (mL/kg/hr) 950 1950 (0 7)    Stool  0    Total Output 950 1950    Net -695 6 -1184 9          Unmeasured Stool Occurrence  1 x          Height and Weights   Height: 5' 10" (177 8 cm)  IBW: 73 kg  Body mass index is 36 82 kg/m²  Weight (last 2 days)     Date/Time   Weight    08/17/20 2225   116 (256 62)                Nutrition       Diet Orders   (From admission, onward)             Start     Ordered    08/18/20 1033  Diet Vincent/CHO Controlled; Consistent Carbohydrate Diet Level 2 (5 carb servings/75 grams CHO/meal)  Diet effective now     Question Answer Comment   Diet Type Vincent/CHO Controlled    Vincent/CHO Controlled Consistent Carbohydrate Diet Level 2 (5 carb servings/75 grams CHO/meal)    RD to adjust diet per protocol?  Yes        08/18/20 1032                  Active Medications  Scheduled Meds:  Current Facility-Administered Medications   Medication Dose Route Frequency Provider Last Rate    acetaminophen  650 mg Oral Q4H PRN Neeraj A Paster, DO      heparin (porcine)  300-2,000 Units/hr Intravenous Titrated Enrique Rodriguez  Units/hr (08/19/20 0010)    heparin  20 Units/hr Intravenous Continuous Enrique Rodriguez MD Stopped (08/18/20 1500)    heparin  20 Units/hr Intravenous Continuous Enrique Rodriguez MD Stopped (08/18/20 1500)    insulin glargine  10 Units Subcutaneous HS Jayme Wynne MD      insulin lispro  2-12 Units Subcutaneous TID AC Neeraj A Paster, DO      ondansetron  4 mg Intravenous Q4H PRN Neeraj A Paster, DO      pneumococcal 13-valent conjugate vaccine  0 5 mL Intramuscular Prior to discharge Gi Bryant DO       Continuous Infusions:  heparin (porcine), 300-2,000 Units/hr, Last Rate: 850 Units/hr (08/19/20 0010)  heparin, 20 Units/hr, Last Rate: Stopped (08/18/20 1500)  heparin, 20 Units/hr, Last Rate: Stopped (08/18/20 1500)      PRN Meds:   acetaminophen, 650 mg, Q4H PRN  ondansetron, 4 mg, Q4H PRN  pneumococcal 13-valent conjugate vaccine, 0 5 mL, Prior to discharge        Allergies   No Known Allergies  ---------------------------------------------------------------------------------------  Advance Directive and Living Will:      Power of :    POLST:    ---------------------------------------------------------------------------------------  Care Time Delivered: 45 mins      David Leonard MD      Portions of the record may have been created with voice recognition software  Occasional wrong word or "sound a like" substitutions may have occurred due to the inherent limitations of voice recognition software    Read the chart carefully and recognize, using context, where substitutions have occurred

## 2020-08-19 NOTE — PROGRESS NOTES
Subjective: No acute events overnight  No acute distress  Resting comfortably in bed    Objective:  A 10 point ROS was performed; negative except as noted above  Lab Results   Component Value Date/Time    WBC 14 25 (H) 08/18/2020 11:05 PM    HGB 14 3 08/18/2020 11:05 PM       Vitals:    08/19/20 0200   BP:    Pulse: 70   Resp: 14   Temp:    SpO2: 92%     Left lower extremity  Incision CDI with steri strips in place   No erythema or drainage noted  Leg soft and compressible  Motor intact to EHL/FHL/TA/GS  Sensation intact to light touch to dp/sp/tib/ursula/saph distributions  Toes warm and well perfused with brisk capillary refill    Assessment: 75 y o  male approximately 1 month s/p ORIF L   Plan:  Weight bearing per surgeon of record  Pain control  DVT ppx: Heparin drip per primary  PT/OT and ROM instructions per surgeon of record  Dispo: Ortho signing off

## 2020-08-19 NOTE — ASSESSMENT & PLAN NOTE
Lab Results   Component Value Date    HGBA1C 7 2 (H) 03/02/2019       Recent Labs     08/18/20  0645 08/18/20  1200 08/18/20  1730 08/19/20  0743   POCGLU 119 127 143* 143*       Blood Sugar Average: Last 72 hrs:  (P) 132 8     On insulin sliding scale and Lantus 10 qhs     Continue to monitor BBGs

## 2020-08-19 NOTE — PROGRESS NOTES
Transfer Note Red Leblanc 1945, 75 y o  male MRN: [id number]    [facility]: [id number]    Primary Care Provider: Truong Chisholm MD   Date and time admitted to hospital: 8/17/2020 10:22 PM        * Acute pulmonary embolism [facility])  Assessment & Plan  Bilateral submassive PE-   · likely provoked in the setting of possible underlying hypercoagulable state given family history  ( sister had multiple PEs,grandmother blood clots )   · With RV strain on bedside echo, troponin leak  · Completed CDT- no complications  Right IJ access removed   · DVT studies were negative  · Echo showed RV dilatation with systolic dysfunction estimated PA pressure 50  Likely patient has chronic pulmonary hypertension  Would benefit from further workup was outpatient  · Started  On Eliquis 10 mg BID for 7 days followed by 5 BID  · Patient may require long term AC  · Heparin drip discontinued          S/P ORIF (open reduction internal fixation) fracture  Assessment & Plan   Left distal tibial fracture status post ORIF on July 15th 2020  XRAY- Displaced distal tibial fracture  Overlying medial plate and screws identified  The surgical screws second from the most superior/top appears inverted, possibly displaced  Nate Monet on board- no concern for displacement of screws     · Frequent turning and pressure off-loading  · Local wound care as needed  · Surgical incisions on left lower extremity, well healed; monitor closely for bleeding      Pulmonary hypertension ([address] )  Assessment & Plan  Elevated PA pressures on ECHO, RV dilatation even on previous ECHO prior to PE  Follows up with Pulmonologist  Will need work up for possible CTEPH ( V/Q scan)   Patient advised to follow up with his pulmonologist at OP    Diabetes mellitus [facility])  Assessment & Plan  Lab Results   Component Value Date    HGBA1C 7 2 (H) 03/02/2019       Recent Labs     08/18/20  0645 08/18/20  1200 08/18/20  1730 08/19/20  0743   POCGLU 119 127 143* 143*       Blood Sugar Average: Last 72 hrs:  (P) 132 8     On insulin sliding scale and Lantus 10 qhs  Continue to monitor BBGs    JED (obstructive sleep apnea)  Assessment & Plan  On CPAP at home at pressure of 10, QHS BiPAP ordered  Progress Note - ICU Transfer to SD/MS tele/MS   Meera Alfaro 75 y o  male MRN: [id number]  [facility]   Unit/Bed#: MICU 09 Encounter: [id number]    Code Status: Level 1 - Full Code  POA:    Referring Physician: Dr Jono Lyle  Accepting Physician: Dr Ngozi Lorenzo  _____________________________________________________________________    Reason for ICU/SD admission:  Submassive PE    History of Present Illness:     Meera Alfaro is a 75 y o  male with history of HTN, DM, sleep apnea, recent left distal orthopedic repair at [facility] on 7/15, who presented at [facility] due to fever and shortness of breath  Patient states he woke up this morning and felt very short of breath and weak  He has a home pulse ox which he checks daily since his operation and noted this morning it read 80%  Patient states he has been relatively immobile since his surgery and was on lovenox for 2 weeks following his surgery, but nothing since then  At [facility], patient was noted to be 86% on RA at triage and improved with 2L NC  CTA showed bilateral pulmonary embolism with right heart strain  Troponin 2 14  Patient started on heparin and [facility] for catheter directed therapy with IR       Upon arrival in ICU, patient is pleasant without any current pain, dyspnea or pleurisy on 2L NC  AAO, no focal deficits  Summary of clinical course:   Right IJ catheter inserted and patient was started on tPA infusion and admitted to MICU  TPA infusion continued for 12 hours and then was discontinued at 3:00 p m  On 08/18/2020  Patient tolerated the intervention well  No complications noted  No bleeding    PA pressures upon removal catheters came down from 50 to 26 per IR documentations  Patient was on heparin drip since admission which was discontinued this morning  Started on Eliquis 10 mg twice a day for 7 days with a plan to transition to 5 mg indefinitely  Patient have chronically dilated RV and elevated PA pressures, chronic pulmonary hypertension which could be related to chronic thromboembolic phenomenon  DVT studies negative  Echocardiogram showed EF of 55%  Dilated RV  PA pressure 50  Patient is advised to have outpatient follow-up with his pulmonologist for further workup with his V/Q scan  Patient oxygen is weaned off and is currently saturating low 90s on room air which is usual for the patient  Orthopedic team consulted for concern of displaced screw on the left tibialis or if  No concern for displacement per orthopedic surgeons  BiPAP q h s  For JED  Consultants:  IR, orthopedic surgery  _____________________________________________________________________    Diagnostic Data:  CBC:  Results from last 7 days   Lab Units 08/19/20  0827   WBC Thousand/uL 12 99*   HEMOGLOBIN g/dL 15 0   HEMATOCRIT % 45 3   PLATELETS Thousands/uL 154      CMP:   Lab Results   Component Value Date    SODIUM 141 08/19/2020    K 4 1 08/19/2020     08/19/2020    CO2 28 08/19/2020    BUN 11 08/19/2020    CREATININE 0 55 (L) 08/19/2020    CALCIUM 8 4 08/19/2020    EGFR 102 08/19/2020   ,   PT/INR: No results found for: PT, INR,   Magnesium: No components found for: MAG,  Phosphorous:   Lab Results   Component Value Date    PHOS 3 2 08/19/2020       ABG: No results found for: PHART, AKO5JAL, PO2ART, NTT0QWW, H2VCSFRT, BEART, SOURCE,     Microbiology:  Results from last 7 days   Lab Units 08/17/20  1654   BLOOD CULTURE  No Growth at 24 hrs  No Growth at 24 hrs         Imaging: I have personally reviewed the pertinent imaging studies on the PACS system      Cardiac/EKG/telemetry/Echo:   Results from last 7 days   Lab Units 08/18/20  1257 08/18/20  0058 08/17/20  1936 08/17/20  1654   TROPONIN I ng/mL 1 86* 1 97* 2 21*   < >  --    NT-PRO BNP pg/mL  --   --   --   --  1,794*    < > = values in this interval not displayed  _____________________________________________________________________    Recent or scheduled procedures:  Catheter directed tPA administration    Outstanding/pending diagnostics: none      Mobilization Plan:  As tolerated, PT OT ordered    Home medications that are not reordered and reason why:  Aspirin on hold due to increased risk of bleeding with tPA/heparin    Spoke with Dr Kendall Saha  regarding transfer  Portions of the record may have been created with voice recognition software  Occasional wrong word or "sound a like" substitutions may have occurred due to the inherent limitations of voice recognition software  Read the chart carefully and recognize, using context, where substitutions have occurred      Keturah Frankel, MD

## 2020-08-19 NOTE — PLAN OF CARE
Assessment as noted in flowsheets  Plan of care reviewed  Pt requests minimal disturbances, care to be clustered when possible  Dressing to RIJ clean dry and intact - no drainage noted at this time  Problem: Potential for Falls  Goal: Patient will remain free of falls  Description: INTERVENTIONS:  - Assess patient frequently for physical needs  -  Identify cognitive and physical deficits and behaviors that affect risk of falls    -  [place] fall precautions as indicated by assessment   - Educate patient/family on patient safety including physical limitations  - Instruct patient to call for assistance with activity based on assessment  - Modify environment to reduce risk of injury  - Consider OT/PT consult to assist with strengthening/mobility  Outcome: Progressing     Problem: CARDIOVASCULAR - ADULT  Goal: Maintains optimal cardiac output and hemodynamic stability  Description: INTERVENTIONS:  - Monitor I/O, vital signs and rhythm  - Monitor for S/S and trends of decreased cardiac output  - Administer and titrate ordered vasoactive medications to optimize hemodynamic stability  - Assess quality of pulses, skin color and temperature  - Assess for signs of decreased coronary artery perfusion  - Instruct patient to report change in severity of symptoms  Outcome: Progressing     Problem: RESPIRATORY - ADULT  Goal: Achieves optimal ventilation and oxygenation  Description: INTERVENTIONS:  - Assess for changes in respiratory status  - Assess for changes in mentation and behavior  - Position to facilitate oxygenation and minimize respiratory effort  - Oxygen administered by appropriate delivery if ordered  - Initiate smoking cessation education as indicated  - Encourage broncho-pulmonary hygiene including cough, deep breathe, Incentive Spirometry  - Assess the need for suctioning and aspirate as needed  - Assess and instruct to report SOB or any respiratory difficulty  - Respiratory Therapy support as indicated  Outcome: Progressing     Problem: MUSCULOSKELETAL - ADULT  Goal: Maintain or return mobility to safest level of function  Description: INTERVENTIONS:  - Assess patient's ability to carry out ADLs; assess patient's baseline for ADL function and identify physical deficits which impact ability to perform ADLs (bathing, care of mouth/teeth, toileting, grooming, dressing, etc )  - Assess/evaluate cause of self-care deficits   - Assess range of motion  - Assess patient's mobility  - Assess patient's need for assistive devices and provide as appropriate  - Encourage maximum independence but intervene and supervise when necessary  - Involve family in performance of ADLs  - Assess for home care needs following discharge   - Consider OT consult to assist with ADL evaluation and planning for discharge  - Provide patient education as appropriate  Outcome: Progressing  Goal: Maintain proper alignment of affected body part  Description: INTERVENTIONS:  - Support, maintain and protect limb and body alignment  - Provide patient/ family with appropriate education  Outcome: Progressing     Problem: PAIN - ADULT  Goal: Verbalizes/displays adequate comfort level or baseline comfort level  Description: Interventions:  - Encourage patient to monitor pain and request assistance  - Assess pain using appropriate pain scale  - Administer analgesics based on type and severity of pain and evaluate response  - Implement non-pharmacological measures as appropriate and evaluate response  - Consider cultural and social influences on pain and pain management  - Notify physician/advanced practitioner if interventions unsuccessful or patient reports new pain  Outcome: Progressing     Problem: SAFETY ADULT  Goal: Maintain or return to baseline ADL function  Description: INTERVENTIONS:  -  Assess patient's ability to carry out ADLs; assess patient's baseline for ADL function and identify physical deficits which impact ability to perform ADLs (bathing, care of mouth/teeth, toileting, grooming, dressing, etc )  - Assess/evaluate cause of self-care deficits   - Assess range of motion  - Assess patient's mobility; develop plan if impaired  - Assess patient's need for assistive devices and provide as appropriate  - Encourage maximum independence but intervene and supervise when necessary  - Involve family in performance of ADLs  - Assess for home care needs following discharge   - Consider OT consult to assist with ADL evaluation and planning for discharge  - Provide patient education as appropriate  Outcome: Progressing     Problem: Prexisting or High Potential for Compromised Skin Integrity  Goal: Skin integrity is maintained or improved  Description: INTERVENTIONS:  - Identify patients at risk for skin breakdown  - Assess and monitor skin integrity  - Assess and monitor nutrition and hydration status  - Monitor labs   - Assess for incontinence   - Turn and reposition patient  - Assist with mobility/ambulation  - Relieve pressure over bony prominences  - Avoid friction and shearing  - Provide appropriate hygiene as needed including keeping skin clean and dry  - Evaluate need for skin moisturizer/barrier cream  - Collaborate with interdisciplinary team   - Patient/family teaching  - Consider wound care consult   Outcome: Progressing
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
## 2020-08-19 NOTE — ASSESSMENT & PLAN NOTE
Left distal tibial fracture status post ORIF on July 15th 2020  XRAY- Displaced distal tibial fracture  Overlying medial plate and screws identified  The surgical screws second from the most superior/top appears inverted, possibly displaced    Jose Briggs on board- no concern for displacement of screws     · Frequent turning and pressure off-loading  · Local wound care as needed  · Surgical incisions on left lower extremity, well healed; monitor closely for bleeding

## 2020-08-19 NOTE — ASSESSMENT & PLAN NOTE
Elevated PA pressures on ECHO, RV dilatation even on previous ECHO prior to PE  Follows up with Pulmonologist  Will need work up for possible CTEPH ( V/Q scan)   Patient advised to follow up with his pulmonologist at OP

## 2020-08-19 NOTE — QUICK NOTE
Called to bedside by RN and family for complaints of cough shortness of breath and lump on the back  Patient reports she has breathing is okay at rest however gets short of breath on minimal exertion  Lump on the back patient reports he noticed it today and has been expanding in size  On exam a circular soft mass noted nontender no skin changes nonpulsatile likely lipoma, less likely hematoma  Check CT chest without contrast  Monitor vitals, hemoglobin  Patient was reassured given his submassive pulmonary embolism and is symptoms of cough and exertional shortness of breath he verbalized understanding  Updated spouse at bedside she was reassured    She is agreeable with management as outlined    Discussed with radiologist on call  Discussed with RN    Iwona Aldrich

## 2020-08-20 PROBLEM — Z79.4 TYPE 2 DIABETES MELLITUS, WITH LONG-TERM CURRENT USE OF INSULIN (HCC): Status: ACTIVE | Noted: 2019-03-02

## 2020-08-20 LAB
ANION GAP SERPL CALCULATED.3IONS-SCNC: 5 MMOL/L (ref 4–13)
BUN SERPL-MCNC: 14 MG/DL (ref 5–25)
CALCIUM SERPL-MCNC: 8.4 MG/DL (ref 8.3–10.1)
CHLORIDE SERPL-SCNC: 107 MMOL/L (ref 100–108)
CO2 SERPL-SCNC: 28 MMOL/L (ref 21–32)
CREAT SERPL-MCNC: 0.68 MG/DL (ref 0.6–1.3)
ERYTHROCYTE [DISTWIDTH] IN BLOOD BY AUTOMATED COUNT: 14.1 % (ref 11.6–15.1)
GFR SERPL CREATININE-BSD FRML MDRD: 93 ML/MIN/1.73SQ M
GLUCOSE SERPL-MCNC: 124 MG/DL (ref 65–140)
GLUCOSE SERPL-MCNC: 147 MG/DL (ref 65–140)
GLUCOSE SERPL-MCNC: 163 MG/DL (ref 65–140)
GLUCOSE SERPL-MCNC: 164 MG/DL (ref 65–140)
GLUCOSE SERPL-MCNC: 214 MG/DL (ref 65–140)
HCT VFR BLD AUTO: 45.7 % (ref 36.5–49.3)
HGB BLD-MCNC: 14.7 G/DL (ref 12–17)
MCH RBC QN AUTO: 30.3 PG (ref 26.8–34.3)
MCHC RBC AUTO-ENTMCNC: 32.2 G/DL (ref 31.4–37.4)
MCV RBC AUTO: 94 FL (ref 82–98)
PLATELET # BLD AUTO: 154 THOUSANDS/UL (ref 149–390)
PMV BLD AUTO: 11.4 FL (ref 8.9–12.7)
POTASSIUM SERPL-SCNC: 4.3 MMOL/L (ref 3.5–5.3)
RBC # BLD AUTO: 4.85 MILLION/UL (ref 3.88–5.62)
SODIUM SERPL-SCNC: 140 MMOL/L (ref 136–145)
WBC # BLD AUTO: 11.27 THOUSAND/UL (ref 4.31–10.16)

## 2020-08-20 PROCEDURE — 94760 N-INVAS EAR/PLS OXIMETRY 1: CPT

## 2020-08-20 PROCEDURE — 99232 SBSQ HOSP IP/OBS MODERATE 35: CPT | Performed by: INTERNAL MEDICINE

## 2020-08-20 PROCEDURE — 97163 PT EVAL HIGH COMPLEX 45 MIN: CPT

## 2020-08-20 PROCEDURE — 97167 OT EVAL HIGH COMPLEX 60 MIN: CPT

## 2020-08-20 PROCEDURE — 85027 COMPLETE CBC AUTOMATED: CPT | Performed by: INTERNAL MEDICINE

## 2020-08-20 PROCEDURE — 82948 REAGENT STRIP/BLOOD GLUCOSE: CPT

## 2020-08-20 PROCEDURE — 80048 BASIC METABOLIC PNL TOTAL CA: CPT | Performed by: INTERNAL MEDICINE

## 2020-08-20 PROCEDURE — 94660 CPAP INITIATION&MGMT: CPT

## 2020-08-20 RX ORDER — INSULIN GLARGINE 100 [IU]/ML
15 INJECTION, SOLUTION SUBCUTANEOUS
Status: DISCONTINUED | OUTPATIENT
Start: 2020-08-20 | End: 2020-08-21

## 2020-08-20 RX ORDER — PRAVASTATIN SODIUM 80 MG/1
80 TABLET ORAL
Status: DISCONTINUED | OUTPATIENT
Start: 2020-08-20 | End: 2020-08-22 | Stop reason: HOSPADM

## 2020-08-20 RX ADMIN — APIXABAN 10 MG: 5 TABLET, FILM COATED ORAL at 09:56

## 2020-08-20 RX ADMIN — INSULIN GLARGINE 15 UNITS: 100 INJECTION, SOLUTION SUBCUTANEOUS at 21:40

## 2020-08-20 RX ADMIN — INSULIN LISPRO 2 UNITS: 100 INJECTION, SOLUTION INTRAVENOUS; SUBCUTANEOUS at 17:16

## 2020-08-20 RX ADMIN — APIXABAN 10 MG: 5 TABLET, FILM COATED ORAL at 17:07

## 2020-08-20 RX ADMIN — PRAVASTATIN SODIUM 80 MG: 80 TABLET ORAL at 17:07

## 2020-08-20 RX ADMIN — INSULIN LISPRO 4 UNITS: 100 INJECTION, SOLUTION INTRAVENOUS; SUBCUTANEOUS at 06:24
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
## 2020-08-20 NOTE — ASSESSMENT & PLAN NOTE
Lab Results   Component Value Date    HGBA1C 7 2 (H) 03/02/2019       Recent Labs     08/19/20  2044 08/20/20  0555 08/20/20  1109 08/20/20  1554   POCGLU 168* 214* 124 163*       Blood Sugar Average: Last 72 hrs:  (P) 148 [id number]     On Tresiba 30 units hs and Novolog sliding scale at home  Lantus 15 hs, SSI

## 2020-08-20 NOTE — PHYSICAL THERAPY NOTE
Physical Therapy Evaluation     Patient's Name: Angel Josue    Admitting Diagnosis  Pulmonary thromboembolism ([facility] ) [I26 [id number]]    Problem List  Patient Active Problem List   Diagnosis    Gastroesophageal reflux disease with esophagitis    Adenomatous polyp of descending colon    Chronic superficial gastritis with bleeding    Diabetes mellitus ([facility] )    Essential hypertension    Chronic cough    Chest pain    Leukocytosis    Elevated troponin    JED (obstructive sleep apnea)    Acute pulmonary embolism (HCC)    HLD (hyperlipidemia)    S/P ORIF (open reduction internal fixation) fracture    Pulmonary hypertension ([facility] )       Past Medical History  Past Medical History:   Diagnosis Date    Diabetes mellitus ([facility] )     Hiatal hernia     Hyperlipidemia     Hypertension     Neuropathy     Shortness of breath     Venous insufficiency (chronic) (peripheral)        Past Surgical History  Past Surgical History:   Procedure Laterality Date    JOINT REPLACEMENT Bilateral     knees    LEG SURGERY      NC COLONOSCOPY FLX DX W/COLLJ SPEC WHEN PFRMD N/A 6/30/2017    Procedure: EGD AND COLONOSCOPY;  Surgeon: Annabelle Van MD;  Location: [facility]; Service: General    TONSILLECTOMY            08/20/20 0911   Note Type   Note type Eval only   Pain Assessment   Pain Assessment Tool 0-10   Pain Score No Pain   Home Living   Type of Home House  [facility])   Home Layout Two level; Able to live on main level with bedroom/bathroom; Performs ADLs on one level;Ramped entrance   Bathroom Shower/Tub Tub/shower unit   Bathroom Toilet Raised   Bathroom Equipment Tub transfer bench   [address]; Wheelchair-manual  (RW and Rollator)   Additional Comments Pt able to complete WC transfers by himself using RW, also reports being able to self propel WC      Prior Function   Level of [place] Needs assistance with IADLs   Lives With Spouse  (wife)   [place] Help From Family   ADL Assistance Needs assistance   IADLs Independent   Falls in the last 6 months 0   Vocational Retired   Comments Pt reports needing assist from wife to wash back, otherwise pt independent   Restrictions/Precautions   Weight Bearing Precautions Per Order Yes   LLE Weight Bearing Per Order NWB   Braces or Orthoses CAM Boot  (LLE)   Other Precautions Chair Alarm; Bed Alarm; Fall Risk;WBS   General   Family/Caregiver Present No   Cognition   Overall Cognitive Status WFL   Arousal/Participation Alert   Orientation Level Oriented X4   Memory Within functional limits   Following Commands Follows one step commands with increased time or repetition   Comments Pt pleasant and agreeable to therapy  Pt acknowledges importance of maintaining NWB status of LLE  RLE Assessment   RLE Assessment   (functionally 3+/5)   Coordination   Movements are Fluid and Coordinated 1   Bed Mobility   Supine to Sit 5  Supervision   Additional items HOB elevated; Bedrails; Increased time required;Verbal cues   Sit to Supine Unable to assess   Additional Comments Pt found supine in bed upon PT arrival  Pt left sitting up in chair w/ chair alarm on and all needs in reach s/p PT session  Transfers   Sit to Stand 4  Minimal assistance   Additional items Assist x 1; Increased time required;Verbal cues  (2nd person for SBA)   Stand pivot 4  Minimal assistance   Additional items Assist x 1; Increased time required;Verbal cues  (CGA, 2nd person for SBA)   Additional Comments Pt required VC and TC for hand placement and safety  Pt able to independently maintain NWB status of LLE in CAM boot during transfers  RW used during transfer  Ambulation/Elevation   Gait pattern Foward flexed;Decreased foot clearance  (Hop-to)   Gait Assistance 4  Minimal assist  (CGA)   Additional items Assist x 1;Verbal cues; Tactile cues  (2nd person for SBA)   Assistive Device Rolling walker   Distance 3ft to chair from EOB   Balance   Static Sitting Good   Dynamic Sitting Fair + Static Standing Fair +   Dynamic Standing Fair   Ambulatory Fair -   Endurance Deficit   Endurance Deficit Yes   Endurance Deficit Description fatigue   Activity Tolerance   Activity Tolerance Patient limited by fatigue   Medical Staff Made Aware PT Mliss Darvin, RN   Nurse Made Aware RN cleared pt for PT session   Assessment   Prognosis Good   Problem List Decreased strength;Decreased endurance; Impaired balance;Decreased mobility; Decreased safety awareness   Assessment Pt seen by PT on 08/20/20 for high complexity PT evaluation due to a decline in functional mobility compared to baseline  Pt was admitted to [place] [id number] on 8/17/20 for acute pulmonary embolism  Pt now S/P s/p IR catheter directed lysis 8/1/8/20, and LLE surgery for tibial fracture July, 2020  Pt now NWB LLE Chart reviewed, PT orders active and activity orders indicate up with assistance  Pt  has a past medical history of Diabetes mellitus ([facility] ), Hiatal hernia, Hyperlipidemia, Hypertension, Neuropathy, Shortness of breath, and Venous insufficiency (chronic) (peripheral)  Pt reports no falls in the past 6 months  Pt resides in 2 story home w/ ramp entrance  Pt lives with wife who is home and able to assist if needed  Prior to hospital admission pt functioned at a Mod-I A level, using a WC and RW for mobility  Pt able to transfer into WC w/ RW and self propel WC  Prior to fracture pt was completely independent  Pt presents with decreased strength, endurance and balance that contribute to limitations in bed mobility, functional mobility and functional transfer  During the session pt performed Bed mobility at a supervision-A, STS at a Min-A x1 and ambulated 3ft to chair from EOB at a CGA using RW, minding LLE NWB precautions in CAM boot  Pt left sitting up in chair w/ chair alarm on and all needs in reach at end of therapy session  Pt would benefit from skilled PT in order to address impairments and functional limitations   PT to follow pt 3-5x /week, and would recommend home w/ HHPT pending medical clearance  Barriers to Discharge None   Goals   Patient Goals To go home   STG Expiration Date 09/03/20   Short Term Goal #1 1  Pt will be able to complete all aspects of bed mobility at a independent A level in order to decrease burden on caregivers  2  Pt will be able to complete functional transfers at a Mod-I A level in order to increase independence  3  Pt will be able to ambulate 20+ ft at a Mod-I A level with LRAD in order to promote safe transition back into the home environment  4  Pt will demonstrate an increase in b/l LE strength by one grade as indicated on MMT scale in order to promote functional independence  5  Pt will demonstrate improved balance by increasing at least one grade on the graded balance scale in order to reduce falls risk  Plan   Treatment/Interventions OT; Spoke to nursing; Functional transfer training;LE strengthening/ROM; Therapeutic exercise; Endurance training;Equipment eval/education;Patient/family training;Bed mobility;Gait training   PT Frequency Other (Comment)  (3-5x/week)   Recommendation   PT Discharge Recommendation Home with skilled therapy  (HHPT)   Equipment Recommended Walker; Wheelchair  (pt already owns)   PT - OK to Discharge Yes  (pending medical clearance)   Modified Genevieve Scale   Modified [place] Scale 4         Shreya Daniles, SPT

## 2020-08-20 NOTE — PLAN OF CARE
Problem: PHYSICAL THERAPY ADULT  Goal: Performs mobility at highest level of function for planned discharge setting  See evaluation for individualized goals  Note: Prognosis: Good  Problem List: Decreased strength, Decreased endurance, Impaired balance, Decreased mobility, Decreased safety awareness  Assessment: Pt seen by PT on 08/20/20 for high complexity PT evaluation due to a decline in functional mobility compared to baseline  Pt was admitted to [place] [address] on 8/17/20 for acute pulmonary embolism  Pt now S/P s/p IR catheter directed lysis 8/1/8/20, and LLE surgery for tibial fracture July, 2020  Pt now NWB LLE Chart reviewed, PT orders active and activity orders indicate up with assistance  Pt  has a past medical history of Diabetes mellitus ([facility] ), Hiatal hernia, Hyperlipidemia, Hypertension, Neuropathy, Shortness of breath, and Venous insufficiency (chronic) (peripheral)  Pt reports no falls in the past 6 months  Pt resides in 2 story home w/ ramp entrance  Pt lives with wife who is home and able to assist if needed  Prior to hospital admission pt functioned at a Mod-I A level, using a WC and RW for mobility  Pt able to transfer into WC w/ RW and self propel WC  Prior to fracture pt was completely independent  Pt presents with decreased strength, endurance and balance that contribute to limitations in bed mobility, functional mobility and functional transfer  During the session pt performed Bed mobility at a supervision-A, STS at a Min-A x1 and ambulated 3ft to chair from EOB at a CGA using RW, minding LLE NWB precautions in CAM boot  Pt left sitting up in chair w/ chair alarm on and all needs in reach at end of therapy session  Pt would benefit from skilled PT in order to address impairments and functional limitations  PT to follow pt 3-5x /week, and would recommend home w/ HHPT pending medical clearance    Barriers to Discharge: None     PT Discharge Recommendation: Home with skilled therapy(HHPT)     PT - OK to Discharge: Yes(pending medical clearance)    See flowsheet documentation for full assessment
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
## 2020-08-20 NOTE — ASSESSMENT & PLAN NOTE
Elevated PA pressures on ECHO, RV dilatation even on previous ECHO prior to PE  Follows up with Pulmonologist  Will need work up for possible CTEPH   Patient advised to follow up with his pulmonologist as outpatient  Has history if shortness of breath with exertion and O2 sats 89 at baseline

## 2020-08-20 NOTE — SOCIAL WORK
CM was informed that pt will need Eliquis at [place]  Dr Carter Rogers [facility] script to [facility]'s [facility] per his request   [facility] and spoke to More Ortega who states copay is $30  Pt is agreeable  Pt is eligible for free 30 day trial and $10 a month assistance card; both cards were provided  CM also informed pt's wife of same  CM discussed therapies recommendation for home therapy  Pt is agreeable  Pt states he was open to [facility] but is unsure of the provider  Pt's wife states she has the contact at home; she will contact CM with [address] information tonight

## 2020-08-20 NOTE — PROGRESS NOTES
08/20/20 1200   Clinical Encounter Type   Visited With Patient   Routine Visit Introduction     Patient declined a visit at this time
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
## 2020-08-21 LAB
GLUCOSE SERPL-MCNC: 152 MG/DL (ref 65–140)
GLUCOSE SERPL-MCNC: 164 MG/DL (ref 65–140)
GLUCOSE SERPL-MCNC: 165 MG/DL (ref 65–140)
GLUCOSE SERPL-MCNC: 236 MG/DL (ref 65–140)

## 2020-08-21 PROCEDURE — 94660 CPAP INITIATION&MGMT: CPT

## 2020-08-21 PROCEDURE — 94760 N-INVAS EAR/PLS OXIMETRY 1: CPT

## 2020-08-21 PROCEDURE — 82948 REAGENT STRIP/BLOOD GLUCOSE: CPT

## 2020-08-21 PROCEDURE — 99232 SBSQ HOSP IP/OBS MODERATE 35: CPT | Performed by: INTERNAL MEDICINE

## 2020-08-21 RX ORDER — INSULIN GLARGINE 100 [IU]/ML
20 INJECTION, SOLUTION SUBCUTANEOUS
Status: DISCONTINUED | OUTPATIENT
Start: 2020-08-22 | End: 2020-08-22 | Stop reason: HOSPADM

## 2020-08-21 RX ORDER — BENZONATATE 100 MG/1
100 CAPSULE ORAL 3 TIMES DAILY PRN
Status: DISCONTINUED | OUTPATIENT
Start: 2020-08-21 | End: 2020-08-21

## 2020-08-21 RX ORDER — BENZONATATE 100 MG/1
100 CAPSULE ORAL 3 TIMES DAILY
Status: DISCONTINUED | OUTPATIENT
Start: 2020-08-21 | End: 2020-08-22 | Stop reason: HOSPADM

## 2020-08-21 RX ADMIN — INSULIN GLARGINE 15 UNITS: 100 INJECTION, SOLUTION SUBCUTANEOUS at 21:23

## 2020-08-21 RX ADMIN — BENZONATATE 100 MG: 100 CAPSULE ORAL at 16:56

## 2020-08-21 RX ADMIN — APIXABAN 10 MG: 5 TABLET, FILM COATED ORAL at 16:57

## 2020-08-21 RX ADMIN — BENZONATATE 100 MG: 100 CAPSULE ORAL at 21:23

## 2020-08-21 RX ADMIN — INSULIN LISPRO 4 UNITS: 100 INJECTION, SOLUTION INTRAVENOUS; SUBCUTANEOUS at 16:57

## 2020-08-21 RX ADMIN — INSULIN LISPRO 2 UNITS: 100 INJECTION, SOLUTION INTRAVENOUS; SUBCUTANEOUS at 09:33

## 2020-08-21 RX ADMIN — INSULIN LISPRO 2 UNITS: 100 INJECTION, SOLUTION INTRAVENOUS; SUBCUTANEOUS at 11:30

## 2020-08-21 RX ADMIN — PRAVASTATIN SODIUM 80 MG: 80 TABLET ORAL at 16:56

## 2020-08-21 RX ADMIN — APIXABAN 10 MG: 5 TABLET, FILM COATED ORAL at 09:32

## 2020-08-21 NOTE — RESTORATIVE TECHNICIAN NOTE
Restorative Specialist Mobility Note       Activity: Ambulate in room, Chair, Dangle, Stand at bedside(Educated/encouraged pt to ambulate with assistance 3-4 x's/day  Chair alarm on  Pt callbell, phone/tray within reach )     Assistive Device: Front wheel walker(L CAM [employer] on   Pt is NWB to [facility]ODNEY LING )          Elizabeth RAYMOND, Restorative Technician, [employer]

## 2020-08-21 NOTE — SOCIAL WORK
[facility] follow up:       TJ called and spoke with pt's wife Cheng Ayala; she reported that she had forgotten to check which VNA and stated she will call back with agency name when she gets back home  CM reviewed previous encounters  Pt has "home care" telephone visit with Ortho MD  TJ found that pt was referred and open to "[address]" in [facility]  [facility] sent  MSW TJ Carlisle advised  1700 hrs:   [facility] unable to accept  [facility] sent to [facility]

## 2020-08-21 NOTE — PLAN OF CARE
Problem: Potential for Falls  Goal: Patient will remain free of falls  Description: INTERVENTIONS:  - Assess patient frequently for physical needs  -  Identify cognitive and physical deficits and behaviors that affect risk of falls    -  [place] fall precautions as indicated by assessment   - Educate patient/family on patient safety including physical limitations  - Instruct patient to call for assistance with activity based on assessment  - Modify environment to reduce risk of injury  - Consider OT/PT consult to assist with strengthening/mobility  Outcome: Progressing     Problem: CARDIOVASCULAR - ADULT  Goal: Maintains optimal cardiac output and hemodynamic stability  Description: INTERVENTIONS:  - Monitor I/O, vital signs and rhythm  - Monitor for S/S and trends of decreased cardiac output  - Administer and titrate ordered vasoactive medications to optimize hemodynamic stability  - Assess quality of pulses, skin color and temperature  - Assess for signs of decreased coronary artery perfusion  - Instruct patient to report change in severity of symptoms  Outcome: Progressing     Problem: RESPIRATORY - ADULT  Goal: Achieves optimal ventilation and oxygenation  Description: INTERVENTIONS:  - Assess for changes in respiratory status  - Assess for changes in mentation and behavior  - Position to facilitate oxygenation and minimize respiratory effort  - Oxygen administered by appropriate delivery if ordered  - Initiate smoking cessation education as indicated  - Encourage broncho-pulmonary hygiene including cough, deep breathe, Incentive Spirometry  - Assess the need for suctioning and aspirate as needed  - Assess and instruct to report SOB or any respiratory difficulty  - Respiratory Therapy support as indicated  Outcome: Progressing     Problem: MUSCULOSKELETAL - ADULT  Goal: Maintain or return mobility to safest level of function  Description: INTERVENTIONS:  - Assess patient's ability to carry out ADLs; assess patient's baseline for ADL function and identify physical deficits which impact ability to perform ADLs (bathing, care of mouth/teeth, toileting, grooming, dressing, etc )  - Assess/evaluate cause of self-care deficits   - Assess range of motion  - Assess patient's mobility  - Assess patient's need for assistive devices and provide as appropriate  - Encourage maximum independence but intervene and supervise when necessary  - Involve family in performance of ADLs  - Assess for home care needs following discharge   - Consider OT consult to assist with ADL evaluation and planning for discharge  - Provide patient education as appropriate  Outcome: Progressing  Goal: Maintain proper alignment of affected body part  Description: INTERVENTIONS:  - Support, maintain and protect limb and body alignment  - Provide patient/ family with appropriate education  Outcome: Progressing     Problem: PAIN - ADULT  Goal: Verbalizes/displays adequate comfort level or baseline comfort level  Description: Interventions:  - Encourage patient to monitor pain and request assistance  - Assess pain using appropriate pain scale  - Administer analgesics based on type and severity of pain and evaluate response  - Implement non-pharmacological measures as appropriate and evaluate response  - Consider cultural and social influences on pain and pain management  - Notify physician/advanced practitioner if interventions unsuccessful or patient reports new pain  Outcome: Progressing     Problem: SAFETY ADULT  Goal: Maintain or return to baseline ADL function  Description: INTERVENTIONS:  -  Assess patient's ability to carry out ADLs; assess patient's baseline for ADL function and identify physical deficits which impact ability to perform ADLs (bathing, care of mouth/teeth, toileting, grooming, dressing, etc )  - Assess/evaluate cause of self-care deficits   - Assess range of motion  - Assess patient's mobility; develop plan if impaired  - Assess patient's need for assistive devices and provide as appropriate  - Encourage maximum independence but intervene and supervise when necessary  - Involve family in performance of ADLs  - Assess for home care needs following discharge   - Consider OT consult to assist with ADL evaluation and planning for discharge  - Provide patient education as appropriate  Outcome: Progressing     Problem: Prexisting or High Potential for Compromised Skin Integrity  Goal: Skin integrity is maintained or improved  Description: INTERVENTIONS:  - Identify patients at risk for skin breakdown  - Assess and monitor skin integrity  - Assess and monitor nutrition and hydration status  - Monitor labs   - Assess for incontinence   - Turn and reposition patient  - Assist with mobility/ambulation  - Relieve pressure over bony prominences  - Avoid friction and shearing  - Provide appropriate hygiene as needed including keeping skin clean and dry  - Evaluate need for skin moisturizer/barrier cream  - Collaborate with interdisciplinary team   - Patient/family teaching  - Consider wound care consult   Outcome: Progressing

## 2020-08-22 VITALS
RESPIRATION RATE: 16 BRPM | BODY MASS INDEX: 37.91 KG/M2 | TEMPERATURE: 98.2 F | WEIGHT: 264.77 LBS | HEART RATE: 75 BPM | DIASTOLIC BLOOD PRESSURE: 65 MMHG | SYSTOLIC BLOOD PRESSURE: 123 MMHG | OXYGEN SATURATION: 90 % | HEIGHT: 70 IN

## 2020-08-22 LAB
ANION GAP SERPL CALCULATED.3IONS-SCNC: 5 MMOL/L (ref 4–13)
BUN SERPL-MCNC: 13 MG/DL (ref 5–25)
CALCIUM SERPL-MCNC: 8.8 MG/DL (ref 8.3–10.1)
CHLORIDE SERPL-SCNC: 104 MMOL/L (ref 100–108)
CO2 SERPL-SCNC: 30 MMOL/L (ref 21–32)
CREAT SERPL-MCNC: 0.6 MG/DL (ref 0.6–1.3)
ERYTHROCYTE [DISTWIDTH] IN BLOOD BY AUTOMATED COUNT: 13.9 % (ref 11.6–15.1)
GFR SERPL CREATININE-BSD FRML MDRD: 98 ML/MIN/1.73SQ M
GLUCOSE SERPL-MCNC: 149 MG/DL (ref 65–140)
GLUCOSE SERPL-MCNC: 149 MG/DL (ref 65–140)
GLUCOSE SERPL-MCNC: 206 MG/DL (ref 65–140)
HCT VFR BLD AUTO: 45.9 % (ref 36.5–49.3)
HGB BLD-MCNC: 14.5 G/DL (ref 12–17)
MCH RBC QN AUTO: 30 PG (ref 26.8–34.3)
MCHC RBC AUTO-ENTMCNC: 31.6 G/DL (ref 31.4–37.4)
MCV RBC AUTO: 95 FL (ref 82–98)
PLATELET # BLD AUTO: 165 THOUSANDS/UL (ref 149–390)
PMV BLD AUTO: 11.6 FL (ref 8.9–12.7)
POTASSIUM SERPL-SCNC: 3.9 MMOL/L (ref 3.5–5.3)
RBC # BLD AUTO: 4.84 MILLION/UL (ref 3.88–5.62)
SODIUM SERPL-SCNC: 139 MMOL/L (ref 136–145)
WBC # BLD AUTO: 8.55 THOUSAND/UL (ref 4.31–10.16)

## 2020-08-22 PROCEDURE — 99239 HOSP IP/OBS DSCHRG MGMT >30: CPT | Performed by: INTERNAL MEDICINE

## 2020-08-22 PROCEDURE — 80048 BASIC METABOLIC PNL TOTAL CA: CPT | Performed by: INTERNAL MEDICINE

## 2020-08-22 PROCEDURE — 85027 COMPLETE CBC AUTOMATED: CPT | Performed by: INTERNAL MEDICINE

## 2020-08-22 PROCEDURE — 82948 REAGENT STRIP/BLOOD GLUCOSE: CPT

## 2020-08-22 RX ORDER — BENZONATATE 100 MG/1
100 CAPSULE ORAL 3 TIMES DAILY
Qty: 40 CAPSULE | Refills: 0 | Status: SHIPPED | OUTPATIENT
Start: 2020-08-22 | End: 2020-08-29

## 2020-08-22 RX ADMIN — APIXABAN 10 MG: 5 TABLET, FILM COATED ORAL at 07:59

## 2020-08-22 RX ADMIN — BENZONATATE 100 MG: 100 CAPSULE ORAL at 08:01

## 2020-08-22 RX ADMIN — INSULIN LISPRO 4 UNITS: 100 INJECTION, SOLUTION INTRAVENOUS; SUBCUTANEOUS at 11:46

## 2020-08-22 NOTE — ASSESSMENT & PLAN NOTE
· Left distal tibial fracture status post ORIF on July 15th 2020  · XRAY- Displaced distal tibial fracture  Overlying medial plate and screws identified  The surgical screws second from the most superior/top appears inverted, possibly displaced  · Seen by ortho - no concern for displacement of screws  Incisions well approximated  · Weight bearing status per primary surgeon at [facility] -  recommendations on 07/29/20 - touchdown weight-bearing status only on the left lower extremity in his cam walker  Advised to come out of his cam walker 2 to 3 times daily for gentle active dorsiflexion and plantar flexion of the ankle    Was encouraged to use his walker for ambulation

## 2020-08-22 NOTE — ASSESSMENT & PLAN NOTE
Lab Results   Component Value Date    HGBA1C 7 2 (H) 03/02/2019       Recent Labs     08/21/20  0619 08/21/20  1042 08/21/20  1556 08/21/20 2055   POCGLU 164* 165* 236* 152*       Blood Sugar Average: Last 72 hrs:  (P) 157 375     On Tresiba 30 units hs and Novolog sliding scale at home  Increase Lantus to 20 units hs    Continue sliding scale insulin
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
## 2020-08-22 NOTE — PROGRESS NOTES
Progress Note Katherine Mg 1945, 75 y o  male MRN: [id number]    [facility] Encounter: [id number]    Primary Care Provider: Erum Hurtado MD   Date and time admitted to hospital: 8/17/2020 10:22 PM        * Acute pulmonary embolism [facility])  Assessment & Plan  · Bilateral submassive PE  · In the setting of recent left tibial fracture - s/p ORIF on 7/13/20  · Initially concern for hypercoagulable disorder as was reported that sister and grandmother had blood clots - patient clarified - Sister had 2 provoked blood clots following injuries sustained during accidents and after last episode at the age of 22 was on [facility] for 3 years  She has been off AC for the past 25 years with no recurrence of blood clots  No blood clots in his grandmother according to patient  · With troponin leak  · Echo showed RV dilatation with systolic dysfunction estimated PA pressure 50  Likely patient has chronic pulmonary hypertension  Would benefit from further workup was outpatient  · No DVT  · Completed catheter directed lysis on 8/18/20  · On Eliquis 10 mg BID for 7 days followed by 5 mg BID  · Needs anticoagulation for at least 3 months  · He will follow-up with pulmonology as an outpatient (primary pulmonologist - Johnny Mcclure      Type 2 diabetes mellitus, with long-term current use of insulin [facility])  Assessment & Plan  Lab Results   Component Value Date    HGBA1C 7 2 (H) 03/02/2019       Recent Labs     08/21/20  0619 08/21/20  1042 08/21/20  1556 08/21/20  2055   POCGLU 164* 165* 236* 152*       Blood Sugar Average: Last 72 hrs:  (P) 157 375     On Tresiba 30 units hs and Novolog sliding scale at home  Increase Lantus to 20 units hs    Continue sliding scale insulin      JED (obstructive sleep apnea)  Assessment & Plan  · CPAP 10 cms hs    S/P ORIF (open reduction internal fixation) left distal tibia fracture  Assessment & Plan  · Left distal tibial fracture status post ORIF on July 15th   · XRAY- Displaced distal tibial fracture  Overlying medial plate and screws identified  The surgical screws second from the most superior/top appears inverted, possibly displaced  · Seen by ortho - no concern for displacement of screws  Incisions well approximated  · Weight bearing status per primary surgeon at [facility] -  recommendations on 20 - touchdown weight-bearing status only on the left lower extremity in his cam walker  Advised to come out of his cam walker 2 to 3 times daily for gentle active dorsiflexion and plantar flexion of the ankle  Was encouraged to use his walker for ambulation            HLD (hyperlipidemia)  Assessment & Plan  · Home simvastatin changed to equivalent pravastatin as simvastatin non formulary    Pulmonary hypertension ([facility] )  Assessment & Plan  Elevated PA pressures on ECHO, RV dilatation even on previous ECHO prior to PE  Follows up with Pulmonologist  Will need work up for possible CTEPH  Patient advised to follow up with his pulmonologist as outpatient  Has history if shortness of breath with exertion and O2 sats 89 at baseline        VTE Pharmacologic Prophylaxis:   Pharmacologic: Apixaban (Eliquis)  Mechanical VTE Prophylaxis in Place: Yes    Patient Centered Rounds: I have performed bedside rounds with nursing staff today  Education and Discussions with Family / Patient:  Discussed with patient  Discussed with wife on the phone     Time Spent for Care: 20 minutes  More than 50% of total time spent on counseling and coordination of care as described above  Current Length of Stay: 4 day(s)    Current Patient Status: Inpatient   Certification Statement: The patient will continue to require additional inpatient hospital stay due to Acute pulmonary embolism    Code Status: Level 1 - Full Code    Subjective:   Shortness of breath improved  Persistent dry cough      Objective:     Vitals:   Temp (24hrs), Av 2 °F (36 8 °C), Min:98 2 °F (36 8 °C), Max:98 2 °F (36 8 °C)    Temp:  [98 2 °F (36 8 °C)] 98 2 °F (36 8 °C)  HR:  [73-76] 75  Resp:  [16-19] 16  BP: (125-128)/(59-72) 126/62  SpO2:  [89 %-92 %] 89 %  Body mass index is 40 46 kg/m²  Physical Exam:     Physical Exam  Constitutional:       Appearance: Normal appearance  HENT:      Head: Normocephalic and atraumatic  Nose: Nose normal       Mouth/Throat:      Mouth: Mucous membranes are moist    Eyes:      Extraocular Movements: Extraocular movements intact  Pupils: Pupils are equal, round, and reactive to light  Neck:      Musculoskeletal: Neck supple  Cardiovascular:      Rate and Rhythm: Normal rate and regular rhythm  Pulmonary:      Effort: Pulmonary effort is normal       Breath sounds: Normal breath sounds  Abdominal:      General: Bowel sounds are normal       Palpations: Abdomen is soft  Tenderness: There is no abdominal tenderness  Musculoskeletal:         General: No swelling  Skin:     Comments: Left lower extremity dressing dry     Neurological:      General: No focal deficit present  Mental Status: He is alert and oriented to person, place, and time  Psychiatric:         Mood and Affect: Mood normal          Behavior: Behavior normal          Additional Data:     Labs:    Results from last 7 days   Lab Units 08/20/20 0459 08/17/20  1654   WBC Thousand/uL 11 27*   < > 16 69*   HEMOGLOBIN g/dL 14 7   < > 16 2   HEMATOCRIT % 45 7   < > 50 0*   PLATELETS Thousands/uL 154   < > 177   NEUTROS PCT %  --   --  63   LYMPHS PCT %  --   --  25   MONOS PCT %  --   --  11   EOS PCT %  --   --  0    < > = values in this interval not displayed       Results from last 7 days   Lab Units 08/20/20 0459 08/17/20  1654   SODIUM mmol/L 140   < > 140   POTASSIUM mmol/L 4 3   < > 4 5   CHLORIDE mmol/L 107   < > 102   CO2 mmol/L 28   < > 26   BUN mg/dL 14   < > 16   CREATININE mg/dL 0 68   < > 0 89   ANION GAP mmol/L 5   < > 12   CALCIUM mg/dL 8 4   < > 9 2   ALBUMIN g/dL  --   -- 3 4*   TOTAL BILIRUBIN mg/dL  --   --  1 80*   ALK PHOS U/L  --   --  63   ALT U/L  --   --  26   AST U/L  --   --  19   GLUCOSE RANDOM mg/dL 147*   < > 161*    < > = values in this interval not displayed  Results from last 7 days   Lab Units 08/17/20  1654   INR  1 02     Results from last 7 days   Lab Units 08/21/20  2055 08/21/20  1556 08/21/20  1042 08/21/20  0619 08/20/20  2053 08/20/20  1554 08/20/20  1109 08/20/20  0555 08/19/20  2044 08/19/20  1626 08/19/20  1123 08/19/20  0743   POC GLUCOSE mg/dl 152* 236* 165* 164* 164* 163* 124 214* 168* 167* 137 143*         Results from last 7 days   Lab Units 08/17/20  1654   LACTIC ACID mmol/L 1 6   PROCALCITONIN ng/ml 0 07       * I Have Reviewed All Lab Data Listed Above  * Additional Pertinent Lab Tests Reviewed: Marvin 66 Admission Reviewed    Recent Cultures (last 7 days):     Results from last 7 days   Lab Units 08/17/20  1654   BLOOD CULTURE  No Growth at 72 hrs  No Growth at 72 hrs  Last 24 Hours Medication List:   Current Facility-Administered Medications   Medication Dose Route Frequency Provider Last Rate    acetaminophen  650 mg Oral Q4H PRN Nimco Nelson MD      apixaban  10 mg Oral BID Nimco Nelson MD      benzonatate  100 mg Oral TID Mellissa Howard MD      [START ON 8/22/2020] insulin glargine  20 Units Subcutaneous HS Mellissa Howard MD      insulin lispro  2-12 Units Subcutaneous TID [facility] [place] Nimco Nelson MD      ondansetron  4 mg Intravenous Q4H PRN Nimco Nelson MD      pravastatin  80 mg Oral Daily With Pete Hwang MD          Today, Patient Was Seen By: Mellissa Howard MD    ** Please Note: Dictation voice to text software may have been used in the creation of this document   **

## 2020-08-22 NOTE — SOCIAL WORK
Met with patient, advised [facility] has accepted with start date 8/25-patient  Agreeable    IMM signed

## 2020-08-23 LAB
BACTERIA BLD CULT: NORMAL
BACTERIA BLD CULT: NORMAL

## 2020-08-23 NOTE — PROGRESS NOTES
Progress Note Charlee Hendersonster 1945, 75 y o  male MRN: [id number]    [facility]: [id number]    Primary Care Provider: Annelise Esteves MD   Date and time admitted to hospital: 8/17/2020 10:22 PM        * Acute pulmonary embolism [facility])  Assessment & Plan  · Bilateral submassive PE  · In the setting of recent left tibial fracture - s/p ORIF on 7/13/20  · Initially concern for hypercoagulable disorder as was reported that sister and grandmother had blood clots - patient clarified - Sister had 2 provoked blood clots following injuries sustained during accidents and after last episode at the age of 22 was on [facility] [place] for 3 years  She has been off AC for the past 25 years with no recurrence of blood clots  No blood clots in his grandmother according to patient  · With troponin leak  · Echo showed RV dilatation with systolic dysfunction estimated PA pressure 50  Likely patient has chronic pulmonary hypertension  Would benefit from further workup was outpatient  · No DVT  · Completed catheter directed lysis on 8/18/20  · On Eliquis 10 mg BID for 7 days followed by 5 mg BID  · Needs anticoagulation for at least 3 months  · He will follow-up with pulmonology as an outpatient (primary pulmonologist - Munira Chavez      Type 2 diabetes mellitus, with long-term current use of insulin [facility])  Assessment & Plan  Lab Results   Component Value Date    HGBA1C 7 2 (H) 03/02/2019       Recent Labs     08/21/20  1556 08/21/20  2055 08/22/20  0555 08/22/20  1049   POCGLU 236* 152* 149* 206*       Blood Sugar Average: Last 72 hrs:  (P) 168     On Tresiba 30 units hs, Jardiance and Novolog sliding scale at home - restart on discharge      JED (obstructive sleep apnea)  Assessment & Plan  · CPAP 10 cms hs    S/P ORIF (open reduction internal fixation) left distal tibia fracture  Assessment & Plan  · Left distal tibial fracture status post ORIF on July 15th 2020  · XRAY- Displaced distal tibial fracture  Overlying medial plate and screws identified  The surgical screws second from the most superior/top appears inverted, possibly displaced  · Seen by ortho - no concern for displacement of screws  Incisions well approximated  · Weight bearing status per primary surgeon at [facility] -  recommendations on 07/29/20 - touchdown weight-bearing status only on the left lower extremity in his cam walker  Advised to come out of his cam walker 2 to 3 times daily for gentle active dorsiflexion and plantar flexion of the ankle  Was encouraged to use his walker for ambulation            HLD (hyperlipidemia)  Assessment & Plan  · Continue home simvastatin 40 mg daily on discharge    Pulmonary hypertension ([facility])  Assessment & Plan  Elevated PA pressures on ECHO, RV dilatation even on previous ECHO prior to PE  Follows up with Pulmonologist  Will need work up for possible CTEPH  Patient advised to follow up with his pulmonologist as outpatient  Has history if shortness of breath with exertion and O2 sats 89 at baseline      VTE Pharmacologic Prophylaxis:   Pharmacologic: Apixaban (Eliquis)  Mechanical VTE Prophylaxis in Place: Yes    Patient Centered Rounds: I have performed bedside rounds with nursing staff today  Education and Discussions with Family / Patient:  Discussed with patient  Discussed with wife at the bedside    Time Spent for Care: 20 minutes  More than 50% of total time spent on counseling and coordination of care as described above  Current Length of Stay: 5 day(s)    Current Patient Status: Inpatient     Discharge Plan:  Discharge home today    Subjective:   Shortness of breath at baseline  No chest pain  Cough improved    Objective:     Vitals:     HR:  [75] 75  BP: (123)/(65) 123/65  SpO2:  [90 %] 90 %  Body mass index is 37 99 kg/m²  Physical Exam:     Physical Exam  HENT:      Head: Normocephalic and atraumatic        Nose: Nose normal       Mouth/Throat:      Mouth: Mucous membranes are moist  Eyes:      Extraocular Movements: Extraocular movements intact  Pupils: Pupils are equal, round, and reactive to light  Neck:      Musculoskeletal: Neck supple  Cardiovascular:      Rate and Rhythm: Normal rate and regular rhythm  Pulmonary:      Effort: Pulmonary effort is normal       Breath sounds: Normal breath sounds  Abdominal:      General: Bowel sounds are normal       Palpations: Abdomen is soft  Tenderness: There is no abdominal tenderness  Musculoskeletal:         General: No swelling  Skin:     General: Skin is warm  Comments: Left leg dressing dry   Neurological:      General: No focal deficit present  Mental Status: He is alert and oriented to person, place, and time  Psychiatric:         Mood and Affect: Mood normal          Behavior: Behavior normal          Additional Data:     Labs:    Results from last 7 days   Lab Units 08/22/20  0452  08/17/20  1654   WBC Thousand/uL 8 55   < > 16 69*   HEMOGLOBIN g/dL 14 5   < > 16 2   HEMATOCRIT % 45 9   < > 50 0*   PLATELETS Thousands/uL 165   < > 177   NEUTROS PCT %  --   --  63   LYMPHS PCT %  --   --  25   MONOS PCT %  --   --  11   EOS PCT %  --   --  0    < > = values in this interval not displayed  Results from last 7 days   Lab Units 08/22/20  0452  08/17/20  1654   SODIUM mmol/L 139   < > 140   POTASSIUM mmol/L 3 9   < > 4 5   CHLORIDE mmol/L 104   < > 102   CO2 mmol/L 30   < > 26   BUN mg/dL 13   < > 16   CREATININE mg/dL 0 60   < > 0 89   ANION GAP mmol/L 5   < > 12   CALCIUM mg/dL 8 8   < > 9 2   ALBUMIN g/dL  --   --  3 4*   TOTAL BILIRUBIN mg/dL  --   --  1 80*   ALK PHOS U/L  --   --  63   ALT U/L  --   --  26   AST U/L  --   --  19   GLUCOSE RANDOM mg/dL 149*   < > 161*    < > = values in this interval not displayed       Results from last 7 days   Lab Units 08/17/20  1654   INR  1 02     Results from last 7 days   Lab Units 08/22/20  1049 08/22/20  0555 08/21/20  2055 08/21/20  1556 08/21/20  1042 08/21/20  5483 08/20/20  2053 08/20/20  1554 08/20/20  1109 08/20/20  0555 08/19/20  2044 08/19/20  1626   POC GLUCOSE mg/dl 206* 149* 152* 236* 165* 164* 164* 163* 124 214* 168* 167*         Results from last 7 days   Lab Units 08/17/20  1654   LACTIC ACID mmol/L 1 6   PROCALCITONIN ng/ml 0 07     * I Have Reviewed All Lab Data Listed Above  * Additional Pertinent Lab Tests Reviewed: Marvin 66 Admission Reviewed    Recent Cultures (last 7 days):     Results from last 7 days   Lab Units 08/17/20  1654   BLOOD CULTURE  No Growth After 4 Days  No Growth After 4 Days  Today, Patient Was Seen By: Marylene Masters, MD    ** Please Note: Dictation voice to text software may have been used in the creation of this document   **

## 2020-08-23 NOTE — ASSESSMENT & PLAN NOTE
Lab Results   Component Value Date    HGBA1C 7 2 (H) 03/02/2019       Recent Labs     08/21/20  1556 08/21/20  2055 08/22/20  0555 08/22/20  1049   POCGLU 236* 152* 149* 206*       Blood Sugar Average: Last 72 hrs:  (P) 168     On Tresiba 30 units hs, Jardiance and Novolog sliding scale at home - restart on discharge

## 2020-08-23 NOTE — ASSESSMENT & PLAN NOTE
· Bilateral submassive PE  · In the setting of recent left tibial fracture - s/p ORIF on 7/13/20  · Initially concern for hypercoagulable disorder as was reported that sister and grandmother had blood clots - patient clarified - Sister had 2 provoked blood clots following injuries sustained during accidents and after last episode at the age of 22 was on [facility] for 3 years  She has been off AC for the past 25 years with no recurrence of blood clots  No blood clots in his grandmother according to patient  · With troponin leak  · Echo showed RV dilatation with systolic dysfunction estimated PA pressure 50  Likely patient has chronic pulmonary hypertension  Would benefit from further workup was outpatient  · No DVT  · Completed catheter directed lysis on 8/18/20  · On Eliquis 10 mg BID for 7 days followed by 5 mg BID  · Needs anticoagulation for at least 3 months    · He will follow-up with pulmonology as an outpatient (primary pulmonologist - Ninoska Gomez

## 2020-08-23 NOTE — DISCHARGE SUMMARY
Discharge Summary - [place] [address] Internal Medicine    Patient Information: Florian Elizabeth 75 y o  male MRN: [id number]  [facility]counter: [id number]    Discharging Physician / Practitioner: Kaleb Cross MD  PCP: Ed Hernandez MD  Admission Date: 8/17/2020  Discharge Date: 08/22/20    Principal discharge diagnoses:  1  Acute bilateral submassive pulmonary embolism with right heart strain status post catheter directed lysis  2  Pulmonary hypertension    Secondary diagnoses:  1  Hyperlipidemia  2  Type 2 diabetes  3  Obstructive sleep apnea on CPAP at 10 cm HS  4  Left distal tibial fracture status post ORIF on July 15, 2020    Consultations During Hospital Stay:  Orthopedics    Procedures Performed:   1  X-ray left tibia fibula - Displaced distal tibial fracture  Overlying medial plate and screws identified  The surgical screws second from the most superior/top appears inverted, possibly displaced  2  Chest x-ray - no acute cardiopulmonary disease  3  CTA chest PE study - Bilateral pulmonary emboli and right heart strain  4  CT head without contrast - no acute intracranial abnormality  5  Catheter directed lysis of pulmonary embolism on 08/18/20  6  Lower extremity venous Doppler -  RIGHT LOWER LIMB:  No evidence of acute or chronic deep vein thrombosis  No evidence of superficial thrombophlebitis noted  Doppler evaluation shows a normal response to augmentation maneuvers  Popliteal, posterior tibial and anterior tibial arterial Doppler waveforms are  triphasic  LEFT LOWER LIMB:  No evidence of acute or chronic deep vein thrombosis  No evidence of superficial thrombophlebitis noted  Doppler evaluation shows a normal response to augmentation maneuvers  Popliteal, posterior tibial and anterior tibial arterial Doppler waveforms are  triphasic  7  Echocardiogram -  LEFT VENTRICLE:  Systolic function was normal  Ejection fraction was estimated to be 55 %    There were no regional wall motion abnormalities  The changes were consistent with concentric remodeling (increased wall thickness with normal wall mass)  Doppler parameters were consistent with abnormal left ventricular relaxation (grade 1 diastolic dysfunction)    VENTRICULAR SEPTUM:  There was systolic and diastolic flattening  These changes are consistent with RV volume and pressure overload     RIGHT VENTRICLE:  The ventricle was markedly dilated and apex-forming  Systolic function was moderately reduced  There is akinesis of the mid-apical free wall    LEFT ATRIUM:  The atrium was mildly dilated    TRICUSPID VALVE:  There was trace regurgitation  Estimated peak PA pressure was 50 mmHg  The findings suggest mild to moderate pulmonary hypertension    Hospital Course: Lillie Bamberger is a 75 y o  male patient with a past medical history as detailed above who originally presented to the hospital on 8/17/2020 as a transfer from [facility] interventional radiology evaluation for bilateral submassive pulmonary embolism with right heart strain  He presented there with shortness of breath and hypoxia with oxygen saturations of 86% on room air  CT chest showed bilateral submassive pulmonary embolism with right ventricular strain  His troponin was noted to be elevated at 2 14  He was transferred to [facility] ICU and underwent catheter directed lysis on 08/18/20  He had a left distal tibial fracture for which he underwent ORIF on July 15, 2020  He had been on Lovenox for 2 weeks postoperatively  He was initially on a heparin drip and then transitioned to Eliquis 10 mg twice daily which he has been advised to take for a total of 7 days and then change to 5 mg twice daily  He needs anticoagulation for at least 3 months  He will follow-up with his primary pulmonologist as an outpatient to determine when anticoagulation can be discontinued    His sister had 2 provoked blood clots following injury sustained during accidents and after last episode at the age of 22 was on anticoagulation for 3 years  She has been off anticoagulation for the past 25 years with no recurrence of blood clots  He was noted have pulmonary hypertension  He has chronic shortness of breath with exertion and his O2 sats are usually 89 to 90% of unclear etiology  He has been advised to follow-up with his pulmonologist as an outpatient to workup possible CTEPH  Condition at Discharge: stable     Discharge Day Visit / Exam:     * Please refer to separate progress for these details *    Discharge instructions/Information to patient and family:   See after visit summary for information provided to patient and family  Provisions for Follow-Up Care:  See after visit summary for information related to follow-up care and any pertinent home health orders  Disposition: Home    Planned Readmission: No    Discharge Statement:  I spent 40 minutes discharging the patient  This time was spent on the day of discharge  I had direct contact with the patient on the day of discharge  Greater than 50% of the total time was spent examining patient, answering all patient questions, arranging and discussing plan of care with patient as well as directly providing post-discharge instructions  Additional time then spent on discharge activities  Discharge Medications:  See after visit summary for reconciled discharge medications provided to patient and family

## 2020-08-23 NOTE — ASSESSMENT & PLAN NOTE
· Left distal tibial fracture status post ORIF on July 15th 2020  · XRAY- Displaced distal tibial fracture  Overlying medial plate and screws identified  The surgical screws second from the most superior/top appears inverted, possibly displaced  · Seen by ortho - no concern for displacement of screws  Incisions well approximated  · Weight bearing status per primary surgeon at [facility] -  recommendations on 07/29/20 - touchdown weight-bearing status only on the left lower extremity in his cam walker  Advised to come out of his cam walker 2 to 3 times daily for gentle active dorsiflexion and plantar flexion of the ankle    Was encouraged to use his walker for ambulation

## 2020-09-10 ENCOUNTER — EVALUATION (OUTPATIENT)
Dept: PHYSICAL THERAPY | Facility: CLINIC | Age: 75
End: 2020-09-10
Payer: MEDICARE

## 2020-09-10 ENCOUNTER — TRANSCRIBE ORDERS (OUTPATIENT)
Dept: PHYSICAL THERAPY | Facility: CLINIC | Age: 75
End: 2020-09-10

## 2020-09-10 DIAGNOSIS — S82.302D CLOSED FRACTURE OF DISTAL END OF LEFT TIBIA WITH ROUTINE HEALING, UNSPECIFIED FRACTURE MORPHOLOGY, SUBSEQUENT ENCOUNTER: Primary | ICD-10-CM

## 2020-09-10 PROCEDURE — 97161 PT EVAL LOW COMPLEX 20 MIN: CPT | Performed by: PHYSICAL THERAPIST

## 2020-09-10 PROCEDURE — 97110 THERAPEUTIC EXERCISES: CPT | Performed by: PHYSICAL THERAPIST
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
## 2020-09-14 ENCOUNTER — OFFICE VISIT (OUTPATIENT)
Dept: PHYSICAL THERAPY | Facility: CLINIC | Age: 75
End: 2020-09-14
Payer: MEDICARE

## 2020-09-14 DIAGNOSIS — S82.302D CLOSED FRACTURE OF DISTAL END OF LEFT TIBIA WITH ROUTINE HEALING, UNSPECIFIED FRACTURE MORPHOLOGY, SUBSEQUENT ENCOUNTER: Primary | ICD-10-CM

## 2020-09-14 PROCEDURE — 97110 THERAPEUTIC EXERCISES: CPT

## 2020-09-14 PROCEDURE — 97014 ELECTRIC STIMULATION THERAPY: CPT

## 2020-09-14 PROCEDURE — 97116 GAIT TRAINING THERAPY: CPT

## 2020-09-14 PROCEDURE — 97140 MANUAL THERAPY 1/> REGIONS: CPT

## 2020-09-14 NOTE — PROGRESS NOTES
Daily Note     Today's date: 2020  Patient name: Stacie Munoz  : 1945  MRN: [id number]  Referring provider: Barak Glass MD  Dx:   Encounter Diagnosis     ICD-10-CM    1  Closed fracture of distal end of left tibia with routine healing, unspecified fracture morphology, subsequent encounter  S82 302D        Start Time: 1200  Stop Time: 1250  Total time in clinic (min): 50 minutes    Subjective: Patient reports no pain  Stated his foot and ankle are swollen  Reported compliance w/ HEP and practicing WB 40lb on scale  Objective: See treatment diary below      Assessment: Patient tolerated exercises well  Ambulation in // bars w/ gait boot w/ verbal emphasis to maintain 40lb of weight bearing  No increased pain w/ ambulating at 40 lb WB  Patient required extra guarding w/ standing and sitting in wheelchair due to chair not locking  PROM of ankle in all planes to reduce stiffness and improve mobility  Retrograde massage and scare massage to reduce swelling and scar tissue  Applied estim/cp to ankle to reduce swelling and discomfort  Plan: Continue per plan of care        Precautions: 30-40 WB 2 weeks; 40-50 WB 2 weeks; 50-60 WB 2 weeks      Manual 9/10 9/14           MFR  Retrograde 10'           Scar massage  5'                                     Neuro Re-Ed                                                                                                        Ther Ex             bike             Prom  15'           Arom  BAPS 20x2 sitting           Pt ed - HEP 20                         [place]   5'                                     Ther Activity                                       Gait Training             // bars  10'                        Modalities             PRN  estim/cp 10'

## 2020-09-16 ENCOUNTER — OFFICE VISIT (OUTPATIENT)
Dept: PHYSICAL THERAPY | Facility: CLINIC | Age: 75
End: 2020-09-16
Payer: MEDICARE

## 2020-09-16 DIAGNOSIS — S82.302D CLOSED FRACTURE OF DISTAL END OF LEFT TIBIA WITH ROUTINE HEALING, UNSPECIFIED FRACTURE MORPHOLOGY, SUBSEQUENT ENCOUNTER: Primary | ICD-10-CM

## 2020-09-16 PROCEDURE — 97140 MANUAL THERAPY 1/> REGIONS: CPT

## 2020-09-16 PROCEDURE — 97110 THERAPEUTIC EXERCISES: CPT

## 2020-09-16 PROCEDURE — 97116 GAIT TRAINING THERAPY: CPT

## 2020-09-16 NOTE — PROGRESS NOTES
Daily Note     Today's date: 2020  Patient name: Meera Alfaro  : 1945  MRN: [id number]  Referring provider: Marifer Talavera MD  Dx:   Encounter Diagnosis     ICD-10-CM    1  Closed fracture of distal end of left tibia with routine healing, unspecified fracture morphology, subsequent encounter  S82 302D        Start Time: 1100  Stop Time: 1145  Total time in clinic (min): 45 minutes    Subjective: Patient reports no pain in ankle  Stated he is compliant w/ practicing using scale at home for weight bearing  Objective: See treatment diary below      Assessment: Continued to practice ambulation in // bars w/ 40% WB  Patient tolerated rec  Bike to improve mobility w/ difficulty, no resistance  Retrograde massage to reduce swelling and improve blood flow  PROM to ankle to improve mobility and function  Plan: Continue per plan of care        Precautions: 30-40 WB 2 weeks; 40-50 WB 2 weeks; 50-60 WB 2 weeks      Manual 9/10 9/14 9/16          MFR  Retrograde 10' retrograde 10'          Scar massage  5' 10'                                    Neuro Re-Ed                                                                                                        Ther Ex             bike   10'          Prom  15' 15'          Arom  BAPS 20x2 sitting BAPS 20x2 sitting          Pt ed - HEP 20                         [place]   5'                                     Ther Activity                                       Gait Training             // bars  10' 10'                       Modalities             PRN  estim/cp 10'

## 2020-09-21 ENCOUNTER — OFFICE VISIT (OUTPATIENT)
Dept: PHYSICAL THERAPY | Facility: CLINIC | Age: 75
End: 2020-09-21
Payer: MEDICARE

## 2020-09-21 DIAGNOSIS — S82.302D CLOSED FRACTURE OF DISTAL END OF LEFT TIBIA WITH ROUTINE HEALING, UNSPECIFIED FRACTURE MORPHOLOGY, SUBSEQUENT ENCOUNTER: Primary | ICD-10-CM

## 2020-09-21 PROCEDURE — 97116 GAIT TRAINING THERAPY: CPT

## 2020-09-21 PROCEDURE — 97140 MANUAL THERAPY 1/> REGIONS: CPT

## 2020-09-21 PROCEDURE — 97110 THERAPEUTIC EXERCISES: CPT

## 2020-09-21 NOTE — PROGRESS NOTES
Daily Note     Today's date: 2020  Patient name: Zenaida Canavan  : 1945  MRN: [id number]  Referring provider: Milena Arizmendi MD  Dx:   Encounter Diagnosis     ICD-10-CM    1  Closed fracture of distal end of left tibia with routine healing, unspecified fracture morphology, subsequent encounter  S82 302D        Start Time: 1155  Stop Time: 1250  Total time in clinic (min): 55 minutes    Subjective: Patient stated he has no pain in the ankle today  Doing well  Objective: See treatment diary below      Assessment: May start 40-50 lbs next week  Ambulation in // bars w/ cueing for heel-toe gait and to us UE support to maintain 30-40 lbs of WB  Added RTB ankle 4 way for gentle strengthening  PROM to improve ankle mobility and function  Plan: Continue per plan of care        Precautions: 30-40 WB 2 weeks; 40-50 WB 2 weeks; 50-60 WB 2 weeks      Manual 9/10 9/14 9/16 9/21         MFR  Retrograde 10' retrograde 10'          Scar massage  5' 10' 10'                                   Neuro Re-Ed                                                                                                        Ther Ex             bike   10' 10'         Prom  15' 15' 15'         Arom  BAPS 20x2 sitting BAPS 20x2 sitting BAPS 20x2 sitting         Pt ed - HEP 20                         [place]   5'           Ankle 4 way RTB    30x                      Ther Activity                                       Gait Training             // bars  10' 10' 10'                      Modalities             PRN  estim/cp 10'

## 2020-09-24 ENCOUNTER — OFFICE VISIT (OUTPATIENT)
Dept: PHYSICAL THERAPY | Facility: CLINIC | Age: 75
End: 2020-09-24
Payer: MEDICARE

## 2020-09-24 DIAGNOSIS — S82.302D CLOSED FRACTURE OF DISTAL END OF LEFT TIBIA WITH ROUTINE HEALING, UNSPECIFIED FRACTURE MORPHOLOGY, SUBSEQUENT ENCOUNTER: Primary | ICD-10-CM

## 2020-09-24 PROCEDURE — 97110 THERAPEUTIC EXERCISES: CPT

## 2020-09-24 PROCEDURE — 97140 MANUAL THERAPY 1/> REGIONS: CPT

## 2020-09-24 PROCEDURE — 97116 GAIT TRAINING THERAPY: CPT

## 2020-09-24 NOTE — PROGRESS NOTES
Daily Note     Today's date: 2020  Patient name: Meera Alfaro  : 1945  MRN: [id number]  Referring provider: Marifer Talavera MD  Dx:   Encounter Diagnosis     ICD-10-CM    1  Closed fracture of distal end of left tibia with routine healing, unspecified fracture morphology, subsequent encounter  S82 302D        Start Time: 1200  Stop Time: 1300  Total time in clinic (min): 60 minutes    Subjective: Patient reports having no pain in ankle today  Compliant w/ HEP  Objective: See treatment diary below      Assessment: Improved ability to pick-up marbles w/ toes  Increased resistance w/ ankle 4 way w/o difficulty or increased pain  Scar massage to medial scar to reduce scar tissue  Improved mobility post PROM  Plan: Continue per plan of care        Precautions: 30-40 WB 2 weeks; 40-50 WB 2 weeks; 50-60 WB 2 weeks      Manual 9/10 9/14 9/16 9/21 9/24        MFR  Retrograde 10' retrograde 10'          Scar massage  5' 10' 10' 15'                                  Neuro Re-Ed                                                                                                        Ther Ex             bike   10' 10' 10'        Prom  15' 15' 15' 15'        Arom  BAPS 20x2 sitting BAPS 20x2 sitting BAPS 20x2 sitting BAPS 20x2 sitting        Pt ed - HEP 20                         [place]   5'   5'        Ankle 4 way RTB    30x BTB 30x        Sitting heel-toes     30x        Ther Activity                                       Gait Training             // bars  10' 10' 10' 10'                     Modalities             PRN  estim/cp 10'

## 2020-09-28 ENCOUNTER — OFFICE VISIT (OUTPATIENT)
Dept: PHYSICAL THERAPY | Facility: CLINIC | Age: 75
End: 2020-09-28
Payer: MEDICARE

## 2020-09-28 DIAGNOSIS — S82.302D CLOSED FRACTURE OF DISTAL END OF LEFT TIBIA WITH ROUTINE HEALING, UNSPECIFIED FRACTURE MORPHOLOGY, SUBSEQUENT ENCOUNTER: Primary | ICD-10-CM

## 2020-09-28 PROCEDURE — 97116 GAIT TRAINING THERAPY: CPT

## 2020-09-28 PROCEDURE — 97110 THERAPEUTIC EXERCISES: CPT

## 2020-09-28 PROCEDURE — 97140 MANUAL THERAPY 1/> REGIONS: CPT

## 2020-09-28 NOTE — PROGRESS NOTES
Daily Note     Today's date: 2020  Patient name: Kevin Russo  : 1945  MRN: [id number]  Referring provider: Sharon Whitman MD  Dx:   Encounter Diagnosis     ICD-10-CM    1  Closed fracture of distal end of left tibia with routine healing, unspecified fracture morphology, subsequent encounter  S82 302D        Start Time: 1155  Stop Time: 1250  Total time in clinic (min): 55 minutes    Subjective: Patient stated he over did it on Friday and foot and ankle was sore and swollen  Feeling much better today, minimal pain reported  Objective: See treatment diary below      Assessment: Patient is now able to apply 40-50% WB for the next two weeks  Patient able to pick-up marbles easier today  Verbal cueing for ambulation w/ walker to maintain 40-50% WB w/ proper stride length  Reduced tightness in scar  Plan: Continue per plan of care        Precautions: 30-40 WB 2 weeks; 40-50 WB 2 weeks; 50-60 WB 2 weeks      Manual 9/10 9/14 9/16 9/21 9/24 9/28       MFR  Retrograde 10' retrograde 10'          Scar massage  5' 10' 10' 15' 15'                                 Neuro Re-Ed                                                                                                        Ther Ex             bike   10' 10' 10' 10'       Prom  15' 15' 15' 15' 15'       Arom  BAPS 20x2 sitting BAPS 20x2 sitting BAPS 20x2 sitting BAPS 20x2 sitting BAPS 20x2 sitting       Pt ed - HEP 20                         [place]   5'   5' 5'       Ankle 4 way RTB    30x BTB 30x BTB 30x       Sitting heel-toes     30x 30x       Ther Activity                                       Gait Training             // bars  10' 10' 10' 10' 10' w/ walker                    Modalities             PRN  estim/cp 10'

## 2020-10-01 ENCOUNTER — OFFICE VISIT (OUTPATIENT)
Dept: PHYSICAL THERAPY | Facility: CLINIC | Age: 75
End: 2020-10-01
Payer: MEDICARE

## 2020-10-01 DIAGNOSIS — S82.302D CLOSED FRACTURE OF DISTAL END OF LEFT TIBIA WITH ROUTINE HEALING, UNSPECIFIED FRACTURE MORPHOLOGY, SUBSEQUENT ENCOUNTER: Primary | ICD-10-CM

## 2020-10-01 PROCEDURE — 97110 THERAPEUTIC EXERCISES: CPT

## 2020-10-01 PROCEDURE — 97140 MANUAL THERAPY 1/> REGIONS: CPT

## 2020-10-01 PROCEDURE — 97116 GAIT TRAINING THERAPY: CPT

## 2020-10-05 ENCOUNTER — OFFICE VISIT (OUTPATIENT)
Dept: PHYSICAL THERAPY | Facility: CLINIC | Age: 75
End: 2020-10-05
Payer: MEDICARE

## 2020-10-05 DIAGNOSIS — S82.302D CLOSED FRACTURE OF DISTAL END OF LEFT TIBIA WITH ROUTINE HEALING, UNSPECIFIED FRACTURE MORPHOLOGY, SUBSEQUENT ENCOUNTER: Primary | ICD-10-CM

## 2020-10-05 PROCEDURE — 97110 THERAPEUTIC EXERCISES: CPT

## 2020-10-05 PROCEDURE — 97140 MANUAL THERAPY 1/> REGIONS: CPT

## 2020-10-05 PROCEDURE — 97116 GAIT TRAINING THERAPY: CPT

## 2020-10-08 ENCOUNTER — OFFICE VISIT (OUTPATIENT)
Dept: PHYSICAL THERAPY | Facility: CLINIC | Age: 75
End: 2020-10-08
Payer: MEDICARE

## 2020-10-08 DIAGNOSIS — S82.302D CLOSED FRACTURE OF DISTAL END OF LEFT TIBIA WITH ROUTINE HEALING, UNSPECIFIED FRACTURE MORPHOLOGY, SUBSEQUENT ENCOUNTER: Primary | ICD-10-CM

## 2020-10-08 PROCEDURE — 97110 THERAPEUTIC EXERCISES: CPT

## 2020-10-08 PROCEDURE — 97116 GAIT TRAINING THERAPY: CPT

## 2020-10-08 PROCEDURE — 97140 MANUAL THERAPY 1/> REGIONS: CPT

## 2020-10-12 ENCOUNTER — OFFICE VISIT (OUTPATIENT)
Dept: PHYSICAL THERAPY | Facility: CLINIC | Age: 75
End: 2020-10-12
Payer: MEDICARE

## 2020-10-12 DIAGNOSIS — S82.302D CLOSED FRACTURE OF DISTAL END OF LEFT TIBIA WITH ROUTINE HEALING, UNSPECIFIED FRACTURE MORPHOLOGY, SUBSEQUENT ENCOUNTER: Primary | ICD-10-CM

## 2020-10-12 PROCEDURE — 97110 THERAPEUTIC EXERCISES: CPT

## 2020-10-12 PROCEDURE — 97116 GAIT TRAINING THERAPY: CPT

## 2020-10-15 ENCOUNTER — OFFICE VISIT (OUTPATIENT)
Dept: PHYSICAL THERAPY | Facility: CLINIC | Age: 75
End: 2020-10-15
Payer: MEDICARE

## 2020-10-15 DIAGNOSIS — S82.302D CLOSED FRACTURE OF DISTAL END OF LEFT TIBIA WITH ROUTINE HEALING, UNSPECIFIED FRACTURE MORPHOLOGY, SUBSEQUENT ENCOUNTER: Primary | ICD-10-CM

## 2020-10-15 PROCEDURE — 97110 THERAPEUTIC EXERCISES: CPT

## 2020-10-15 PROCEDURE — 97116 GAIT TRAINING THERAPY: CPT

## 2020-10-19 ENCOUNTER — OFFICE VISIT (OUTPATIENT)
Dept: PHYSICAL THERAPY | Facility: CLINIC | Age: 75
End: 2020-10-19
Payer: MEDICARE

## 2020-10-19 DIAGNOSIS — S82.302D CLOSED FRACTURE OF DISTAL END OF LEFT TIBIA WITH ROUTINE HEALING, UNSPECIFIED FRACTURE MORPHOLOGY, SUBSEQUENT ENCOUNTER: Primary | ICD-10-CM

## 2020-10-19 PROCEDURE — 97116 GAIT TRAINING THERAPY: CPT

## 2020-10-19 PROCEDURE — 97110 THERAPEUTIC EXERCISES: CPT

## 2020-10-21 ENCOUNTER — TRANSCRIBE ORDERS (OUTPATIENT)
Dept: PHYSICAL THERAPY | Facility: CLINIC | Age: 75
End: 2020-10-21

## 2020-10-21 DIAGNOSIS — S82.302D CLOSED FRACTURE OF DISTAL END OF LEFT TIBIA WITH ROUTINE HEALING, UNSPECIFIED FRACTURE MORPHOLOGY, SUBSEQUENT ENCOUNTER: Primary | ICD-10-CM

## 2020-10-22 ENCOUNTER — OFFICE VISIT (OUTPATIENT)
Dept: PHYSICAL THERAPY | Facility: CLINIC | Age: 75
End: 2020-10-22
Payer: MEDICARE

## 2020-10-22 DIAGNOSIS — S82.302D CLOSED FRACTURE OF DISTAL END OF LEFT TIBIA WITH ROUTINE HEALING, UNSPECIFIED FRACTURE MORPHOLOGY, SUBSEQUENT ENCOUNTER: Primary | ICD-10-CM

## 2020-10-22 PROCEDURE — 97110 THERAPEUTIC EXERCISES: CPT

## 2020-10-22 PROCEDURE — 97116 GAIT TRAINING THERAPY: CPT

## 2020-10-26 ENCOUNTER — OFFICE VISIT (OUTPATIENT)
Dept: PHYSICAL THERAPY | Facility: CLINIC | Age: 75
End: 2020-10-26
Payer: MEDICARE

## 2020-10-26 DIAGNOSIS — S82.302D CLOSED FRACTURE OF DISTAL END OF LEFT TIBIA WITH ROUTINE HEALING, UNSPECIFIED FRACTURE MORPHOLOGY, SUBSEQUENT ENCOUNTER: Primary | ICD-10-CM

## 2020-10-26 PROCEDURE — 97110 THERAPEUTIC EXERCISES: CPT

## 2020-10-26 PROCEDURE — 97112 NEUROMUSCULAR REEDUCATION: CPT

## 2020-10-29 ENCOUNTER — OFFICE VISIT (OUTPATIENT)
Dept: PHYSICAL THERAPY | Facility: CLINIC | Age: 75
End: 2020-10-29
Payer: MEDICARE

## 2020-10-29 DIAGNOSIS — S82.302D CLOSED FRACTURE OF DISTAL END OF LEFT TIBIA WITH ROUTINE HEALING, UNSPECIFIED FRACTURE MORPHOLOGY, SUBSEQUENT ENCOUNTER: Primary | ICD-10-CM

## 2020-10-29 PROCEDURE — 97110 THERAPEUTIC EXERCISES: CPT | Performed by: PHYSICAL THERAPIST

## 2020-11-02 ENCOUNTER — OFFICE VISIT (OUTPATIENT)
Dept: PHYSICAL THERAPY | Facility: CLINIC | Age: 75
End: 2020-11-02
Payer: MEDICARE

## 2020-11-02 DIAGNOSIS — S82.302D CLOSED FRACTURE OF DISTAL END OF LEFT TIBIA WITH ROUTINE HEALING, UNSPECIFIED FRACTURE MORPHOLOGY, SUBSEQUENT ENCOUNTER: Primary | ICD-10-CM

## 2020-11-02 PROCEDURE — 97112 NEUROMUSCULAR REEDUCATION: CPT | Performed by: PHYSICAL THERAPIST

## 2020-11-02 PROCEDURE — 97110 THERAPEUTIC EXERCISES: CPT | Performed by: PHYSICAL THERAPIST

## 2020-11-05 ENCOUNTER — OFFICE VISIT (OUTPATIENT)
Dept: PHYSICAL THERAPY | Facility: CLINIC | Age: 75
End: 2020-11-05
Payer: MEDICARE

## 2020-11-05 DIAGNOSIS — S82.302D CLOSED FRACTURE OF DISTAL END OF LEFT TIBIA WITH ROUTINE HEALING, UNSPECIFIED FRACTURE MORPHOLOGY, SUBSEQUENT ENCOUNTER: Primary | ICD-10-CM

## 2020-11-05 PROCEDURE — 97110 THERAPEUTIC EXERCISES: CPT | Performed by: PHYSICAL THERAPIST

## 2020-11-09 ENCOUNTER — OFFICE VISIT (OUTPATIENT)
Dept: PHYSICAL THERAPY | Facility: CLINIC | Age: 75
End: 2020-11-09
Payer: MEDICARE

## 2020-11-09 DIAGNOSIS — S82.302D CLOSED FRACTURE OF DISTAL END OF LEFT TIBIA WITH ROUTINE HEALING, UNSPECIFIED FRACTURE MORPHOLOGY, SUBSEQUENT ENCOUNTER: Primary | ICD-10-CM

## 2020-11-09 PROCEDURE — 97110 THERAPEUTIC EXERCISES: CPT

## 2020-11-12 ENCOUNTER — OFFICE VISIT (OUTPATIENT)
Dept: PHYSICAL THERAPY | Facility: CLINIC | Age: 75
End: 2020-11-12
Payer: MEDICARE

## 2020-11-12 DIAGNOSIS — S82.302D CLOSED FRACTURE OF DISTAL END OF LEFT TIBIA WITH ROUTINE HEALING, UNSPECIFIED FRACTURE MORPHOLOGY, SUBSEQUENT ENCOUNTER: Primary | ICD-10-CM

## 2020-11-12 PROCEDURE — 97110 THERAPEUTIC EXERCISES: CPT

## 2020-11-12 PROCEDURE — 97112 NEUROMUSCULAR REEDUCATION: CPT

## 2020-11-16 ENCOUNTER — EVALUATION (OUTPATIENT)
Dept: PHYSICAL THERAPY | Facility: CLINIC | Age: 75
End: 2020-11-16
Payer: MEDICARE

## 2020-11-16 DIAGNOSIS — S82.302D CLOSED FRACTURE OF DISTAL END OF LEFT TIBIA WITH ROUTINE HEALING, UNSPECIFIED FRACTURE MORPHOLOGY, SUBSEQUENT ENCOUNTER: Primary | ICD-10-CM

## 2020-11-16 PROCEDURE — 97110 THERAPEUTIC EXERCISES: CPT | Performed by: PHYSICAL THERAPIST

## 2020-11-19 ENCOUNTER — APPOINTMENT (OUTPATIENT)
Dept: PHYSICAL THERAPY | Facility: CLINIC | Age: 75
End: 2020-11-19
Payer: MEDICARE

## 2020-11-23 ENCOUNTER — APPOINTMENT (OUTPATIENT)
Dept: PHYSICAL THERAPY | Facility: CLINIC | Age: 75
End: 2020-11-23
Payer: MEDICARE

## 2020-11-27 ENCOUNTER — APPOINTMENT (OUTPATIENT)
Dept: PHYSICAL THERAPY | Facility: CLINIC | Age: 75
End: 2020-11-27
Payer: MEDICARE

## 2021-01-22 DIAGNOSIS — Z23 ENCOUNTER FOR IMMUNIZATION: ICD-10-CM

## 2021-02-05 ENCOUNTER — IMMUNIZATIONS (OUTPATIENT)
Dept: FAMILY MEDICINE CLINIC | Facility: HOSPITAL | Age: 76
End: 2021-02-05

## 2021-02-05 DIAGNOSIS — Z23 ENCOUNTER FOR IMMUNIZATION: Primary | ICD-10-CM

## 2021-02-05 PROCEDURE — 0011A SARS-COV-2 / COVID-19 MRNA VACCINE (MODERNA) 100 MCG: CPT

## 2021-02-05 PROCEDURE — 91301 SARS-COV-2 / COVID-19 MRNA VACCINE (MODERNA) 100 MCG: CPT

## 2021-02-15 ENCOUNTER — TRANSCRIBE ORDERS (OUTPATIENT)
Dept: ADMINISTRATIVE | Facility: HOSPITAL | Age: 76
End: 2021-02-15

## 2021-02-15 DIAGNOSIS — M25.511 RIGHT SHOULDER PAIN: Primary | ICD-10-CM

## 2021-02-24 ENCOUNTER — HOSPITAL ENCOUNTER (OUTPATIENT)
Dept: MRI IMAGING | Facility: HOSPITAL | Age: 76
Discharge: HOME | End: 2021-02-24
Payer: MEDICARE

## 2021-02-24 DIAGNOSIS — M25.511 RIGHT SHOULDER PAIN: ICD-10-CM

## 2021-02-24 PROCEDURE — 73221 MRI JOINT UPR EXTREM W/O DYE: CPT

## 2021-02-24 PROCEDURE — G1004 CDSM NDSC: HCPCS

## 2021-03-03 ENCOUNTER — IMMUNIZATIONS (OUTPATIENT)
Dept: FAMILY MEDICINE CLINIC | Facility: HOSPITAL | Age: 76
End: 2021-03-03

## 2021-03-03 DIAGNOSIS — Z23 ENCOUNTER FOR IMMUNIZATION: Primary | ICD-10-CM

## 2021-03-03 PROCEDURE — 91301 SARS-COV-2 / COVID-19 MRNA VACCINE (MODERNA) 100 MCG: CPT

## 2021-03-03 PROCEDURE — 0012A SARS-COV-2 / COVID-19 MRNA VACCINE (MODERNA) 100 MCG: CPT

## 2021-03-04 ENCOUNTER — APPOINTMENT (OUTPATIENT)
Dept: RADIOLOGY | Facility: CLINIC | Age: 76
End: 2021-03-04
Payer: MEDICARE

## 2021-03-04 ENCOUNTER — OFFICE VISIT (OUTPATIENT)
Dept: OBGYN CLINIC | Facility: CLINIC | Age: 76
End: 2021-03-04
Payer: MEDICARE

## 2021-03-04 VITALS
SYSTOLIC BLOOD PRESSURE: 119 MMHG | DIASTOLIC BLOOD PRESSURE: 70 MMHG | HEART RATE: 56 BPM | WEIGHT: 266 LBS | HEIGHT: 70 IN | RESPIRATION RATE: 20 BRPM | BODY MASS INDEX: 38.08 KG/M2

## 2021-03-04 DIAGNOSIS — M75.31 CALCIFIC TENDINITIS OF RIGHT SHOULDER: Primary | ICD-10-CM

## 2021-03-04 DIAGNOSIS — M19.011 PRIMARY OSTEOARTHRITIS OF RIGHT SHOULDER: ICD-10-CM

## 2021-03-04 DIAGNOSIS — M25.511 ACUTE PAIN OF RIGHT SHOULDER: ICD-10-CM

## 2021-03-04 DIAGNOSIS — G89.29 CHRONIC RIGHT SHOULDER PAIN: ICD-10-CM

## 2021-03-04 DIAGNOSIS — M75.41 IMPINGEMENT SYNDROME OF RIGHT SHOULDER: ICD-10-CM

## 2021-03-04 DIAGNOSIS — M25.511 CHRONIC RIGHT SHOULDER PAIN: ICD-10-CM

## 2021-03-04 PROCEDURE — 99213 OFFICE O/P EST LOW 20 MIN: CPT | Performed by: ORTHOPAEDIC SURGERY

## 2021-03-04 PROCEDURE — 73030 X-RAY EXAM OF SHOULDER: CPT

## 2021-03-04 NOTE — PROGRESS NOTES
Patient Name:  Rachel Delacruz  MRN:  [id number]    [address] [place] [address]     1  Calcific tendinitis of right shoulder    2  Impingement syndrome of right shoulder    3  Primary osteoarthritis of right shoulder    4  Chronic right shoulder pain  -     XR shoulder 2+ vw right; Future; Expected date: 03/04/2021      Negro You is a pleasant 67year old who presents here today for an initial evaluation of his right shoulder pain  Upon evaluation and review of his x-rays and MRI, he has findings consistent with  Right shoulder calcific tendinitis,  Impingement syndrome  and mild osteoarthritis  I discussed with the patient today that we can try conservative treatments as for surgical intervention  Treatment options include physical therapy and steroid injection  At this time, he declined a steroid injection due to being diabetic and previous steroid injections to his knees which made his blood sugar drastically increase  I provided him with a script for physical therapy to work on range of motion and strengthening of his right shoulder  A home exercise program was provided to the patient today to do daily  I instructed him he should be icing his shoulder at night  to help alleviate his pain  I stated that he should follow-up with his primary care physician if he would be interested in a steroid injection so that his diabetes medication can be adjusted  I will see him back in 6 weeks for a clinical re-evaluation  If he has not seen any improvement from physical therapy, we can discuss further treatment options  Patient understood and had no further questions  Chief Complaint     Right Shoulder Pain    History of the Present Illness     Rachel Delacruz is a 67 y o  male who presemts here today for an initial evaluation of his right shoulder pain  He states that his pain started 5 years ago with no known mechanism of injury  He states that he is unable to sleep in a bed due to his right shoulder pain   He sleeps in a recliner  He states that his pain has gotten worse over the years with decreased range of motion  He states that his pain is located in his shoulder and will radiate down into his biceps  He states he does not take anything for pain  Review of Systems     Review of Systems   Constitutional: Negative for appetite change and unexpected weight change  HENT: Negative for congestion and trouble swallowing  Eyes: Negative for visual disturbance  Respiratory: Negative for cough and shortness of breath  Cardiovascular: Negative for chest pain and palpitations  Gastrointestinal: Negative for nausea and vomiting  Endocrine: Negative for cold intolerance and heat intolerance  Genitourinary: Negative  Musculoskeletal: Negative for gait problem and myalgias  Skin: Negative for rash  Allergic/Immunologic: Negative  Neurological: Negative for numbness  Hematological: Negative  Psychiatric/Behavioral: Negative  Physical Exam     /70   Pulse 56   Resp 20   Ht 5' 10" (1 778 m)   Wt 121 kg (266 lb)   BMI 38 17 kg/m²     Right Shoulder Exam  Active range of motion to 140 degrees forward flexion, 130 degrees abduction, 90 degrees external rotation, and internal rotation to SI Joint  Passive range of motion to 140  There is no tenderness present over the shoulder  Empty can testing is good strength but with pain  There is good strength with external rotation testing at the side  Belly press test is negative  Woo test is positive  [place]'s test is positive  Speed's test is negative  There is a negative cross-arm adduction test   The patient is neurovascularly intact distally in the extremity  Eyes:  Anicteric sclerae  Neck:  Supple  Lungs:  Normal respiratory effort  Cardiovascular:  Capillary refill is less than 2 seconds  Skin:  Intact without erythema  Neurologic:  Sensation grossly intact to light touch  Psychiatric:  Mood and affect are appropriate      Data Review     I have personally reviewed pertinent films in PACS, and my interpretation follows:    X-rays taken today of his right shoulder demonstrates calcific tendinitis and mild acromioclavicular joint osteoarthritis  No acute fractures or dislocations appreciated  MRI from 2/24/2021 of his right shoulder demonstrates tendinosis of the supraspinatus, subscapularis and infraspinatus with a rotator cuff tear  Degenerative tear of glenoid labrum  Complete tear of the proximal long head biceps tendon  Mild glenohumeral joint and acromioclavicular joint osteoarthritis  Mild subacromial/subdeltoid bursitis  Past Medical History:   Diagnosis Date    Diabetes mellitus ([address] )     Hiatal hernia     Hyperlipidemia     Hypertension     Neuropathy     Shortness of breath     Venous insufficiency (chronic) (peripheral)        Past Surgical History:   Procedure Laterality Date    IR PE THROMBOLYSIS  8/18/2020    JOINT REPLACEMENT Bilateral     knees    LEG SURGERY      WI COLONOSCOPY FLX DX W/COLLJ SPEC WHEN PFRMD N/A 6/30/2017    Procedure: EGD AND COLONOSCOPY;  Surgeon: Nakita Mcclure MD;  Location: [facility];   Service: General    TONSILLECTOMY         No Known Allergies    Current Outpatient Medications on File Prior to Visit   Medication Sig Dispense Refill    apixaban (ELIQUIS) 5 mg Take 1 tablet (5 mg total) by mouth 2 (two) times a day 60 tablet 0    B-D ULTRAFINE III SHORT PEN 31G X 8 MM MISC USE TO INJECT INSULIN THREE TIMES DAILY      Cholecalciferol (VITAMIN D) 2000 units CAPS Take by mouth      Empagliflozin (JARDIANCE) 25 MG TABS Take 25 mg by mouth every morning      HUMALOG 100 UNIT/ML injection INJECT DAILY AS DIRECTED PER SLIDING SCALE  3    Insulin Degludec (TRESIBA FLEXTOUCH SC) Inject 26 Units under the skin daily at bedtime       multivitamin-iron-minerals-folic acid (CENTRUM) chewable tablet Chew 1 tablet daily      Omega-3 Fatty Acids (FISH OIL CONCENTRATE PO) Take by mouth      ONETOUCH VERIO test strip TEST 2 TIMES A DAY DX E11 9  6    simvastatin (ZOCOR) 40 mg tablet Take 40 mg by mouth daily at bedtime      apixaban (ELIQUIS) 5 mg Take 2 tablets (10 mg total) by mouth 2 (two) times a day for 5 days 20 tablet 0    SANTYL ointment        No current facility-administered medications on file prior to visit          Social History     Tobacco Use    Smoking status: Never Smoker    Smokeless tobacco: Never Used   Substance Use Topics    Alcohol use: Never     Frequency: Never    Drug use: No       Family History   Problem Relation Age of Onset    Cancer Mother     Cancer Father              Procedures Performed     Procedures    Scribe Attestation    I,:  James Mackey am acting as a scribe while in the presence of the attending physician :       I,:  Say Lewis DO personally performed the services described in this documentation    as scribed in my presence :

## 2021-03-22 ENCOUNTER — EVALUATION (OUTPATIENT)
Dept: PHYSICAL THERAPY | Facility: CLINIC | Age: 76
End: 2021-03-22
Payer: MEDICARE

## 2021-03-22 DIAGNOSIS — M75.41 IMPINGEMENT SYNDROME OF RIGHT SHOULDER: ICD-10-CM

## 2021-03-22 DIAGNOSIS — M75.31 CALCIFIC TENDINITIS OF RIGHT SHOULDER: ICD-10-CM

## 2021-03-22 DIAGNOSIS — M25.511 CHRONIC RIGHT SHOULDER PAIN: ICD-10-CM

## 2021-03-22 DIAGNOSIS — G89.29 CHRONIC RIGHT SHOULDER PAIN: ICD-10-CM

## 2021-03-22 DIAGNOSIS — M19.011 PRIMARY OSTEOARTHRITIS OF RIGHT SHOULDER: ICD-10-CM

## 2021-03-22 PROCEDURE — 97110 THERAPEUTIC EXERCISES: CPT | Performed by: PHYSICAL THERAPIST

## 2021-03-22 PROCEDURE — 97161 PT EVAL LOW COMPLEX 20 MIN: CPT | Performed by: PHYSICAL THERAPIST

## 2021-03-22 NOTE — LETTER
2021    Rahul Mccauley DO  [address]  [address]  [facility] [address]    Patient: Ector Lorenzo   YOB: 1945   Date of Visit: 3/22/2021     Encounter Diagnosis     ICD-10-CM    1  Calcific tendinitis of right shoulder  M75 31 Ambulatory referral to Physical Therapy   2  Impingement syndrome of right shoulder  M75 41 Ambulatory referral to Physical Therapy   3  Primary osteoarthritis of right shoulder  M19 011 Ambulatory referral to Physical Therapy   4  Chronic right shoulder pain  M25 511 Ambulatory referral to Physical Therapy    G89 29        Dear Dr Elisa Beavers:    Thank you for your recent referral of Ector Lorenzo  Please review the attached evaluation summary from [facility] recent visit  Please verify that you agree with the plan of care by signing the attached order  If you have any questions or concerns, please do not hesitate to call  I sincerely appreciate the opportunity to share in the care of one of your patients and hope to have another opportunity to work with you in the near future  Sincerely,    Dayanna Lofton, PT      Referring Provider:      I certify that I have read the below Plan of Care and certify the need for these services furnished under this plan of treatment while under my care  Rahul Mccauley DO  [address]  [address]  [facility] [address]  [place]          PT Evaluation     Today's date: 3/22/2021  Patient name: Ector Lorenzo  : 1945  MRN: [id number]  Referring provider: Ruth Lawton DO  Dx:   Encounter Diagnosis     ICD-10-CM    1  Calcific tendinitis of right shoulder  M75 31 Ambulatory referral to Physical Therapy   2  Impingement syndrome of right shoulder  M75 41 Ambulatory referral to Physical Therapy   3  Primary osteoarthritis of right shoulder  M19 011 Ambulatory referral to Physical Therapy   4   Chronic right shoulder pain  M25 511 Ambulatory referral to Physical Therapy    G89 29        Start Time: 930  Stop Time:   Total time in clinic (min): 45 minutes    Assessment  Assessment details: Patient presents with chronic R shoulder pain, insidious onset, demonstrates FHRS, (+) BL UT myofascial tightness, decreased Arom and Strength, empty can (-), reports difficulty lifting overhead, reaching behind the back; patient would benefit from therapeutic exercise, manual therapy, patient education regarding posture and proper lifting techniques, modalities PRN, HEP  Impairments: abnormal muscle tone, abnormal or restricted ROM, impaired physical strength, pain with function and poor posture   Understanding of Dx/Px/POC: good   Prognosis: good    Goals  STGs  1  Decrease pain 50% in 2 weeks  2  Increase Rom 50% in 2 weeks  3  Increase Strength half a grade in 2 weeks  4  Compliant with HEP in 2 weeks  LTGs  1  Decrease pain to mild to none in 4 weeks  2  Increase Rom WNL in 4 weeks  3  Increase Strength WNL in 4 weeks  4  Able to lift overhead with mild to no difficulty in 4 weeks  5   Able to reach behind the back with mild to no difficulty in 4 weeks    Plan  Patient would benefit from: skilled physical therapy  Planned modality interventions: unattended electrical stimulation and hydrotherapy  Planned therapy interventions: manual therapy, patient education, postural training, therapeutic activities, therapeutic exercise, home exercise program and functional ROM exercises  Frequency: 2x week  Duration in weeks: 4        Subjective Evaluation    History of Present Illness  Mechanism of injury: Chronic, worsened recently          Recurrent probem    Quality of life: good    Pain  Current pain ratin  At best pain rating: 3  At worst pain rating: 10  Quality: sharp  Relieving factors: rest  Aggravating factors: lifting    Social Support  Lives in: multiple-level home  Lives with: spouse    Employment status: not working  Hand dominance: right      Diagnostic Tests  X-ray: abnormal  MRI studies: abnormal  Patient Goals  Patient goals for therapy: increased strength, decreased pain and increased motion  Patient goal: lift overhead, reach behind the back        Objective     Postural Observations    Additional Postural Observation Details  FHRS    Palpation   Left   Hypertonic in the upper trapezius  Right   Hypertonic in the upper trapezius       Active Range of Motion     Right Shoulder   Flexion: 130 degrees   Extension: 30 degrees   Abduction: 130 degrees   External rotation 0°: WFL  Internal rotation BTB: sacrum     Strength/Myotome Testing     Right Shoulder     Planes of Motion   Flexion: 4-   Extension: 4   Abduction: 4-   External rotation at 0°: 4   Internal rotation at 0°: 4              Precautions: none    Manual 3/22            MFR             vib/mass             Jt mobs                          Neuro Re-Ed             BTB retract             Prone I/Y/Ts             Prone retract                                                                 Ther Ex             Circuit - UE             Pulleys             Prom 15            Biceps 3-way             Grippers             HEP                                       Ther Activity             lift             reach             Gait Training                                       Modalities             E-Stim             MH

## 2021-03-22 NOTE — PROGRESS NOTES
PT Evaluation     Today's date: 3/22/2021  Patient name: Garrett Jasso  : 1945  MRN: [id number]  Referring provider: Lucy Arnold DO  Dx:   Encounter Diagnosis     ICD-10-CM    1  Calcific tendinitis of right shoulder  M75 31 Ambulatory referral to Physical Therapy   2  Impingement syndrome of right shoulder  M75 41 Ambulatory referral to Physical Therapy   3  Primary osteoarthritis of right shoulder  M19 011 Ambulatory referral to Physical Therapy   4  Chronic right shoulder pain  M25 511 Ambulatory referral to Physical Therapy    G89 29        Start Time: 930  Stop Time: 101  Total time in clinic (min): 45 minutes    Assessment  Assessment details: Patient presents with chronic R shoulder pain, insidious onset, demonstrates FHRS, (+) BL UT myofascial tightness, decreased Arom and Strength, empty can (-), reports difficulty lifting overhead, reaching behind the back; patient would benefit from therapeutic exercise, manual therapy, patient education regarding posture and proper lifting techniques, modalities PRN, HEP  Impairments: abnormal muscle tone, abnormal or restricted ROM, impaired physical strength, pain with function and poor posture   Understanding of Dx/Px/POC: good   Prognosis: good    Goals  STGs  1  Decrease pain 50% in 2 weeks  2  Increase Rom 50% in 2 weeks  3  Increase Strength half a grade in 2 weeks  4  Compliant with HEP in 2 weeks  LTGs  1  Decrease pain to mild to none in 4 weeks  2  Increase Rom WNL in 4 weeks  3  Increase Strength WNL in 4 weeks  4  Able to lift overhead with mild to no difficulty in 4 weeks  5   Able to reach behind the back with mild to no difficulty in 4 weeks    Plan  Patient would benefit from: skilled physical therapy  Planned modality interventions: unattended electrical stimulation and hydrotherapy  Planned therapy interventions: manual therapy, patient education, postural training, therapeutic activities, therapeutic exercise, home exercise program and functional ROM exercises  Frequency: 2x week  Duration in weeks: 4        Subjective Evaluation    History of Present Illness  Mechanism of injury: Chronic, worsened recently          Recurrent probem    Quality of life: good    Pain  Current pain ratin  At best pain rating: 3  At worst pain rating: 10  Quality: sharp  Relieving factors: rest  Aggravating factors: lifting    Social Support  Lives in: multiple-level home  Lives with: spouse    Employment status: not working  Hand dominance: right      Diagnostic Tests  X-ray: abnormal  MRI studies: abnormal  Patient Goals  Patient goals for therapy: increased strength, decreased pain and increased motion  Patient goal: lift overhead, reach behind the back        Objective     Postural Observations    Additional Postural Observation Details  FHRS    Palpation   Left   Hypertonic in the upper trapezius  Right   Hypertonic in the upper trapezius       Active Range of Motion     Right Shoulder   Flexion: 130 degrees   Extension: 30 degrees   Abduction: 130 degrees   External rotation 0°: WFL  Internal rotation BTB: sacrum     Strength/Myotome Testing     Right Shoulder     Planes of Motion   Flexion: 4-   Extension: 4   Abduction: 4-   External rotation at 0°: 4   Internal rotation at 0°: 4              Precautions: none    Manual 3/22            MFR             vib/mass             Jt mobs                          Neuro Re-Ed             BTB retract             Prone I/Y/Ts             Prone retract                                                                 Ther Ex             Circuit - UE             Pulleys             Prom 15            Biceps 3-way             Grippers             HEP                                       Ther Activity             lift             reach             Gait Training                                       Modalities             E-Stim

## 2021-03-24 ENCOUNTER — OFFICE VISIT (OUTPATIENT)
Dept: PHYSICAL THERAPY | Facility: CLINIC | Age: 76
End: 2021-03-24
Payer: MEDICARE

## 2021-03-24 DIAGNOSIS — M75.31 CALCIFIC TENDINITIS OF RIGHT SHOULDER: Primary | ICD-10-CM

## 2021-03-24 DIAGNOSIS — M19.011 PRIMARY OSTEOARTHRITIS OF RIGHT SHOULDER: ICD-10-CM

## 2021-03-24 DIAGNOSIS — M75.41 IMPINGEMENT SYNDROME OF RIGHT SHOULDER: ICD-10-CM

## 2021-03-24 PROCEDURE — 97140 MANUAL THERAPY 1/> REGIONS: CPT

## 2021-03-24 PROCEDURE — 97110 THERAPEUTIC EXERCISES: CPT

## 2021-03-24 NOTE — PROGRESS NOTES
Daily Note     Today's date: 3/24/2021  Patient name: Kleber Fong  : 1945  MRN: [id number]  Referring provider: Onesimo Sever, DO  Dx:   Encounter Diagnosis     ICD-10-CM    1  Calcific tendinitis of right shoulder  M75 31    2  Impingement syndrome of right shoulder  M75 41    3  Primary osteoarthritis of right shoulder  M19 011        Start Time: 915  Stop Time: 1005  Total time in clinic (min): 50 minutes    Subjective: Patient stated he has no pain in shoulder until he raised arm OH  Stated he has sharp pain that lasts a short time when raising arm OH and back down  Objective: See treatment diary below      Assessment: Patient tolerated session well  Added UE strengthening exercises to improve strength and posture  VC and tactile cueing for proper form and posture throughout session  Performed MFR and periscap region, UT, med deltoid, and pec major  Reduced muscular tightness and improved mobility post manual therapy  Patient could benefit from continued skilled PT  Plan: Continue per plan of care        Precautions: none    Manual 3/22 3/24           MFR  15'           vib/mass             Jt mobs                          Neuro Re-Ed             BTB retract             Prone I/Y/Ts  Is 20x 5"           Prone retract  5" 30x                                                               Ther Ex             Circuit - UE  20x           Pulleys  30x           Prom 15            Biceps 3-way             Grippers  40x           HEP                                       Ther Activity             lift             reach             Gait Training                                       Modalities             E-Stim

## 2021-03-29 ENCOUNTER — OFFICE VISIT (OUTPATIENT)
Dept: PHYSICAL THERAPY | Facility: CLINIC | Age: 76
End: 2021-03-29
Payer: MEDICARE

## 2021-03-29 DIAGNOSIS — M75.31 CALCIFIC TENDINITIS OF RIGHT SHOULDER: Primary | ICD-10-CM

## 2021-03-29 DIAGNOSIS — M75.41 IMPINGEMENT SYNDROME OF RIGHT SHOULDER: ICD-10-CM

## 2021-03-29 DIAGNOSIS — M19.011 PRIMARY OSTEOARTHRITIS OF RIGHT SHOULDER: ICD-10-CM

## 2021-03-29 PROCEDURE — 97112 NEUROMUSCULAR REEDUCATION: CPT

## 2021-03-29 PROCEDURE — 97110 THERAPEUTIC EXERCISES: CPT

## 2021-03-29 PROCEDURE — 97140 MANUAL THERAPY 1/> REGIONS: CPT

## 2021-03-29 NOTE — PROGRESS NOTES
Daily Note     Today's date: 3/29/2021  Patient name: Noa Del Castillo   : 1945  MRN: [id number]  Referring provider: Loral Claude, DO  Dx:   Encounter Diagnosis     ICD-10-CM    1  Calcific tendinitis of right shoulder  M75 31    2  Impingement syndrome of right shoulder  M75 41    3  Primary osteoarthritis of right shoulder  M19 011        Start Time: 845  Stop Time: 930  Total time in clinic (min): 45 minutes    Subjective: Patient stated that he has no pain at the moment  Stated that he can have some radiating pain that radiates down to the elbow once in a while  Objective: See treatment diary below      Assessment: Patient tolerated session well  Focused on on stretching exercises to improve posture  Patient ed on how to maintain posture throughout the day  Gentle c/s traction to relieve radic sxs in arm  Improved mobility post PROM to shoulder in all planes  Plan: Continue per plan of care        Precautions: none    Manual 3/22 3/24 3/29          MFR  15' 10'          vib/mass             Jt mobs             C/s man traction   5'          Neuro Re-Ed             BTB retract   NV          Prone I/Y/Ts  Is 20x 5" All 20x 5"          Prone retract  5" 30x 5" 30x          Pt ed posture   5'                                                 Ther Ex             Circuit - UE  20x           Pulleys  30x 30x          Prom 15            Biceps 3-way             Grippers  40x           HEP             UBE   3/2          Doorway stretch   15" 5x          Sitting t/s stretch over half foam roll   Ext/rot 1-2" 10x ea                                    Ther Activity             lift             reach             Gait Training                                       Modalities             E-Stim

## 2021-04-01 ENCOUNTER — OFFICE VISIT (OUTPATIENT)
Dept: PHYSICAL THERAPY | Facility: CLINIC | Age: 76
End: 2021-04-01
Payer: MEDICARE

## 2021-04-01 DIAGNOSIS — M19.011 PRIMARY OSTEOARTHRITIS OF RIGHT SHOULDER: ICD-10-CM

## 2021-04-01 DIAGNOSIS — M75.41 IMPINGEMENT SYNDROME OF RIGHT SHOULDER: ICD-10-CM

## 2021-04-01 DIAGNOSIS — M75.31 CALCIFIC TENDINITIS OF RIGHT SHOULDER: Primary | ICD-10-CM

## 2021-04-01 PROCEDURE — 97112 NEUROMUSCULAR REEDUCATION: CPT

## 2021-04-01 PROCEDURE — 97110 THERAPEUTIC EXERCISES: CPT

## 2021-04-01 NOTE — PROGRESS NOTES
Daily Note     Today's date: 2021  Patient name: Doug Huitron  : 1945  MRN: [id number]  Referring provider: Karla Quan DO  Dx:   Encounter Diagnosis     ICD-10-CM    1  Calcific tendinitis of right shoulder  M75 31    2  Impingement syndrome of right shoulder  M75 41    3  Primary osteoarthritis of right shoulder  M19 011        Start Time: 930  Stop Time: 1015  Total time in clinic (min): 45 minutes    Subjective: Patient stated he has no pain in the shoulder today  Stated that the motions that usually cause him pain is improving, reduced severity of pain  Objective: See treatment diary below      Assessment: Patient tolerated session well  Progressed POC as tolerated  Cueing for posture w/ Nick and standing shoulder lifts  ROM in shoulder is improving in all planes  Patient would benefit from continued skilled PT  Plan: Continue per plan of care        Precautions: none    Manual 3/22 3/24 3/29 4/1         MFR  15' 10'          vib/mass             Jt mobs             C/s man traction   5'          Neuro Re-Ed             BTB retract   NV [place] 10# 20x         Prone I/Y/Ts  Is 20x 5" All 20x 5" 5" 20x ea         Prone retract  5" 30x 5" 30x          Pt ed posture   5'          Nick LPD    10# 20x                                   Ther Ex             Circuit - UE  20x           Pulleys  30x 30x 30x         Prom 15            Biceps 3-way    4# 20x ea         Grippers  40x  blck 50x         HEP             UBE   3/ 3/3         Doorway stretch   15" 5x NV         Sitting t/s stretch over half foam roll   Ext/rot 1-2" 10x ea NV         Standing shoulder flexion    2# 20x         Standing shoulder abduction    2# 20x         Ther Activity             lift             reach             Gait Training                                       Modalities             E-Stim

## 2021-04-05 ENCOUNTER — OFFICE VISIT (OUTPATIENT)
Dept: PHYSICAL THERAPY | Facility: CLINIC | Age: 76
End: 2021-04-05
Payer: MEDICARE

## 2021-04-05 DIAGNOSIS — M75.41 IMPINGEMENT SYNDROME OF RIGHT SHOULDER: ICD-10-CM

## 2021-04-05 DIAGNOSIS — G89.29 CHRONIC RIGHT SHOULDER PAIN: ICD-10-CM

## 2021-04-05 DIAGNOSIS — M19.011 PRIMARY OSTEOARTHRITIS OF RIGHT SHOULDER: ICD-10-CM

## 2021-04-05 DIAGNOSIS — M25.511 CHRONIC RIGHT SHOULDER PAIN: ICD-10-CM

## 2021-04-05 DIAGNOSIS — M75.31 CALCIFIC TENDINITIS OF RIGHT SHOULDER: Primary | ICD-10-CM

## 2021-04-05 PROCEDURE — 97110 THERAPEUTIC EXERCISES: CPT | Performed by: PHYSICAL THERAPIST

## 2021-04-05 PROCEDURE — 97140 MANUAL THERAPY 1/> REGIONS: CPT | Performed by: PHYSICAL THERAPIST

## 2021-04-05 NOTE — PROGRESS NOTES
Daily Note     Today's date: 2021  Patient name: Narayan Silverio  : 1945  MRN: [id number]  Referring provider: Cass Earl DO  Dx:   Encounter Diagnosis     ICD-10-CM    1  Calcific tendinitis of right shoulder  M75 31    2  Impingement syndrome of right shoulder  M75 41    3  Primary osteoarthritis of right shoulder  M19 011    4  Chronic right shoulder pain  M25 511     G89 29                   Subjective: Bill reports that his shoulder was feeling good but today he is having increased pain after remodeling his bathroom over the weekend  Objective: See treatment diary below      Assessment: Tolerated treatment well  Patient demonstrated fatigue post treatment and would benefit from continued PT  Did not progress this session due to patient reported soreness  Educated patient on increasing fluids and staying active to combat soreness  Patient reports understanding  Cues provided throughout session for form, patient has the tendency to compensate with his upper body  Plan: Continue per plan of care  Progress treatment as tolerated         Precautions: none    Manual 3/22 3/24 3/29 4/1 4/5        MFR  15' 10'          vib/mass             Jt mobs     EG        C/s man traction   5'          Neuro Re-Ed             BTB retract   NV Nick 10# 20x Nick 15# 2x15        Prone I/Y/Ts  Is 20x 5" All 20x 5" 5" 20x ea         Prone retract  5" 30x 5" 30x          Pt ed posture   5'          Burson LPD    10# 20x 15# 2x15                                  Ther Ex             Circuit - UE  20x           Pulleys  30x 30x 30x 30x        Prom 15            Biceps 3-way    4# 20x ea 4# 20x        Grippers  40x  blck 50x         HEP             UBE   3/2 3/3         Doorway stretch   15" 5x NV 3x30"        Sitting t/s stretch over half foam roll   Ext/rot 1-2" 10x ea NV         Standing shoulder flexion    2# 20x 2# 20x        Standing shoulder abduction    2# 20x 2# 20x        Ther Activity lift             reach             Gait Training                                       Modalities             E-Stim             MH

## 2021-04-07 ENCOUNTER — OFFICE VISIT (OUTPATIENT)
Dept: PHYSICAL THERAPY | Facility: CLINIC | Age: 76
End: 2021-04-07
Payer: MEDICARE

## 2021-04-07 DIAGNOSIS — G89.29 CHRONIC RIGHT SHOULDER PAIN: ICD-10-CM

## 2021-04-07 DIAGNOSIS — M75.31 CALCIFIC TENDINITIS OF RIGHT SHOULDER: Primary | ICD-10-CM

## 2021-04-07 DIAGNOSIS — M25.511 CHRONIC RIGHT SHOULDER PAIN: ICD-10-CM

## 2021-04-07 DIAGNOSIS — M75.41 IMPINGEMENT SYNDROME OF RIGHT SHOULDER: ICD-10-CM

## 2021-04-07 DIAGNOSIS — M19.011 PRIMARY OSTEOARTHRITIS OF RIGHT SHOULDER: ICD-10-CM

## 2021-04-07 PROCEDURE — 97140 MANUAL THERAPY 1/> REGIONS: CPT | Performed by: PHYSICAL THERAPIST

## 2021-04-07 PROCEDURE — 97112 NEUROMUSCULAR REEDUCATION: CPT | Performed by: PHYSICAL THERAPIST

## 2021-04-07 PROCEDURE — 97110 THERAPEUTIC EXERCISES: CPT | Performed by: PHYSICAL THERAPIST

## 2021-04-07 NOTE — PROGRESS NOTES
Daily Note     Today's date: 2021  Patient name: Narayan Silverio  : 1945  MRN: [id number]  Referring provider: Cass Earl DO  Dx:   Encounter Diagnosis     ICD-10-CM    1  Calcific tendinitis of right shoulder  M75 31    2  Impingement syndrome of right shoulder  M75 41    3  Primary osteoarthritis of right shoulder  M19 011    4  Chronic right shoulder pain  M25 511     G89 29        Start Time: 845  Stop Time: 930  Total time in clinic (min): 45 minutes    Subjective: patient reports R shoulder soreness      Objective: See treatment diary below      Assessment: good shoulder mobility post manual therapy, patient educated on the importance of stabilizing scapular while performing UE movement, endurance improving      Plan: Continue per plan of care  Progress treatment as tolerated         Precautions: none    Manual 3/22 3/24 3/29 4/1 4/5 4/7       MFR  15' 10'          vib/mass             Jt mobs     EG 15'       C/s man traction   5'          Neuro Re-Ed             BTB retract   NV [place] 10# 20x [place] 15# 2x15 15#  2x15       Prone I/Y/Ts  Is 20x 5" All 20x 5" 5" 20x ea         Prone retract  5" 30x 5" 30x          Pt ed posture   5'          Nick LPD    10# 20x 15# 2x15 15#  2x15                                 Ther Ex             Circuit - UE  20x           Pulleys  30x 30x 30x 30x 30x       Prom 15            Biceps 3-way    4# 20x ea 4# 20x 4#  20x       Grippers  40x  blck 50x  50x       HEP             UBE   3/2 3/3         Doorway stretch   15" 5x NV 3x30" 3x30"       Sitting t/s stretch over half foam roll   Ext/rot 1-2" 10x ea NV         Standing shoulder flexion    2# 20x 2# 20x 2#  20x       Standing shoulder abduction    2# 20x 2# 20x 2#  20x       Ther Activity             lift             reach             Gait Training                                       Modalities             E-Stim             MH

## 2021-04-12 ENCOUNTER — APPOINTMENT (OUTPATIENT)
Dept: RADIOLOGY | Facility: CLINIC | Age: 76
End: 2021-04-12
Payer: MEDICARE

## 2021-04-12 ENCOUNTER — OFFICE VISIT (OUTPATIENT)
Dept: OBGYN CLINIC | Facility: CLINIC | Age: 76
End: 2021-04-12
Payer: MEDICARE

## 2021-04-12 VITALS
BODY MASS INDEX: 37.22 KG/M2 | WEIGHT: 260 LBS | DIASTOLIC BLOOD PRESSURE: 77 MMHG | HEART RATE: 54 BPM | SYSTOLIC BLOOD PRESSURE: 131 MMHG | HEIGHT: 70 IN | RESPIRATION RATE: 20 BRPM

## 2021-04-12 DIAGNOSIS — R20.0 RIGHT LEG NUMBNESS: ICD-10-CM

## 2021-04-12 DIAGNOSIS — M75.31 CALCIFIC TENDINITIS OF RIGHT SHOULDER: ICD-10-CM

## 2021-04-12 DIAGNOSIS — M54.41 ACUTE RIGHT-SIDED LOW BACK PAIN WITH RIGHT-SIDED SCIATICA: ICD-10-CM

## 2021-04-12 DIAGNOSIS — M25.571 RIGHT ANKLE PAIN, UNSPECIFIED CHRONICITY: ICD-10-CM

## 2021-04-12 DIAGNOSIS — M76.61 ACHILLES TENDINITIS OF RIGHT LOWER EXTREMITY: Primary | ICD-10-CM

## 2021-04-12 PROCEDURE — 72100 X-RAY EXAM L-S SPINE 2/3 VWS: CPT

## 2021-04-12 PROCEDURE — 99214 OFFICE O/P EST MOD 30 MIN: CPT | Performed by: ORTHOPAEDIC SURGERY

## 2021-04-12 PROCEDURE — 73610 X-RAY EXAM OF ANKLE: CPT

## 2021-04-12 RX ORDER — METHYLPREDNISOLONE 4 MG/1
TABLET ORAL
Qty: 1 EACH | Refills: 0 | Status: SHIPPED | OUTPATIENT
Start: 2021-04-12 | End: 2021-05-17 | Stop reason: ALTCHOICE

## 2021-04-12 NOTE — PROGRESS NOTES
Patient Name:  Ashvin Huertas  MRN:  [id number]    [address] [place] [address]     1  Achilles tendinitis of right lower extremity  -     XR ankle 3+ vw right; Future; Expected date: 04/12/2021  -     Ambulatory referral to Physical Therapy; Future    2  Acute right-sided low back pain with right-sided sciatica  -     XR spine lumbar 2 or 3 views injury; Future; Expected date: 04/12/2021  -     methylPREDNISolone 4 MG tablet therapy pack; Use as directed on package  -     Ambulatory referral to Physical Therapy; Future  -     Ambulatory referral to Pain Management; Future    3  Calcific tendinitis of right shoulder  -     Ambulatory referral to Physical Therapy; Future         Patient reports mild improvement his right shoulder pain with physical therapy since his last visit  He also has newer onset Achilles tendinitis and right-sided sciatica  Degenerative changes noted in lumbar spine  X-rays of the lumbar spine and   Right ankle were reviewed and discussed with the patient in the office today  I have ordered a Medrol Dosepak for the patient  He was instructed to closely monitor his blood sugars and contact his primary care physician if his blood sugars were elevated for adjustment of his insulin  He does report that 1 year ago he had a Medrol Dosepak with minimal increase in his blood sugar  I have also ordered physical therapy to continue work on his shoulder range of motion as well as incorporate exercises for his Achilles tendinitis  He should also continue with home exercises for his right shoulder  I have also placed a referral for evaluation by Sp[facility] Pain Management  I will see the patient back in 6 weeks for repeat evaluation  If his shoulder pain persists or worsens we will consider referral for needle lavage  History of the Present Illness   Ashvin Huertas is a 67 y o  male with  Mildly improved right-sided shoulder pain    He reports mild improvement with physical therapy and home exercise program   He reports of newer onset right lower extremity pain that began about 1 month ago  The patient states that he was putting on his boot with a shoe or and felt a sudden onset of pain in his ankle  Ankle pain is worse with dorsiflexion of the ankle and he describes it as a stretching pain  He also describes another new onset pain diffusely throughout the right lower extremity  This is worse with certain seated positions including riding his motorcycle  Pain radiates up his leg to his buttock region  He denies any discrete numbness, tingling, or weakness  Pain in the right lower extremity has been getting worse he is riding his motorcycle  No other new complaints  Review of Systems     Review of Systems   Constitutional: Negative for appetite change and unexpected weight change  HENT: Negative for congestion and trouble swallowing  Eyes: Negative for visual disturbance  Respiratory: Negative for cough and shortness of breath  Cardiovascular: Negative for chest pain and palpitations  Gastrointestinal: Negative for nausea and vomiting  Endocrine: Negative for cold intolerance and heat intolerance  Musculoskeletal: Negative for gait problem and myalgias  Skin: Negative for rash  Neurological: Negative for numbness  Physical Exam     /77   Pulse (!) 54   Resp 20   Ht 5' 10" (1 778 m)   Wt 118 kg (260 lb)   BMI 37 31 kg/m²     Right Shoulder Exam  Active range of motion to 140 degrees forward flexion, 130 degrees abduction, 90 degrees external rotation, and internal rotation to SI Joint  Passive range of motion to 140  There is no tenderness present over the shoulder  Empty can testing  Elicits pain though strength is well maintained  There is 5/5 strength with external rotation testing at the side  Belly press test is negative  Woo test is positive  Lynden's test is positive  Speed's test is negative      The patient is neurovascularly intact distally in the extremity  Right Ankle: Active range of motion to 5 degrees of dorsiflexion and 50 degrees plantar flexion  There is  normal range of motion of toes in plantar flexion and dorsiflexion  There is no swelling or ecchymosis present  About the foot and ankle  There is tenderness present over the  Achilles tendon insertion  Skin is intact without sign of trauma  Negative Claros test   There is pain with resisted   Plantar flexion  Sensation is intact to light touch superficial peroneal, deep peroneal, tibial, saphenous, and sural nerve distributions  2+ DP pulse present  Lumbar spine: There is  no midline tenderness present  There is  no paraspinal tenderness  Sensation  intact to light touch left L2 through S1 dermatomes  Sensation  intact to light touch right L2 through S1 dermatomes 5/5 strength left iliopsoas, quadriceps, tibialis anterior, extensor hallucis longus, and gastrocsoleus  5/5 strength right iliopsoas, quadriceps, tibialis anterior, extensor hallucis longus, and gastrocsoleus  Negative clonus bilaterally  Negative Babinski bilaterally  Straight leg raise test is  Positive on the right eliciting pain radiating from the buttock down the leg to the ankle  The patient is well perfused distally  Data Review     I have personally reviewed pertinent films in PACS, and my interpretation follows  x-rays of the right ankle show no fracture or dislocation  Joint space is relatively well maintained  X-rays of the lumbar spine show no obvious fracture dislocation  Multilevel degenerative changes noted with anterior bridging osteophytes and foraminal narrowing present      Social History     Tobacco Use    Smoking status: Never Smoker    Smokeless tobacco: Never Used   Substance Use Topics    Alcohol use: Never     Frequency: Never    Drug use: No           Procedures    Khushbu Skains, DO

## 2021-04-13 ENCOUNTER — OFFICE VISIT (OUTPATIENT)
Dept: PHYSICAL THERAPY | Facility: CLINIC | Age: 76
End: 2021-04-13
Payer: MEDICARE

## 2021-04-13 DIAGNOSIS — M75.41 IMPINGEMENT SYNDROME OF RIGHT SHOULDER: ICD-10-CM

## 2021-04-13 DIAGNOSIS — M19.011 PRIMARY OSTEOARTHRITIS OF RIGHT SHOULDER: ICD-10-CM

## 2021-04-13 DIAGNOSIS — M75.31 CALCIFIC TENDINITIS OF RIGHT SHOULDER: Primary | ICD-10-CM

## 2021-04-13 PROCEDURE — 97110 THERAPEUTIC EXERCISES: CPT

## 2021-04-13 PROCEDURE — 97530 THERAPEUTIC ACTIVITIES: CPT

## 2021-04-13 PROCEDURE — 97112 NEUROMUSCULAR REEDUCATION: CPT

## 2021-04-13 NOTE — PROGRESS NOTES
Daily Note     Today's date: 2021  Patient name: Jeanie Matute  : 1945  MRN: [id number]  Referring provider: Dama Dandy, DO  Dx:   Encounter Diagnosis     ICD-10-CM    1  Calcific tendinitis of right shoulder  M75 31    2  Impingement syndrome of right shoulder  M75 41    3  Primary osteoarthritis of right shoulder  M19 011        Start Time: 0845  Stop Time: 930  Total time in clinic (min): 45 minutes    Subjective: Patient reports 3/10 pain in the shoulder today  Objective: See treatment diary below      Assessment: Patient tolerated session well  Increased resistance w/ Nick rows w/o difficulty  Added more shoulder stability exercises, which patient was challenged with and would benefit from continued skilled PT  Plan: Continue per plan of care        Precautions: none    Manual 3/22 3/24 3/29 4/1 4/5 4/7 4/13      MFR  15' 10'          vib/mass             Jt mobs     EG 15'       C/s man traction   5'          Neuro Re-Ed             BTB retract   NV [place] 10# 20x Nick 15# 2x15 15#  2x15 20# 2x15      Prone I/Y/Ts  Is 20x 5" All 20x 5" 5" 20x ea         Prone retract  5" 30x 5" 30x          Pt ed posture   5'          [place] LPD    10# 20x 15# 2x15 15#  2x15 20# 2x15                                Ther Ex             Circuit - UE  20x           Pulleys  30x 30x 30x 30x 30x 30x      Prom 15      10'      Biceps 3-way    4# 20x ea 4# 20x 4#  20x 4# 2x15      Grippers  40x  blck 50x  50x       HEP             UBE   3/2 3/3   4/4      Doorway stretch   15" 5x NV 3x30" 3x30" 30" 4x      Sitting t/s stretch over half foam roll   Ext/rot 1-2" 10x ea NV         Standing shoulder flexion    2# 20x 2# 20x 2#  20x       Standing shoulder abduction    2# 20x 2# 20x 2#  20x       Ther Activity             RTB driving motion       2x15 YTB      Standing shoulder abduction       YTB 2x10      Gait Training                                       Modalities             E-Stim

## 2021-04-15 ENCOUNTER — OFFICE VISIT (OUTPATIENT)
Dept: PHYSICAL THERAPY | Facility: CLINIC | Age: 76
End: 2021-04-15
Payer: MEDICARE

## 2021-04-15 DIAGNOSIS — M75.31 CALCIFIC TENDINITIS OF RIGHT SHOULDER: Primary | ICD-10-CM

## 2021-04-15 DIAGNOSIS — M75.41 IMPINGEMENT SYNDROME OF RIGHT SHOULDER: ICD-10-CM

## 2021-04-15 DIAGNOSIS — M19.011 PRIMARY OSTEOARTHRITIS OF RIGHT SHOULDER: ICD-10-CM

## 2021-04-15 PROCEDURE — 97140 MANUAL THERAPY 1/> REGIONS: CPT

## 2021-04-15 PROCEDURE — 97112 NEUROMUSCULAR REEDUCATION: CPT

## 2021-04-15 PROCEDURE — 97530 THERAPEUTIC ACTIVITIES: CPT

## 2021-04-15 NOTE — PROGRESS NOTES
Daily Note     Today's date: 4/15/2021  Patient name: Kevin Araiza  : 1945  MRN: [id number]  Referring provider: Yahaira Acuna DO  Dx:   Encounter Diagnosis     ICD-10-CM    1  Calcific tendinitis of right shoulder  M75 31    2  Impingement syndrome of right shoulder  M75 41    3  Primary osteoarthritis of right shoulder  M19 011        Start Time: 09  Stop Time: 1015  Total time in clinic (min): 45 minutes    Subjective: Patient reports 3/10 pain in the shoulder  Stated he is getting better  Objective: See treatment diary below      Assessment: Patient tolerated session well  Patient was challenged w/ tband exercises to promote stability and functional mobility  Provided patient HEP exercises  Improve muscular mobility post MFR to med deltoid  Plan: Continue per plan of care        Precautions: none    Manual 3/22 3/24 3/29 4/1 4/5 4/7 4/13 4/15     MFR  15' 10'     10'     vib/mass             Jt mobs     EG 15'       C/s man traction   5'          Neuro Re-Ed             BTB retract   NV [place] 10# 20x Nick 15# 2x15 15#  2x15 20# 2x15 20# 30x     Prone I/Y/Ts  Is 20x 5" All 20x 5" 5" 20x ea         Prone retract  5" 30x 5" 30x          Pt ed posture   5'          Nick LPD    10# 20x 15# 2x15 15#  2x15 20# 2x15 20# 30x                               Ther Ex             Circuit - UE  20x           Pulleys  30x 30x 30x 30x 30x 30x 30x     Prom 15      10'      Biceps 3-way    4# 20x ea 4# 20x 4#  20x 4# 2x15      Grippers  40x  blck 50x  50x       HEP             UBE   3/2 3/3   4/4 4/4     Doorway stretch   15" 5x NV 3x30" 3x30" 30" 4x      Sitting t/s stretch over half foam roll   Ext/rot 1-2" 10x ea NV         Standing shoulder flexion    2# 20x 2# 20x 2#  20x       Standing shoulder abduction    2# 20x 2# 20x 2#  20x       Ther Activity             RTB driving motion       2x15 YTB GTB 30x     ER bilat        GTB 30x     D2 flexion        GTB 30x                  Standing shoulder abduction       YTB 2x10 RTB 20x     Gait Training                                       Modalities             E-Stim             MH

## 2021-04-19 ENCOUNTER — OFFICE VISIT (OUTPATIENT)
Dept: PHYSICAL THERAPY | Facility: CLINIC | Age: 76
End: 2021-04-19
Payer: MEDICARE

## 2021-04-19 DIAGNOSIS — M19.011 PRIMARY OSTEOARTHRITIS OF RIGHT SHOULDER: ICD-10-CM

## 2021-04-19 DIAGNOSIS — G89.29 CHRONIC RIGHT SHOULDER PAIN: ICD-10-CM

## 2021-04-19 DIAGNOSIS — M75.41 IMPINGEMENT SYNDROME OF RIGHT SHOULDER: ICD-10-CM

## 2021-04-19 DIAGNOSIS — M75.31 CALCIFIC TENDINITIS OF RIGHT SHOULDER: Primary | ICD-10-CM

## 2021-04-19 DIAGNOSIS — M25.511 CHRONIC RIGHT SHOULDER PAIN: ICD-10-CM

## 2021-04-19 PROCEDURE — 97140 MANUAL THERAPY 1/> REGIONS: CPT

## 2021-04-19 PROCEDURE — 97110 THERAPEUTIC EXERCISES: CPT

## 2021-04-19 NOTE — PROGRESS NOTES
Daily Note     Today's date: 2021  Patient name: Moy Hager  : 1945  MRN: [id number]  Referring provider: Nasreen Wilks DO  Dx:   Encounter Diagnosis     ICD-10-CM    1  Calcific tendinitis of right shoulder  M75 31    2  Impingement syndrome of right shoulder  M75 41    3  Primary osteoarthritis of right shoulder  M19 011    4  Chronic right shoulder pain  M25 511     G89 29                   Subjective: Patient reports elevated pain since Friday  Elevated R shoulder and R sided low back pain reported  Patient also reports radicular pains to the R elbow  Patient reprots increased pain when performing D2 flexion- patient advised to hold exercise at this time  Objective: See treatment diary below      Assessment: Modified treamtnet program secondary to elevated pain levels, good tolerance to modified exercise, with encouragement to stay out of pain window- good carry over noted  Mild tightness in flexion at end range  VC utilized to facilitate form and dosage t/o TE, emphasis on scap setting and appropriate periscapular recruitment  Patient denies any increased pain post session  Patient would benefit from continued PT to improve recruitment patterns, reduce pain, and improve shoulder stability  Plan: Continue per plan of care        Precautions: none    Manual 3/22 3/24 3/29 4/1 4/5 4/7 4/13 4/15 4/19    MFR  15' 10'     10' 10'    vib/mass             Jt mobs     EG 15'       C/s man traction   5'          Neuro Re-Ed             BTB retract   NV [place] 10# 20x [place] 15# 2x15 15#  2x15 20# 2x15 20# 30x 20# 30x    Prone I/Y/Ts  Is 20x 5" All 20x 5" 5" 20x ea         Prone retract  5" 30x 5" 30x          Pt ed posture   5'          Nick LPD    10# 20x 15# 2x15 15#  2x15 20# 2x15 20# 30x 20# 30x    Median N glides         10x                 Ther Ex             Circuit - UE  20x           Pulleys  30x 30x 30x 30x 30x 30x 30x 30x    Prom 15      10' 10' 10'    Biceps 3-way    4# 20x ea 4# 20x 4#  20x 4# 2x15 4# 2x15 4# 2x15    Grippers  40x  blck 50x  50x       HEP             UBE   3/2 3/3   4/4 4/4 nt    Doorway stretch   15" 5x NV 3x30" 3x30" 30" 4x      Sitting t/s stretch over half foam roll   Ext/rot 1-2" 10x ea NV         Standing shoulder flexion    2# 20x 2# 20x 2#  20x       Standing shoulder abduction    2# 20x 2# 20x 2#  20x       Ther Activity             RTB driving motion       2x15 YTB GTB 30x     ER bilat        GTB 30x     D2 flexion        GTB 30x                  Standing shoulder abduction       YTB 2x10 RTB 20x     Gait Training                                       Modalities             E-Stim             MH

## 2021-04-20 ENCOUNTER — TELEPHONE (OUTPATIENT)
Dept: PAIN MEDICINE | Facility: CLINIC | Age: 76
End: 2021-04-20

## 2021-04-20 NOTE — TELEPHONE ENCOUNTER
Aware  Thanks  He recently got medrol dosepack from Dr Laurie Castro    This is more likely to raise sugar for a more persistent period compared to something like BRETT      Will discuss @ OV

## 2021-04-20 NOTE — TELEPHONE ENCOUNTER
Patient wanted to make provider aware that hes diabetic and cant have steroids injections  Assured patient that all will be covered in consult visit       cb [phone]

## 2021-04-21 ENCOUNTER — OFFICE VISIT (OUTPATIENT)
Dept: PHYSICAL THERAPY | Facility: CLINIC | Age: 76
End: 2021-04-21
Payer: MEDICARE

## 2021-04-21 ENCOUNTER — CONSULT (OUTPATIENT)
Dept: PAIN MEDICINE | Facility: CLINIC | Age: 76
End: 2021-04-21
Payer: MEDICARE

## 2021-04-21 VITALS
DIASTOLIC BLOOD PRESSURE: 76 MMHG | RESPIRATION RATE: 16 BRPM | HEIGHT: 70 IN | BODY MASS INDEX: 38.37 KG/M2 | SYSTOLIC BLOOD PRESSURE: 145 MMHG | HEART RATE: 76 BPM | WEIGHT: 268 LBS

## 2021-04-21 DIAGNOSIS — M75.41 IMPINGEMENT SYNDROME OF RIGHT SHOULDER: ICD-10-CM

## 2021-04-21 DIAGNOSIS — M54.41 ACUTE RIGHT-SIDED LOW BACK PAIN WITH RIGHT-SIDED SCIATICA: Primary | ICD-10-CM

## 2021-04-21 DIAGNOSIS — M25.511 CHRONIC RIGHT SHOULDER PAIN: ICD-10-CM

## 2021-04-21 DIAGNOSIS — M75.31 CALCIFIC TENDINITIS OF RIGHT SHOULDER: Primary | ICD-10-CM

## 2021-04-21 DIAGNOSIS — G89.29 CHRONIC RIGHT SHOULDER PAIN: ICD-10-CM

## 2021-04-21 DIAGNOSIS — M19.011 PRIMARY OSTEOARTHRITIS OF RIGHT SHOULDER: ICD-10-CM

## 2021-04-21 DIAGNOSIS — M51.36 LUMBAR DEGENERATIVE DISC DISEASE: ICD-10-CM

## 2021-04-21 DIAGNOSIS — M47.816 FACET ARTHRITIS OF LUMBAR REGION: ICD-10-CM

## 2021-04-21 PROCEDURE — 99204 OFFICE O/P NEW MOD 45 MIN: CPT | Performed by: STUDENT IN AN ORGANIZED HEALTH CARE EDUCATION/TRAINING PROGRAM

## 2021-04-21 PROCEDURE — 97110 THERAPEUTIC EXERCISES: CPT | Performed by: PHYSICAL THERAPIST

## 2021-04-21 PROCEDURE — 97112 NEUROMUSCULAR REEDUCATION: CPT | Performed by: PHYSICAL THERAPIST

## 2021-04-21 PROCEDURE — 97140 MANUAL THERAPY 1/> REGIONS: CPT | Performed by: PHYSICAL THERAPIST

## 2021-04-21 NOTE — PROGRESS NOTES
Assessment  1  Acute right-sided low back pain with right-sided sciatica    2  Lumbar degenerative disc disease    3  Facet arthritis of lumbar region        Plan  THIS IS A 67YEAR-OLD MALE who presents to our office with chief complaint  Bilateral low back pain with right lower extremity radiculopathy in what appears to be in L4/L5 dermatomal distribution  He has diminished patellar reflex on the right  Strength is intact 5/5   Except for mild right hip flexor weakness  No sensory deficits  Also has component of axial low back pain in the setting of multilevel degenerative disc disease and facet joint arthritis  Has positive facet loading on exam as well  Will order updated script for physical therapy to help address the lower back  Will also order MRI of the lumbar spine to assess for any compressive pathology  Results of imaging will determine next course of treatment which may include lumbar epidural steroid injection which was discussed in the office today  We specifically discussed elevated blood sugar in the setting of epidural steroid injection  Explained that the hyperglycemia following steroid injections is transient  His last A1c  Was 6 7  He did also recently received Medrol Dosepak and did well with that  Reports that about 10 years ago he had significantly elevated hyperglycemia following steroid joint injection  I did explain to the patient that it appears his sugars are more well controlled now and that  Although some hyperglycemia is expected, that with  Vigilance in monitoring, this is unlikely to be an issue  [place] Prescription Drug Monitoring Program report was reviewed and was appropriate     My impressions and treatment recommendations were discussed in detail with the patient who verbalized understanding and had no further questions  Discharge instructions were provided   I personally saw and examined the patient and I agree with the above discussed plan of care     Orders Placed This Encounter   Procedures    MRI lumbar spine without contrast     Standing Status:   Future     Standing Expiration Date:   4/21/2025     Scheduling Instructions: There is no preparation for this test  Please leave your jewelry and valuables at home, wedding rings are the exception  Magnetic nail polish must be removed prior to arrival for your test  Please bring your insurance cards, a form of photo ID and a list of your medications with you  Arrive 15 minutes prior to your appointment time in order to register  Please bring any prior CT or MRI studies of this area that were not performed at a [facility]  To schedule this appointment, please contact Central Scheduling at [phone]  Prior to your appointment, please make sure you complete the MRI Screening Form when you e-Check in for your appointment  This will be available starting 7 days before your appointment in [address]  You may receive an e-mail with an activation code if you do not have a [facility] account  If you do not have access to a device, we will complete your screening at your appointment  Order Specific Question:   What is the patient's sedation requirement? Answer:   No Sedation     Order Specific Question:   Release to patient through [employer]     Answer:   Immediate     Order Specific Question:   Is order priority selected as STAT?      Answer:   No     Order Specific Question:   Reason for Exam (FREE TEXT)     Answer:   Right lower extremity radiculopathy    Ambulatory referral to Physical Therapy     Standing Status:   Future     Standing Expiration Date:   4/21/2022     Referral Priority:   Routine     Referral Type:   Physical Therapy     Referral Reason:   Specialty Services Required     Requested Specialty:   Physical Therapy     Number of Visits Requested:   1     Expiration Date:   4/21/2022     No orders of the defined types were placed in this encounter  History of Present Illness    Referring Provider: Reedia DO Magnus    Ginny Barillas is a 67 y o  male who presents with a chief complaint of  RIGHT-SIDED LOW BACK PAIN RADIATION DOWN RIGHT LOWER EXTREMITY  THIS IS ACUTE ISSUE That started 2 months ago  First noticed it while riding his motorcycle  Referred here by Dr Umm Davila any for possible lumbar radiculopathy    Pain is described as moderate to severe in intensity  Current pain is 10/10  Reports having pain occasionally  During the past month, pain is been present at all times with no typical pattern  Described as dull/aching in nature  Pain starts in the back and radiates down the anterolateral thigh, past the knee and wraps more anteriorly into the shin  Does not go into the foot  Appears to be L4/5 dermatome by description  He has x-ray of the lumbar spine showing multilevel degenerative disc disease and facet joint arthritis  No previous back surgery  Had knee replacement surgery 10 years ago  Past medical history includes diabetes, high cholesterol  Patient notably is concerned about any injections with steroids raising his blood sugar  He was notably however put on Medrol Dosepak by Dr Umm Davila any recently  He is currently undergoing physical therapy in regards to his shoulder pain, they have not address the low back  Next does not smoke tobacco or marijuana  Does drink alcohol  He is currently on Eliquis  Not allergic to latex or contrast dye  Past medications include Tylenol, aspirin, ibuprofen, oral steroids with mild relief        I have personally reviewed and/or updated the patient's past medical history, past surgical history, family history, social history, current medications, allergies, and vital signs today  Review of Systems   Constitutional: Negative for fever and unexpected weight change  HENT: Negative for trouble swallowing  Eyes: Negative for visual disturbance     Respiratory: Negative for shortness of breath and wheezing  Cardiovascular: Negative for chest pain and palpitations  Gastrointestinal: Negative for constipation, diarrhea, nausea and vomiting  Endocrine: Negative for cold intolerance, heat intolerance and polydipsia  Genitourinary: Negative for difficulty urinating and frequency  Musculoskeletal: Negative for arthralgias, gait problem, joint swelling and myalgias  Skin: Negative for rash  Neurological: Negative for dizziness, seizures, syncope, weakness and headaches  Hematological: Does not bruise/bleed easily  Psychiatric/Behavioral: Negative for dysphoric mood  All other systems reviewed and are negative  Patient Active Problem List   Diagnosis    Gastroesophageal reflux disease with esophagitis    Adenomatous polyp of descending colon    Chronic superficial gastritis with bleeding    Type 2 diabetes mellitus, with long-term current use of insulin (HCC)    Essential hypertension    Chronic cough    Chest pain    Leukocytosis    Elevated troponin    JED (obstructive sleep apnea)    Acute pulmonary embolism (HCC)    HLD (hyperlipidemia)    S/P ORIF (open reduction internal fixation) left distal tibia fracture    Pulmonary hypertension ([facility] [address] )       Past Medical History:   Diagnosis Date    Diabetes mellitus ([facility] [address] )     Hiatal hernia     Hyperlipidemia     Hypertension     Neuropathy     Shortness of breath     Venous insufficiency (chronic) (peripheral)        Past Surgical History:   Procedure Laterality Date    IR PE ENDOVASCULAR THERAPY  8/18/2020    JOINT REPLACEMENT Bilateral     knees    LEG SURGERY      MD COLONOSCOPY FLX DX W/COLLJ SPEC WHEN PFRMD N/A 6/30/2017    Procedure: EGD AND COLONOSCOPY;  Surgeon: Maryellen Dailey MD;  Location: [facility];   Service: General    TONSILLECTOMY         Family History   Problem Relation Age of Onset    Cancer Mother     Cancer Father        Social History     Occupational History    Not on file   Tobacco Use    Smoking status: Never Smoker    Smokeless tobacco: Never Used   Substance and Sexual Activity    Alcohol use: Never     Frequency: Never    Drug use: No    Sexual activity: Not on file       Current Outpatient Medications on File Prior to Visit   Medication Sig    apixaban (ELIQUIS) 5 mg Take 1 tablet (5 mg total) by mouth 2 (two) times a day    B-D ULTRAFINE III SHORT PEN 31G X 8 MM MISC USE TO INJECT INSULIN THREE TIMES DAILY    Cholecalciferol (VITAMIN D) 2000 units CAPS Take by mouth    Empagliflozin (JARDIANCE) 25 MG TABS Take 25 mg by mouth every morning    HUMALOG 100 UNIT/ML injection INJECT DAILY AS DIRECTED PER SLIDING SCALE    Insulin Degludec (TRESIBA FLEXTOUCH SC) Inject 26 Units under the skin daily at bedtime     multivitamin-iron-minerals-folic acid (CENTRUM) chewable tablet Chew 1 tablet daily    Omega-3 Fatty Acids (FISH OIL CONCENTRATE PO) Take by mouth    ONETOUCH VERIO test strip TEST 2 TIMES A DAY DX E11 9    simvastatin (ZOCOR) 40 mg tablet Take 40 mg by mouth daily at bedtime    apixaban (ELIQUIS) 5 mg Take 2 tablets (10 mg total) by mouth 2 (two) times a day for 5 days    methylPREDNISolone 4 MG tablet therapy pack Use as directed on package    SANTYL ointment      No current facility-administered medications on file prior to visit  No Known Allergies    Physical Exam    /76   Pulse 76   Resp 16   Ht 5' 10" (1 778 m)   Wt 122 kg (268 lb)   BMI 38 45 kg/m²     Constitutional: normal, well developed, well nourished, alert, in no distress and non-toxic and no overt pain behavior    Eyes: anicteric  HEENT: grossly intact  Neck: supple, symmetric, trachea midline and no masses   Pulmonary:even and unlabored  Cardiovascular:No edema or pitting edema present  Skin:Normal without rashes or lesions and well hydrated  Psychiatric:Mood and affect appropriate  Neurologic:Cranial Nerves II-XII grossly intact  Musculoskeletal:normal    Lumbar Spine Exam    Appearance:  Normal lordosis  Palpation/Tenderness:  no tenderness or spasm  Sensory:  no sensory deficits noted  Range of Motion:  Flexion:  Minimally limited  without pain  Extension:  Moderately limited  with pain  Lateral Flexion - Left:  Moderately limited  with pain  Lateral Flexion - Right:  Moderately limited  with pain  Rotation - Left:  Moderately limited  with pain  Rotation - Right:  Moderately limited  with pain  Motor Strength:  Left hip flexion:  5/5  Left hip extension:  5/5  Right hip flexion:  4/5  Right hip extension:  5/5  Left knee flexion:  5/5  Left knee extension:  5/5  Right knee flexion:  5/5  Right knee extension:  5/5  Left foot dorsiflexion:  5/5  Left foot plantar flexion:  5/5  Right foot dorsiflexion:  5/5  Right foot plantar flexion:  5/5  Reflexes:  Left Patellar:  2+   Right Patellar:  absent   Left Achilles:  2+   Right Achilles:  2+   Special Tests:  Left Straight Leg Test:  negative  Right Straight Leg Test:  positive    DIAGNOSTIC IMAGING AND TEST RESULTS:  LUMBAR SPINE     INDICATION:   R20 0: Anesthesia of skin  Low back pain     COMPARISON:  None     VIEWS:  XR SPINE LUMBAR 2 OR 3 VIEWS INJURY        FINDINGS:     There are 5 non rib bearing lumbar vertebral bodies       There is no evidence of acute fracture or destructive osseous lesion      There is mild rotatory levoscoliosis      Multilevel degenerative disc disease of the lumbar spine noted, with the most severe degree of narrowing at L5-S1  Bilateral facet joint osteoarthritis present   Endplate spurring present      The pedicles appear intact      Soft tissues are unremarkable      IMPRESSION:     Multilevel degenerative disc disease and facet joint osteoarthritis of the lumbar spine      No compression fracture or subluxation

## 2021-04-21 NOTE — PATIENT INSTRUCTIONS
Epidural Steroid Injection   AMBULATORY CARE:   What you need to know about an epidural steroid injection (ALMA):  An ALMA is a procedure to inject steroid medicine into the epidural space  The epidural space is between your spinal cord and vertebrae  Steroids reduce inflammation and fluid buildup in your spine that may be causing pain  You may be given pain medicine along with the steroids  How to prepare for an ALMA:  Your healthcare provider will talk to you about how to prepare for your procedure  He or she will tell you what medicines to take or not take on the day of your procedure  You may need to stop taking blood thinners or other medicines several days before your procedure  You may need to adjust any diabetes medicine you take on the day of your procedure  Steroid medicine can increase your blood sugar level  Arrange for someone to drive you home when you are discharged  What will happen during an ALMA:   · You will be given medicine to numb the procedure area  You will be awake for the procedure, but you will not feel pain  You may also be given medicine to help you relax  Contrast liquid will be used to help your healthcare provider see the area better  Tell the healthcare provider if you have ever had an allergic reaction to contrast liquid  · Your healthcare provider may place the needle into your neck area, middle of your back, or tailbone area  He may inject the medicine next to the nerves that are causing your pain  He may instead inject the medicine into a larger area of the epidural space  This helps the medicine spread to more nerves  Your healthcare provider will use a fluoroscope to help guide the needle to the right place  A fluoroscope is a type of x-ray  After the procedure, a bandage will be placed over the injection site to prevent infection  What will happen after an ALMA:  You will have a bandage over the injection site to prevent infection   Your healthcare provider will tell you when you can bathe and any activity guidelines  You will be able to go home  Risks of an ALMA:  You may have temporary or permanent nerve damage or paralysis  You may have bleeding or develop a serious infection, such as meningitis (swelling of the brain coverings)  An abscess may also develop  An abscess is a pus-filled area under the skin  You may need surgery to fix the abscess  You may have a seizure, anxiety, or trouble sleeping  If you are a man, you may have temporary erectile dysfunction (not able to have an erection)  Call your local emergency number (911 in the [address] [place] [address],[address]) if:   · You have a seizure  · You have trouble moving your legs  Seek care immediately if:   · Blood soaks through your bandage  · You have a fever or chills, severe back pain, and the procedure area is sensitive to the touch  · You cannot control when you urinate or have a bowel movement  Call your doctor if:   · You have weakness or numbness in your legs  · Your wound is red, swollen, or draining pus  · You have nausea or are vomiting  · Your face or neck is red and you feel warm  · You have more pain than you had before the procedure  · You have swelling in your hands or feet  · You have questions or concerns about your condition or care  Care for your wound as directed: You may remove the bandage before you go to bed the day of your procedure  You may take a shower, but do not take a bath for at least 24 hours  Self-care:   · Do not drive,  use machines, or do strenuous activity for 24 hours after your procedure or as directed  · Continue other treatments  as directed  Steroid injections alone will not control your pain  The injections are meant to be used with other treatments, such as physical therapy  Follow up with your doctor as directed:  Write down your questions so you remember to ask them during your visits     © Copyright [employer] 2020 Information is for End User's use only and may not be sold, redistributed or otherwise used for commercial purposes  All illustrations and images included in CareNotes® are the copyrighted property of [employer]  or Emmy Garib[employer]  The above information is an  only  It is not intended as medical advice for individual conditions or treatments  Talk to your doctor, nurse or pharmacist before following any medical regimen to see if it is safe and effective for you

## 2021-04-21 NOTE — PROGRESS NOTES
RE-CERTIFICATION     Today's date: 2021  Patient name: Ashvin Huertas  : 1945  MRN: [id number]  Referring provider: Merlin Mars, DO  Dx:   Encounter Diagnosis     ICD-10-CM    1  Calcific tendinitis of right shoulder  M75 31    2  Impingement syndrome of right shoulder  M75 41    3  Primary osteoarthritis of right shoulder  M19 011    4  Chronic right shoulder pain  M25 511     G89 29        Start Time: 845  Stop Time: 930  Total time in clinic (min): 45 minutes    Subjective: patient reports R shoulder pain -2/10 today    Objective: See treatment diary below      Assessment: decreased pain; increased Rom and Strength; able to reach overhead; reach behind the back with 50% decreased difficulty      Plan: Continue per plan of care   2x/week - 4 weeks; STGs MET; LTGs 50% MET     Precautions: none    Manual 3/22 3/24 3/29 4/1 4/5 4/7 4/13 4/15 4/19 4/21   MFR  15' 10'     10' 10' 10   vib/mass             Jt mobs     EG 15'       C/s man traction   5'       5   Neuro Re-Ed             BTB retract   NV Nick 10# 20x [place] 15# 2x15 15#  2x15 20# 2x15 20# 30x 20# 30x [place] mid/low rows x30 ea   Prone I/Y/Ts  Is 20x 5" All 20x 5" 5" 20x ea         Prone retract  5" 30x 5" 30x          Pt ed posture   5'          Nick LPD    10# 20x 15# 2x15 15#  2x15 20# 2x15 20# 30x 20# 30x x30   Median N glides         10x                 Ther Ex             Circuit - UE  20x           Pulleys  30x 30x 30x 30x 30x 30x 30x 30x 30x   Prom 15      10' 10' 10' 5'   Biceps 3-way    4# 20x ea 4# 20x 4#  20x 4# 2x15 4# 2x15 4# 2x15 4#  2x15   Grippers  40x  blck 50x  50x       HEP             UBE   3/2 3/3   4/4 4/4 nt    Doorway stretch   15" 5x NV 3x30" 3x30" 30" 4x      Sitting t/s stretch over half foam roll   Ext/rot 1-2" 10x ea NV         Standing shoulder flexion    2# 20x 2# 20x 2#  20x       Standing shoulder abduction    2# 20x 2# 20x 2#  20x       Ther Activity             RTB driving motion       2x15 YTB GTB 30x     ER bilat        GTB 30x     D2 flexion        GTB 30x                  Standing shoulder abduction       YTB 2x10 RTB 20x     Gait Training                                       Modalities             E-Stim             MH

## 2021-04-21 NOTE — LETTER
2021    Edgar Vigil DO  [address] [facility]  [address]  [facility] [address]    Patient: Jordin Vo   YOB: 1945   Date of Visit: 2021     Encounter Diagnosis     ICD-10-CM    1  Calcific tendinitis of right shoulder  M75 31    2  Impingement syndrome of right shoulder  M75 41    3  Primary osteoarthritis of right shoulder  M19 011    4  Chronic right shoulder pain  M25 511     G89 29        Dear Dr Rodrigo Pat:    Thank you for your recent referral of Jordin Vo  Please review the attached evaluation summary from [facility] recent visit  Please verify that you agree with the plan of care by signing the attached order  If you have any questions or concerns, please do not hesitate to call  I sincerely appreciate the opportunity to share in the care of one of your patients and hope to have another opportunity to work with you in the near future  Sincerely,    Shyla Siegel PT      Referring Provider:      I certify that I have read the below Plan of Care and certify the need for these services furnished under this plan of treatment while under my care  Edgar Vigil DO  [address] [facility]  [address]  [facility] [address]  [place]          RE-CERTIFICATION     Today's date: 2021  Patient name: Jordin Vo  : 1945  MRN: [id number]  Referring provider: Mirtha Jackson DO  Dx:   Encounter Diagnosis     ICD-10-CM    1  Calcific tendinitis of right shoulder  M75 31    2  Impingement syndrome of right shoulder  M75 41    3  Primary osteoarthritis of right shoulder  M19 011    4   Chronic right shoulder pain  M25 511     G89 29        Start Time: 0845  Stop Time: 09  Total time in clinic (min): 45 minutes    Subjective: patient reports R shoulder pain 1-2/10 today    Objective: See treatment diary below      Assessment: decreased pain; increased Rom and Strength; able to reach overhead; reach behind the back with 50% decreased difficulty      Plan: Continue per plan of care   2x/week - 4 weeks; STGs MET; LTGs 50% MET     Precautions: none    Manual 3/22 3/24 3/29 4/1 4/5 4/7 4/13 4/15 4/19 4/21   MFR  15' 10'     10' 10' 10   vib/mass             Jt mobs     EG 15'       C/s man traction   5'       5   Neuro Re-Ed             BTB retract   NV [place] 10# 20x [place] 15# 2x15 15#  2x15 20# 2x15 20# 30x 20# 30x Nick mid/low rows x30 ea   Prone I/Y/Ts  Is 20x 5" All 20x 5" 5" 20x ea         Prone retract  5" 30x 5" 30x          Pt ed posture   5'          Nick LPD    10# 20x 15# 2x15 15#  2x15 20# 2x15 20# 30x 20# 30x x30   Median N glides         10x                 Ther Ex             Circuit - UE  20x           Pulleys  30x 30x 30x 30x 30x 30x 30x 30x 30x   Prom 15      10' 10' 10' 5'   Biceps 3-way    4# 20x ea 4# 20x 4#  20x 4# 2x15 4# 2x15 4# 2x15 4#  2x15   Grippers  40x  blck 50x  50x       HEP             UBE   3/2 3/3   4/4 4/4 nt    Doorway stretch   15" 5x NV 3x30" 3x30" 30" 4x      Sitting t/s stretch over half foam roll   Ext/rot 1-2" 10x ea NV         Standing shoulder flexion    2# 20x 2# 20x 2#  20x       Standing shoulder abduction    2# 20x 2# 20x 2#  20x       Ther Activity             RTB driving motion       2x15 YTB GTB 30x     ER bilat        GTB 30x     D2 flexion        GTB 30x                  Standing shoulder abduction       YTB 2x10 RTB 20x     Gait Training                                       Modalities             E-Stim

## 2021-04-27 ENCOUNTER — OFFICE VISIT (OUTPATIENT)
Dept: PHYSICAL THERAPY | Facility: CLINIC | Age: 76
End: 2021-04-27
Payer: MEDICARE

## 2021-04-27 DIAGNOSIS — G89.29 CHRONIC RIGHT SHOULDER PAIN: ICD-10-CM

## 2021-04-27 DIAGNOSIS — M19.011 PRIMARY OSTEOARTHRITIS OF RIGHT SHOULDER: ICD-10-CM

## 2021-04-27 DIAGNOSIS — M25.511 CHRONIC RIGHT SHOULDER PAIN: ICD-10-CM

## 2021-04-27 DIAGNOSIS — M75.41 IMPINGEMENT SYNDROME OF RIGHT SHOULDER: ICD-10-CM

## 2021-04-27 DIAGNOSIS — M75.31 CALCIFIC TENDINITIS OF RIGHT SHOULDER: Primary | ICD-10-CM

## 2021-04-27 PROCEDURE — 97110 THERAPEUTIC EXERCISES: CPT

## 2021-04-27 PROCEDURE — 97140 MANUAL THERAPY 1/> REGIONS: CPT

## 2021-04-27 PROCEDURE — 97112 NEUROMUSCULAR REEDUCATION: CPT

## 2021-04-27 NOTE — PROGRESS NOTES
Daily Note     Today's date: 2021  Patient name: Jacqui West  : 1945  MRN: [id number]  Referring provider: Hermelindo Hilliard DO  Dx:   Encounter Diagnosis     ICD-10-CM    1  Calcific tendinitis of right shoulder  M75 31    2  Impingement syndrome of right shoulder  M75 41    3  Primary osteoarthritis of right shoulder  M19 011    4  Chronic right shoulder pain  M25 511     G89 29                   Subjective: Patient stated that his shoulder is moving a little better  Objective: See treatment diary below      Assessment: Tolerated treatment well  Fair standing posture with TE, fair carryover from VC  Improved parascapular activation with TC and postural corrections to increase scapular retractions  Active and passive ROM limitations noted in abd and flex  Patient would benefit from continued PT      Plan: Continue per plan of care         Precautions: none    Manual 3/22 3/24 3/29 4/1 4/5 4/7 4/13 4/15 4/19 4/21 4/27   MFR  15' 10'     10' 10' 10 MSB   vib/mass              Jt mobs     EG 15'        C/s man traction   5'       5 MSB   Neuro Re-Ed              BTB retract   NV [place] 10# 20x Nick 15# 2x15 15#  2x15 20# 2x15 20# 30x 20# 30x Nick mid/low rows x30 ea    Nick Mid/low rows 30x ea 20#   Prone I/Y/Ts  Is 20x 5" All 20x 5" 5" 20x ea          Prone retract  5" 30x 5" 30x           Pt ed posture   5'           Nick LPD    10# 20x 15# 2x15 15#  2x15 20# 2x15 20# 30x 20# 30x x30 20# 30x   Median N glides         10x                   Ther Ex              Circuit - UE  20x            Pulleys  30x 30x 30x 30x 30x 30x 30x 30x 30x 30x   Prom 15      10' 10' 10' 5' 5'   Biceps 3-way    4# 20x ea 4# 20x 4#  20x 4# 2x15 4# 2x15 4# 2x15 4#  2x15 4#  2x15   Grippers  40x  blck 50x  50x        HEP              UBE   3/ 3/3   4/ 4/4 nt     Doorway stretch   15" 5x NV 3x30" 3x30" 30" 4x       Sitting t/s stretch over half foam roll   Ext/rot 1-2" 10x ea NV          Standing shoulder flexion    2# 20x 2# 20x 2#  20x        Standing shoulder abduction    2# 20x 2# 20x 2#  20x        Ther Activity              RTB driving motion       2x15 YTB GTB 30x      ER bilat        GTB 30x   GTB 30x   D2 flexion        GTB 30x                    Standing shoulder abduction       YTB 2x10 RTB 20x      Gait Training                                          Modalities              E-Stim              MH

## 2021-04-29 ENCOUNTER — OFFICE VISIT (OUTPATIENT)
Dept: PHYSICAL THERAPY | Facility: CLINIC | Age: 76
End: 2021-04-29
Payer: MEDICARE

## 2021-04-29 DIAGNOSIS — M25.511 CHRONIC RIGHT SHOULDER PAIN: ICD-10-CM

## 2021-04-29 DIAGNOSIS — M75.41 IMPINGEMENT SYNDROME OF RIGHT SHOULDER: ICD-10-CM

## 2021-04-29 DIAGNOSIS — M19.011 PRIMARY OSTEOARTHRITIS OF RIGHT SHOULDER: ICD-10-CM

## 2021-04-29 DIAGNOSIS — M75.31 CALCIFIC TENDINITIS OF RIGHT SHOULDER: Primary | ICD-10-CM

## 2021-04-29 DIAGNOSIS — G89.29 CHRONIC RIGHT SHOULDER PAIN: ICD-10-CM

## 2021-04-29 PROCEDURE — 97110 THERAPEUTIC EXERCISES: CPT

## 2021-04-29 PROCEDURE — 97112 NEUROMUSCULAR REEDUCATION: CPT

## 2021-04-29 PROCEDURE — 97140 MANUAL THERAPY 1/> REGIONS: CPT

## 2021-04-29 NOTE — PROGRESS NOTES
Daily Note     Today's date: 2021  Patient name: Florian Elizabeth  : 1945  MRN: [id number]  Referring provider: Tracey Villaseñor DO  Dx:   Encounter Diagnosis     ICD-10-CM    1  Calcific tendinitis of right shoulder  M75 31    2  Impingement syndrome of right shoulder  M75 41    3  Primary osteoarthritis of right shoulder  M19 011    4  Chronic right shoulder pain  M25 511     G89 29                   Subjective: Pt reports continued difficulty with reaching behind his back  He is having less difficulty with reaching overhead  Objective: See treatment diary below      Assessment: Added AAROM with wand shoulder extension and internal rotation with good tolerance; no increases in pain  Added ball on wall for scapular stability  Consistent cues t/o therapy session to ensure posterior tilt of scapula  Plan: Continue with current POC to address pt deficits        Precautions: none    Manual 3/22 3/24 3/29 4/1 4/5 4/7 4/13 4/15 4/19 4/21 4/27 4/29   MFR  15' 10'     10' 10' 10 MSB TB   vib/mass               Jt mobs     EG 15'         C/s man traction   5'       5 MSB TB   Neuro Re-Ed               BTB retract   NV Nick 10# 20x Nick 15# 2x15 15#  2x15 20# 2x15 20# 30x 20# 30x Nick mid/low rows x30 ea    [place] Mid/low rows 30x ea 20# [place] mid/low rows 30x ea 20#   Prone I/Y/Ts  Is 20x 5" All 20x 5" 5" 20x ea           Prone retract  5" 30x 5" 30x            Pt ed posture   5'            [place] LPD    10# 20x 15# 2x15 15#  2x15 20# 2x15 20# 30x 20# 30x x30 20# 30x 20# 30x   Median N glides         10x      Ball on wall scap stab            20xcw 20x ccw   Ther Ex               AAROM shoulder ext/internal rotation            10"x10/10"x10   Circuit - UE  20x             Pulleys  30x 30x 30x 30x 30x 30x 30x 30x 30x 30x NP-resume NV   Prom 15      10' 10' 10' 5' 5'    Biceps 3-way    4# 20x ea 4# 20x 4#  20x 4# 2x15 4# 2x15 4# 2x15 4#  2x15 4#  2x15 4#2x15   Grippers  40x  blck 50x  50x HEP               UBE   3/2 3/3   4/4 4/4 nt   3'/3'   Doorway stretch   15" 5x NV 3x30" 3x30" 30" 4x        Sitting t/s stretch over half foam roll   Ext/rot 1-2" 10x ea NV           Standing shoulder flexion    2# 20x 2# 20x 2#  20x         Standing shoulder abduction    2# 20x 2# 20x 2#  20x         Ther Activity               RTB driving motion       2x15 YTB GTB 30x       ER bilat        GTB 30x   GTB 30x GTB 30x   D2 flexion        GTB 30x                      Standing shoulder abduction       YTB 2x10 RTB 20x       Gait Training                                             Modalities               E-Stim               MH

## 2021-05-04 ENCOUNTER — OFFICE VISIT (OUTPATIENT)
Dept: PHYSICAL THERAPY | Facility: CLINIC | Age: 76
End: 2021-05-04
Payer: MEDICARE

## 2021-05-04 DIAGNOSIS — M75.31 CALCIFIC TENDINITIS OF RIGHT SHOULDER: Primary | ICD-10-CM

## 2021-05-04 DIAGNOSIS — M25.511 CHRONIC RIGHT SHOULDER PAIN: ICD-10-CM

## 2021-05-04 DIAGNOSIS — M19.011 PRIMARY OSTEOARTHRITIS OF RIGHT SHOULDER: ICD-10-CM

## 2021-05-04 DIAGNOSIS — G89.29 CHRONIC RIGHT SHOULDER PAIN: ICD-10-CM

## 2021-05-04 DIAGNOSIS — M75.41 IMPINGEMENT SYNDROME OF RIGHT SHOULDER: ICD-10-CM

## 2021-05-04 PROCEDURE — 97530 THERAPEUTIC ACTIVITIES: CPT

## 2021-05-04 PROCEDURE — 97140 MANUAL THERAPY 1/> REGIONS: CPT

## 2021-05-04 PROCEDURE — 97112 NEUROMUSCULAR REEDUCATION: CPT

## 2021-05-04 PROCEDURE — 97110 THERAPEUTIC EXERCISES: CPT

## 2021-05-04 NOTE — PROGRESS NOTES
Daily Note     Today's date: 2021  Patient name: Adam Fernando  : 1945  MRN: [id number]  Referring provider: Juhi Smith DO  Dx:   Encounter Diagnosis     ICD-10-CM    1  Calcific tendinitis of right shoulder  M75 31    2  Impingement syndrome of right shoulder  M75 41    3  Primary osteoarthritis of right shoulder  M19 011    4  Chronic right shoulder pain  M25 511     G89 29        Start Time: 0900  Stop Time: 09  Total time in clinic (min): 53 minutes    Subjective: Pt reports increased soreness in to R shoulder today  Objective: See treatment diary below      Assessment: Improvement in tissue quality post manuals to R UT, lateral deltoid, periscap musculature  Emphasis on scapular positioning to ensure posterior tilt during exercises and avoid UT compensation  Initiated more functional carries/lifts to simulate daily activities  Pt reported no increases in pain t/o tx session  Plan: Continue with current POC to address pt deficits        Precautions: none    Manual 4/7 4/13 4/15 4/19 4/21 4/27 4/29 5/4   MFR   10' 10' 10 MSB TB TB   vib/mass           Jt mobs 15'          C/s man traction     5 MSB TB TB   Neuro Re-Ed           BTB retract 15#  2x15 20# 2x15 20# 30x 20# 30x Nick mid/low rows x30 ea    Nick Mid/low rows 30x ea 20# Nick mid/low rows 30x ea 20# Nick rows 30x ea 20#   Prone I/Y/Ts           Prone retract           Pt ed posture        3' standing   Nick LPD 15#  2x15 20# 2x15 20# 30x 20# 30x x30 20# 30x 20# 30x 20# 30x   Median N glides    10x       Ball on wall scap stab       20xcw 20x ccw NP   Ther Ex           AAROM shoulder extension/internal rotation       10"x10/10"x10 10"x10/10"x10   Circuit - UE           Pulleys 30x 30x 30x 30x 30x 30x NP-resume NV 30x   Prom  10' 10' 10' 5' 5'  7'   Biceps 3-way 4#  20x 4# 2x15 4# 2x15 4# 2x15 4#  2x15 4#  2x15 4#2x15    Grippers 50x          HEP           UBE   nt   3'/3' 3'/3'   Doorway stretch 3x30" 30" 4x         Sitting t/s stretch over half foam roll           Standing shoulder flexion 2#  20x          Standing shoulder abduction 2#  20x          Ther Activity           RTB driving motion  0S45 YTB GTB 30x        ER bilat   GTB 30x   GTB 30x GTB 30x RTB 20x   D2 flexion   GTB 30x     RTB 20x   Carry OH        4# 3 laps   Suitcase carry         10# DB 3 laps   Standing shoulder abduction  YTB 2x10 RTB 20x     RTB 20x supine   Gait Training                                 Modalities           E-Stim           MH

## 2021-05-06 ENCOUNTER — OFFICE VISIT (OUTPATIENT)
Dept: PHYSICAL THERAPY | Facility: CLINIC | Age: 76
End: 2021-05-06
Payer: MEDICARE

## 2021-05-06 DIAGNOSIS — M25.511 CHRONIC RIGHT SHOULDER PAIN: ICD-10-CM

## 2021-05-06 DIAGNOSIS — M75.31 CALCIFIC TENDINITIS OF RIGHT SHOULDER: Primary | ICD-10-CM

## 2021-05-06 DIAGNOSIS — M75.41 IMPINGEMENT SYNDROME OF RIGHT SHOULDER: ICD-10-CM

## 2021-05-06 DIAGNOSIS — M19.011 PRIMARY OSTEOARTHRITIS OF RIGHT SHOULDER: ICD-10-CM

## 2021-05-06 DIAGNOSIS — G89.29 CHRONIC RIGHT SHOULDER PAIN: ICD-10-CM

## 2021-05-06 PROCEDURE — 97112 NEUROMUSCULAR REEDUCATION: CPT | Performed by: PHYSICAL THERAPIST

## 2021-05-06 PROCEDURE — 97140 MANUAL THERAPY 1/> REGIONS: CPT | Performed by: PHYSICAL THERAPIST

## 2021-05-06 PROCEDURE — 97110 THERAPEUTIC EXERCISES: CPT | Performed by: PHYSICAL THERAPIST

## 2021-05-06 NOTE — PROGRESS NOTES
Daily Note     Today's date: 2021  Patient name: Jacqui West  : 1945  MRN: [id number]  Referring provider: Hermelindo Hilliard DO  Dx:   Encounter Diagnosis     ICD-10-CM    1  Calcific tendinitis of right shoulder  M75 31    2  Impingement syndrome of right shoulder  M75 41    3  Primary osteoarthritis of right shoulder  M19 011    4  Chronic right shoulder pain  M25 511     G89 29        Start Time: 930  Stop Time: 1015  Total time in clinic (min): 45 minutes    Subjective: patient reports R shoulder pain 3/10 today       Objective: See treatment diary below      Assessment: good shoulder mobility during manual therapy and prom, verbal and tactile cueing for posture provided, tolerated increased resistance during biceps 3-way and grippers; no increase in pain reported      Plan: Continue with current POC to address pt deficits        Precautions: none    Manual 5/6 4/13 4/15 4/19 4/21 4/27 4/29 5/4   MFR 10  10' 10' 10 MSB TB TB   vib/mass           Jt mobs 5          C/s man traction     5 MSB TB TB   Neuro Re-Ed           BTB retract nick x30  20# 20# 2x15 20# 30x 20# 30x Nick mid/low rows x30 ea    [place] Mid/low rows 30x ea 20# Nick mid/low rows 30x ea 20# [place] rows 30x ea 20#   Prone I/Y/Ts           Prone retract           Pt ed posture        3' standing   [place] LPD x30  20# 20# 2x15 20# 30x 20# 30x x30 20# 30x 20# 30x 20# 30x   Median N glides    10x       Ball on wall scap stab       20xcw 20x ccw NP   Ther Ex           AAROM shoulder extension/internal rotation       10"x10/10"x10 10"x10/10"x10   Circuit - UE           Pulleys 30x 30x 30x 30x 30x 30x NP-resume NV 30x   Prom 5' 10' 10' 10' 5' 5'  7'   Biceps 3-way 5#  20x 4# 2x15 4# 2x15 4# 2x15 4#  2x15 4#  2x15 4#2x15    Grippers 100x black          HEP           UBE 3'/3' 4/4 4/4 nt   3'/3' 3'/3'   Doorway stretch  30" 4x         Sitting t/s stretch over half foam roll           Standing shoulder flexion           Standing shoulder abduction           Ther Activity           RTB driving motion  9Z35 YTB GTB 30x        ER bilat   GTB 30x   GTB 30x GTB 30x RTB 20x   D2 flexion   GTB 30x     RTB 20x   Carry OH        4# 3 laps   Suitcase carry         10# DB 3 laps   Standing shoulder abduction  YTB 2x10 RTB 20x     RTB 20x supine   Gait Training                                 Modalities           E-Stim           MH

## 2021-05-08 ENCOUNTER — HOSPITAL ENCOUNTER (OUTPATIENT)
Dept: MRI IMAGING | Facility: HOSPITAL | Age: 76
Discharge: HOME | End: 2021-05-08
Attending: STUDENT IN AN ORGANIZED HEALTH CARE EDUCATION/TRAINING PROGRAM
Payer: MEDICARE

## 2021-05-08 DIAGNOSIS — M54.41 ACUTE RIGHT-SIDED LOW BACK PAIN WITH RIGHT-SIDED SCIATICA: ICD-10-CM

## 2021-05-08 PROCEDURE — 72148 MRI LUMBAR SPINE W/O DYE: CPT

## 2021-05-08 PROCEDURE — G1004 CDSM NDSC: HCPCS

## 2021-05-11 ENCOUNTER — OFFICE VISIT (OUTPATIENT)
Dept: PHYSICAL THERAPY | Facility: CLINIC | Age: 76
End: 2021-05-11
Payer: MEDICARE

## 2021-05-11 DIAGNOSIS — M75.31 CALCIFIC TENDINITIS OF RIGHT SHOULDER: Primary | ICD-10-CM

## 2021-05-11 DIAGNOSIS — M19.011 PRIMARY OSTEOARTHRITIS OF RIGHT SHOULDER: ICD-10-CM

## 2021-05-11 DIAGNOSIS — M75.41 IMPINGEMENT SYNDROME OF RIGHT SHOULDER: ICD-10-CM

## 2021-05-11 PROCEDURE — 97530 THERAPEUTIC ACTIVITIES: CPT

## 2021-05-11 PROCEDURE — 97140 MANUAL THERAPY 1/> REGIONS: CPT

## 2021-05-11 PROCEDURE — 97110 THERAPEUTIC EXERCISES: CPT

## 2021-05-11 NOTE — PROGRESS NOTES
Daily Note     Today's date: 2021  Patient name: Narayan Silverio  : 1945  MRN: [id number]  Referring provider: Cass Earl DO  Dx:   Encounter Diagnosis     ICD-10-CM    1  Calcific tendinitis of right shoulder  M75 31    2  Impingement syndrome of right shoulder  M75 41    3  Primary osteoarthritis of right shoulder  M19 011        Start Time: 1015  Stop Time: 1100  Total time in clinic (min): 45 minutes    Subjective: Patient reports shoulder feels about the same  No new complaints  Objective: See treatment diary below      Assessment: Patient tolerated session well  This session we added function activities w/ theraband w/ no reported increased pain  Patient had reduced middle deltoid muscular tightness post MFR  Patient would benefit from continued skilled PT  Plan: Continue per plan of care        Precautions: none    Manual    MFR 10 10'      TB   vib/mass           Jt mobs 5          C/s man traction        TB   Neuro Re-Ed           BTB retract nick x30  20# nick 30x 20#      [place] rows 30x ea 20#   Prone I/Y/Ts           Prone retract           Pt ed posture        3' standing   Nick LPD x30  20# 30x 20#      20# 30x   Median N glides           Ball on wall scap stab        NP   Ther Ex           AAROM shoulder extension/internal rotation        10"x10/10"x10              Pulleys 30x 30x      30x   Prom 5'       7'   Biceps 3-way 5#  20x TRX 20x         Grippers 100x black 100x black         HEP           UBE 3'/3' 3/3      3'/3'   Doorway stretch           Sitting t/s stretch over half foam roll           Standing shoulder flexion           Standing shoulder abduction           Ther Activity           RTB driving motion           ER bilat  RTB 30x      RTB 20x   D2 flexion  RTB  30x      RTB 20x   Carry OH  4# 5 laps      4# 3 laps   Suitcase carry   10# DB 5 laps      10# DB 3 laps   Standing shoulder abduction  RTB 30x       RTB 20x supine   Gait Training                                 Modalities           E-Stim           MH

## 2021-05-12 ENCOUNTER — HOSPITAL ENCOUNTER (INPATIENT)
Facility: HOSPITAL | Age: 76
LOS: 2 days | Discharge: HOME | DRG: 605 | End: 2021-05-14
Attending: SURGERY | Admitting: SURGERY
Payer: MEDICARE

## 2021-05-12 ENCOUNTER — APPOINTMENT (EMERGENCY)
Dept: CT IMAGING | Facility: HOSPITAL | Age: 76
End: 2021-05-12
Payer: MEDICARE

## 2021-05-12 ENCOUNTER — HOSPITAL ENCOUNTER (EMERGENCY)
Facility: HOSPITAL | Age: 76
End: 2021-05-12
Attending: EMERGENCY MEDICINE | Admitting: EMERGENCY MEDICINE
Payer: MEDICARE

## 2021-05-12 VITALS
OXYGEN SATURATION: 95 % | TEMPERATURE: 98.8 F | DIASTOLIC BLOOD PRESSURE: 72 MMHG | SYSTOLIC BLOOD PRESSURE: 147 MMHG | RESPIRATION RATE: 18 BRPM | HEART RATE: 60 BPM

## 2021-05-12 DIAGNOSIS — Z78.9 ACTIVE BLEEDING: Primary | ICD-10-CM

## 2021-05-12 DIAGNOSIS — S80.02XA TRAUMATIC HEMATOMA OF LEFT KNEE, INITIAL ENCOUNTER: Primary | ICD-10-CM

## 2021-05-12 DIAGNOSIS — S80.02XA TRAUMATIC HEMATOMA OF LEFT KNEE, INITIAL ENCOUNTER: ICD-10-CM

## 2021-05-12 PROBLEM — S80.01XA TRAUMATIC HEMATOMA OF RIGHT KNEE: Status: ACTIVE | Noted: 2021-05-12

## 2021-05-12 LAB
ALBUMIN SERPL BCP-MCNC: 3.6 G/DL (ref 3.5–5)
ALP SERPL-CCNC: 75 U/L (ref 46–116)
ALT SERPL W P-5'-P-CCNC: 35 U/L (ref 12–78)
ANION GAP SERPL CALCULATED.3IONS-SCNC: 9 MMOL/L (ref 4–13)
AST SERPL W P-5'-P-CCNC: 32 U/L (ref 5–45)
BASE EXCESS BLDA CALC-SCNC: 5 MMOL/L (ref -2–3)
BASOPHILS # BLD AUTO: 0.05 THOUSANDS/ΜL (ref 0–0.1)
BASOPHILS # BLD AUTO: 0.06 THOUSANDS/ΜL (ref 0–0.1)
BASOPHILS NFR BLD AUTO: 1 % (ref 0–1)
BASOPHILS NFR BLD AUTO: 1 % (ref 0–1)
BILIRUB SERPL-MCNC: 1.03 MG/DL (ref 0.2–1)
BUN SERPL-MCNC: 20 MG/DL (ref 5–25)
CALCIUM SERPL-MCNC: 9.1 MG/DL (ref 8.3–10.1)
CHLORIDE SERPL-SCNC: 106 MMOL/L (ref 100–108)
CO2 SERPL-SCNC: 27 MMOL/L (ref 21–32)
CREAT SERPL-MCNC: 0.77 MG/DL (ref 0.6–1.3)
EOSINOPHIL # BLD AUTO: 0.17 THOUSAND/ΜL (ref 0–0.61)
EOSINOPHIL # BLD AUTO: 0.2 THOUSAND/ΜL (ref 0–0.61)
EOSINOPHIL NFR BLD AUTO: 2 % (ref 0–6)
EOSINOPHIL NFR BLD AUTO: 2 % (ref 0–6)
ERYTHROCYTE [DISTWIDTH] IN BLOOD BY AUTOMATED COUNT: 14.8 % (ref 11.6–15.1)
ERYTHROCYTE [DISTWIDTH] IN BLOOD BY AUTOMATED COUNT: 14.9 % (ref 11.6–15.1)
GFR SERPL CREATININE-BSD FRML MDRD: 88 ML/MIN/1.73SQ M
GLUCOSE SERPL-MCNC: 105 MG/DL (ref 65–140)
GLUCOSE SERPL-MCNC: 119 MG/DL (ref 65–140)
GLUCOSE SERPL-MCNC: 95 MG/DL (ref 65–140)
HCO3 BLDA-SCNC: 30.6 MMOL/L (ref 24–30)
HCT VFR BLD AUTO: 46.4 % (ref 36.5–49.3)
HCT VFR BLD AUTO: 47.3 % (ref 36.5–49.3)
HCT VFR BLD AUTO: 48.6 % (ref 36.5–49.3)
HCT VFR BLD CALC: 45 % (ref 36.5–49.3)
HGB BLD-MCNC: 15.3 G/DL (ref 12–17)
HGB BLD-MCNC: 15.5 G/DL (ref 12–17)
HGB BLD-MCNC: 15.6 G/DL (ref 12–17)
HGB BLDA-MCNC: 15.3 G/DL (ref 12–17)
HOLD SPECIMEN: NORMAL
IMM GRANULOCYTES # BLD AUTO: 0.03 THOUSAND/UL (ref 0–0.2)
IMM GRANULOCYTES # BLD AUTO: 0.04 THOUSAND/UL (ref 0–0.2)
IMM GRANULOCYTES NFR BLD AUTO: 0 % (ref 0–2)
IMM GRANULOCYTES NFR BLD AUTO: 0 % (ref 0–2)
LYMPHOCYTES # BLD AUTO: 3.84 THOUSANDS/ΜL (ref 0.6–4.47)
LYMPHOCYTES # BLD AUTO: 4.32 THOUSANDS/ΜL (ref 0.6–4.47)
LYMPHOCYTES NFR BLD AUTO: 36 % (ref 14–44)
LYMPHOCYTES NFR BLD AUTO: 39 % (ref 14–44)
MCH RBC QN AUTO: 29.2 PG (ref 26.8–34.3)
MCH RBC QN AUTO: 29.9 PG (ref 26.8–34.3)
MCHC RBC AUTO-ENTMCNC: 32.1 G/DL (ref 31.4–37.4)
MCHC RBC AUTO-ENTMCNC: 32.8 G/DL (ref 31.4–37.4)
MCV RBC AUTO: 91 FL (ref 82–98)
MCV RBC AUTO: 91 FL (ref 82–98)
MONOCYTES # BLD AUTO: 0.93 THOUSAND/ΜL (ref 0.17–1.22)
MONOCYTES # BLD AUTO: 1 THOUSAND/ΜL (ref 0.17–1.22)
MONOCYTES NFR BLD AUTO: 9 % (ref 4–12)
MONOCYTES NFR BLD AUTO: 9 % (ref 4–12)
NEUTROPHILS # BLD AUTO: 5.49 THOUSANDS/ΜL (ref 1.85–7.62)
NEUTROPHILS # BLD AUTO: 5.55 THOUSANDS/ΜL (ref 1.85–7.62)
NEUTS SEG NFR BLD AUTO: 49 % (ref 43–75)
NEUTS SEG NFR BLD AUTO: 52 % (ref 43–75)
NRBC BLD AUTO-RTO: 0 /100 WBCS
NRBC BLD AUTO-RTO: 0 /100 WBCS
PCO2 BLD: 32 MMOL/L (ref 21–32)
PCO2 BLD: 46.7 MM HG (ref 42–50)
PH BLD: 7.42 [PH] (ref 7.3–7.4)
PLATELET # BLD AUTO: 207 THOUSANDS/UL (ref 149–390)
PLATELET # BLD AUTO: 220 THOUSANDS/UL (ref 149–390)
PMV BLD AUTO: 11.3 FL (ref 8.9–12.7)
PMV BLD AUTO: 11.4 FL (ref 8.9–12.7)
PO2 BLD: 25 MM HG (ref 35–45)
POTASSIUM BLD-SCNC: 3.9 MMOL/L (ref 3.5–5.3)
POTASSIUM SERPL-SCNC: 4.4 MMOL/L (ref 3.5–5.3)
PROT SERPL-MCNC: 7.6 G/DL (ref 6.4–8.2)
RBC # BLD AUTO: 5.19 MILLION/UL (ref 3.88–5.62)
RBC # BLD AUTO: 5.34 MILLION/UL (ref 3.88–5.62)
SAO2 % BLD FROM PO2: 46 % (ref 60–85)
SODIUM BLD-SCNC: 142 MMOL/L (ref 136–145)
SODIUM SERPL-SCNC: 142 MMOL/L (ref 136–145)
SPECIMEN SOURCE: ABNORMAL
WBC # BLD AUTO: 10.57 THOUSAND/UL (ref 4.31–10.16)
WBC # BLD AUTO: 11.11 THOUSAND/UL (ref 4.31–10.16)

## 2021-05-12 PROCEDURE — 99222 1ST HOSP IP/OBS MODERATE 55: CPT | Performed by: SURGERY

## 2021-05-12 PROCEDURE — 85014 HEMATOCRIT: CPT | Performed by: SURGERY

## 2021-05-12 PROCEDURE — 73701 CT LOWER EXTREMITY W/DYE: CPT

## 2021-05-12 PROCEDURE — 96361 HYDRATE IV INFUSION ADD-ON: CPT

## 2021-05-12 PROCEDURE — 1124F ACP DISCUSS-NO DSCNMKR DOCD: CPT | Performed by: EMERGENCY MEDICINE

## 2021-05-12 PROCEDURE — 85018 HEMOGLOBIN: CPT | Performed by: SURGERY

## 2021-05-12 PROCEDURE — 36415 COLL VENOUS BLD VENIPUNCTURE: CPT | Performed by: PHYSICIAN ASSISTANT

## 2021-05-12 PROCEDURE — 85025 COMPLETE CBC W/AUTO DIFF WBC: CPT | Performed by: PHYSICIAN ASSISTANT

## 2021-05-12 PROCEDURE — 85014 HEMATOCRIT: CPT

## 2021-05-12 PROCEDURE — 99285 EMERGENCY DEPT VISIT HI MDM: CPT

## 2021-05-12 PROCEDURE — 82948 REAGENT STRIP/BLOOD GLUCOSE: CPT

## 2021-05-12 PROCEDURE — 84295 ASSAY OF SERUM SODIUM: CPT

## 2021-05-12 PROCEDURE — 96360 HYDRATION IV INFUSION INIT: CPT

## 2021-05-12 PROCEDURE — 84132 ASSAY OF SERUM POTASSIUM: CPT

## 2021-05-12 PROCEDURE — 82947 ASSAY GLUCOSE BLOOD QUANT: CPT

## 2021-05-12 PROCEDURE — NC001 PR NO CHARGE: Performed by: EMERGENCY MEDICINE

## 2021-05-12 PROCEDURE — 99284 EMERGENCY DEPT VISIT MOD MDM: CPT | Performed by: PHYSICIAN ASSISTANT

## 2021-05-12 PROCEDURE — 10160 PNXR ASPIR ABSC HMTMA BULLA: CPT | Performed by: PHYSICIAN ASSISTANT

## 2021-05-12 PROCEDURE — 82803 BLOOD GASES ANY COMBINATION: CPT

## 2021-05-12 PROCEDURE — 80053 COMPREHEN METABOLIC PANEL: CPT | Performed by: PHYSICIAN ASSISTANT

## 2021-05-12 PROCEDURE — 85025 COMPLETE CBC W/AUTO DIFF WBC: CPT | Performed by: STUDENT IN AN ORGANIZED HEALTH CARE EDUCATION/TRAINING PROGRAM

## 2021-05-12 PROCEDURE — 93308 TTE F-UP OR LMTD: CPT | Performed by: SURGERY

## 2021-05-12 PROCEDURE — 76705 ECHO EXAM OF ABDOMEN: CPT | Performed by: SURGERY

## 2021-05-12 PROCEDURE — 93005 ELECTROCARDIOGRAM TRACING: CPT

## 2021-05-12 RX ORDER — OXYCODONE HYDROCHLORIDE 5 MG/1
2.5 TABLET ORAL EVERY 4 HOURS PRN
Status: DISCONTINUED | OUTPATIENT
Start: 2021-05-12 | End: 2021-05-14 | Stop reason: HOSPADM

## 2021-05-12 RX ORDER — ACETAMINOPHEN 325 MG/1
650 TABLET ORAL EVERY 6 HOURS PRN
Status: DISCONTINUED | OUTPATIENT
Start: 2021-05-12 | End: 2021-05-13

## 2021-05-12 RX ORDER — OXYCODONE HYDROCHLORIDE 5 MG/1
5 TABLET ORAL EVERY 4 HOURS PRN
Status: DISCONTINUED | OUTPATIENT
Start: 2021-05-12 | End: 2021-05-14 | Stop reason: HOSPADM

## 2021-05-12 RX ORDER — ONDANSETRON 2 MG/ML
4 INJECTION INTRAMUSCULAR; INTRAVENOUS EVERY 6 HOURS PRN
Status: DISCONTINUED | OUTPATIENT
Start: 2021-05-12 | End: 2021-05-14 | Stop reason: HOSPADM

## 2021-05-12 RX ORDER — PRAVASTATIN SODIUM 80 MG/1
80 TABLET ORAL
Status: DISCONTINUED | OUTPATIENT
Start: 2021-05-13 | End: 2021-05-14 | Stop reason: HOSPADM

## 2021-05-12 RX ORDER — HYDROMORPHONE HCL/PF 1 MG/ML
0.5 SYRINGE (ML) INJECTION
Status: DISCONTINUED | OUTPATIENT
Start: 2021-05-12 | End: 2021-05-14 | Stop reason: HOSPADM

## 2021-05-12 RX ORDER — CALCIUM CARBONATE 200(500)MG
500 TABLET,CHEWABLE ORAL DAILY PRN
Status: DISCONTINUED | OUTPATIENT
Start: 2021-05-12 | End: 2021-05-14 | Stop reason: HOSPADM

## 2021-05-12 RX ORDER — INSULIN GLARGINE 100 [IU]/ML
26 INJECTION, SOLUTION SUBCUTANEOUS
Status: DISCONTINUED | OUTPATIENT
Start: 2021-05-12 | End: 2021-05-14 | Stop reason: HOSPADM

## 2021-05-12 RX ORDER — LANOLIN ALCOHOL/MO/W.PET/CERES
3 CREAM (GRAM) TOPICAL
Status: DISCONTINUED | OUTPATIENT
Start: 2021-05-12 | End: 2021-05-14 | Stop reason: HOSPADM

## 2021-05-12 RX ADMIN — SODIUM CHLORIDE 250 ML: 0.9 INJECTION, SOLUTION INTRAVENOUS at 17:26

## 2021-05-12 RX ADMIN — ACETAMINOPHEN 650 MG: 325 TABLET, FILM COATED ORAL at 23:13

## 2021-05-12 RX ADMIN — IOHEXOL 100 ML: 350 INJECTION, SOLUTION INTRAVENOUS at 18:20

## 2021-05-12 RX ADMIN — INSULIN GLARGINE 26 UNITS: 100 INJECTION, SOLUTION SUBCUTANEOUS at 23:13

## 2021-05-12 NOTE — EMTALA/ACUTE CARE TRANSFER
[address] [place] [address]  [address] [facility] [address]  Dept: [phone]      EMTALA TRANSFER CONSENT    NAME Mushtaq SPENCER 1945                              MRN [id number]    I have been informed of my rights regarding examination, treatment, and transfer   by Dr Mery Soto MD    Benefits:      Risks:        Consent for Transfer:  I acknowledge that my medical condition has been evaluated and explained to me by the emergency department physician or other qualified medical person and/or my attending physician, who has recommended that I be transferred to the service of    at    The above potential benefits of such transfer, the potential risks associated with such transfer, and the probable risks of not being transferred have been explained to me, and I fully understand them  The doctor has explained that, in my case, the benefits of transfer outweigh the risks  I agree to be transferred  I authorize the performance of emergency medical procedures and treatments upon me in both transit and upon arrival at the receiving facility  Additionally, I authorize the release of any and all medical records to the receiving facility and request they be transported with me, if possible  I understand that the safest mode of transportation during a medical emergency is an ambulance and that the Hospital advocates the use of this mode of transport  Risks of traveling to the receiving facility by car, including absence of medical control, life sustaining equipment, such as oxygen, and medical personnel has been explained to me and I fully understand them  (HUNG CORRECT BOX BELOW)  [  ]  I consent to the stated transfer and to be transported by ambulance/helicopter  [  ]  I consent to the stated transfer, but refuse transportation by ambulance and accept full responsibility for my transportation by car    I understand the risks of non-ambulance transfers and I exonerate the Hospital and its staff from any deterioration in my condition that results from this refusal     X___________________________________________    DATE  21  TIME________  Signature of patient or legally responsible individual signing on patient behalf           RELATIONSHIP TO PATIENT_________________________          Provider Certification    NAME Ena SPENCER 1945                              MRN [id number]    A medical screening exam was performed on the above named patient  Based on the examination:    Condition Necessitating Transfer The primary encounter diagnosis was Active bleeding  A diagnosis of Traumatic hematoma of left knee, initial encounter was also pertinent to this visit  Patient Condition:      Reason for Transfer:      Transfer Requirements: Facility     · Space available and qualified personnel available for treatment as acknowledged by    · Agreed to accept transfer and to provide appropriate medical treatment as acknowledged by          · Appropriate medical records of the examination and treatment of the patient are provided at the time of transfer   [address] [facility] [address] _______  · Transfer will be performed by qualified personnel from    and appropriate transfer equipment as required, including the use of necessary and appropriate life support measures      Provider Certification: I have examined the patient and explained the following risks and benefits of being transferred/refusing transfer to the patient/family:         Based on these reasonable risks and benefits to the patient and/or the unborn child(elaina), and based upon the information available at the time of the patients examination, I certify that the medical benefits reasonably to be expected from the provision of appropriate medical treatments at another medical facility outweigh the increasing risks, if any, to the individuals medical condition, and in the case of labor to the unborn child, from effecting the transfer      X____________________________________________ DATE 05/12/21        TIME_______      ORIGINAL - SEND TO MEDICAL RECORDS   COPY - SEND WITH PATIENT DURING TRANSFER

## 2021-05-12 NOTE — ED PROVIDER NOTES
History  Chief Complaint   Patient presents with    Knee Injury     working outside and was "slapped in leg with a tree brand" pt presents with L knee swelling and difficulty ambulating      This is a 58-year-old male patient that was slapped in the left leg by a tree branch"  just below the knee prior to arrival   Currently on Eliquis  Originally was able to walk but now is having difficulty walking due to pain and swelling  He has had previous knee surgery  He denies any numbness or tingling he does have decreased range of motion due to pain  He was struck just medial and inferior to the patella  Nothing makes it better or worse she tried nothing over-the-counter  My concern is being on Eliquis that he has a groin hematoma  He will have a CT scan with contrast           Prior to Admission Medications   Prescriptions Last Dose Informant Patient Reported? Taking?    B-D ULTRAFINE III SHORT PEN 31G X 8 MM MISC  Self Yes No   Sig: USE TO INJECT INSULIN THREE TIMES DAILY   Cholecalciferol (VITAMIN D) 2000 units CAPS  Self Yes No   Sig: Take by mouth   Empagliflozin (JARDIANCE) 25 MG TABS  Self Yes No   Sig: Take 25 mg by mouth every morning   HUMALOG 100 UNIT/ML injection  Self Yes No   Sig: INJECT DAILY AS DIRECTED PER SLIDING SCALE   Insulin Degludec (TRESIBA FLEXTOUCH SC)  Self Yes No   Sig: Inject 26 Units under the skin daily at bedtime    ONETOUCH VERIO test strip  Self Yes No   Sig: TEST 2 TIMES A DAY DX E11 9   Omega-3 Fatty Acids (FISH OIL CONCENTRATE PO)  Self Yes No   Sig: Take by mouth   SANTYL ointment  Self Yes No   methylPREDNISolone 4 MG tablet therapy pack  Self No No   Sig: Use as directed on package   multivitamin-iron-minerals-folic acid (CENTRUM) chewable tablet  Self Yes No   Sig: Chew 1 tablet daily   simvastatin (ZOCOR) 40 mg tablet  Self Yes No   Sig: Take 40 mg by mouth daily at bedtime      Facility-Administered Medications: None       Past Medical History:   Diagnosis Date    Diabetes mellitus ([facility] [place] [address] )     Hiatal hernia     Hyperlipidemia     Hypertension     Neuropathy     PE (pulmonary thromboembolism) (HCC)     Shortness of breath     Venous insufficiency (chronic) (peripheral)        Past Surgical History:   Procedure Laterality Date    IR PE ENDOVASCULAR THERAPY  8/18/2020    JOINT REPLACEMENT Bilateral     knees    LEG SURGERY      TX COLONOSCOPY FLX DX W/COLLJ SPEC WHEN PFRMD N/A 6/30/2017    Procedure: EGD AND COLONOSCOPY;  Surgeon: Juarez Luz MD;  Location: [facility]; Service: General    TONSILLECTOMY         Family History   Problem Relation Age of Onset    Cancer Mother     Cancer Father      I have reviewed and agree with the history as documented  E-Cigarette/Vaping    E-Cigarette Use Never User      E-Cigarette/Vaping Substances    Nicotine No     THC No     CBD No     Flavoring No     Other No     Unknown No      Social History     Tobacco Use    Smoking status: Never Smoker    Smokeless tobacco: Never Used   Substance Use Topics    Alcohol use: Not Currently     Frequency: Never    Drug use: No       Review of Systems   Constitutional: Negative for diaphoresis, fatigue and fever  HENT: Negative for congestion, ear pain, nosebleeds and sore throat  Eyes: Negative for photophobia, pain, discharge and visual disturbance  Respiratory: Negative for cough, choking, chest tightness, shortness of breath and wheezing  Cardiovascular: Negative for chest pain and palpitations  Gastrointestinal: Negative for abdominal distention, abdominal pain, diarrhea and vomiting  Genitourinary: Negative for dysuria, flank pain and frequency  Musculoskeletal: Positive for gait problem  Negative for back pain and joint swelling  Skin: Negative for color change and rash  Neurological: Negative for dizziness, syncope and headaches  Psychiatric/Behavioral: Negative for behavioral problems and confusion  The patient is not nervous/anxious      All other systems reviewed and are negative  Physical Exam  Physical Exam  Vitals signs and nursing note reviewed  Constitutional:       Appearance: He is well-developed  HENT:      Head: Normocephalic and atraumatic  Right Ear: External ear normal       Left Ear: External ear normal       Nose: Nose normal    Eyes:      Conjunctiva/sclera: Conjunctivae normal       Pupils: Pupils are equal, round, and reactive to light  Neck:      Musculoskeletal: Normal range of motion and neck supple  Cardiovascular:      Rate and Rhythm: Normal rate and regular rhythm  Pulmonary:      Effort: Pulmonary effort is normal       Breath sounds: Normal breath sounds  Abdominal:      General: Bowel sounds are normal       Palpations: Abdomen is soft  Tenderness: There is no abdominal tenderness  Musculoskeletal:        Legs:    Skin:     General: Skin is warm  Neurological:      Mental Status: He is alert     Psychiatric:         Behavior: Behavior normal          Vital Signs  ED Triage Vitals [05/12/21 1705]   Temperature Pulse Respirations Blood Pressure SpO2   98 8 °F (37 1 °C) 67 18 145/67 94 %      Temp Source Heart Rate Source Patient Position - Orthostatic VS BP Location FiO2 (%)   Oral Monitor Sitting Left arm --      Pain Score       --           Vitals:    05/12/21 1800 05/12/21 1900 05/12/21 2000 05/12/21 2037   BP: 144/63 140/61 147/72 147/72   Pulse: 66 62 60 60   Patient Position - Orthostatic VS:             Visual Acuity      ED Medications  Medications   sodium chloride 0 9 % bolus 250 mL (250 mL Intravenous New Bag 5/12/21 1726)   iohexol (OMNIPAQUE) 350 MG/ML injection (SINGLE-DOSE) 100 mL (100 mL Intravenous Given 5/12/21 1820)       Diagnostic Studies  Results Reviewed     Procedure Component Value Units Date/Time    Comprehensive metabolic panel [[id number]]  (Abnormal) Collected: 05/12/21 1723    Lab Status: Final result Specimen: Blood from Arm, Right Updated: 05/12/21 1807     Sodium 142 mmol/L      Potassium 4 4 mmol/L      Chloride 106 mmol/L      CO2 27 mmol/L      ANION GAP 9 mmol/L      BUN 20 mg/dL      Creatinine 0 77 mg/dL      Glucose 105 mg/dL      Calcium 9 1 mg/dL      AST 32 U/L      ALT 35 U/L      Alkaline Phosphatase 75 U/L      Total Protein 7 6 g/dL      Albumin 3 6 g/dL      Total Bilirubin 1 03 mg/dL      eGFR 88 ml/min/1 73sq m     Narrative:      National Kidney Disease Foundation guidelines for Chronic Kidney Disease (CKD):     Stage 1 with normal or high GFR (GFR > 90 mL/min/1 73 square meters)    Stage 2 Mild CKD (GFR = 60-89 mL/min/1 73 square meters)    Stage 3A Moderate CKD (GFR = 45-59 mL/min/1 73 square meters)    Stage 3B Moderate CKD (GFR = 30-44 mL/min/1 73 square meters)    Stage 4 Severe CKD (GFR = 15-29 mL/min/1 73 square meters)    Stage 5 End Stage CKD (GFR <15 mL/min/1 73 square meters)  Note: GFR calculation is accurate only with a steady state creatinine    CBC and differential [[id number]]  (Abnormal) Collected: 05/12/21 1723    Lab Status: Final result Specimen: Blood from Arm, Right Updated: 05/12/21 1747     WBC 11 11 Thousand/uL      RBC 5 34 Million/uL      Hemoglobin 15 6 g/dL      Hematocrit 48 6 %      MCV 91 fL      MCH 29 2 pg      MCHC 32 1 g/dL      RDW 14 9 %      MPV 11 3 fL      Platelets 078 Thousands/uL      nRBC 0 /100 WBCs      Neutrophils Relative 49 %      Immat GRANS % 0 %      Lymphocytes Relative 39 %      Monocytes Relative 9 %      Eosinophils Relative 2 %      Basophils Relative 1 %      Neutrophils Absolute 5 49 Thousands/µL      Immature Grans Absolute 0 04 Thousand/uL      Lymphocytes Absolute 4 32 Thousands/µL      Monocytes Absolute 1 00 Thousand/µL      Eosinophils Absolute 0 20 Thousand/µL      Basophils Absolute 0 06 Thousands/µL                  CT lower extremity w contrast right   Final Result by Luís Yu MD (05/12 1939)      5 4 x 1 9 x 7 5 cm medial subcutaneous hematoma with focal inferior hyperdensity in keeping with active bleed  I personally discussed this study with Manasa Donovan on 5/12/2021 at 7:38 PM                Workstation performed: [id number]                    Procedures  Procedures         ED Course  ED Course as of May 17 0808   Wed May 12, 2021   1751 Signed out to [address] ST[place]LEY                                SBIRT 22yo+      Most Recent Value   SBIRT (25 yo +)   In order to provide better care to our patients, we are screening all of our patients for alcohol and drug use  Would it be okay to ask you these screening questions? Yes Filed at: 05/12/2021 1731   Initial Alcohol Screen: US AUDIT-C    1  How often do you have a drink containing alcohol?  0 Filed at: 05/12/2021 1731   2  How many drinks containing alcohol do you have on a typical day you are drinking? 0 Filed at: 05/12/2021 1731   3a  Male UNDER 65: How often do you have five or more drinks on one occasion? 0 Filed at: 05/12/2021 1731   Audit-C Score  0 Filed at: 05/12/2021 1731   RAULITO: How many times in the past year have you    Used an illegal drug or used a prescription medication for non-medical reasons? Never Filed at: 05/12/2021 1731                    MDM    Disposition  Final diagnoses: Active bleeding   Traumatic hematoma of left knee, initial encounter     Time reflects when diagnosis was documented in both MDM as applicable and the Disposition within this note     Time User Action Codes Description Comment    5/12/2021  7:44 PM Tracey Morales Add [Z78 9] Active bleeding     5/12/2021  7:44 PM Tracey Morales Add [S80 02XA] Traumatic hematoma of left knee, initial encounter       ED Disposition     ED Disposition Condition Date/Time Comment    Transfer to Another [facility] May 12, 2021  7:44 PM Sherif Morris should be transferred out to [facility] MD Documentation      Most Recent Value   Patient Condition  The patient has been stabilized such that within reasonable medical probability, no material deterioration of the patient condition or the condition of the unborn child(elaina) is likely to result from the transfer   Reason for Transfer  Level of Care needed not available at this facility   Benefits of Transfer  Specialized equipment and/or services available at the receiving facility (Include comment)________________________   Risks of Transfer  Potential for delay in receiving treatment, Potential deterioration of medical condition, Loss of IV, Possible worsening of condition or death during transfer   Accepting Physician  Dr Angie Joy Name, Imtiaz Humphrey Staff, PA-C   Provider Certification  General risk, such as traffic hazards, adverse weather conditions, rough terrain or turbulence, possible failure of equipment (including vehicle or aircraft), or consequences of actions of persons outside the control of the transport personnel      RN Documentation      [address] Name, [address]   SLB   Bed Assignment  SLB   Report Given to  Dunia Edward, [address] [place] [address]   Medications Reviewed with Next Provider of Service  Yes   Transport Mode  Ambulance   Level of Care  Advanced life support   Transfer Date  05/12/21   Transfer Time  2020      Follow-up Information    None         Discharge Medication List as of 5/12/2021  8:39 PM      CONTINUE these medications which have NOT CHANGED    Details   B-D ULTRAFINE III SHORT PEN 31G X 8 MM MISC USE TO INJECT INSULIN THREE TIMES DAILY, Historical Med      Cholecalciferol (VITAMIN D) 2000 units CAPS Take by mouth, Historical Med      Empagliflozin (JARDIANCE) 25 MG TABS Take 25 mg by mouth every morning, Historical Med      HUMALOG 100 UNIT/ML injection INJECT DAILY AS DIRECTED PER SLIDING SCALE, Historical Med      Insulin Degludec (TRESIBA FLEXTOUCH SC) Inject 26 Units under the skin daily at bedtime , Historical Med      methylPREDNISolone 4 MG tablet therapy pack Use as directed on package, Normal      multivitamin-iron-minerals-folic acid (CENTRUM) chewable tablet Chew 1 tablet daily, Historical Med      Omega-3 Fatty Acids (FISH OIL CONCENTRATE PO) Take by mouth, Historical Med      ONETOUCH VERIO test strip TEST 2 TIMES A DAY DX E11 9, Historical Med      SANTYL ointment Starting Mon 2/17/2020, Historical Med      simvastatin (ZOCOR) 40 mg tablet Take 40 mg by mouth daily at bedtime, Historical Med      apixaban (ELIQUIS) 5 mg Take 1 tablet (5 mg total) by mouth 2 (two) times a day, Starting Wed 8/26/2020, Normal           No discharge procedures on file      PDMP Review       Value Time User    PDMP Reviewed  Yes 5/14/2021  2:19 PM Any Moncada PA-C          ED Provider  Electronically Signed by           Ting Dean PA-C  05/17/21 1960

## 2021-05-12 NOTE — ED CARE HANDOFF
Emergency Department Sign Out Note        Sign out and transfer of care from [facility]  See Separate Emergency Department note  The patient, Trey John, was evaluated by the previous provider for right knee injury  Workup Completed:  CT right knee with contrast    ED Course / Workup Pending (followup):  CT right knee consistent with active bleeding into the subcutaneous space  Trauma surgery at [facility] consult  Patient will be transferred to [facility] for further evaluation and treatment  Procedures  MDM    Disposition  Final diagnoses: Active bleeding   Traumatic hematoma of left knee, initial encounter     Time reflects when diagnosis was documented in both MDM as applicable and the Disposition within this note     Time User Action Codes Description Comment    5/12/2021  7:44 PM Nicole Nolan Add [Z78 9] Active bleeding     5/12/2021  7:44 PM Nicole Nolan Add [S80 02XA] Traumatic hematoma of left knee, initial encounter       ED Disposition     ED Disposition Condition Date/Time Comment    Transfer to Another [address] May 12, 2021  7:44 PM Trey John should be transferred out to [facility]          MD Documentation      Most Recent Value   Patient Condition  The patient has been stabilized such that within reasonable medical probability, no material deterioration of the patient condition or the condition of the unborn child(elaina) is likely to result from the transfer   Reason for Transfer  Level of Care needed not available at this facility   Benefits of Transfer  Specialized equipment and/or services available at the receiving facility (Include comment)________________________   Risks of Transfer  Potential for delay in receiving treatment, Potential deterioration of medical condition, Loss of IV, Possible worsening of condition or death during transfer   Accepting Physician  Dr Hartford [facility]tron Accepting Facility Name, Romel Mckeon   Provider Certification  General risk, such as traffic hazards, adverse weather conditions, rough terrain or turbulence, possible failure of equipment (including vehicle or aircraft), or consequences of actions of persons outside the control of the transport personnel      RN Documentation      [address] Name, Romel Garcia      Follow-up Information    None       Patient's Medications   Discharge Prescriptions    No medications on file     No discharge procedures on file         ED Provider  Electronically Signed by     Brandi Sepulveda PA-C  05/12/21 1955

## 2021-05-13 ENCOUNTER — APPOINTMENT (OUTPATIENT)
Dept: PHYSICAL THERAPY | Facility: CLINIC | Age: 76
End: 2021-05-13
Payer: MEDICARE

## 2021-05-13 LAB
ANION GAP SERPL CALCULATED.3IONS-SCNC: 6 MMOL/L (ref 4–13)
ATRIAL RATE: 220 BPM
BASOPHILS # BLD AUTO: 0.07 THOUSANDS/ΜL (ref 0–0.1)
BASOPHILS NFR BLD AUTO: 1 % (ref 0–1)
BUN SERPL-MCNC: 13 MG/DL (ref 5–25)
CALCIUM SERPL-MCNC: 8.7 MG/DL (ref 8.3–10.1)
CHLORIDE SERPL-SCNC: 111 MMOL/L (ref 100–108)
CO2 SERPL-SCNC: 25 MMOL/L (ref 21–32)
CREAT SERPL-MCNC: 0.53 MG/DL (ref 0.6–1.3)
EOSINOPHIL # BLD AUTO: 0.23 THOUSAND/ΜL (ref 0–0.61)
EOSINOPHIL NFR BLD AUTO: 2 % (ref 0–6)
ERYTHROCYTE [DISTWIDTH] IN BLOOD BY AUTOMATED COUNT: 15.1 % (ref 11.6–15.1)
GFR SERPL CREATININE-BSD FRML MDRD: 103 ML/MIN/1.73SQ M
GLUCOSE SERPL-MCNC: 118 MG/DL (ref 65–140)
GLUCOSE SERPL-MCNC: 125 MG/DL (ref 65–140)
GLUCOSE SERPL-MCNC: 126 MG/DL (ref 65–140)
GLUCOSE SERPL-MCNC: 137 MG/DL (ref 65–140)
GLUCOSE SERPL-MCNC: 143 MG/DL (ref 65–140)
HCT VFR BLD AUTO: 46.8 % (ref 36.5–49.3)
HCT VFR BLD AUTO: 47.8 % (ref 36.5–49.3)
HGB BLD-MCNC: 15.3 G/DL (ref 12–17)
HGB BLD-MCNC: 15.4 G/DL (ref 12–17)
IMM GRANULOCYTES # BLD AUTO: 0.04 THOUSAND/UL (ref 0–0.2)
IMM GRANULOCYTES NFR BLD AUTO: 0 % (ref 0–2)
LYMPHOCYTES # BLD AUTO: 4.44 THOUSANDS/ΜL (ref 0.6–4.47)
LYMPHOCYTES NFR BLD AUTO: 38 % (ref 14–44)
MAGNESIUM SERPL-MCNC: 2.2 MG/DL (ref 1.6–2.6)
MCH RBC QN AUTO: 29.5 PG (ref 26.8–34.3)
MCHC RBC AUTO-ENTMCNC: 32 G/DL (ref 31.4–37.4)
MCV RBC AUTO: 92 FL (ref 82–98)
MONOCYTES # BLD AUTO: 1.14 THOUSAND/ΜL (ref 0.17–1.22)
MONOCYTES NFR BLD AUTO: 10 % (ref 4–12)
NEUTROPHILS # BLD AUTO: 5.93 THOUSANDS/ΜL (ref 1.85–7.62)
NEUTS SEG NFR BLD AUTO: 49 % (ref 43–75)
NRBC BLD AUTO-RTO: 0 /100 WBCS
PHOSPHATE SERPL-MCNC: 3.3 MG/DL (ref 2.3–4.1)
PLATELET # BLD AUTO: 201 THOUSANDS/UL (ref 149–390)
PMV BLD AUTO: 11.2 FL (ref 8.9–12.7)
POTASSIUM SERPL-SCNC: 3.8 MMOL/L (ref 3.5–5.3)
QRS AXIS: 105 DEGREES
QRSD INTERVAL: 106 MS
QT INTERVAL: 406 MS
QTC INTERVAL: 408 MS
RBC # BLD AUTO: 5.18 MILLION/UL (ref 3.88–5.62)
SODIUM SERPL-SCNC: 142 MMOL/L (ref 136–145)
T WAVE AXIS: 71 DEGREES
VENTRICULAR RATE: 61 BPM
WBC # BLD AUTO: 11.85 THOUSAND/UL (ref 4.31–10.16)

## 2021-05-13 PROCEDURE — 83735 ASSAY OF MAGNESIUM: CPT | Performed by: SURGERY

## 2021-05-13 PROCEDURE — 82948 REAGENT STRIP/BLOOD GLUCOSE: CPT

## 2021-05-13 PROCEDURE — 80048 BASIC METABOLIC PNL TOTAL CA: CPT | Performed by: SURGERY

## 2021-05-13 PROCEDURE — 99222 1ST HOSP IP/OBS MODERATE 55: CPT | Performed by: INTERNAL MEDICINE

## 2021-05-13 PROCEDURE — 85025 COMPLETE CBC W/AUTO DIFF WBC: CPT | Performed by: SURGERY

## 2021-05-13 PROCEDURE — 94760 N-INVAS EAR/PLS OXIMETRY 1: CPT

## 2021-05-13 PROCEDURE — 93010 ELECTROCARDIOGRAM REPORT: CPT | Performed by: INTERNAL MEDICINE

## 2021-05-13 PROCEDURE — 85014 HEMATOCRIT: CPT | Performed by: SURGERY

## 2021-05-13 PROCEDURE — 84100 ASSAY OF PHOSPHORUS: CPT | Performed by: SURGERY

## 2021-05-13 PROCEDURE — 90715 TDAP VACCINE 7 YRS/> IM: CPT | Performed by: SURGERY

## 2021-05-13 PROCEDURE — 99231 SBSQ HOSP IP/OBS SF/LOW 25: CPT | Performed by: SURGERY

## 2021-05-13 PROCEDURE — 85018 HEMOGLOBIN: CPT | Performed by: SURGERY

## 2021-05-13 RX ORDER — METHOCARBAMOL 500 MG/1
500 TABLET, FILM COATED ORAL EVERY 8 HOURS SCHEDULED
Status: DISCONTINUED | OUTPATIENT
Start: 2021-05-13 | End: 2021-05-14 | Stop reason: HOSPADM

## 2021-05-13 RX ORDER — ACETAMINOPHEN 325 MG/1
975 TABLET ORAL EVERY 8 HOURS SCHEDULED
Status: DISCONTINUED | OUTPATIENT
Start: 2021-05-13 | End: 2021-05-14 | Stop reason: HOSPADM

## 2021-05-13 RX ADMIN — TETANUS TOXOID, REDUCED DIPHTHERIA TOXOID AND ACELLULAR PERTUSSIS VACCINE, ADSORBED 0.5 ML: 5; 2.5; 8; 8; 2.5 SUSPENSION INTRAMUSCULAR at 00:11

## 2021-05-13 RX ADMIN — ACETAMINOPHEN 975 MG: 325 TABLET, FILM COATED ORAL at 14:08

## 2021-05-13 RX ADMIN — OXYCODONE HYDROCHLORIDE 5 MG: 5 TABLET ORAL at 01:20

## 2021-05-13 RX ADMIN — METHOCARBAMOL 500 MG: 500 TABLET, FILM COATED ORAL at 21:56

## 2021-05-13 RX ADMIN — ACETAMINOPHEN 975 MG: 325 TABLET, FILM COATED ORAL at 21:56

## 2021-05-13 RX ADMIN — OXYCODONE HYDROCHLORIDE 5 MG: 5 TABLET ORAL at 14:08

## 2021-05-13 RX ADMIN — METHOCARBAMOL 500 MG: 500 TABLET, FILM COATED ORAL at 14:08

## 2021-05-13 RX ADMIN — PRAVASTATIN SODIUM 80 MG: 80 TABLET ORAL at 17:34

## 2021-05-13 RX ADMIN — OXYCODONE HYDROCHLORIDE 5 MG: 5 TABLET ORAL at 09:29

## 2021-05-13 RX ADMIN — OXYCODONE HYDROCHLORIDE 5 MG: 5 TABLET ORAL at 22:02

## 2021-05-13 RX ADMIN — MELATONIN TAB 3 MG 3 MG: 3 TAB at 21:56

## 2021-05-13 RX ADMIN — OXYCODONE HYDROCHLORIDE 5 MG: 5 TABLET ORAL at 18:08

## 2021-05-13 RX ADMIN — HYDROMORPHONE HYDROCHLORIDE 0.5 MG: 1 INJECTION, SOLUTION INTRAMUSCULAR; INTRAVENOUS; SUBCUTANEOUS at 04:10

## 2021-05-13 RX ADMIN — INSULIN GLARGINE 26 UNITS: 100 INJECTION, SOLUTION SUBCUTANEOUS at 21:56

## 2021-05-13 NOTE — ED PROVIDER NOTES
Emergency Department Airway Evaluation and Management Form    History  Obtained from:  Patient      Chief Complaint:  Trauma Alert    HPI: Pt is a 67 y o  male presents s/p injury to right knee area from tree branch  About 3:00 p m  Today, was cutting a tree branches the yd when a tree branch hit the inside of the right knee  He is on Eliquis for prior PE  No head strike, loss of consciousness or other trauma  Noticed some swelling on the inside of the knee, seen in outside emergency department and transferred as a trauma to [facility] for further evaluation  I have reviewed and agree with the history as documented      Allergies  Allergies: see nurses notes    Physical Exam    Vitals:    05/12/21 2122   BP: 156/88   Pulse: 65   Resp: 18   Temp: 98 °F (36 7 °C)   SpO2: 96%     Supplemental Oxygen:  None    GCS:  15    Neuro: Alert and oriented x3  Psych: not combative, not anxious, cooperative for exam  Neck: No JVD, No midline tenderness  Respiratory:  Clear to auscultation  Mouth:  No signs of trauma  Pharynx:  Patent        ED Medications given  See nursing notes    Intubation    No intubation required    Final Diagnosis:  Acute right knee injury, chronic anticoagulation    ED Provider  Electronically Signed by       Jamia Rodriguez DO  05/12/21 2126

## 2021-05-13 NOTE — PROCEDURES
POC FAST US    Date/Time: 5/12/2021 9:47 PM  Performed by: Yaneth Villalba MD  Authorized by: Yaneth Villalba MD     Patient location:  ED and Trauma  Procedure details:     Exam Type:  Diagnostic    Indications: blunt abdominal trauma and blunt chest trauma      Technique: FAST      Views obtained:  Heart - Pericardial sac, RUQ - Vasquez's Pouch, Suprapubic - Pouch of Mason and LUQ - Splenorenal space    Image quality: diagnostic      Image availability:  Images available in PACS  FAST Findings:     RUQ (Hepatorenal) free fluid: absent      LUQ (Splenorenal) free fluid: absent      Suprapubic free fluid: absent      Cardiac wall motion: identified      Pericardial effusion: absent    Interpretation:     Impressions: negative

## 2021-05-13 NOTE — ASSESSMENT & PLAN NOTE
Patient was struck in right knee region by wood / branch  Patient on Eliqous and experienced a large traumatic hematoma  To monitor bleeding, Hgb were order round the clock and Neurovascular checks where initiated  Continue to monitor hemoglobins, Ace wrap intact for compression to prevent bleeding  Neurovascular intact, wiggles toes  Significant swelling of lower extremity but compartments remain soft

## 2021-05-13 NOTE — PLAN OF CARE
Problem: Potential for Falls  Goal: Patient will remain free of falls  Description: INTERVENTIONS:  - Assess patient frequently for physical needs  -  Identify cognitive and physical deficits and behaviors that affect risk of falls    -  [place]ly fall precautions as indicated by assessment   - Educate patient/family on patient safety including physical limitations  - Instruct patient to call for assistance with activity based on assessment  - Modify environment to reduce risk of injury  - Consider OT/PT consult to assist with strengthening/mobility  5/12/2021 2330 by Amy Burgos RN  Outcome: Progressing  5/12/2021 2301 by Amy Burgos RN  Outcome: Progressing

## 2021-05-13 NOTE — QUICK NOTE
Interventional radiology contacted regarding this patient with trauma and active bleeding  Patient being transferred to [address] [place] [address]  CT scan personally reviewed    Small amount of active extravasation into a subcutaneous space, possibly internal degloving or direct injury    This may be a small branch of the descending geniculate artery or a venous branch    Given the location I recommend direct compression with a tight wrap  Consider pedal Doppler while the wrap is on to insure adequate perfusion and avoidance of too much compression on the popliteal artery    If there is persistent bleeding recommend reversal of anticoagulation, as any percutaneous embolization will be much less effective in an anti coagulated patient    I actually performed pulmonary artery thrombolysis for submassive pulmonary embolism on Mr Shelton Galeas in August 2020, clearly he is at risk of thromboembolic disease and if reversal of anticoagulation could be avoided it would be preferable

## 2021-05-13 NOTE — PROGRESS NOTES
Pastoral Care Progress Note    2021  Patient: Naz Tao : 1945  Admission Date & Time: 2021  MRN: [id number] CSN: [id number]    Pt is supported by his wife who is planning to visit him today  He is also supported by his family       21 1100   Clinical Encounter Type   Visited With Patient             Chaplaincy Interventions Utilized:   Empowerment: Provided chaplaincy education    Relationship Building: Cultivated a relationship of care and support and Listened empathically

## 2021-05-13 NOTE — PROGRESS NOTES
Upon nursing assessment, the patients right knee is ACE wrapped and there is a stack of gauze underneath the wrap  [place] with trauma to order a tetanus booster  Will notify the MD if the patient c/o any numbness or tingling

## 2021-05-13 NOTE — PROGRESS NOTES
Pt is a precautionary admittance that came in as a level B trauma  Pt was hit on knee w tree branch causing a small wound that would not stop bleeding bd pt is on blood thinners  Pt was transported from [place] [address] to be admitted for assistance for bleeding to stop and observation

## 2021-05-13 NOTE — H&P
H&P Exam - Trauma   Angel Josue 67 y o  male MRN: [id number]  Unit/Bed#: ED 12 Encounter: [id number]    Assessment/Plan   Trauma Alert: Level B  Model of Arrival: Transfer [place]  Trauma Team: Attending Lakeshia Stout, Residents Ariana Lucero and Fellow [facility]  Consultants: None    Trauma Active Problems:  Hematoma right knee    Trauma Plan: Admit, Wrap RLE  NV checks Q2, Q6 H/H, Update Tetanus    Chief Complaint:  Right knee pain    History of Present Illness   HPI:  Angel Josue is a 67 y o  male on eliquis with R medial leg hematoma  Patient was doing yardwork on 5/10 and a tree limb fell on his leg  He had increasing pain and presented to [place] on 5/12  Tx to B for Trauma evaluation after CT showed extravasation  Palpable Pedal pulses and sensorimotor intact  PMH includes HTN, HLD, DM, and PE s/p thrombolysis 8/2020 , B/l knee replacements  Mechanism:Other: tree limb    Review of Systems   Constitutional: Negative for chills and fever  HENT: Negative for ear pain and sore throat  Eyes: Negative for pain and visual disturbance  Respiratory: Negative for cough and shortness of breath  Cardiovascular: Negative for chest pain and palpitations  Gastrointestinal: Negative for abdominal pain and vomiting  Genitourinary: Negative for dysuria and hematuria  Musculoskeletal: Positive for joint swelling  Negative for arthralgias and back pain  Skin: Negative for color change and rash  Neurological: Negative for seizures and syncope  Hematological: Negative for adenopathy  Does not bruise/bleed easily  Psychiatric/Behavioral: Negative for agitation and behavioral problems  All other systems reviewed and are negative  12-point, complete review of systems was reviewed and negative except as stated above         Historical Information     Past Medical History:   Diagnosis Date    Diabetes mellitus ([facility] [address] )     Hiatal hernia     Hyperlipidemia     Hypertension     Neuropathy     PE (pulmonary thromboembolism) (HCC)     Shortness of breath     Venous insufficiency (chronic) (peripheral)      Past Surgical History:   Procedure Laterality Date    IR PE ENDOVASCULAR THERAPY  8/18/2020    JOINT REPLACEMENT Bilateral     knees    LEG SURGERY      [place] COLONOSCOPY FLX DX W/COLLJ SPEC WHEN PFRMD N/A 6/30/2017    Procedure: EGD AND COLONOSCOPY;  Surgeon: Yadiel Mcclain MD;  Location: [facility]; Service: General    TONSILLECTOMY       Social History   Social History     Substance and Sexual Activity   Alcohol Use Never    Frequency: Never     Social History     Substance and Sexual Activity   Drug Use No     Social History     Tobacco Use   Smoking Status Never Smoker   Smokeless Tobacco Never Used     E-Cigarette/Vaping    E-Cigarette Use Never User      E-Cigarette/Vaping Substances    Nicotine No     THC No     CBD No     Flavoring No     Other No     Unknown No      Immunization History   Administered Date(s) Administered    SARS-CoV-2 / COVID-19 mRNA IM (Claudette Ball) 02/05/2021, 03/03/2021     Last Tetanus: Unsure- will update  Family History: Non-contributory      Meds/Allergies   all current active meds have been reviewed    No Known Allergies      PHYSICAL EXAM      Objective   Vitals:   First set: Temperature: 98 °F (36 7 °C) (05/12/21 2122)  Pulse: 65 (05/12/21 2122)  Respirations: 18 (05/12/21 2122)  Blood Pressure: 156/88 (05/12/21 2122)      Primary Survey:   (A) Airway: intact  (B) Breathing: CTAB  (C) Circulation: Pulses:   femoral  4/4  (D) Disabliity:  GCS Total:  15  (E) Expose:  Completed    Secondary Survey:  Physical Exam  Vitals signs and nursing note reviewed  Constitutional:       General: He is not in acute distress  Appearance: He is well-developed  He is not toxic-appearing  HENT:      Head: Normocephalic and atraumatic        Right Ear: External ear normal       Left Ear: External ear normal       Nose: Nose normal       Mouth/Throat:      Mouth: Mucous membranes are moist       Pharynx: Oropharynx is clear  Eyes:      Conjunctiva/sclera: Conjunctivae normal       Pupils: Pupils are equal, round, and reactive to light  Neck:      Musculoskeletal: Neck supple  No neck rigidity  Cardiovascular:      Rate and Rhythm: Normal rate  Pulses: Normal pulses  Comments: Palpable pedal pulses bilaterally  Pulmonary:      Effort: Pulmonary effort is normal  No respiratory distress  Breath sounds: Normal breath sounds  Abdominal:      Palpations: Abdomen is soft  Tenderness: There is no abdominal tenderness  Genitourinary:     Penis: Normal        Scrotum/Testes: Normal    Musculoskeletal: Normal range of motion  General: Swelling present  No deformity  Comments: Hematoma of R medial knee  Stable  No active bleeding  ROM not impinged  Skin:     General: Skin is warm and dry  Neurological:      General: No focal deficit present  Mental Status: He is alert and oriented to person, place, and time  Psychiatric:         Mood and Affect: Mood normal          Behavior: Behavior normal          Invasive Devices     Peripheral Intravenous Line            Peripheral IV 05/12/21 Right Antecubital less than 1 day                Lab Results: Results: I have personally reviewed pertinent reports  Imaging/EKG Studies: Results: I have personally reviewed pertinent reports    , FAST: [place],  Other Studies: [address] Status: Level 1 - Full Code  Advance Directive and Living Will:      Power of :    POLST:

## 2021-05-13 NOTE — CASE MANAGEMENT
Pt is not a Bundle  Pt is NOT A 30 Day Readmission    CM spoke to both pt and his wife to discuss the role of CM  Pt lives with his wife (retired and home) in a 2 story home which has 2STE but the pt remains on the 1st floor  Pt's bedroom and bathroom (tub/shower with raised toilet) are on the 1st floor  Pt drives, is retired but works part time from home as a , and reports being fully independent for all ADL/IADLs PTA  Pt owns a walker, SPC, and shower chair (doesn't use at baseline)  Pt's had 1 fall in the last 6 months  Pt enjoys computers and yard work  Pt doesn't have a living will  Pt's pharmacy is [facility] in [place]  Pt has no hx of mental health, substance abuse, IP rehab, or VNA  CM will appreciate therapy recommendations when appropriate  CM reviewed d/c planning process including the following: identifying help at home, patient preference for d/c planning needs, Discharge Lounge, [facility] Meds to Bed program, availability of treatment team to discuss questions or concerns patient and/or family may have regarding understanding medications and recognizing signs and symptoms once discharged  CM also encouraged patient to follow up with all recommended appointments after discharge  Patient advised of importance for patient and family to participate in managing patients medical well being

## 2021-05-13 NOTE — CONSULTS
Consultation - Geriatric Medicine   Ena Doran 67 y o  male MRN: [id number]  Unit/Bed#: [facility] 810-01 Encounter: [id number]      Assessment/Plan     Right medial leg hematoma  -secondary to falling tree injury  -CT right lower extremity showing hematoma with extravasation, background of bilateral total knee placement 10 years back  Right total knee replacements components found to be intact  -patient on anticoagulation with Eliquis for history of PE   -Eliquis being held in this hospitalization  -patient being followed by Trauma care and compression bandage applied  No further expansion of hematoma noted  -distal neurovascular function is intact  Acute pain due to trauma  -patient reports pain of 5/10 currently  He is on Dilaudid 0 5 q 3 hours for breakthrough pain and oxycodone 2 5 mg q 4 hours p r n  For moderate pain, oxycodone 5 mg q 4 hours p r n  For severe pain  -recommend Tylenol, ice compress, limb elevation, for pain relief  Re-evaluate necessity for narcotic analgesics frequently and deescalate as appropriate  -bowel regimen while on narcotic analgesics  -PT OT when deemed appropriate    Lumbar degenerative disc disease  -MRI evidence of DJD without high-grade canal or foraminal stenosis on the L spine with herniated disc at L5-S1 level   -patient reports no major chronic back pain at this time   -will recommend non pharmacological intervention at this time  JED on CPAP  -consistently uses CPAP at home  -ensure compliance while inpatient    DM-II  Name is-insulin-dependent, long-acting at night without any mealtime insulin , additionally takes a oral agents Jardiance while at home   -patient notes few hypoglycemic episodes while at home with his blood sugars goes to 55  Patient aware of hypoglycemic symptoms and remedial measures  Reports no LOC with hypoglycemia    Insulin requirements have been stable over several months now    -patient aware that he cannot skip meals while being on insulin, also aware that he needs to reduce doses of insulin if he anticipates reduced meal intake or increased exercise   -adjust insulin doses, monitor Accu-Cheks avoid hypoglycemia, Ensure hypoglycemic protocol  Deconditioning/debility/frailty  -clinical frailty score of 3  -risk of progression without appropriate intervention  Will recommend a PT OT when more appropriate  -    Delirium precautions  -Patient is high risk of delirium due to advanced  age, multiple hospital setting, pain due to trauma and hematoma, multiple comorbidities, JED requiring CPAP  -Initiate delirium precautions  -maintain normal sleep/wake cycle  -minimize overnight interruptions, group overnight vitals/labs/nursing checks as possible  -dim lights, close blinds and turn off tv to minimize stimulation and encourage sleep environment in evenings  -ensure that pain is well controlled     Or consider Tylenol 975mg Q8H scheduled if not already ordered   -monitor for fecal and urinary retention which may precipitate delirium  -encourage early mobilization and ambulation  -provide frequent reorientation and redirection  -encourage family and friends at the bedside to help help calm patient if anxious  -avoid medications which may precipitate or worsen delirium such as tramadol, benzodiazepine, anticholinergics, and benadryl  -encourage hydration and nutrition   -redirect unwanted behaviors as first line, avoid physical restraints, use chemical restraint only if all other attempts have been unsuccessful, would consider Zyprexa 2 5mg IM Q, monitor for orthostatic hypotension and QTc prolongation with repeat dosing, recommend lowest dose possible for shortest duration possible     Home medication review    [phone]  Humalog vial  Eliquis 5 mg b i d   Jardiance 25 mg once daily  Simvastatin 40 mg at night       History of Present Illness   Physician Requesting Consult: Romel Andersen DO  Reason for Consult / Principal Problem: ISAR  Hx and PE limited by: N/A  Additional history obtained from: Chart review and patient interview    HPI: Jody Guzman is a 67y o  year old male with DM-II, JED on CPAP, pulmonary hypertension, hyperlipidemia, GERD, hypertension, history of Pulmonary Embolism, and OA of right shoulder trauma service with following injury while yard work at  home on 5/12/21  Being seen in consultation by Geriatrics for risk of developing delirium during hospitalization  Patient reports attempting to chop a dead tree in his garden yesterday afternoon, and sustained an injury to his right knee by a falling tree branch  Patient was able to continue with his current work for about an hour and then noticed swelling and pain, and went to the ER at [facility]  CT of the right lower extremity DVT hematoma of the right knee with extravasation in the setting of normal right total knee replacement components  Patient was referred to [facility] for further management  Patient had no further hematoma expansion and was given compression bandage  Noted that initial labs were within normal limits  Patient had CT head done several months ago which was unremarkable  Currently patient is sitting on his recliner, comfortable, reports  pain of 5/10 on his right knee, and is able to wiggle his right toe  Patient denies any subjective complaints of chest pain, shortness of breath, palpitations, nausea vomiting diarrhea, any other bleeding injuries  He is wearing his corrective lenses, denies any use of a hearing aids  He is independent of activities of daily living, does not use a walker or cane and is physically active  He takes Eliquis for history of PE status post thrombolysis 1 year back, history of distal tibial fracture status post surgery 1 year back, which is now improved  History of  total bilateral knee replacements    He is also insulin-dependent diabetic taking long-acting insulin at night along with 1 oral hypoglycemic agent  He denies any mealtime insulin  He checks his blood sugar at least 2 times a day, notes hypoglycemic episodes occasionally with blood sugars as low as 55  He has never lost consciousness some hypoglycemia is aware of hypoglycemic symptoms and is aware of the medial measures  No recent insulin dosage changes have been made  He uses CPAP at night for JED  He has been tolerating Dilaudid and oxycodone for pain  He has not had a bowel movement in 2 days  Denies any urinary symptoms  Discussed with spouse at bedside and answered all questions  Inpatient consult to Gerontology  Consult performed by: Media Poster, MD  Consult ordered by: Estelita Pan [facility]          Inpatient consult to Gerontology     Performed by  Argelia Mena MD     Authorized by CHRISTIANE Pan              Review of Systems   Constitutional: Negative for activity change, appetite change, chills, diaphoresis, fatigue, fever and unexpected weight change  HENT: Negative for congestion and sore throat  Respiratory: Negative for apnea, cough, choking, chest tightness, shortness of breath, wheezing and stridor  Cardiovascular: Negative for chest pain, palpitations and leg swelling  Gastrointestinal: Negative for abdominal distention, abdominal pain, blood in stool, constipation, nausea and rectal pain  Genitourinary: Negative for dysuria, flank pain, frequency and urgency  Musculoskeletal: Positive for joint swelling  Negative for arthralgias, back pain, gait problem and myalgias  Swelling and pain in the right knee following injury by 3 yesterday  Skin: Negative for color change, pallor and rash  Neurological: Negative for dizziness, seizures, syncope and headaches  Psychiatric/Behavioral: Negative for agitation, behavioral problems, confusion, dysphoric mood, sleep disturbance and suicidal ideas         Historical Information   Past Medical History:   Diagnosis Date    Diabetes mellitus ([facility] [address] )     Hiatal hernia     Hyperlipidemia     Hypertension     Neuropathy     PE (pulmonary thromboembolism) (HCC)     Shortness of breath     Venous insufficiency (chronic) (peripheral)      Past Surgical History:   Procedure Laterality Date    IR PE ENDOVASCULAR THERAPY  8/18/2020    JOINT REPLACEMENT Bilateral     knees    LEG SURGERY      [place] COLONOSCOPY FLX DX W/COLLJ SPEC WHEN PFRMD N/A 6/30/2017    Procedure: EGD AND COLONOSCOPY;  Surgeon: Dee Calvin MD;  Location: [facility]; Service: General    TONSILLECTOMY       Social History   Social History     Substance and Sexual Activity   Alcohol Use Not Currently    Frequency: Never     Social History     Substance and Sexual Activity   Drug Use No     Social History     Tobacco Use   Smoking Status Never Smoker   Smokeless Tobacco Never Used     Family History:   Family History   Problem Relation Age of Onset    Cancer Mother     Cancer Father      Meds/Allergies   all current active meds have been reviewed    No Known Allergies    Objective     Intake/Output Summary (Last 24 hours) at 5/13/2021 1117  Last data filed at 5/13/2021 6360  Gross per 24 hour   Intake 180 ml   Output 400 ml   Net -220 ml     Invasive Devices     Peripheral Intravenous Line            Peripheral IV 05/12/21 Right Antecubital less than 1 day              Physical Exam  Constitutional:       General: He is not in acute distress  Appearance: Normal appearance  He is obese  He is not ill-appearing, toxic-appearing or diaphoretic  Cardiovascular:      Rate and Rhythm: Normal rate and regular rhythm  Pulses: Normal pulses  Heart sounds: Normal heart sounds  No murmur  No gallop  Pulmonary:      Effort: Pulmonary effort is normal  No respiratory distress  Breath sounds: Normal breath sounds  No stridor  No wheezing, rhonchi or rales  Chest:      Chest wall: No tenderness     Abdominal:      General: Bowel sounds are normal  There is no distension  Palpations: Abdomen is soft  There is no mass  Tenderness: There is no abdominal tenderness  There is no guarding  Hernia: No hernia is present  Musculoskeletal:         General: Swelling and tenderness present  Left lower leg: Edema present  Comments: Right the wrap in compression bandage this with Dr Rivera Curet over read  Patient has some mild tenderness on superficial palpation  Distal pulses and motor functions intact  Skin:     General: Skin is warm and dry  Neurological:      General: No focal deficit present  Mental Status: He is alert and oriented to person, place, and time  Mental status is at baseline  Lab Results:   I have personally reviewed pertinent lab results including the following:  -    I have personally reviewed the following imaging study reports in PACS:  -    Personal interpretation of imaging studies mentioned above:    -     Therapies:   PT:   To be ordered    OT:  To be ordered    VTE Prophylaxis: Sequential compression device (Venodyne)     Code Status: Level 1 - Full Code  Advance Directive and Living Will:      Power of :    POLST:      Family and Social Support:  Spouse  Living Arrangements: Spouse/significant other  Support Systems: Spouse/significant other  Assistance Needed: tbd  Type of Current Residence: Private residence  Current [address] : No      Goals of Care:  Level 1 full code

## 2021-05-13 NOTE — PLAN OF CARE
Problem: Potential for Falls  Goal: Patient will remain free of falls  Description: INTERVENTIONS:  - Assess patient frequently for physical needs  -  Identify cognitive and physical deficits and behaviors that affect risk of falls    -  [place] fall precautions as indicated by assessment   - Educate patient/family on patient safety including physical limitations  - Instruct patient to call for assistance with activity based on assessment  - Modify environment to reduce risk of injury  - Consider OT/PT consult to assist with strengthening/mobility  Outcome: Progressing

## 2021-05-13 NOTE — RESPIRATORY THERAPY NOTE
RT Protocol Note  Ginny Barillas 67 y o  male MRN: [id number]  Unit/Bed#: [facility] 810-01 Encounter: [id number]    Assessment    Principal Problem:    Traumatic hematoma of right knee      Home Pulmonary Medications:  none       Past Medical History:   Diagnosis Date    Diabetes mellitus ([address] )     Hiatal hernia     Hyperlipidemia     Hypertension     Neuropathy     PE (pulmonary thromboembolism) (HCC)     Shortness of breath     Venous insufficiency (chronic) (peripheral)      Social History     Socioeconomic History    Marital status: /Civil Union     Spouse name: None    Number of children: None    Years of education: None    Highest education level: None   Occupational History    None   Social Needs    Financial resource strain: None    Food insecurity     Worry: None     Inability: None    Transportation needs     Medical: None     Non-medical: None   Tobacco Use    Smoking status: Never Smoker    Smokeless tobacco: Never Used   Substance and Sexual Activity    Alcohol use: Not Currently     Frequency: Never    Drug use: No    Sexual activity: None   Lifestyle    Physical activity     Days per week: None     Minutes per session: None    Stress: None   Relationships    Social connections     Talks on phone: None     Gets together: None     Attends Hindu service: None     Active member of club or organization: None     Attends meetings of clubs or organizations: None     Relationship status: None    Intimate partner violence     Fear of current or ex partner: None     Emotionally abused: None     Physically abused: None     Forced sexual activity: None   Other Topics Concern    None   Social History Narrative    None       Subjective         Objective    Physical Exam:   Assessment Type: Assess only  General Appearance: Awake, Alert, Other (Comment)(when initially interviewed upon arrival on 8th floor)  Respiratory Pattern: Normal  Bilateral Breath Sounds: Clear  Cough: None  O2 Device: room air    Vitals:  Blood pressure 142/71, pulse (!) 51, temperature 98 °F (36 7 °C), resp  rate (P) 18, height 5' 10" (1 778 m), weight 116 kg (255 lb), SpO2 95 %  Imaging and other studies: I have personally reviewed pertinent reports  Small right pleural effusion with associated atelectasis    O2 Device: room air     Plan    Respiratory Plan: No distress/Pulmonary history        Resp Comments: (P) Assessed per protocol  Patient was doing yardwork on 5/10 and a tree limb fell on his leg  He had increasing pain and presented to [facility] on 5/12  Tx to B for Trauma evaluation after CT showed extravasation  Patient was doing yardwork on 5/10 and a tree limb fell on his leg  He had increasing pain and presented to [facility] on 5/12  Tx to B for Trauma evaluation after CT showed extravasation  No smoking hs  No pulm dx other than PEs in the past   No home usage of any pulmonary meds or bronchodilators  RCP does not appear to be indicated at this time but will continue to follow this pt each evening for initiation of his CPAP HS  Pt on room air currently with SpO2 > 94% and sound asleep w/o any snoring or moments of apnea

## 2021-05-13 NOTE — PROGRESS NOTES
[facility]  Progress Note Stewart Rothman 1945, 67 y o  male MRN: [id number]  Unit[facility]Bed[facility] 810-01 Encounter: [id number]  Primary Care Provider: Keyshawn Mckeon MD   Date and time admitted to hospital: 5/12/2021  9:17 PM    * Traumatic hematoma of right knee  Assessment & Plan  Patient was struck in right knee region by wood / branch  Patient on Eliqous and experienced a large traumatic hematoma  To monitor bleeding, Hgb were order round the clock and Neurovascular checks where initiated  Continue to monitor hemoglobins, Ace wrap intact for compression to prevent bleeding  Neurovascular intact, wiggles toes  Significant swelling of lower extremity but compartments remain soft  TERTIARY TRAUMA SURVEY NOTE    Prophylaxis: Sequential compression device (Venodyne)     Disposition: home    Code status:  Level 1 - Full Code    Consultants: Geriatrics    Is the patient 72 years or older?: YES:    1  Before the illness or injury that brought you to the Emergency, did you need someone to help you on a regular basis? 0=No   2  Since the illness or injury that brought you to the Emergency, have you needed more help than usual to take care of yourself? 1=Yes   3  Have you been hospitalized for one or more nights during the past 6 months (excluding a stay in the Emergency Department)? 0=No   4  In general, do you see well? 0=Yes   5  In general, do you have serious problems with your memory? 0=No   6  Do you take more than three different medications everyday? 1=Yes   TOTAL   2     Did you order a geriatric consult if the score was 2 or greater?: yes    Identification of Seniors at Risk      Most Recent Value   (ISAR) Identification of Seniors at Risk   Before the illness or injury that brought you to the Emergency, did you need someone to help you on a regular basis?   0 Filed at: 05/12/2021 2112   In the last 24 hours, have you needed more help than usual?  0 Filed at: 05/12/2021 2115   Have you been hospitalized for one or more nights during the past 6 months? 0 Filed at: 05/12/2021 2115   In general, do you see well?  0 Filed at: 05/12/2021 2115   In general, do you have serious problems with your memory? 0 Filed at: 05/12/2021 2115   Do you take more than three different medications every day?   1 Filed at: 05/12/2021 2115   ISAR Score  1 Filed at: 05/12/2021 2115            SUBJECTIVE:     Transfer from: [facility]  Outside Films Received: yes  Tertiary Exam Due on: 5/13/21    Mechanism of Injury:Other: struck by a piece of wood    Details related to Injury: +LOC:  no    Chief Complaint: pain in right knee and lower extremity    HPI/Last 24 hour events: Admitted to trauma and q 6 hour hemoglobins ordered    Active medications:           Current Facility-Administered Medications:     acetaminophen (TYLENOL) tablet 650 mg, 650 mg, Oral, Q6H PRN, 650 mg at 05/12/21 2313    calcium carbonate (TUMS) chewable tablet 500 mg, 500 mg, Oral, Daily PRN    HYDROmorphone (DILAUDID) injection 0 5 mg, 0 5 mg, Intravenous, Q3H PRN, 0 5 mg at 05/13/21 0410    insulin glargine (LANTUS) subcutaneous injection 26 Units 0 26 mL, 26 Units, Subcutaneous, HS, 26 Units at 05/12/21 2313    insulin lispro (HumaLOG) 100 units/mL subcutaneous injection 2-12 Units, 2-12 Units, Subcutaneous, TID AC **AND** Fingerstick Glucose (POCT), , , TID AC    insulin lispro (HumaLOG) 100 units/mL subcutaneous injection 2-12 Units, 2-12 Units, Subcutaneous, HS    melatonin tablet 3 mg, 3 mg, Oral, HS    ondansetron (ZOFRAN) injection 4 mg, 4 mg, Intravenous, Q6H PRN    oxyCODONE (ROXICODONE) IR tablet 2 5 mg, 2 5 mg, Oral, Q4H PRN    oxyCODONE (ROXICODONE) IR tablet 5 mg, 5 mg, Oral, Q4H PRN, 5 mg at 05/13/21 0929    pravastatin (PRAVACHOL) tablet 80 mg, 80 mg, Oral, Daily With Dinner      OBJECTIVE:     Vitals:   Vitals:    05/13/21 0739   BP: 141/71   Pulse: (!) 54   Resp: 12   Temp: (!) 97 4 °F (36 3 °C)   SpO2: 95%       Physical Exam:   GENERAL APPEARANCE: comfortable  NEURO: GCS - 15, intact  HEENT: EOm's intact  CV: RRR< no complaints of chest pain  LUNGS: CTA bilaterally, no shortness of breath  GI: tolerating a diet  : voiding  MSK: moves other extremities, able to wiggle toes and foot of RLE  SKIN: swollen but compartments remain soft    I/O:   I/O       05/11 0701 - 05/12 0700 05/12 0701 - 05/13 0700 05/13 0701 - 05/14 0700    P  O    180    Total Intake(mL/kg)   180 (1 6)    Net   +180                 Invasive Devices: Invasive Devices     Peripheral Intravenous Line            Peripheral IV 05/12/21 Right Antecubital less than 1 day                  Imaging:   Ct Lower Extremity W Contrast Right    Result Date: 5/12/2021  Impression: 5 4 x 1 9 x 7 5 cm medial subcutaneous hematoma with focal inferior hyperdensity in keeping with active bleed  I personally discussed this study with Nhi Donovan on 5/12/2021 at 7:38 PM  Workstation performed: [id number]       Labs: Results for Harini Umana (MRN [id number]) as of 5/13/2021 10:55   Ref   Range 5/13/2021 04:04 5/13/2021 04:04 5/13/2021 07:37   Sodium Latest Ref Range: 136 - 145 mmol/L 142     Potassium Latest Ref Range: 3 5 - 5 3 mmol/L 3 8     Chloride Latest Ref Range: 100 - 108 mmol/L 111 (H)     CO2 Latest Ref Range: 21 - 32 mmol/L 25     Anion Gap Latest Ref Range: 4 - 13 mmol/L 6     BUN Latest Ref Range: 5 - 25 mg/dL 13     Creatinine Latest Ref Range: 0 60 - 1 30 mg/dL 0 53 (L)     Glucose, Random Latest Ref Range: 65 - 140 mg/dL 126     Calcium Latest Ref Range: 8 3 - 10 1 mg/dL 8 7     eGFR Latest Units: ml/min/1 73sq m 103     Phosphorus Latest Ref Range: 2 3 - 4 1 mg/dL 3 3     Magnesium Latest Ref Range: 1 6 - 2 6 mg/dL 2 2     WBC Latest Ref Range: 4 31 - 10 16 Thousand/uL  11 85 (H)    Red Blood Cell Count Latest Ref Range: 3 88 - 5 62 Million/uL  5 18    Hemoglobin Latest Ref Range: 12 0 - 17 0 g/dL 15 4 15 3    HCT Latest Ref Range: 36 5 - 49 3 % 46 8 47 8    MCV Latest Ref Range: 82 - 98 fL  92    MCH Latest Ref Range: 26 8 - 34 3 pg  29 5    MCHC Latest Ref Range: 31 4 - 37 4 g/dL  32 0    RDW Latest Ref Range: 11 6 - 15 1 %  15 1    Platelet Count Latest Ref Range: 149 - 390 Thousands/uL  201    MPV Latest Ref Range: 8 9 - 12 7 fL  11 2    nRBC Latest Units: /100 WBCs  0    Neutrophils % Latest Ref Range: 43 - 75 %  49    Immat GRANS % Latest Ref Range: 0 - 2 %  0    Lymphocytes Relative Latest Ref Range: 14 - 44 %  38    Monocytes Relative Latest Ref Range: 4 - 12 %  10    Eosinophils Latest Ref Range: 0 - 6 %  2    Basophils Relative Latest Ref Range: 0 - 1 %  1    Immature Grans Absolute Latest Ref Range: 0 00 - 0 20 Thousand/uL  0 04    Absolute Neutrophils Latest Ref Range: 1 85 - 7 62 Thousands/µL  5 93    Lymphocytes Absolute Latest Ref Range: 0 60 - 4 47 Thousands/µL  4 44    Absolute Monocytes Latest Ref Range: 0 17 - 1 22 Thousand/µL  1 14    Absolute Eosinophils Latest Ref Range: 0 00 - 0 61 Thousand/µL  0 23    Basophils Absolute Latest Ref Range: 0 00 - 0 10 Thousands/µL  0 07    POC Glucose Latest Ref Range: 65 - 140 mg/dl   118

## 2021-05-14 VITALS
WEIGHT: 255 LBS | BODY MASS INDEX: 36.51 KG/M2 | DIASTOLIC BLOOD PRESSURE: 59 MMHG | SYSTOLIC BLOOD PRESSURE: 114 MMHG | RESPIRATION RATE: 14 BRPM | HEIGHT: 70 IN | OXYGEN SATURATION: 91 % | HEART RATE: 66 BPM | TEMPERATURE: 98.1 F

## 2021-05-14 LAB
BASOPHILS # BLD AUTO: 0.05 THOUSANDS/ΜL (ref 0–0.1)
BASOPHILS NFR BLD AUTO: 1 % (ref 0–1)
EOSINOPHIL # BLD AUTO: 0.23 THOUSAND/ΜL (ref 0–0.61)
EOSINOPHIL NFR BLD AUTO: 2 % (ref 0–6)
ERYTHROCYTE [DISTWIDTH] IN BLOOD BY AUTOMATED COUNT: 15 % (ref 11.6–15.1)
GLUCOSE SERPL-MCNC: 124 MG/DL (ref 65–140)
GLUCOSE SERPL-MCNC: 134 MG/DL (ref 65–140)
HCT VFR BLD AUTO: 48.2 % (ref 36.5–49.3)
HGB BLD-MCNC: 15.7 G/DL (ref 12–17)
IMM GRANULOCYTES # BLD AUTO: 0.03 THOUSAND/UL (ref 0–0.2)
IMM GRANULOCYTES NFR BLD AUTO: 0 % (ref 0–2)
LYMPHOCYTES # BLD AUTO: 4.06 THOUSANDS/ΜL (ref 0.6–4.47)
LYMPHOCYTES NFR BLD AUTO: 39 % (ref 14–44)
MCH RBC QN AUTO: 29.6 PG (ref 26.8–34.3)
MCHC RBC AUTO-ENTMCNC: 32.6 G/DL (ref 31.4–37.4)
MCV RBC AUTO: 91 FL (ref 82–98)
MONOCYTES # BLD AUTO: 1.01 THOUSAND/ΜL (ref 0.17–1.22)
MONOCYTES NFR BLD AUTO: 10 % (ref 4–12)
NEUTROPHILS # BLD AUTO: 5.09 THOUSANDS/ΜL (ref 1.85–7.62)
NEUTS SEG NFR BLD AUTO: 48 % (ref 43–75)
NRBC BLD AUTO-RTO: 0 /100 WBCS
PLATELET # BLD AUTO: 199 THOUSANDS/UL (ref 149–390)
PMV BLD AUTO: 11 FL (ref 8.9–12.7)
RBC # BLD AUTO: 5.3 MILLION/UL (ref 3.88–5.62)
WBC # BLD AUTO: 10.47 THOUSAND/UL (ref 4.31–10.16)

## 2021-05-14 PROCEDURE — 85025 COMPLETE CBC W/AUTO DIFF WBC: CPT | Performed by: NURSE PRACTITIONER

## 2021-05-14 PROCEDURE — NC001 PR NO CHARGE: Performed by: PHYSICIAN ASSISTANT

## 2021-05-14 PROCEDURE — 82948 REAGENT STRIP/BLOOD GLUCOSE: CPT

## 2021-05-14 PROCEDURE — 94760 N-INVAS EAR/PLS OXIMETRY 1: CPT

## 2021-05-14 PROCEDURE — 99238 HOSP IP/OBS DSCHRG MGMT 30/<: CPT | Performed by: PHYSICIAN ASSISTANT

## 2021-05-14 RX ORDER — METHOCARBAMOL 500 MG/1
500 TABLET, FILM COATED ORAL EVERY 8 HOURS SCHEDULED
Qty: 20 TABLET | Refills: 1 | Status: SHIPPED | OUTPATIENT
Start: 2021-05-14 | End: 2021-05-28 | Stop reason: ALTCHOICE

## 2021-05-14 RX ORDER — ACETAMINOPHEN 325 MG/1
650 TABLET ORAL EVERY 4 HOURS PRN
Refills: 0
Start: 2021-05-14 | End: 2021-12-21 | Stop reason: ALTCHOICE

## 2021-05-14 RX ORDER — OXYCODONE HYDROCHLORIDE 5 MG/1
2.5-5 TABLET ORAL EVERY 4 HOURS PRN
Qty: 15 TABLET | Refills: 0 | Status: SHIPPED | OUTPATIENT
Start: 2021-05-14 | End: 2021-11-01 | Stop reason: ALTCHOICE

## 2021-05-14 RX ADMIN — ACETAMINOPHEN 975 MG: 325 TABLET, FILM COATED ORAL at 05:19

## 2021-05-14 RX ADMIN — ACETAMINOPHEN 975 MG: 325 TABLET, FILM COATED ORAL at 15:48

## 2021-05-14 RX ADMIN — METHOCARBAMOL 500 MG: 500 TABLET, FILM COATED ORAL at 05:19

## 2021-05-14 RX ADMIN — METHOCARBAMOL 500 MG: 500 TABLET, FILM COATED ORAL at 15:48

## 2021-05-14 NOTE — PLAN OF CARE
Problem: Potential for Falls  Goal: Patient will remain free of falls  Description: INTERVENTIONS:  - Assess patient frequently for physical needs  -  Identify cognitive and physical deficits and behaviors that affect risk of falls    -  [place] fall precautions as indicated by assessment   - Educate patient/family on patient safety including physical limitations  - Instruct patient to call for assistance with activity based on assessment  - Modify environment to reduce risk of injury  - Consider OT/PT consult to assist with strengthening/mobility  Outcome: Progressing

## 2021-05-14 NOTE — ASSESSMENT & PLAN NOTE
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

## 2021-05-14 NOTE — DISCHARGE INSTRUCTIONS
Trauma Discharge Instructions:    Please follow-up as instructed  Activity:  - You may resume activity as tolerated  - Walking and normal light activities are encouraged  - Normal daily activities including climbing steps are okay  - No driving until no longer using pain medications  Diet:    - You may resume your normal diet  Medications:  - You should continue your current medication regimen after discharge unless otherwise instructed  Please refer to your discharge medication list for further details  - Continue to hold your blood thinning medication, Eliquis, for at least 5-7 days  Recommend short-term outpatient follow-up with primary care provider to review indication and discuss whether not ongoing anticoagulation (Eliquis use) is necessary   - Please take the pain medications as directed  Additional Instructions:  - May shower daily   - If you have any questions or concerns after discharge please call the office   - Call office or return to ER if fever greater than 101, chills, persistent nausea/vomiting, worsening/uncontrollable pain, develop productive cough, increasing shortness of breath, difficulty breathing, and/or increasing redness or purulent/foul smelling drainage from leg wound

## 2021-05-14 NOTE — PLAN OF CARE
Problem: Potential for Falls  Goal: Patient will remain free of falls  Description: INTERVENTIONS:  - Assess patient frequently for physical needs  -  Identify cognitive and physical deficits and behaviors that affect risk of falls    -  [place] fall precautions as indicated by assessment   - Educate patient/family on patient safety including physical limitations  - Instruct patient to call for assistance with activity based on assessment  - Modify environment to reduce risk of injury  - Consider OT/PT consult to assist with strengthening/mobility  5/14/2021 0057 by Tania Lesches, RN  Outcome: Progressing  5/14/2021 0057 by Tania Lesches, RN  Outcome: Progressing     Problem: HEMATOLOGIC - ADULT  Goal: Maintains hematologic stability  Description: INTERVENTIONS  - Assess for signs and symptoms of bleeding or hemorrhage  - Monitor labs  - Administer supportive blood products/factors as ordered and appropriate  Outcome: Progressing     Problem: MUSCULOSKELETAL - ADULT  Goal: Maintain or return mobility to safest level of function  Description: INTERVENTIONS:  - Assess patient's ability to carry out ADLs; assess patient's baseline for ADL function and identify physical deficits which impact ability to perform ADLs (bathing, care of mouth/teeth, toileting, grooming, dressing, etc )  - Assess/evaluate cause of self-care deficits   - Assess range of motion  - Assess patient's mobility  - Assess patient's need for assistive devices and provide as appropriate  - Encourage maximum independence but intervene and supervise when necessary  - Involve family in performance of ADLs  - Assess for home care needs following discharge   - Consider OT consult to assist with ADL evaluation and planning for discharge  - Provide patient education as appropriate  Outcome: Progressing

## 2021-05-14 NOTE — DISCHARGE SUMMARY
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  Anticoagulation currently held in setting of right lower extremity hematoma  - No evidence of active or ongoing bleeding with stable hemoglobin for the last 48 hours, and clinically no evidence of active or ongoing bleeding by exam   - Continue multimodal analgesic regimen  - Activity as tolerated with weight-bearing as tolerated on the right lower extremity   - Recommend keeping right lower extremity elevated while at rest   - Recommend holding anticoagulation for 5-7 days and following up with primary care provider to discuss need for ongoing anticoagulation  Discharge Summary - Trauma Service   Adam Fernando 67 y o  male MRN: [id number]  [facility] Encounter: [id number]    Admission Date: 5/12/2021     Discharge Date: 5/14/2021    Admitting Diagnosis: Knee pain [M25 [id number]]  Traumatic hematoma of left knee, initial encounter [S80 [id number]]    Discharge Diagnosis: See above  Attending and Service: Dr Robert Stone, Acute Care Surgical Services  Consulting Physician(s):     1  Geriatric Medicine    Imaging and Procedures Performed:   Orders Placed This Encounter   Procedures    Fast Ultrasound       Ct Lower Extremity W Contrast Right    Result Date: 5/12/2021  Impression: 5 4 x 1 9 x 7 5 cm medial subcutaneous hematoma with focal inferior hyperdensity in keeping with active bleed  I personally discussed this study with Negro Donovan on 5/12/2021 at 7:38 PM  Workstation performed: [id number]       Hospital Course: Mushtaq Garncia is a 58-year-old male  Who initially presented to [facility] before being transferred to [facility] as a level B trauma alert  The patient was noted to have been doing yd work back on 05/10/2021 when a tree limb fell on his leg  Since then, he had increasing pain in that leg and presented to [facility] 05/12/2021 where he was identified to have a right lower extremity hematoma around the right knee with active extravasation  A pressure dressing was applied and he was transferred to [facility]  On arrival to [facility], he complained of pain in the right lower extremity  His primary survey was unremarkable  On secondary survey, he was mildly hypertensive with normal vital signs otherwise; he was noted to have a hematoma over the right medial knee with no evidence of active bleeding and some surrounding swelling; the remainder of his secondary survey was unremarkable  His initial workup included imaging studies was reviewed by the trauma service at [facility]  He was admitted to trauma service at [facility] status post blunt trauma to the right lower extremity with a right knee hematoma  He was managed conservatively with serial exams, serial hemoglobins and a pressure dressing to the right lower extremity  His tetanus status was updated  His anticoagulant/anti-platelet medications were held  The patient did well during his hospitalization at [facility] and was able to achieve adequate analgesia with oral medications only  He had no issue with mobility are ambulation requiring therapy evaluation  His hemoglobins were stable and he did not require any intervention or transfusions  He was deemed stable for discharge on 05/14/2021      On discharge, the patient is instructed to follow-up with the patient's primary care provider to review the events of the patient's recent hospitalization  The patient is instructed to follow-up in the Trauma Clinic as scheduled on 5/21/2021 at 2:15 PM   The patient should follow the provided discharge instructions  Condition at Discharge: good     Discharge instructions/Information to patient and family:   See after visit summary for information provided to patient and family  Provisions for Follow-Up Care:  See after visit summary for information related to follow-up care and any pertinent home health orders  Disposition: See After Visit Summary for discharge disposition information  Planned Readmission: No    Discharge Statement   I spent 22 minutes discharging the patient  This time was spent on the day of discharge  I had direct contact with the patient on the day of discharge  Additional documentation is required if more than 30 minutes were spent on discharge  Discharge Medications:  See after visit summary for reconciled discharge medications provided to patient and family        Ana Fowler PA-C  5/14/2021  2:31 PM

## 2021-05-14 NOTE — PROGRESS NOTES
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  Anticoagulation currently held in setting of right lower extremity hematoma  - No evidence of active or ongoing bleeding with stable hemoglobin for the last 48 hours, and clinically no evidence of active or ongoing bleeding by exam   - Continue multimodal analgesic regimen  - Activity as tolerated with weight-bearing as tolerated on the right lower extremity   - Recommend keeping right lower extremity elevated while at rest   - Recommend holding anticoagulation for 5-7 days and following up with primary care provider to discuss need for ongoing anticoagulation  Disposition:  Anticipate possible discharge in the next 24 hours  Case Management following for disposition planning  SUBJECTIVE:  Chief Complaint:  My right knee still hurts      Subjective:  Patient is overall doing well  He notes he was able to get some rest overnight  When he moves his leg, he does still have pretty significant pain near his right knee  Pain medicine has not helped all that much  He is tolerating his diet without nausea, vomiting or constipation  He offers no other complaints  He denies any numbness/weakness/tingling in his right lower leg        OBJECTIVE:     Meds/Allergies     Current Facility-Administered Medications:     acetaminophen (TYLENOL) tablet 975 mg, 975 mg, Oral, Q8H Serina SAVAGE CRNP, 975 mg at 05/14/21 0519    calcium carbonate (TUMS) chewable tablet 500 mg, 500 mg, Oral, Daily PRN, Mac Rhodes MD    HYDROmorphone (DILAUDID) injection 0 5 mg, 0 5 mg, Intravenous, Q3H PRN, Mac Rhodes MD, 0 5 mg at 05/13/21 0410    insulin glargine (LANTUS) subcutaneous injection 26 Units 0 26 mL, 26 Units, Subcutaneous, HS, Mac Rhodes MD, 26 Units at 05/13/21 2156    insulin lispro (HumaLOG) 100 units/mL subcutaneous injection 2-12 Units, 2-12 Units, Subcutaneous, TID AC **AND** Fingerstick Glucose (POCT), , , TID AC, Mac Rhodes MD    insulin lispro (HumaLOG) 100 units/mL subcutaneous injection 2-12 Units, 2-12 Units, Subcutaneous, HS, Mac Rhodes MD    melatonin tablet 3 mg, 3 mg, Oral, HS, Mac Rhodes MD, 3 mg at 05/13/21 2156    methocarbamol (ROBAXIN) tablet 500 mg, 500 mg, Oral, Q8H [facility], Serina Pino Ser, CRNP, 500 mg at 05/14/21 0519    ondansetron (ZOFRAN) injection 4 mg, 4 mg, Intravenous, Q6H PRN, Mac Rhodes MD    oxyCODONE (ROXICODONE) IR tablet 2 5 mg, 2 5 mg, Oral, Q4H PRN, Mac Rhodes MD    oxyCODONE (ROXICODONE) IR tablet 5 mg, 5 mg, Oral, Q4H PRN, Mac Rhodes MD, 5 mg at 05/13/21 2202    pravastatin (PRAVACHOL) tablet 80 mg, 80 mg, Oral, Daily With Maty Cordova MD, 80 mg at 05/13/21 1734     Vitals:   Vitals:    05/13/21 2317   BP:    Pulse:    Resp:    Temp:    SpO2: 95%       Intake/Output:  I/O       05/12 0701 - 05/13 0700 05/13 0701 - 05/14 0700 05/14 0701 - 05/15 0700    P  O   600     Total Intake(mL/kg)  600 (5 2)     Urine (mL/kg/hr)  600 (0 2)     Total Output  600     Net  0            Unmeasured Urine Occurrence  1 x            Nutrition/GI Proph/Bowel Reg:  Level 2 constant carbohydrate diet; Tums  Physical Exam:   GENERAL APPEARANCE: Patient in no acute distress  HEENT: NCAT; EOMs intact; Mucous membranes moist  CV: Regular rate and rhythm; no murmur/gallops/rubs appreciated  CHEST / LUNGS: Clear to auscultation; no wheezes/rales/rhonci  ABD: NABS; soft; non-distended; non-tender    : Voiding spontaneously  EXT: +2 pulses bilaterally upper & lower extremities  There is a hematoma of the anterior proximal right lower leg/knee area with mild tenderness and intact overlying skin  There is some blistering of the skin overlying the hematoma  The distal right lower extremity neurovascular exam remains intact and unremarkable  NEURO: GCS 15; no focal neurologic deficits; neurovascularly intact  SKIN: Warm, dry and well perfused; no rash; no jaundice  Invasive Devices     Peripheral Intravenous Line            Peripheral IV 05/12/21 Right Antecubital 1 day                 Lab Results:   Results: I have personally reviewed pertinent reports   , BMP/CMP: No results found for: SODIUM, K, CL, CO2, ANIONGAP, BUN, CREATININE, GLUCOSE, CALCIUM, AST, ALT, ALKPHOS, PROT, BILITOT, EGFR, CBC:   Lab Results   Component Value Date    WBC 10 47 (H) 05/14/2021    HGB 15 7 05/14/2021    HCT 48 2 05/14/2021    MCV 91 05/14/2021     05/14/2021    MCH 29 6 05/14/2021    MCHC 32 6 05/14/2021    RDW 15 0 05/14/2021    MPV 11 0 05/14/2021    NRBC 0 05/14/2021    and Coagulation: No results found for: PT, INR, APTT  Imaging/EKG Studies: Results: I have personally reviewed pertinent reports      Other Studies: N/A  VTE Prophylaxis: Sequential compression device Neda Shirley)        Teofilo Pryor PA-C  5/14/2021 07:04 AM

## 2021-05-16 ENCOUNTER — HOSPITAL ENCOUNTER (EMERGENCY)
Facility: HOSPITAL | Age: 76
Discharge: HOME | End: 2021-05-16
Attending: EMERGENCY MEDICINE | Admitting: EMERGENCY MEDICINE
Payer: MEDICARE

## 2021-05-16 VITALS
TEMPERATURE: 98.9 F | RESPIRATION RATE: 16 BRPM | SYSTOLIC BLOOD PRESSURE: 152 MMHG | WEIGHT: 255 LBS | BODY MASS INDEX: 36.59 KG/M2 | HEART RATE: 76 BPM | DIASTOLIC BLOOD PRESSURE: 67 MMHG | OXYGEN SATURATION: 92 %

## 2021-05-16 DIAGNOSIS — S80.821A INFECTED BLISTER OF RIGHT LOWER EXTREMITY, INITIAL ENCOUNTER: Primary | ICD-10-CM

## 2021-05-16 DIAGNOSIS — L03.115 CELLULITIS OF RIGHT LOWER LEG: ICD-10-CM

## 2021-05-16 DIAGNOSIS — L08.9 INFECTED BLISTER OF RIGHT LOWER EXTREMITY, INITIAL ENCOUNTER: Primary | ICD-10-CM

## 2021-05-16 PROCEDURE — 99284 EMERGENCY DEPT VISIT MOD MDM: CPT | Performed by: EMERGENCY MEDICINE

## 2021-05-16 PROCEDURE — 99283 EMERGENCY DEPT VISIT LOW MDM: CPT

## 2021-05-16 PROCEDURE — 10060 I&D ABSCESS SIMPLE/SINGLE: CPT | Performed by: EMERGENCY MEDICINE

## 2021-05-16 RX ORDER — CEPHALEXIN 500 MG/1
500 CAPSULE ORAL 3 TIMES DAILY
Qty: 20 CAPSULE | Refills: 0 | Status: SHIPPED | OUTPATIENT
Start: 2021-05-16 | End: 2021-05-23

## 2021-05-16 RX ORDER — SULFAMETHOXAZOLE AND TRIMETHOPRIM 800; 160 MG/1; MG/1
1 TABLET ORAL 2 TIMES DAILY
Qty: 13 TABLET | Refills: 0 | Status: SHIPPED | OUTPATIENT
Start: 2021-05-16 | End: 2021-05-23

## 2021-05-16 RX ORDER — SULFAMETHOXAZOLE AND TRIMETHOPRIM 800; 160 MG/1; MG/1
1 TABLET ORAL ONCE
Status: COMPLETED | OUTPATIENT
Start: 2021-05-16 | End: 2021-05-16

## 2021-05-16 RX ORDER — CEPHALEXIN 250 MG/1
500 CAPSULE ORAL ONCE
Status: COMPLETED | OUTPATIENT
Start: 2021-05-16 | End: 2021-05-16

## 2021-05-16 RX ADMIN — CEPHALEXIN 500 MG: 250 CAPSULE ORAL at 16:01

## 2021-05-16 RX ADMIN — SULFAMETHOXAZOLE AND TRIMETHOPRIM 1 TABLET: 800; 160 TABLET ORAL at 16:01

## 2021-05-16 NOTE — ED PROVIDER NOTES
History  Chief Complaint   Patient presents with    Leg Swelling     pt states to have been discharged from [facility] on Friday due to trauma  pt states to have hit right shin while on BT and was informed to come to the ED to be evaluated with any abnormalities post discharge  pt presents with pus filled blisters to injured area  HPI    Prior to Admission Medications   Prescriptions Last Dose Informant Patient Reported? Taking?    B-D ULTRAFINE III SHORT PEN 31G X 8 MM MISC  Self Yes No   Sig: USE TO INJECT INSULIN THREE TIMES DAILY   Cholecalciferol (VITAMIN D) 2000 units CAPS  Self Yes No   Sig: Take by mouth   Empagliflozin (JARDIANCE) 25 MG TABS  Self Yes No   Sig: Take 25 mg by mouth every morning   HUMALOG 100 UNIT/ML injection  Self Yes No   Sig: INJECT DAILY AS DIRECTED PER SLIDING SCALE   Insulin Degludec (TRESIBA FLEXTOUCH SC)  Self Yes No   Sig: Inject 26 Units under the skin daily at bedtime    ONETOUCH VERIO test strip  Self Yes No   Sig: TEST 2 TIMES A DAY DX E11 9   Omega-3 Fatty Acids (FISH OIL CONCENTRATE PO)  Self Yes No   Sig: Take by mouth   SANTYL ointment  Self Yes No   acetaminophen (TYLENOL) 325 mg tablet   No No   Sig: Take 2 tablets (650 mg total) by mouth every 4 (four) hours as needed for mild pain   methocarbamol (ROBAXIN) 500 mg tablet   No No   Sig: Take 1 tablet (500 mg total) by mouth every 8 (eight) hours   methylPREDNISolone 4 MG tablet therapy pack  Self No No   Sig: Use as directed on package   multivitamin-iron-minerals-folic acid (CENTRUM) chewable tablet  Self Yes No   Sig: Chew 1 tablet daily   oxyCODONE (ROXICODONE) 5 mg immediate release tablet   No No   Sig: Take 0 5-1 tablets (2 5-5 mg total) by mouth every 4 (four) hours as needed for severe painMax Daily Amount: 30 mg   simvastatin (ZOCOR) 40 mg tablet  Self Yes No   Sig: Take 40 mg by mouth daily at bedtime      Facility-Administered Medications: None       Past Medical History:   Diagnosis Date    Diabetes mellitus ([address] )     Hiatal hernia     Hyperlipidemia     Hypertension     Neuropathy     PE (pulmonary thromboembolism) (HCC)     Shortness of breath     Venous insufficiency (chronic) (peripheral)        Past Surgical History:   Procedure Laterality Date    IR PE ENDOVASCULAR THERAPY  8/18/2020    JOINT REPLACEMENT Bilateral     knees    LEG SURGERY      UT COLONOSCOPY FLX DX W/COLLJ SPEC WHEN PFRMD N/A 6/30/2017    Procedure: EGD AND COLONOSCOPY;  Surgeon: Jeff Edgar MD;  Location: [facility]; Service: General    TONSILLECTOMY         Family History   Problem Relation Age of Onset    Cancer Mother     Cancer Father      I have reviewed and agree with the history as documented  E-Cigarette/Vaping    E-Cigarette Use Never User      E-Cigarette/Vaping Substances    Nicotine No     THC No     CBD No     Flavoring No     Other No     Unknown No      Social History     Tobacco Use    Smoking status: Never Smoker    Smokeless tobacco: Never Used   Substance Use Topics    Alcohol use: Not Currently     Frequency: Never    Drug use: No       Review of Systems    Physical Exam  Physical Exam  Vitals signs and nursing note reviewed  Constitutional:       General: He is not in acute distress  Appearance: He is well-developed  He is obese  HENT:      Head: Normocephalic and atraumatic  Eyes:      Conjunctiva/sclera: Conjunctivae normal       Pupils: Pupils are equal, round, and reactive to light  Neck:      Musculoskeletal: Normal range of motion  Trachea: No tracheal deviation  Cardiovascular:      Rate and Rhythm: Normal rate and regular rhythm  Pulses:           Dorsalis pedis pulses are 2+ on the right side  Pulmonary:      Effort: Pulmonary effort is normal  No respiratory distress  Musculoskeletal:      Right lower leg: He exhibits tenderness (to area of cellulitis) and swelling (mild, to lower leg; no residual hematoma)  He exhibits no bony tenderness and no deformity  No edema  Legs:       Comments: No posterior calf tenderness, normal sensation in foot   Skin:     General: Skin is warm and dry  Neurological:      Mental Status: He is alert and oriented to person, place, and time  GCS: GCS eye subscore is 4  GCS verbal subscore is 5  GCS motor subscore is 6  Psychiatric:         Behavior: Behavior normal          Vital Signs  ED Triage Vitals [05/16/21 1523]   Temperature Pulse Respirations Blood Pressure SpO2   98 9 °F (37 2 °C) 76 16 152/67 92 %      Temp Source Heart Rate Source Patient Position - Orthostatic VS BP Location FiO2 (%)   Oral Monitor Sitting Left arm --      Pain Score       --           Vitals:    05/16/21 1523   BP: 152/67   Pulse: 76   Patient Position - Orthostatic VS: Sitting         Visual Acuity      ED Medications  Medications   cephalexin (KEFLEX) capsule 500 mg (500 mg Oral Given 5/16/21 1601)   sulfamethoxazole-trimethoprim (BACTRIM DS) 800-160 mg per tablet 1 tablet (1 tablet Oral Given 5/16/21 1601)       Diagnostic Studies  Results Reviewed     None                 No orders to display              Procedures  Incision and drain    Date/Time: 5/16/2021 4:58 PM  Performed by: Kaia Lui MD  Authorized by: Kaia Lui MD   Universal Protocol:  Consent: Verbal consent obtained  Risks and benefits: risks, benefits and alternatives were discussed    Patient location:  Bedside  Location:     Type:  [place]    Location:  Lower extremity    Lower extremity location:  L leg  Pre-procedure details:     Skin preparation:  Chloraprep  Anesthesia (see MAR for exact dosages): Anesthesia method:  None  Procedure details:     Complexity:  Simple    Needle aspiration: yes      Needle size:  18 G    Aspiration type: puncture aspiration      Approach:  Puncture    Incision depth:  Superficial    Drainage characteristics: primarily dark yellow serous, occasional purulence      Drainage amount:  Copious    Wound treatment:  Wound left open (loose skin from blister left in place)    Packing materials:  None  Post-procedure details:     Patient tolerance of procedure: Tolerated well, no immediate complications             ED Course               Identification of Seniors at Risk      Most Recent Value   (ISAR) Identification of Seniors at Risk   Before the illness or injury that brought you to the Emergency, did you need someone to help you on a regular basis? 0 Filed at: 05/16/2021 1527   In the last 24 hours, have you needed more help than usual?  1 Filed at: 05/16/2021 1527   Have you been hospitalized for one or more nights during the past 6 months? 1 Filed at: 05/16/2021 1527   In general, do you see well? 1 Filed at: 05/16/2021 1527   In general, do you have serious problems with your memory? 1 Filed at: 05/16/2021 1527   Do you take more than three different medications every day? 1 Filed at: 05/16/2021 1527   ISAR Score  5 Filed at: 05/16/2021 1527                    SBIRT 20yo+      Most Recent Value   SBIRT (25 yo +)   In order to provide better care to our patients, we are screening all of our patients for alcohol and drug use  Would it be okay to ask you these screening questions? Yes Filed at: 05/16/2021 1534   Initial Alcohol Screen: US AUDIT-C    1  How often do you have a drink containing alcohol?  0 Filed at: 05/16/2021 1534   2  How many drinks containing alcohol do you have on a typical day you are drinking? 0 Filed at: 05/16/2021 1534   3a  Male UNDER 65: How often do you have five or more drinks on one occasion? 0 Filed at: 05/16/2021 1534   3b  FEMALE Any Age, or MALE 65+: How often do you have 4 or more drinks on one occassion? 0 Filed at: 05/16/2021 1534   Audit-C Score  0 Filed at: 05/16/2021 1534   RAULITO: How many times in the past year have you    Used an illegal drug or used a prescription medication for non-medical reasons?   Never Filed at: 05/16/2021 1534 MDM  Number of Diagnoses or Management Options  Cellulitis of right lower leg: new and does not require workup  Infected blister of right lower extremity, initial encounter: new and does not require workup  Diagnosis management comments: This is a 58-year-old male presents here today for evaluation of concern of an infected blister  He was admitted to the Trauma surgery Service at [facility] from 05/12 to 5/14 for expanding hematoma with active extra of in the setting of Eliquis use after blunt trauma to the area  He was monitored for several days with no worsening of the hematoma after compression and a stable hemoglobin  He was discharged with several blisters to the leg at the site of the initial injury  He states most of the blisters appear unchanged  He says the one in the middle of his anterior leg did get slightly bigger but has been draining a small amount of clear fluid  The one on the medial anterior side has become purulence  He does endorse some redness around the area  He states overall the swelling to his leg has gone down since he was discharged from the hospital   He denies fevers, joint swelling, nausea, vomiting, diarrhea, other systemic complaints  Review of systems:  Otherwise negative unless stated as above    He is well-appearing, in no acute distress  He has mild swelling to the right lower leg diffusely  He has several large blisters to the anterior leg, in smaller ones around the medial side of the leg extending posteriorly  The one on the anterior medial side is filled with yellow to white fluid  There is surrounding erythema with tenderness  The other blisters or filled with clear fluid  He does have patchy erythema down the anterior lower leg without tenderness  There are no areas of induration or fluctuance outside of the blisters  There is a scant amount of clear drainage from the central blister  Exam is otherwise unremarkable    Given infected blister with surrounding cellulitis, needle aspiration was performed of the blister with pressure to drain the remainder of  Went fluid  However, the rest of the overlying skin was left intact to protect the underlying skin is sick continues to heal   We will start the patient on antibiotics to treat the associated cellulitis  I discussed with the patient and his wife continued management at home, calling the Trauma surgery office tomorrow Monday, to see if he needs to be seen sooner given the infection or can wait to follow-up later this week as currently scheduled, and indications for return, and they expressed understanding with this plan  Amount and/or Complexity of Data Reviewed  Decide to obtain previous medical records or to obtain history from someone other than the patient: yes  Review and summarize past medical records: yes        Disposition  Final diagnoses:   Infected blister of right lower extremity, initial encounter   Cellulitis of right lower leg     Time reflects when diagnosis was documented in both MDM as applicable and the Disposition within this note     Time User Action Codes Description Comment    5/16/2021  3:51 PM Desean Guallpa Add [R18 346I,  L08 9] Infected blister of right lower extremity, initial encounter     5/16/2021  3:51 PM Desean Guallpa Add [R90 875] Cellulitis of right lower leg       ED Disposition     ED Disposition Condition Date/Time Comment    Discharge Good Sun May 16, 2021 [address] discharge to home/self care        Follow-up Information     Follow up With Specialties Details Why Contact Info Noelle Reynolds Trauma Surgery Call in 1 day to discuss your leg, and if you need to be seen sooner [id number] [id number]  [address] [place] [address], [address] [place] [address], [address] Patient's Medications   Discharge Prescriptions    CEPHALEXIN (KEFLEX) 500 MG CAPSULE    Take 1 capsule (500 mg total) by mouth 3 (three) times a day for 7 days       Start Date: 5/16/2021 End Date: 5/23/2021       Order Dose: 500 mg       Quantity: 20 capsule    Refills: 0    SULFAMETHOXAZOLE-TRIMETHOPRIM (BACTRIM DS) 800-160 MG PER TABLET    Take 1 tablet by mouth 2 (two) times a day for 7 days smx-tmp DS (BACTRIM) 800-160 mg tabs (1tab q12 D10)       Start Date: 5/16/2021 End Date: 5/23/2021       Order Dose: 1 tablet       Quantity: 13 tablet    Refills: 0     No discharge procedures on file      PDMP Review       Value Time User    PDMP Reviewed  Yes 5/14/2021  2:19 PM Elisabeth Traylor PA-C          ED Provider  Electronically Signed by           Vibha Bennett MD  05/16/21 [id number]

## 2021-05-16 NOTE — DISCHARGE INSTRUCTIONS
Gently clean the area with soap and water and pat dry  Do not vigorously clean over the blister  The skin will die and fall off over the next couple of days, and you can trim any loose edges but do not peel off any skin that is not ready to fall off on its own  Apply a dressing over the area to prevent rubbing of overlying clothing  Take the antibiotics as per the instructions  Call the Trauma surgery office in the morning to see if there is anything else they want you to do for it prior to being seen later this week  Return if you develop rapid spread of redness, fevers, or for any other concerns

## 2021-05-17 ENCOUNTER — OFFICE VISIT (OUTPATIENT)
Dept: PAIN MEDICINE | Facility: CLINIC | Age: 76
End: 2021-05-17
Payer: MEDICARE

## 2021-05-17 ENCOUNTER — TELEPHONE (OUTPATIENT)
Dept: SURGERY | Facility: CLINIC | Age: 76
End: 2021-05-17

## 2021-05-17 VITALS
HEIGHT: 70 IN | BODY MASS INDEX: 35.93 KG/M2 | SYSTOLIC BLOOD PRESSURE: 134 MMHG | HEART RATE: 56 BPM | DIASTOLIC BLOOD PRESSURE: 61 MMHG | TEMPERATURE: 98.4 F | WEIGHT: 251 LBS

## 2021-05-17 DIAGNOSIS — G89.4 CHRONIC PAIN SYNDROME: Primary | ICD-10-CM

## 2021-05-17 DIAGNOSIS — M51.16 INTERVERTEBRAL DISC DISORDER WITH RADICULOPATHY OF LUMBAR REGION: ICD-10-CM

## 2021-05-17 PROCEDURE — 99213 OFFICE O/P EST LOW 20 MIN: CPT | Performed by: NURSE PRACTITIONER

## 2021-05-17 NOTE — PATIENT INSTRUCTIONS
Lower Back Exercises   WHAT YOU NEED TO KNOW:   Lower back exercises help heal and strengthen your back muscles to prevent another injury  Ask your healthcare provider if you need to see a physical therapist for more advanced exercises  DISCHARGE INSTRUCTIONS:   Return to the emergency department if:   · You have severe pain that prevents you from moving  Contact your healthcare provider if:   · Your pain becomes worse  · You have new pain  · You have questions or concerns about your condition or care  Do lower back exercises safely:   · Do the exercises on a mat or firm surface  (not on a bed) to support your spine and prevent low back pain  · Move slowly and smoothly  Avoid fast or jerky motions  · Breathe normally  Do not hold your breath  · Stop if you feel pain  It is normal to feel some discomfort at first  Regular exercise will help decrease your discomfort over time  Lower back exercises: Your healthcare provider may recommend that you do back exercises 10 to 30 minutes each day  He may also recommend that you do exercises 1 to 3 times each day  Ask your healthcare provider which exercises are best for you and how often to do them  · Ankle pumps:  Lie on your back  Move your foot up (with your toes pointing toward your head)  Then, move your foot down (with your toes pointing away from you)  Repeat this exercise 10 times on each side  · Heel slides:  Lie on your back  Slowly bend one leg and then straighten it  Next, bend the other leg and then straighten it  Repeat 10 times on each side  · Pelvic tilt:  Lie on your back with your knees bent and feet flat on the floor  Place your arms in a relaxed position beside your body  Tighten the muscles of your abdomen and flatten your back against the floor  Hold for 5 seconds  Repeat 5 times  · Back stretch:  Lie on your back with your hands behind your head   Bend your knees and turn the lower half of your body to one side  Hold this position for 10 seconds  Repeat 3 times on each side  · Straight leg raises:  Lie on your back with one leg straight  Bend the other knee  Tighten your abdomen and then slowly lift the straight leg up about 6 to 12 inches off the floor  Hold for 1 to 5 seconds  Lower your leg slowly  Repeat 10 times on each leg  · Knee-to-chest:  Lie on your back with your knees bent and feet flat on the floor  Pull one of your knees toward your chest and hold it there for 5 seconds  Return your leg to the starting position  Lift the other knee toward your chest and hold for 5 seconds  Do this 5 times on each side  · Cat and camel:  Place your hands and knees on the floor  Arch your back upward toward the ceiling and lower your head  Round out your spine as much as you can  Hold for 5 seconds  Lift your head upward and push your chest downward toward the floor  Hold for 5 seconds  Do 3 sets or as directed  · Wall squats:  Stand with your back against a wall  Tighten the muscles of your abdomen  Slowly lower your body until your knees are bent at a 45 degree angle  Hold this position for 5 seconds  Slowly move back up to a standing position  Repeat 10 times  · Curl up:  Lie on your back with your knees bent and feet flat on the floor  Place your hands, palms down, underneath the curve in your lower back  Next, with your elbows on the floor, lift your shoulders and chest 2 to 3 inches  Keep your head in line with your shoulders  Hold this position for 5 seconds  When you can do this exercise without pain for 10 to 15 seconds, you may add a rotation  While your shoulders and chest are lifted off the ground, turn slightly to the left and hold  Repeat on the other side  · Bird dog:  Place your hands and knees on the floor  Keep your wrists directly below your shoulders and your knees directly below your hips  Pull your belly button in toward your spine   Do not flatten or arch your back  Tighten your abdominal muscles  Raise one arm straight out so that it is aligned with your head  Next, raise the leg opposite your arm  Hold this position for 15 seconds  Lower your arm and leg slowly and change sides  Do 5 sets  © Copyright [address] Information is for End User's use only and may not be sold, redistributed or otherwise used for commercial purposes  All illustrations and images included in CareNotes® are the copyrighted property of A D A M , Inc  or [facility] [employer] Qu[employer]z   The above information is an  only  It is not intended as medical advice for individual conditions or treatments  Talk to your doctor, nurse or pharmacist before following any medical regimen to see if it is safe and effective for you

## 2021-05-17 NOTE — TELEPHONE ENCOUNTER
Pt is s/p traumatic hematoma of right knee 5/12/21  Pt went to [address] yesterday for infection  Pt has follow up appt in [place] on 5/20/21  Due to infection, ED told pt to call Trauma to see if he should be seen sooner  Pt states he is doing better and is on antiobiotic  Please advise      Thanks, Mo

## 2021-05-17 NOTE — PROGRESS NOTES
Assessment:  1  Chronic pain syndrome    2  Intervertebral disc disorder with radiculopathy of lumbar region        Plan:  The patient is a 67 y o  male who was last seen 4/21/2021 presents for a follow up office visit in regards to chronic pain syndrome secondary to low back pain and right lower extremity pain  I reviewed the results of the lumbar MRI completed on 5/8/2021 with the patient  The MRI revealed abutment of right S1 nerve root by herniated disc at level L5-S1  I discussed with the patient possibly moving forward with a right L5-S1 TFESI to help with his pain symptoms; however, I discussed with the patient we would not be able to move forward with any type of intervention until his right lower extremity wound is healed  The patient reports he would not be able to start physical therapy until the injury is healed as well  The patient was provided with home exercises he could complete until he is able to move forward with physical therapy and/or TFESI  The patient will follow up after the wound to his right lower extremitiy is healed or sooner if symptoms worsen in the interim  The patient was agreeable and verbalized understanding  History of Present Illness: The patient is a 67 y o  male who was last seen 4/21/2021 presents for a follow up office visit in regards to chronic pain syndrome secondary to low back pain and right lower extremity pain  The patients current symptoms include Leg Pain  The patient currently reports ongoing low back pain that is unchanged since last office visit  Since the last visit, the patient suffered a traumatic hematoma to the right lower extremity, with subsequent blistering and possible infection  The patient was on anticoagulation therapy at the time of the injury, and this medication was put on hold  The patient continues with low back pain that is intermittent, shooting pain, that is increased with riding his back   The patient was provided a referral for physical therapy for his back; however, the patient reports he has not started physical therapy for his back as he was already doing physical therapy for his shoulder  The patient denies muscle weakness and reports no bowel or bladder dysfunction  The patient is able to complete ADLs with minimal difficulty  Current pain medications includes: The patient reports he was prescribed oxycodone 5mg PO 1/2-1 tab q 4 hours as needed  The patient reports he is not taking the medication as he feels he does not need it  I have personally reviewed and/or updated the patient's past medical history, past surgical history, family history, social history, current medications, allergies, and vital signs today  Review of Systems  Review of Systems   Respiratory: Negative for shortness of breath  Cardiovascular: Negative for chest pain  Gastrointestinal: Negative for constipation, diarrhea, nausea and vomiting  Musculoskeletal: Positive for gait problem, joint swelling and myalgias  Negative for arthralgias  Skin: Negative for rash  Neurological: Negative for dizziness, seizures and weakness  All other systems reviewed and are negative  Past Medical History:   Diagnosis Date    Diabetes mellitus ([facility] )     Hiatal hernia     Hyperlipidemia     Hypertension     Leg pain     Neuropathy     PE (pulmonary thromboembolism) (HCC)     Shortness of breath     Venous insufficiency (chronic) (peripheral)        Past Surgical History:   Procedure Laterality Date    FRACTURE SURGERY  7/12/20    IR PE ENDOVASCULAR THERAPY  8/18/2020    JOINT REPLACEMENT Bilateral     knees    LEG SURGERY      ORTHOPEDIC SURGERY      HI COLONOSCOPY FLX DX W/COLLJ SPEC WHEN PFRMD N/A 6/30/2017    Procedure: EGD AND COLONOSCOPY;  Surgeon: Maria M Batista MD;  Location: [facility];   Service: General    TONSILLECTOMY         Family History   Problem Relation Age of Onset    Cancer Mother     Cancer Father Social History     Occupational History    Not on file   Tobacco Use    Smoking status: Never Smoker    Smokeless tobacco: Never Used   Substance and Sexual Activity    Alcohol use: Never     Frequency: Never    Drug use: No    Sexual activity: Not Currently     Partners: Female         Current Outpatient Medications:     acetaminophen (TYLENOL) 325 mg tablet, Take 2 tablets (650 mg total) by mouth every 4 (four) hours as needed for mild pain, Disp: , Rfl: 0    cephalexin (KEFLEX) 500 mg capsule, Take 1 capsule (500 mg total) by mouth 3 (three) times a day for 7 days, Disp: 20 capsule, Rfl: 0    Cholecalciferol (VITAMIN D) 2000 units CAPS, Take by mouth, Disp: , Rfl:     Empagliflozin (JARDIANCE) 25 MG TABS, Take 25 mg by mouth every morning, Disp: , Rfl:     HUMALOG 100 UNIT/ML injection, INJECT DAILY AS DIRECTED PER SLIDING SCALE, Disp: , Rfl: 3    Insulin Degludec (TRESIBA FLEXTOUCH SC), Inject 26 Units under the skin daily at bedtime , Disp: , Rfl:     methocarbamol (ROBAXIN) 500 mg tablet, Take 1 tablet (500 mg total) by mouth every 8 (eight) hours, Disp: 20 tablet, Rfl: 1    multivitamin-iron-minerals-folic acid (CENTRUM) chewable tablet, Chew 1 tablet daily, Disp: , Rfl:     Omega-3 Fatty Acids (FISH OIL CONCENTRATE PO), Take by mouth, Disp: , Rfl:     oxyCODONE (ROXICODONE) 5 mg immediate release tablet, Take 0 5-1 tablets (2 5-5 mg total) by mouth every 4 (four) hours as needed for severe painMax Daily Amount: 30 mg, Disp: 15 tablet, Rfl: 0    simvastatin (ZOCOR) 40 mg tablet, Take 40 mg by mouth daily at bedtime, Disp: , Rfl:     B-D ULTRAFINE III SHORT PEN 31G X 8 MM MISC, USE TO INJECT INSULIN THREE TIMES DAILY, Disp: , Rfl:     ONETOUCH VERIO test strip, TEST 2 TIMES A DAY DX E11 9, Disp: , Rfl: 6    sulfamethoxazole-trimethoprim (BACTRIM DS) 800-160 mg per tablet, Take 1 tablet by mouth 2 (two) times a day for 7 days smx-tmp DS (BACTRIM) 800-160 mg tabs (1tab q12 D10) (Patient not taking: Reported on 5/17/2021), Disp: 13 tablet, Rfl: 0  No current facility-administered medications for this visit  No Known Allergies    Physical Exam:    /61   Pulse 56   Temp 98 4 °F (36 9 °C)   Ht 5' 10" (1 778 m)   Wt 114 kg (251 lb)   BMI 36 01 kg/m²     Constitutional:normal, well developed, well nourished, alert, in no distress and non-toxic and no overt pain behavior  and overweight  Eyes:anicteric  HEENT:grossly intact  Neck:supple, symmetric, trachea midline and no masses   Pulmonary:even and unlabored  Cardiovascular:No edema or pitting edema present  Skin: dressing to right knee  Psychiatric:Mood and affect appropriate  Neurologic:Cranial Nerves II-XII grossly intact  Musculoskeletal:normal    Imaging  No orders to display       No orders of the defined types were placed in this encounter

## 2021-05-18 NOTE — TELEPHONE ENCOUNTER
We can see if we can rotate the schedule and see if he can be sooner in [facility]? Let me know if there is any success with schedule adjustment

## 2021-05-19 ENCOUNTER — TELEPHONE (OUTPATIENT)
Dept: SURGERY | Facility: CLINIC | Age: 76
End: 2021-05-19

## 2021-05-19 NOTE — TELEPHONE ENCOUNTER
Please see previous notes  I called patient - he is scheduled for 5/21; I offered to bring him in sooner at [facility], but, he said it ws not getting worse, and that he wanted to leave the appointment for Friday  I told him if anything changes, to give us a call      mb

## 2021-05-21 ENCOUNTER — OFFICE VISIT (OUTPATIENT)
Dept: SURGERY | Facility: CLINIC | Age: 76
End: 2021-05-21
Payer: MEDICARE

## 2021-05-21 VITALS
HEART RATE: 72 BPM | WEIGHT: 266 LBS | HEIGHT: 70 IN | TEMPERATURE: 97.1 F | SYSTOLIC BLOOD PRESSURE: 140 MMHG | DIASTOLIC BLOOD PRESSURE: 60 MMHG | BODY MASS INDEX: 38.08 KG/M2

## 2021-05-21 DIAGNOSIS — E11.40 TYPE 2 DIABETES MELLITUS WITH DIABETIC NEUROPATHY, WITH LONG-TERM CURRENT USE OF INSULIN (HCC): ICD-10-CM

## 2021-05-21 DIAGNOSIS — E66.01 OBESITY, MORBID (HCC): ICD-10-CM

## 2021-05-21 DIAGNOSIS — Z79.4 TYPE 2 DIABETES MELLITUS WITH DIABETIC NEUROPATHY, WITH LONG-TERM CURRENT USE OF INSULIN (HCC): ICD-10-CM

## 2021-05-21 DIAGNOSIS — S81.801D WOUND OF RIGHT LOWER EXTREMITY, SUBSEQUENT ENCOUNTER: Primary | ICD-10-CM

## 2021-05-21 PROCEDURE — 99214 OFFICE O/P EST MOD 30 MIN: CPT | Performed by: SURGERY
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
## 2021-05-28 ENCOUNTER — OFFICE VISIT (OUTPATIENT)
Dept: WOUND CARE | Facility: CLINIC | Age: 76
End: 2021-05-28
Payer: MEDICARE

## 2021-05-28 VITALS
RESPIRATION RATE: 16 BRPM | BODY MASS INDEX: 36.51 KG/M2 | WEIGHT: 255 LBS | SYSTOLIC BLOOD PRESSURE: 128 MMHG | DIASTOLIC BLOOD PRESSURE: 62 MMHG | HEIGHT: 70 IN | HEART RATE: 61 BPM | TEMPERATURE: 98.5 F

## 2021-05-28 DIAGNOSIS — S80.01XA TRAUMATIC HEMATOMA OF RIGHT KNEE, INITIAL ENCOUNTER: ICD-10-CM

## 2021-05-28 DIAGNOSIS — E11.40 TYPE 2 DIABETES MELLITUS WITH DIABETIC NEUROPATHY, WITH LONG-TERM CURRENT USE OF INSULIN (HCC): ICD-10-CM

## 2021-05-28 DIAGNOSIS — S89.91XA BLUNT TRAUMA OF RIGHT LOWER LEG, INITIAL ENCOUNTER: ICD-10-CM

## 2021-05-28 DIAGNOSIS — S81.001A OPEN WOUND OF RIGHT KNEE WITH COMPLICATION, INITIAL ENCOUNTER: Primary | ICD-10-CM

## 2021-05-28 DIAGNOSIS — Z79.4 TYPE 2 DIABETES MELLITUS WITH DIABETIC NEUROPATHY, WITH LONG-TERM CURRENT USE OF INSULIN (HCC): ICD-10-CM

## 2021-05-28 PROCEDURE — 99213 OFFICE O/P EST LOW 20 MIN: CPT | Performed by: PREVENTIVE MEDICINE

## 2021-05-28 PROCEDURE — 99203 OFFICE O/P NEW LOW 30 MIN: CPT | Performed by: PREVENTIVE MEDICINE

## 2021-05-28 RX ORDER — LIDOCAINE HYDROCHLORIDE 40 MG/ML
5 SOLUTION TOPICAL ONCE
Status: COMPLETED | OUTPATIENT
Start: 2021-05-28 | End: 2021-05-28

## 2021-05-28 RX ADMIN — LIDOCAINE HYDROCHLORIDE 5 ML: 40 SOLUTION TOPICAL at 08:47

## 2021-05-28 NOTE — PATIENT INSTRUCTIONS
Orders Placed This Encounter   Procedures    Wound cleansing and dressings     Right Leg Trauma Wound    Wash your hands with soap and water  Remove old dressing, discard into plastic bag and place in trash  Cleanse the wound with mild wound clear or mild soap and water (like dove) prior to applying a clean dressing  Do not use tissue or cotton balls  Do not scrub the wound  Pat dry using gauze  Shower Yes but do not get right leg wet in shower    Apply Dermagran to open area only (do not put on healthy closed skin) to the  wound    Cover with gauze and secure with ace wrap    Change dressing every other day or every day if needed     Standing Status:   Future     Standing Expiration Date:   5/28/2022
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
## 2021-06-03 ENCOUNTER — OFFICE VISIT (OUTPATIENT)
Dept: OBGYN CLINIC | Facility: CLINIC | Age: 76
End: 2021-06-03
Payer: MEDICARE

## 2021-06-03 VITALS
RESPIRATION RATE: 18 BRPM | BODY MASS INDEX: 38.37 KG/M2 | HEART RATE: 59 BPM | SYSTOLIC BLOOD PRESSURE: 111 MMHG | WEIGHT: 268 LBS | HEIGHT: 70 IN | DIASTOLIC BLOOD PRESSURE: 70 MMHG

## 2021-06-03 DIAGNOSIS — G89.29 CHRONIC RIGHT SHOULDER PAIN: ICD-10-CM

## 2021-06-03 DIAGNOSIS — M25.511 CHRONIC RIGHT SHOULDER PAIN: ICD-10-CM

## 2021-06-03 DIAGNOSIS — M75.31 CALCIFIC TENDINITIS OF RIGHT SHOULDER: Primary | ICD-10-CM

## 2021-06-03 PROCEDURE — 99213 OFFICE O/P EST LOW 20 MIN: CPT | Performed by: ORTHOPAEDIC SURGERY

## 2021-06-03 NOTE — PROGRESS NOTES
Patient Name:  Octavio Petit  MRN:  [id number]    [address] [place] [address]     1  Calcific tendinitis of right shoulder    2  Chronic right shoulder pain    Kathie Wilkerson is a pleasant 67year old who presents to the office today for a follow-up evaluation of his right shoulder calcific tendinitis  Upon evaluation today, I am pleased with his range of motion and strength  He displays no pain on evaluation  Formal physical therapy notes were reviewed with the patient and I am pleased with his progress  I discussed with the patient that he should continue with the home exercise program he was provided with in physical therapy  I will follow-up with him as needed  He understood and had no further questions  History of the Present Illness   Octavio Petit is a 67 y o  male old who presents to the office today for a follow-up evaluation of his right shoulder calcific tendinitis  He states that he was compliant with attending formal physical therapy and notes improvement  He states that he has been compliant with performing a home exercise program that he was provided with  He states overall he notes improvement and has minimal pain  Review of Systems     Review of Systems   Constitutional: Negative for appetite change and unexpected weight change  HENT: Negative for congestion and trouble swallowing  Eyes: Negative for visual disturbance  Respiratory: Negative for cough and shortness of breath  Cardiovascular: Negative for chest pain and palpitations  Gastrointestinal: Negative for nausea and vomiting  Endocrine: Negative for cold intolerance and heat intolerance  Genitourinary: Negative  Musculoskeletal: Negative for gait problem and myalgias  Skin: Negative for rash  Allergic/Immunologic: Negative  Neurological: Negative for numbness  Hematological: Negative  Psychiatric/Behavioral: Negative          Physical Exam     /70   Pulse 59   Resp 18   Ht 5' 10" (1 778 m)   Wt 122 kg (268 lb)   BMI 38 45 kg/m²     Right Should er: Active range of motion to 160 degrees forward flexion, 160 degrees abduction, 80 degrees external rotation, and internal rotation 1 level restriction  Passive range of motion to 160  There is no tenderness present  Empty can testing is negative  There is 5/5 strength with external rotation testing at the side  Belly press test is negative  Woo test is negative  [place]'s test is negative  Speed's test is negative  The patient is neurovascularly intact distally in the extremity  Data Review     I have personally reviewed pertinent films in PACS, and my interpretation follows  No new images reviewed      Social History     Tobacco Use    Smoking status: Never Smoker    Smokeless tobacco: Never Used   Substance Use Topics    Alcohol use: Never     Frequency: Never    Drug use: No           Procedures    Scribe Attestation    I,:  Jalen Vega am acting as a scribe while in the presence of the attending physician :       I,:  Lon Milton DO personally performed the services described in this documentation    as scribed in my presence :

## 2021-06-04 ENCOUNTER — OFFICE VISIT (OUTPATIENT)
Dept: WOUND CARE | Facility: CLINIC | Age: 76
End: 2021-06-04
Payer: MEDICARE

## 2021-06-04 VITALS
TEMPERATURE: 97.6 F | RESPIRATION RATE: 17 BRPM | HEART RATE: 81 BPM | DIASTOLIC BLOOD PRESSURE: 56 MMHG | SYSTOLIC BLOOD PRESSURE: 112 MMHG

## 2021-06-04 DIAGNOSIS — S81.001A OPEN WOUND OF RIGHT KNEE WITH COMPLICATION, INITIAL ENCOUNTER: Primary | ICD-10-CM

## 2021-06-04 DIAGNOSIS — Z79.4 TYPE 2 DIABETES MELLITUS WITH DIABETIC NEUROPATHY, WITH LONG-TERM CURRENT USE OF INSULIN (HCC): ICD-10-CM

## 2021-06-04 DIAGNOSIS — S80.01XA TRAUMATIC HEMATOMA OF RIGHT KNEE, INITIAL ENCOUNTER: ICD-10-CM

## 2021-06-04 DIAGNOSIS — E11.40 TYPE 2 DIABETES MELLITUS WITH DIABETIC NEUROPATHY, WITH LONG-TERM CURRENT USE OF INSULIN (HCC): ICD-10-CM

## 2021-06-04 PROCEDURE — 99213 OFFICE O/P EST LOW 20 MIN: CPT | Performed by: PREVENTIVE MEDICINE

## 2021-06-04 RX ORDER — LIDOCAINE HYDROCHLORIDE 40 MG/ML
5 SOLUTION TOPICAL ONCE
Status: COMPLETED | OUTPATIENT
Start: 2021-06-04 | End: 2021-06-04

## 2021-06-04 RX ADMIN — LIDOCAINE HYDROCHLORIDE 5 ML: 40 SOLUTION TOPICAL at 08:18

## 2021-06-04 NOTE — PROGRESS NOTES
Patient ID: Kevin Araiza is a 67 y o  male Date of Birth 1945       Chief Complaint   Patient presents with    Follow Up Wound Care Visit     right knee wound       Allergies:  Patient has no known allergies  Diagnosis:  1  Open wound of right knee with complication, initial encounter  -     lidocaine (XYLOCAINE) 4 % topical solution 5 mL  -     Wound cleansing and dressings; Future    2  Traumatic hematoma of right knee, initial encounter    3  Type 2 diabetes mellitus with diabetic neuropathy, with long-term current use of insulin ([address] )       Diagnosis ICD-10-CM Associated Orders   1  Open wound of right knee with complication, initial encounter  S81 001A lidocaine (XYLOCAINE) 4 % topical solution 5 mL     Wound cleansing and dressings   2  Traumatic hematoma of right knee, initial encounter  S80  01XA    3  Type 2 diabetes mellitus with diabetic neuropathy, with long-term current use of insulin ([facility])  E11 40     Z79 4         Assessment & Plan:  See wound orders  Continue dermagran to open wounds and warm compresses to hematoma which is resolving    Subjective:   F/u R knee wound with hematoma  No new complaints         The following portions of the patient's history were reviewed and updated as appropriate:   Patient Active Problem List   Diagnosis    Gastroesophageal reflux disease with esophagitis    Adenomatous polyp of descending colon    Chronic superficial gastritis with bleeding    Type 2 diabetes mellitus with diabetic neuropathy, with long-term current use of insulin ([address] )    Essential hypertension    Chronic cough    Chest pain    Leukocytosis    Elevated troponin    JED (obstructive sleep apnea)    Acute pulmonary embolism (HCC)    HLD (hyperlipidemia)    S/P ORIF (open reduction internal fixation) left distal tibia fracture    Pulmonary hypertension (HCC)    Active bleeding    Traumatic hematoma of left knee    Traumatic hematoma of right knee    Obesity, morbid ([address] ) Past Medical History:   Diagnosis Date    Diabetes mellitus ([facility] )     Hiatal hernia     Hyperlipidemia     Hypertension     Leg pain     Neuropathy     PE (pulmonary thromboembolism) (HCC)     Shortness of breath     Venous insufficiency (chronic) (peripheral)      Past Surgical History:   Procedure Laterality Date    FRACTURE SURGERY  7/12/20    IR PE ENDOVASCULAR THERAPY  8/18/2020    JOINT REPLACEMENT Bilateral     knees    LEG SURGERY      ORTHOPEDIC SURGERY      [place] COLONOSCOPY FLX DX W/COLLJ SPEC WHEN PFRMD N/A 6/30/2017    Procedure: EGD AND COLONOSCOPY;  Surgeon: Gamal Whalen MD;  Location: [facility];   Service: General    TONSILLECTOMY       Social History     Socioeconomic History    Marital status: /Civil Union     Spouse name: None    Number of children: None    Years of education: None    Highest education level: None   Occupational History    None   Social Needs    Financial resource strain: None    Food insecurity     Worry: None     Inability: None    Transportation needs     Medical: None     Non-medical: None   Tobacco Use    Smoking status: Never Smoker    Smokeless tobacco: Never Used   Substance and Sexual Activity    Alcohol use: Never     Frequency: Never    Drug use: No    Sexual activity: Not Currently     Partners: Female   Lifestyle    Physical activity     Days per week: None     Minutes per session: None    Stress: None   Relationships    Social connections     Talks on phone: None     Gets together: None     Attends Gnosticism service: None     Active member of club or organization: None     Attends meetings of clubs or organizations: None     Relationship status: None    Intimate partner violence     Fear of current or ex partner: None     Emotionally abused: None     Physically abused: None     Forced sexual activity: None   Other Topics Concern    None   Social History Narrative    None        Current Outpatient Medications:    acetaminophen (TYLENOL) 325 mg tablet, Take 2 tablets (650 mg total) by mouth every 4 (four) hours as needed for mild pain, Disp: , Rfl: 0    B-D ULTRAFINE III SHORT PEN 31G X 8 MM MISC, USE TO INJECT INSULIN THREE TIMES DAILY, Disp: , Rfl:     Cholecalciferol (VITAMIN D) 2000 units CAPS, Take by mouth, Disp: , Rfl:     Empagliflozin (JARDIANCE) 25 MG TABS, Take 25 mg by mouth every morning, Disp: , Rfl:     HUMALOG 100 UNIT/ML injection, INJECT DAILY AS DIRECTED PER SLIDING SCALE, Disp: , Rfl: 3    Insulin Degludec (TRESIBA FLEXTOUCH SC), Inject 26 Units under the skin daily at bedtime , Disp: , Rfl:     multivitamin-iron-minerals-folic acid (CENTRUM) chewable tablet, Chew 1 tablet daily, Disp: , Rfl:     Omega-3 Fatty Acids (FISH OIL CONCENTRATE PO), Take by mouth, Disp: , Rfl:     ONETOUCH VERIO test strip, TEST 2 TIMES A DAY DX E11 9, Disp: , Rfl: 6    oxyCODONE (ROXICODONE) 5 mg immediate release tablet, Take 0 5-1 tablets (2 5-5 mg total) by mouth every 4 (four) hours as needed for severe painMax Daily Amount: 30 mg (Patient not taking: Reported on 6/3/2021), Disp: 15 tablet, Rfl: 0    simvastatin (ZOCOR) 40 mg tablet, Take 40 mg by mouth daily at bedtime, Disp: , Rfl:   No current facility-administered medications for this visit  Family History   Problem Relation Age of Onset    Cancer Mother     Cancer Father       Review of Systems   Constitutional: Negative  Respiratory: Negative  Cardiovascular: Negative  Skin: Positive for wound  Objective:  /56   Pulse 81   Temp 97 6 °F (36 4 °C)   Resp 17     Physical Exam  Vitals signs reviewed  Constitutional:       General: He is not in acute distress  Appearance: He is obese  Cardiovascular:      Rate and Rhythm: Normal rate  Pulmonary:      Effort: Pulmonary effort is normal    Skin:     Comments: See wound assessment   Neurological:      General: No focal deficit present        Mental Status: He is alert and oriented to person, place, and time  Psychiatric:         Mood and Affect: Mood normal          Behavior: Behavior normal              Wound 05/28/21 Traumatic Other (Comment) Pretibial Proximal;Right (Active)   Wound Image    06/04/21 0811   Wound Description Epithelialization;Pink;Eschar 06/04/21 0810   Lindsay-wound Assessment Erythema 05/28/21 0843   Wound Length (cm) 4 cm 06/04/21 0810   Wound Width (cm) 6 5 cm 06/04/21 0810   Wound Depth (cm) 0 1 cm 06/04/21 0810   Wound Surface Area (cm^2) 26 cm^2 06/04/21 0810   Wound Volume (cm^3) 2 6 cm^3 06/04/21 0810   Calculated Wound Volume (cm^3) 2 6 cm^3 06/04/21 0810   Change in Wound Size % 84 24 06/04/21 0810   Drainage Amount Scant 06/04/21 0810   Drainage Description Serous 06/04/21 0810   Non-staged Wound Description Full thickness 06/04/21 0810   Dressing Status Intact 06/04/21 0810                         Procedures             Wound Instructions:  Orders Placed This Encounter   Procedures    Wound cleansing and dressings     Right Leg Trauma Wound     Wash your hands with soap and water  Remove old dressing, discard into plastic bag and place in trash  Cleanse the wound with mild wound clear or mild soap and water (like dove) prior to applying a clean dressing  Do not use tissue or cotton balls  Do not scrub the wound  Pat dry using gauze      Shower Yes but do not get right leg wet in shower     Apply Justyna Degree to open area only (do not put on healthy closed skin) to the  wound  Cover with gauze and secure with ace wrap     Change dressing every other day or every day if needed     Standing Status:   Future     Standing Expiration Date:   6/4/2022         Clif Rios DO      Portions of the record may have been created with voice recognition software  Occasional wrong word or "sound a like" substitutions may have occurred due to the inherent limitations of voice recognition software   Read the chart carefully and recognize, using context, where substitutions have occurred

## 2021-06-04 NOTE — PATIENT INSTRUCTIONS
Orders Placed This Encounter   Procedures    Wound cleansing and dressings     Right Leg Trauma Wound     Wash your hands with soap and water  Remove old dressing, discard into plastic bag and place in trash  Cleanse the wound with mild wound clear or mild soap and water (like dove) prior to applying a clean dressing  Do not use tissue or cotton balls  Do not scrub the wound  Pat dry using gauze      Shower Yes but do not get right leg wet in shower     Apply Erman Constable to open area only (do not put on healthy closed skin) to the  wound    Cover with gauze and secure with ace wrap     Change dressing every other day or every day if needed     Standing Status:   Future     Standing Expiration Date:   6/4/2022

## 2021-06-09 ENCOUNTER — OFFICE VISIT (OUTPATIENT)
Dept: SURGERY | Facility: CLINIC | Age: 76
End: 2021-06-09
Payer: MEDICARE

## 2021-06-09 VITALS
HEART RATE: 62 BPM | SYSTOLIC BLOOD PRESSURE: 128 MMHG | WEIGHT: 270 LBS | BODY MASS INDEX: 38.65 KG/M2 | HEIGHT: 70 IN | DIASTOLIC BLOOD PRESSURE: 62 MMHG | TEMPERATURE: 97.4 F

## 2021-06-09 DIAGNOSIS — S81.801D WOUND OF RIGHT LOWER EXTREMITY, SUBSEQUENT ENCOUNTER: Primary | ICD-10-CM

## 2021-06-09 PROCEDURE — 99213 OFFICE O/P EST LOW 20 MIN: CPT | Performed by: SURGERY

## 2021-06-09 NOTE — PROGRESS NOTES
Assessment/Plan:     1  Wound of right lower extremity, subsequent encounter      Wound healing with near complete closure  No signs of infection  No surgical indication  F/u as needed  Subjective: right lower leg hematoma     Patient ID: Maia Watts is a 67 y o  male  Triage Notes:    Mr Ligia Meza is following up on his right leg wound and hematoma  He had some additional selective debridement in wound clinic  He no longer has any redness or symptoms of infection  No fever/chills  Feels the wound is healing - is applying dermagran daily  The following portions of the patient's history were reviewed and updated as appropriate: allergies, current medications, past family history, past medical history, past social history, past surgical history and problem list     Review of Systems   All other systems reviewed and are negative  Objective:      /62   Pulse 62   Temp (!) 97 4 °F (36 3 °C) (Temporal)   Ht 5' 10" (1 778 m)   Wt 122 kg (270 lb)   BMI 38 74 kg/m²     Below is the patient's most recent value for Albumin, ALT, AST, BUN, Calcium, Chloride, Cholesterol, CO2, Creatinine, GFR, Glucose, HDL, Hematocrit, Hemoglobin, Hemoglobin A1C, LDL, Magnesium, Phosphorus, Platelets, Potassium, PSA, Sodium, Triglycerides, and WBC  Lab Results   Component Value Date    ALT 35 05/12/2021    AST 32 05/12/2021    BUN 13 05/13/2021    CALCIUM 8 7 05/13/2021     (H) 05/13/2021    CO2 25 05/13/2021    CREATININE 0 53 (L) 05/13/2021    HCT 48 2 05/14/2021    HGB 15 7 05/14/2021    HGBA1C 7 2 (H) 03/02/2019    MG 2 2 05/13/2021    PHOS 3 3 05/13/2021     05/14/2021    K 3 8 05/13/2021    WBC 10 47 (H) 05/14/2021     Note: for a comprehensive list of the patient's lab results, access the Results Review activity  Physical Exam  Vitals signs and nursing note reviewed  Constitutional:       General: He is not in acute distress  Appearance: He is well-developed     HENT:      Head: Normocephalic and atraumatic  Eyes:      General:         Right eye: No discharge  Left eye: No discharge  Neck:      Musculoskeletal: Normal range of motion and neck supple  Vascular: No JVD  Cardiovascular:      Rate and Rhythm: Normal rate  Pulmonary:      Effort: Pulmonary effort is normal  No respiratory distress  Abdominal:      General: There is no distension  Palpations: Abdomen is soft  Musculoskeletal: Normal range of motion  Skin:     General: Skin is warm and dry  Comments: Ace and dermagran removed  +neoepithelialization is nearly complete  Scant exudate  No blanching erythema  Compartment soft  Neurological:      Mental Status: He is alert and oriented to person, place, and time  Psychiatric:         Mood and Affect: Mood normal          Behavior: Behavior normal          Thought Content:  Thought content normal          Judgment: Judgment normal              Procedures

## 2021-06-18 ENCOUNTER — OFFICE VISIT (OUTPATIENT)
Dept: WOUND CARE | Facility: CLINIC | Age: 76
End: 2021-06-18
Payer: MEDICARE

## 2021-06-18 VITALS
HEART RATE: 63 BPM | TEMPERATURE: 96.8 F | RESPIRATION RATE: 18 BRPM | DIASTOLIC BLOOD PRESSURE: 60 MMHG | SYSTOLIC BLOOD PRESSURE: 124 MMHG

## 2021-06-18 DIAGNOSIS — S81.001D OPEN WOUND OF RIGHT KNEE WITH COMPLICATION, SUBSEQUENT ENCOUNTER: Primary | ICD-10-CM

## 2021-06-18 PROCEDURE — 99213 OFFICE O/P EST LOW 20 MIN: CPT | Performed by: PREVENTIVE MEDICINE

## 2021-06-18 PROCEDURE — 99212 OFFICE O/P EST SF 10 MIN: CPT | Performed by: PREVENTIVE MEDICINE

## 2021-06-18 RX ORDER — LIDOCAINE HYDROCHLORIDE 40 MG/ML
5 SOLUTION TOPICAL ONCE
Status: COMPLETED | OUTPATIENT
Start: 2021-06-18 | End: 2021-06-18

## 2021-06-18 RX ADMIN — LIDOCAINE HYDROCHLORIDE 5 ML: 40 SOLUTION TOPICAL at 08:53

## 2021-06-18 NOTE — PATIENT INSTRUCTIONS
Orders Placed This Encounter   Procedures    Wound cleansing and dressings     Right Leg Traumatic Wound: Today we applied a hydrocolloid- can leave on for 1 week  Moisturize leg daily  If some drainage, put a bandaid on if needed  You are discharged as of today, please call us if you need us       Standing Status:   Future     Standing Expiration Date:   6/18/2022

## 2021-06-18 NOTE — PROGRESS NOTES
Patient ID: Augusto Germain is a 67 y o  male Date of Birth 1945       Chief Complaint   Patient presents with    Follow Up Wound Care Visit     R knee wound       Allergies:  Patient has no known allergies  Diagnosis:  1  Open wound of right knee with complication, subsequent encounter  -     lidocaine (XYLOCAINE) 4 % topical solution 5 mL  -     Wound cleansing and dressings; Future       Diagnosis ICD-10-CM Associated Orders   1  Open wound of right knee with complication, subsequent encounter  S81 001D lidocaine (XYLOCAINE) 4 % topical solution 5 mL     Wound cleansing and dressings        Assessment & Plan:  See wound orders  Hydrocolloid placed today, can leave on up to 1 week to protect fragile skin  Recommend daily emollient application after hydrocolloid removed  F/u prn    Subjective:   F/u R knee wound   No new complaints      The following portions of the patient's history were reviewed and updated as appropriate:   Patient Active Problem List   Diagnosis    Gastroesophageal reflux disease with esophagitis    Adenomatous polyp of descending colon    Chronic superficial gastritis with bleeding    Type 2 diabetes mellitus with diabetic neuropathy, with long-term current use of insulin ([address] )    Essential hypertension    Chronic cough    Chest pain    Leukocytosis    Elevated troponin    JED (obstructive sleep apnea)    Acute pulmonary embolism (HCC)    HLD (hyperlipidemia)    S/P ORIF (open reduction internal fixation) left distal tibia fracture    Pulmonary hypertension (HCC)    Active bleeding    Traumatic hematoma of left knee    Traumatic hematoma of right knee    Obesity, morbid ([address] )     Past Medical History:   Diagnosis Date    Diabetes mellitus ([address] )     Hiatal hernia     Hyperlipidemia     Hypertension     Leg pain     Neuropathy     PE (pulmonary thromboembolism) (HCC)     Shortness of breath     Venous insufficiency (chronic) (peripheral)      Past Surgical History:   Procedure Laterality Date    FRACTURE SURGERY  7/12/20    IR PE ENDOVASCULAR THERAPY  8/18/2020    JOINT REPLACEMENT Bilateral     knees    LEG SURGERY      ORTHOPEDIC SURGERY      [place] COLONOSCOPY FLX DX W/COLLJ SPEC WHEN PFRMD N/A 6/30/2017    Procedure: EGD AND COLONOSCOPY;  Surgeon: Gamal Whalen MD;  Location: [facility]; Service: General    TONSILLECTOMY       Social History     Socioeconomic History    Marital status: /Civil Union     Spouse name: Not on file    Number of children: Not on file    Years of education: Not on file    Highest education level: Not on file   Occupational History    Not on file   Tobacco Use    Smoking status: Never Smoker    Smokeless tobacco: Never Used   Vaping Use    Vaping Use: Never used   Substance and Sexual Activity    Alcohol use: Never    Drug use: No    Sexual activity: Not Currently     Partners: Female   Other Topics Concern    Not on file   Social History Narrative    Not on file     Social Determinants of Health     Financial Resource Strain:     Difficulty of Paying Living Expenses:    Food Insecurity:     Worried About Running Out of Food in the Last Year:     [address] in the Last Year:    Transportation Needs:     Lack of Transportation (Medical):      Lack of Transportation (Non-Medical):    Physical Activity:     Days of Exercise per Week:     Minutes of Exercise per Session:    Stress:     Feeling of Stress :    Social Connections:     Frequency of Communication with Friends and Family:     Frequency of Social Gatherings with Friends and Family:     Attends Spiritism Services:     Active Member of Clubs or Organizations:     Attends Club or Organization Meetings:     Marital Status:    Intimate Partner Violence:     Fear of Current or Ex-Partner:     Emotionally Abused:     Physically Abused:     Sexually Abused:         Current Outpatient Medications:     acetaminophen (TYLENOL) 325 mg tablet, Take 2 tablets (650 mg total) by mouth every 4 (four) hours as needed for mild pain, Disp: , Rfl: 0    B-D ULTRAFINE III SHORT PEN 31G X 8 MM MISC, USE TO INJECT INSULIN THREE TIMES DAILY, Disp: , Rfl:     Cholecalciferol (VITAMIN D) 2000 units CAPS, Take by mouth, Disp: , Rfl:     Empagliflozin (JARDIANCE) 25 MG TABS, Take 25 mg by mouth every morning, Disp: , Rfl:     HUMALOG 100 UNIT/ML injection, INJECT DAILY AS DIRECTED PER SLIDING SCALE, Disp: , Rfl: 3    Insulin Degludec (TRESIBA FLEXTOUCH SC), Inject 26 Units under the skin daily at bedtime , Disp: , Rfl:     multivitamin-iron-minerals-folic acid (CENTRUM) chewable tablet, Chew 1 tablet daily, Disp: , Rfl:     Omega-3 Fatty Acids (FISH OIL CONCENTRATE PO), Take by mouth, Disp: , Rfl:     ONETOUCH VERIO test strip, TEST 2 TIMES A DAY DX E11 9, Disp: , Rfl: 6    oxyCODONE (ROXICODONE) 5 mg immediate release tablet, Take 0 5-1 tablets (2 5-5 mg total) by mouth every 4 (four) hours as needed for severe painMax Daily Amount: 30 mg (Patient not taking: Reported on 6/3/2021), Disp: 15 tablet, Rfl: 0    simvastatin (ZOCOR) 40 mg tablet, Take 40 mg by mouth daily at bedtime, Disp: , Rfl:   No current facility-administered medications for this visit  Family History   Problem Relation Age of Onset    Cancer Mother     Cancer Father       Review of Systems   Constitutional: Negative  Respiratory: Negative  Cardiovascular: Negative  Skin: Positive for wound  Objective:  /60   Pulse 63   Temp (!) 96 8 °F (36 °C)   Resp 18     Physical Exam  Vitals reviewed  Constitutional:       General: He is not in acute distress  Cardiovascular:      Rate and Rhythm: Normal rate  Pulmonary:      Effort: Pulmonary effort is normal    Skin:     Comments: See wound assessment   Neurological:      General: No focal deficit present  Mental Status: He is alert and oriented to person, place, and time     Psychiatric:         Mood and Affect: Mood normal          Behavior: Behavior normal              Wound 05/28/21 Traumatic Other (Comment) Pretibial Proximal;Right (Active)   Wound Image   06/18/21 0851   Wound Description Epithelialization;Pink 06/18/21 0851   Lindsay-wound Assessment Hyperpigmented;Fragile 06/18/21 0851   Wound Length (cm) 1 cm 06/18/21 0851   Wound Width (cm) 0 5 cm 06/18/21 0851   Wound Depth (cm) 0 1 cm 06/18/21 0851   Wound Surface Area (cm^2) 0 5 cm^2 06/18/21 0851   Wound Volume (cm^3) 0 05 cm^3 06/18/21 0851   Calculated Wound Volume (cm^3) 0 05 cm^3 06/18/21 0851   Change in Wound Size % 99 7 06/18/21 0851   Drainage Amount Scant 06/18/21 0851   Drainage Description Serous 06/18/21 0851   Non-staged Wound Description Full thickness 06/18/21 0851   Dressing Status Intact 06/18/21 0851                         Procedures             Wound Instructions:  Orders Placed This Encounter   Procedures    Wound cleansing and dressings     Right Leg Traumatic Wound: Today we applied a hydrocolloid- can leave on for 1 week  Moisturize leg daily  If some drainage, put a bandaid on if needed  You are discharged as of today, please call us if you need us  Standing Status:   Future     Standing Expiration Date:   6/18/2022         Micheline Roberts DO      Portions of the record may have been created with voice recognition software  Occasional wrong word or "sound a like" substitutions may have occurred due to the inherent limitations of voice recognition software  Read the chart carefully and recognize, using context, where substitutions have occurred

## 2021-10-24 ENCOUNTER — APPOINTMENT (EMERGENCY)
Dept: RADIOLOGY | Facility: HOSPITAL | Age: 76
End: 2021-10-24
Payer: MEDICARE

## 2021-10-24 ENCOUNTER — APPOINTMENT (EMERGENCY)
Dept: CT IMAGING | Facility: HOSPITAL | Age: 76
End: 2021-10-24
Payer: MEDICARE

## 2021-10-24 ENCOUNTER — HOSPITAL ENCOUNTER (EMERGENCY)
Facility: HOSPITAL | Age: 76
Discharge: HOME | End: 2021-10-24
Attending: EMERGENCY MEDICINE | Admitting: EMERGENCY MEDICINE
Payer: MEDICARE

## 2021-10-24 VITALS
HEART RATE: 70 BPM | OXYGEN SATURATION: 96 % | DIASTOLIC BLOOD PRESSURE: 65 MMHG | RESPIRATION RATE: 18 BRPM | TEMPERATURE: 97.9 F | SYSTOLIC BLOOD PRESSURE: 145 MMHG | HEIGHT: 70 IN | WEIGHT: 245 LBS | BODY MASS INDEX: 35.07 KG/M2

## 2021-10-24 DIAGNOSIS — S82.202A CLOSED FRACTURE OF LEFT TIBIA AND FIBULA, INITIAL ENCOUNTER: Primary | ICD-10-CM

## 2021-10-24 DIAGNOSIS — S82.402A CLOSED FRACTURE OF LEFT TIBIA AND FIBULA, INITIAL ENCOUNTER: Primary | ICD-10-CM

## 2021-10-24 DIAGNOSIS — S20.212A RIB CONTUSION, LEFT, INITIAL ENCOUNTER: ICD-10-CM

## 2021-10-24 DIAGNOSIS — S82.209A TIBIA/FIBULA FRACTURE: ICD-10-CM

## 2021-10-24 DIAGNOSIS — S82.409A TIBIA/FIBULA FRACTURE: ICD-10-CM

## 2021-10-24 PROCEDURE — 73700 CT LOWER EXTREMITY W/O DYE: CPT

## 2021-10-24 PROCEDURE — 71101 X-RAY EXAM UNILAT RIBS/CHEST: CPT

## 2021-10-24 PROCEDURE — 99284 EMERGENCY DEPT VISIT MOD MDM: CPT | Performed by: EMERGENCY MEDICINE

## 2021-10-24 PROCEDURE — 99284 EMERGENCY DEPT VISIT MOD MDM: CPT

## 2021-10-24 PROCEDURE — 73590 X-RAY EXAM OF LOWER LEG: CPT

## 2021-10-24 RX ORDER — TRAMADOL HYDROCHLORIDE 50 MG/1
50 TABLET ORAL EVERY 6 HOURS PRN
Qty: 20 TABLET | Refills: 0 | Status: SHIPPED | OUTPATIENT
Start: 2021-10-24 | End: 2021-10-29

## 2021-10-25 ENCOUNTER — TELEPHONE (OUTPATIENT)
Dept: OBGYN CLINIC | Facility: HOSPITAL | Age: 76
End: 2021-10-25

## 2021-10-26 ENCOUNTER — OFFICE VISIT (OUTPATIENT)
Dept: OBGYN CLINIC | Facility: CLINIC | Age: 76
End: 2021-10-26
Payer: MEDICARE

## 2021-10-26 ENCOUNTER — HOSPITAL ENCOUNTER (EMERGENCY)
Facility: HOSPITAL | Age: 76
Discharge: HOME | End: 2021-10-26
Attending: EMERGENCY MEDICINE | Admitting: EMERGENCY MEDICINE
Payer: MEDICARE

## 2021-10-26 ENCOUNTER — TELEPHONE (OUTPATIENT)
Dept: OTHER | Facility: OTHER | Age: 76
End: 2021-10-26

## 2021-10-26 VITALS
OXYGEN SATURATION: 95 % | TEMPERATURE: 98.6 F | HEART RATE: 59 BPM | DIASTOLIC BLOOD PRESSURE: 58 MMHG | RESPIRATION RATE: 16 BRPM | SYSTOLIC BLOOD PRESSURE: 117 MMHG

## 2021-10-26 VITALS
BODY MASS INDEX: 35.07 KG/M2 | DIASTOLIC BLOOD PRESSURE: 67 MMHG | SYSTOLIC BLOOD PRESSURE: 121 MMHG | HEART RATE: 60 BPM | HEIGHT: 70 IN | WEIGHT: 245 LBS

## 2021-10-26 DIAGNOSIS — S82.832A CLOSED FRACTURE OF DISTAL END OF LEFT FIBULA, UNSPECIFIED FRACTURE MORPHOLOGY, INITIAL ENCOUNTER: ICD-10-CM

## 2021-10-26 DIAGNOSIS — R26.2 AMBULATORY DYSFUNCTION: Primary | ICD-10-CM

## 2021-10-26 DIAGNOSIS — S82.409A FIBULA FRACTURE: ICD-10-CM

## 2021-10-26 DIAGNOSIS — S82.842A BIMALLEOLAR AVULSION FRACTURE OF LEFT ANKLE: Primary | ICD-10-CM

## 2021-10-26 LAB
FLUAV RNA RESP QL NAA+PROBE: NEGATIVE
FLUBV RNA RESP QL NAA+PROBE: NEGATIVE
GLUCOSE SERPL-MCNC: 113 MG/DL (ref 65–140)
GLUCOSE SERPL-MCNC: 198 MG/DL (ref 65–140)
RSV RNA RESP QL NAA+PROBE: NEGATIVE
SARS-COV-2 RNA RESP QL NAA+PROBE: NEGATIVE

## 2021-10-26 PROCEDURE — 99213 OFFICE O/P EST LOW 20 MIN: CPT | Performed by: ORTHOPAEDIC SURGERY

## 2021-10-26 PROCEDURE — 97162 PT EVAL MOD COMPLEX 30 MIN: CPT

## 2021-10-26 PROCEDURE — 97166 OT EVAL MOD COMPLEX 45 MIN: CPT

## 2021-10-26 PROCEDURE — 99283 EMERGENCY DEPT VISIT LOW MDM: CPT | Performed by: EMERGENCY MEDICINE

## 2021-10-26 PROCEDURE — 82948 REAGENT STRIP/BLOOD GLUCOSE: CPT

## 2021-10-26 PROCEDURE — 27808 TREATMENT OF ANKLE FRACTURE: CPT | Performed by: ORTHOPAEDIC SURGERY

## 2021-10-26 PROCEDURE — 99283 EMERGENCY DEPT VISIT LOW MDM: CPT

## 2021-10-26 PROCEDURE — 0241U HB NFCT DS VIR RESP RNA 4 TRGT: CPT | Performed by: EMERGENCY MEDICINE

## 2021-11-01 ENCOUNTER — NURSING HOME VISIT (OUTPATIENT)
Dept: GERIATRICS | Facility: OTHER | Age: 76
End: 2021-11-01
Payer: MEDICARE

## 2021-11-01 ENCOUNTER — TELEPHONE (OUTPATIENT)
Dept: OBGYN CLINIC | Facility: HOSPITAL | Age: 76
End: 2021-11-01

## 2021-11-01 DIAGNOSIS — S82.832D CLOSED FRACTURE OF DISTAL END OF LEFT FIBULA WITH ROUTINE HEALING, UNSPECIFIED FRACTURE MORPHOLOGY, SUBSEQUENT ENCOUNTER: Primary | ICD-10-CM

## 2021-11-01 DIAGNOSIS — R26.2 AMBULATORY DYSFUNCTION: ICD-10-CM

## 2021-11-01 DIAGNOSIS — E78.5 HYPERLIPIDEMIA, UNSPECIFIED HYPERLIPIDEMIA TYPE: ICD-10-CM

## 2021-11-01 DIAGNOSIS — I10 ESSENTIAL HYPERTENSION: ICD-10-CM

## 2021-11-01 DIAGNOSIS — E11.40 TYPE 2 DIABETES MELLITUS WITH DIABETIC NEUROPATHY, WITH LONG-TERM CURRENT USE OF INSULIN (HCC): ICD-10-CM

## 2021-11-01 DIAGNOSIS — Z79.4 TYPE 2 DIABETES MELLITUS WITH DIABETIC NEUROPATHY, WITH LONG-TERM CURRENT USE OF INSULIN (HCC): ICD-10-CM

## 2021-11-01 DIAGNOSIS — G47.33 OSA (OBSTRUCTIVE SLEEP APNEA): ICD-10-CM

## 2021-11-01 PROCEDURE — 99309 SBSQ NF CARE MODERATE MDM 30: CPT | Performed by: NURSE PRACTITIONER

## 2021-11-01 RX ORDER — TRAMADOL HYDROCHLORIDE 50 MG/1
50 TABLET ORAL EVERY 6 HOURS PRN
COMMUNITY
End: 2021-12-21 | Stop reason: ALTCHOICE

## 2021-11-01 RX ORDER — MELATONIN
2000 DAILY
COMMUNITY

## 2021-11-02 ENCOUNTER — TELEPHONE (OUTPATIENT)
Dept: OBGYN CLINIC | Facility: MEDICAL CENTER | Age: 76
End: 2021-11-02

## 2021-11-03 ENCOUNTER — NURSING HOME VISIT (OUTPATIENT)
Dept: GERIATRICS | Facility: OTHER | Age: 76
End: 2021-11-03
Payer: MEDICARE

## 2021-11-03 DIAGNOSIS — E78.5 HYPERLIPIDEMIA, UNSPECIFIED HYPERLIPIDEMIA TYPE: ICD-10-CM

## 2021-11-03 DIAGNOSIS — Z79.4 TYPE 2 DIABETES MELLITUS WITH DIABETIC NEUROPATHY, WITH LONG-TERM CURRENT USE OF INSULIN (HCC): ICD-10-CM

## 2021-11-03 DIAGNOSIS — I10 ESSENTIAL HYPERTENSION: ICD-10-CM

## 2021-11-03 DIAGNOSIS — S82.832D CLOSED FRACTURE OF DISTAL END OF LEFT FIBULA WITH ROUTINE HEALING, UNSPECIFIED FRACTURE MORPHOLOGY, SUBSEQUENT ENCOUNTER: Primary | ICD-10-CM

## 2021-11-03 DIAGNOSIS — E11.40 TYPE 2 DIABETES MELLITUS WITH DIABETIC NEUROPATHY, WITH LONG-TERM CURRENT USE OF INSULIN (HCC): ICD-10-CM

## 2021-11-03 DIAGNOSIS — G47.33 OSA (OBSTRUCTIVE SLEEP APNEA): ICD-10-CM

## 2021-11-03 DIAGNOSIS — R26.2 AMBULATORY DYSFUNCTION: ICD-10-CM

## 2021-11-03 PROCEDURE — 99316 NF DSCHRG MGMT 30 MIN+: CPT | Performed by: NURSE PRACTITIONER

## 2021-11-03 RX ORDER — EMPAGLIFLOZIN 25 MG/1
25 TABLET, FILM COATED ORAL EVERY MORNING
Qty: 7 TABLET | Refills: 0 | Status: SHIPPED | OUTPATIENT
Start: 2021-11-05 | End: 2021-12-21

## 2021-11-03 RX ORDER — SIMVASTATIN 40 MG
40 TABLET ORAL
Qty: 7 TABLET | Refills: 0 | Status: SHIPPED | OUTPATIENT
Start: 2021-11-05 | End: 2021-12-21

## 2021-11-08 ENCOUNTER — HOSPITAL ENCOUNTER (OUTPATIENT)
Dept: RADIOLOGY | Facility: HOSPITAL | Age: 76
Discharge: HOME | End: 2021-11-08
Attending: ORTHOPAEDIC SURGERY
Payer: MEDICARE

## 2021-11-08 DIAGNOSIS — S82.832A CLOSED FRACTURE OF DISTAL END OF LEFT FIBULA, UNSPECIFIED FRACTURE MORPHOLOGY, INITIAL ENCOUNTER: Primary | ICD-10-CM

## 2021-11-08 DIAGNOSIS — S82.832A CLOSED FRACTURE OF DISTAL END OF LEFT FIBULA, UNSPECIFIED FRACTURE MORPHOLOGY, INITIAL ENCOUNTER: ICD-10-CM

## 2021-11-08 PROCEDURE — 73610 X-RAY EXAM OF ANKLE: CPT

## 2021-11-09 ENCOUNTER — OFFICE VISIT (OUTPATIENT)
Dept: OBGYN CLINIC | Facility: CLINIC | Age: 76
End: 2021-11-09

## 2021-11-09 VITALS
SYSTOLIC BLOOD PRESSURE: 109 MMHG | HEIGHT: 70 IN | HEART RATE: 60 BPM | BODY MASS INDEX: 35.07 KG/M2 | WEIGHT: 245 LBS | DIASTOLIC BLOOD PRESSURE: 67 MMHG

## 2021-11-09 DIAGNOSIS — Z87.81 S/P ORIF (OPEN REDUCTION INTERNAL FIXATION) FRACTURE: Primary | ICD-10-CM

## 2021-11-09 DIAGNOSIS — Z98.890 S/P ORIF (OPEN REDUCTION INTERNAL FIXATION) FRACTURE: Primary | ICD-10-CM

## 2021-11-09 PROCEDURE — 99024 POSTOP FOLLOW-UP VISIT: CPT | Performed by: ORTHOPAEDIC SURGERY

## 2021-11-21 DIAGNOSIS — Z98.890 S/P ORIF (OPEN REDUCTION INTERNAL FIXATION) FRACTURE: ICD-10-CM

## 2021-11-21 DIAGNOSIS — S82.832A CLOSED FRACTURE OF DISTAL END OF LEFT FIBULA, UNSPECIFIED FRACTURE MORPHOLOGY, INITIAL ENCOUNTER: Primary | ICD-10-CM

## 2021-11-21 DIAGNOSIS — Z87.81 S/P ORIF (OPEN REDUCTION INTERNAL FIXATION) FRACTURE: ICD-10-CM

## 2021-11-23 ENCOUNTER — APPOINTMENT (OUTPATIENT)
Dept: RADIOLOGY | Facility: CLINIC | Age: 76
End: 2021-11-23
Payer: MEDICARE

## 2021-11-23 ENCOUNTER — OFFICE VISIT (OUTPATIENT)
Dept: OBGYN CLINIC | Facility: CLINIC | Age: 76
End: 2021-11-23
Payer: MEDICARE

## 2021-11-23 VITALS
HEART RATE: 68 BPM | WEIGHT: 245 LBS | HEIGHT: 70 IN | DIASTOLIC BLOOD PRESSURE: 62 MMHG | SYSTOLIC BLOOD PRESSURE: 104 MMHG | BODY MASS INDEX: 35.07 KG/M2

## 2021-11-23 DIAGNOSIS — S82.832A CLOSED FRACTURE OF DISTAL END OF LEFT FIBULA, UNSPECIFIED FRACTURE MORPHOLOGY, INITIAL ENCOUNTER: ICD-10-CM

## 2021-11-23 DIAGNOSIS — S82.832D CLOSED FRACTURE OF DISTAL END OF LEFT FIBULA WITH ROUTINE HEALING, UNSPECIFIED FRACTURE MORPHOLOGY, SUBSEQUENT ENCOUNTER: Primary | ICD-10-CM

## 2021-11-23 DIAGNOSIS — M79.642 LEFT HAND PAIN: ICD-10-CM

## 2021-11-23 DIAGNOSIS — S60.222A CONTUSION OF LEFT HAND, INITIAL ENCOUNTER: ICD-10-CM

## 2021-11-23 PROCEDURE — 29405 APPL SHORT LEG CAST: CPT | Performed by: ORTHOPAEDIC SURGERY

## 2021-11-23 PROCEDURE — 73130 X-RAY EXAM OF HAND: CPT

## 2021-11-23 PROCEDURE — 99024 POSTOP FOLLOW-UP VISIT: CPT | Performed by: ORTHOPAEDIC SURGERY

## 2021-11-23 PROCEDURE — 73610 X-RAY EXAM OF ANKLE: CPT

## 2021-11-23 PROCEDURE — 73590 X-RAY EXAM OF LOWER LEG: CPT

## 2021-11-24 ENCOUNTER — HOSPITAL ENCOUNTER (EMERGENCY)
Facility: HOSPITAL | Age: 76
Discharge: HOME | End: 2021-11-24
Attending: EMERGENCY MEDICINE | Admitting: EMERGENCY MEDICINE
Payer: MEDICARE

## 2021-11-24 VITALS
HEART RATE: 59 BPM | TEMPERATURE: 98.3 F | RESPIRATION RATE: 19 BRPM | OXYGEN SATURATION: 96 % | DIASTOLIC BLOOD PRESSURE: 69 MMHG | SYSTOLIC BLOOD PRESSURE: 147 MMHG

## 2021-11-24 DIAGNOSIS — S60.222A CONTUSION OF LEFT HAND, INITIAL ENCOUNTER: Primary | ICD-10-CM

## 2021-11-24 PROCEDURE — 99283 EMERGENCY DEPT VISIT LOW MDM: CPT

## 2021-11-24 PROCEDURE — 99282 EMERGENCY DEPT VISIT SF MDM: CPT | Performed by: EMERGENCY MEDICINE

## 2021-11-30 ENCOUNTER — TELEPHONE (OUTPATIENT)
Dept: OBGYN CLINIC | Facility: HOSPITAL | Age: 76
End: 2021-11-30

## 2021-12-03 DIAGNOSIS — S82.832D CLOSED FRACTURE OF DISTAL END OF LEFT FIBULA WITH ROUTINE HEALING, UNSPECIFIED FRACTURE MORPHOLOGY, SUBSEQUENT ENCOUNTER: ICD-10-CM

## 2021-12-08 ENCOUNTER — OFFICE VISIT (OUTPATIENT)
Dept: OBGYN CLINIC | Facility: HOSPITAL | Age: 76
End: 2021-12-08

## 2021-12-08 ENCOUNTER — HOSPITAL ENCOUNTER (OUTPATIENT)
Dept: VASCULAR ULTRASOUND | Facility: HOSPITAL | Age: 76
Discharge: HOME | End: 2021-12-08
Payer: MEDICARE

## 2021-12-08 VITALS
HEART RATE: 74 BPM | WEIGHT: 245 LBS | SYSTOLIC BLOOD PRESSURE: 150 MMHG | BODY MASS INDEX: 35.07 KG/M2 | HEIGHT: 70 IN | DIASTOLIC BLOOD PRESSURE: 68 MMHG

## 2021-12-08 DIAGNOSIS — Z98.890 S/P ORIF (OPEN REDUCTION INTERNAL FIXATION) FRACTURE: Primary | ICD-10-CM

## 2021-12-08 DIAGNOSIS — R60.0 LOCALIZED EDEMA: ICD-10-CM

## 2021-12-08 DIAGNOSIS — Z87.81 S/P ORIF (OPEN REDUCTION INTERNAL FIXATION) FRACTURE: ICD-10-CM

## 2021-12-08 DIAGNOSIS — Z98.890 S/P ORIF (OPEN REDUCTION INTERNAL FIXATION) FRACTURE: ICD-10-CM

## 2021-12-08 DIAGNOSIS — Z87.81 S/P ORIF (OPEN REDUCTION INTERNAL FIXATION) FRACTURE: Primary | ICD-10-CM

## 2021-12-08 PROCEDURE — 93971 EXTREMITY STUDY: CPT | Performed by: SURGERY

## 2021-12-08 PROCEDURE — 93971 EXTREMITY STUDY: CPT

## 2021-12-08 PROCEDURE — 99024 POSTOP FOLLOW-UP VISIT: CPT | Performed by: PHYSICIAN ASSISTANT

## 2021-12-18 DIAGNOSIS — Z98.890 S/P ORIF (OPEN REDUCTION INTERNAL FIXATION) FRACTURE: Primary | ICD-10-CM

## 2021-12-18 DIAGNOSIS — Z87.81 S/P ORIF (OPEN REDUCTION INTERNAL FIXATION) FRACTURE: Primary | ICD-10-CM

## 2021-12-21 ENCOUNTER — APPOINTMENT (OUTPATIENT)
Dept: RADIOLOGY | Facility: CLINIC | Age: 76
End: 2021-12-21
Payer: MEDICARE

## 2021-12-21 ENCOUNTER — OFFICE VISIT (OUTPATIENT)
Dept: OBGYN CLINIC | Facility: CLINIC | Age: 76
End: 2021-12-21

## 2021-12-21 VITALS — HEART RATE: 53 BPM | SYSTOLIC BLOOD PRESSURE: 111 MMHG | DIASTOLIC BLOOD PRESSURE: 69 MMHG

## 2021-12-21 DIAGNOSIS — S82.832D CLOSED FRACTURE OF DISTAL END OF LEFT FIBULA WITH ROUTINE HEALING, UNSPECIFIED FRACTURE MORPHOLOGY, SUBSEQUENT ENCOUNTER: Primary | ICD-10-CM

## 2021-12-21 DIAGNOSIS — Z98.890 S/P ORIF (OPEN REDUCTION INTERNAL FIXATION) FRACTURE: ICD-10-CM

## 2021-12-21 DIAGNOSIS — Z87.81 S/P ORIF (OPEN REDUCTION INTERNAL FIXATION) FRACTURE: ICD-10-CM

## 2021-12-21 PROCEDURE — 99024 POSTOP FOLLOW-UP VISIT: CPT | Performed by: ORTHOPAEDIC SURGERY

## 2021-12-21 PROCEDURE — 73610 X-RAY EXAM OF ANKLE: CPT

## 2021-12-21 RX ORDER — INSULIN DETEMIR 100 [IU]/ML
INJECTION, SOLUTION SUBCUTANEOUS
COMMUNITY
Start: 2021-11-25

## 2021-12-21 RX ORDER — ASPIRIN 81 MG/1
81 TABLET ORAL DAILY
COMMUNITY

## 2022-01-13 ENCOUNTER — EVALUATION (OUTPATIENT)
Dept: PHYSICAL THERAPY | Facility: CLINIC | Age: 77
End: 2022-01-13
Payer: MEDICARE

## 2022-01-13 DIAGNOSIS — S82.832D CLOSED FRACTURE OF DISTAL END OF LEFT FIBULA WITH ROUTINE HEALING, UNSPECIFIED FRACTURE MORPHOLOGY, SUBSEQUENT ENCOUNTER: Primary | ICD-10-CM

## 2022-01-13 PROCEDURE — 97162 PT EVAL MOD COMPLEX 30 MIN: CPT | Performed by: PHYSICAL MEDICINE & REHABILITATION
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
## 2022-01-16 DIAGNOSIS — S82.832D CLOSED FRACTURE OF DISTAL END OF LEFT FIBULA WITH ROUTINE HEALING, UNSPECIFIED FRACTURE MORPHOLOGY, SUBSEQUENT ENCOUNTER: Primary | ICD-10-CM

## 2022-01-20 ENCOUNTER — APPOINTMENT (OUTPATIENT)
Dept: PHYSICAL THERAPY | Facility: CLINIC | Age: 77
End: 2022-01-20
Payer: MEDICARE

## 2022-01-25 ENCOUNTER — APPOINTMENT (OUTPATIENT)
Dept: PHYSICAL THERAPY | Facility: CLINIC | Age: 77
End: 2022-01-25
Payer: MEDICARE

## 2022-01-27 ENCOUNTER — APPOINTMENT (OUTPATIENT)
Dept: PHYSICAL THERAPY | Facility: CLINIC | Age: 77
End: 2022-01-27
Payer: MEDICARE

## 2022-02-01 ENCOUNTER — APPOINTMENT (OUTPATIENT)
Dept: RADIOLOGY | Facility: CLINIC | Age: 77
End: 2022-02-01
Payer: MEDICARE

## 2022-02-01 ENCOUNTER — OFFICE VISIT (OUTPATIENT)
Dept: PHYSICAL THERAPY | Facility: CLINIC | Age: 77
End: 2022-02-01
Payer: MEDICARE

## 2022-02-01 ENCOUNTER — OFFICE VISIT (OUTPATIENT)
Dept: OBGYN CLINIC | Facility: CLINIC | Age: 77
End: 2022-02-01
Payer: MEDICARE

## 2022-02-01 VITALS
DIASTOLIC BLOOD PRESSURE: 71 MMHG | HEIGHT: 70 IN | BODY MASS INDEX: 35.15 KG/M2 | HEART RATE: 69 BPM | WEIGHT: 245.5 LBS | SYSTOLIC BLOOD PRESSURE: 135 MMHG

## 2022-02-01 DIAGNOSIS — S82.832D CLOSED FRACTURE OF DISTAL END OF LEFT FIBULA WITH ROUTINE HEALING, UNSPECIFIED FRACTURE MORPHOLOGY, SUBSEQUENT ENCOUNTER: Primary | ICD-10-CM

## 2022-02-01 DIAGNOSIS — S82.832D CLOSED FRACTURE OF DISTAL END OF LEFT FIBULA WITH ROUTINE HEALING, UNSPECIFIED FRACTURE MORPHOLOGY, SUBSEQUENT ENCOUNTER: ICD-10-CM

## 2022-02-01 PROCEDURE — 73610 X-RAY EXAM OF ANKLE: CPT

## 2022-02-01 PROCEDURE — 97110 THERAPEUTIC EXERCISES: CPT

## 2022-02-01 PROCEDURE — 97112 NEUROMUSCULAR REEDUCATION: CPT

## 2022-02-01 PROCEDURE — 99213 OFFICE O/P EST LOW 20 MIN: CPT | Performed by: ORTHOPAEDIC SURGERY

## 2022-02-01 NOTE — PROGRESS NOTES
Orthopaedics Office Visit - Patient Visit    ASSESSMENT/PLAN:    Assessment:   Left ankle bimalleolar fracture DOI 10/24/21    Plan:     Imaging reviewed showing stable healing bimalleolar fracture with continued healing, well maintained overall alignment and position   He can wean out of bed with help of physical therapy and WBAT  Unless patient has any issues see back as needed     To Do Next Visit:  Wean out of boot and continue PT    _____________________________________________________  CHIEF COMPLAINT:  Chief Complaint   Patient presents with    Left Ankle - Follow-up         SUBJECTIVE:  Ena Doran is a 67 y o  male who presents to the office for follow-up for left distal fibula and medial malleolus fractures treated non operatively in a short leg cast   Previous distal tibia ORIF with plate and screw fixation  Date of injury was 10/24/2021  He is WBAT in CAM boot  He has started physical therapy  He has walked w/o the boot short distances w/o increase in pain  Denies any pain in ankle today  PAST MEDICAL HISTORY:  Past Medical History:   Diagnosis Date    Diabetes mellitus ([address] )     Hiatal hernia     Hyperlipidemia     Hypertension     Leg pain     Neuropathy     PE (pulmonary thromboembolism) (HCC)     Shortness of breath     Venous insufficiency (chronic) (peripheral)        PAST SURGICAL HISTORY:  Past Surgical History:   Procedure Laterality Date    FRACTURE SURGERY  7/12/20    IR PE ENDOVASCULAR THERAPY  8/18/2020    JOINT REPLACEMENT Bilateral     knees    LEG SURGERY      ORTHOPEDIC SURGERY      UT COLONOSCOPY FLX DX W/COLLJ SPEC WHEN PFRMD N/A 6/30/2017    Procedure: EGD AND COLONOSCOPY;  Surgeon: Inder Richmond MD;  Location: [facility];   Service: General    TONSILLECTOMY         FAMILY HISTORY:  Family History   Problem Relation Age of Onset   Fallon Ayala Cancer Mother     Cancer Father        SOCIAL HISTORY:  Social History     Tobacco Use    Smoking status: Never Smoker    Smokeless tobacco: Never Used   Vaping Use    Vaping Use: Never used   Substance Use Topics    Alcohol use: Never    Drug use: No       MEDICATIONS:    Current Outpatient Medications:     aspirin (ECOTRIN LOW STRENGTH) 81 mg EC tablet, Take 81 mg by mouth daily, Disp: , Rfl:     cholecalciferol (VITAMIN D3) 1,000 units tablet, Take 2,000 Units by mouth daily, Disp: , Rfl:     HUMALOG 100 UNIT/ML injection, Inject under the skin as needed = Inject as per sliding scale:  if 0 - 149 = 0 units;  150 - 209 = 1 unit;  210 - 269 = 2 units;  270 - 329 = 3 units;  330 - 389 = 4 units;  390+ = 5 units Call MD if BS < 60 or > 390,  subcutaneously before meals for DM, Disp: , Rfl: 3    Levemir 100 UNIT/ML subcutaneous injection, , Disp: , Rfl:     multivitamin-iron-minerals-folic acid (CENTRUM) chewable tablet, Chew 1 tablet daily, Disp: , Rfl:     Omega-3 Fatty Acids (FISH OIL CONCENTRATE PO), Take 1,000 mg by mouth 2 (two) times a day, Disp: , Rfl:     ONETOUCH VERIO test strip, TEST 2 TIMES A DAY DX E11 9, Disp: , Rfl: 6    B-D ULTRAFINE III SHORT PEN 31G X 8 MM MISC, USE TO INJECT INSULIN THREE TIMES DAILY, Disp: , Rfl:     Empagliflozin (Jardiance) 25 MG TABS, Take 1 tablet (25 mg total) by mouth every morning for 7 days, Disp: 7 tablet, Rfl: 0    simvastatin (ZOCOR) 40 mg tablet, Take 1 tablet (40 mg total) by mouth daily at bedtime for 7 days, Disp: 7 tablet, Rfl: 0    ALLERGIES:  No Known Allergies    REVIEW OF SYSTEMS:  MSK: left ankle pain  Neuro: negative  Pertinent items are otherwise noted in HPI  A comprehensive review of systems was otherwise negative      LABS:  HgA1c:   Lab Results   Component Value Date    HGBA1C 7 2 (H) 03/02/2019     BMP:   Lab Results   Component Value Date    GLUCOSE 95 05/12/2021    CALCIUM 8 7 05/13/2021    K 3 8 05/13/2021    CO2 25 05/13/2021     (H) 05/13/2021    BUN 13 05/13/2021    CREATININE 0 53 (L) 05/13/2021     CBC: No components found for: CBC    _____________________________________________________  PHYSICAL EXAMINATION:  Vital signs: /71   Pulse 69   Ht 5' 10" (1 778 m)   Wt 111 kg (245 lb 8 oz)   BMI 35 23 kg/m²   General: No acute distress, awake and alert  Psychiatric: Mood and affect appear appropriate  HEENT: Trachea Midline, No torticollis, no apparent facial trauma  Cardiovascular: No audible murmurs;  Extremities appear perfused  Pulmonary: No audible wheezing or stridor  Skin: No open lesions; see further details (if any) below    MUSCULOSKELETAL EXAMINATION:  Extremities:    Left ankle   Diffuse foot and ankle swelling   No specific tenderness   No erythema or ecchymosis  LLE appears warm and well perfused        _____________________________________________________  STUDIES REVIEWED:  I personally reviewed the images and interpretation is as follows:  xr left ankle demonstrates stable healing distal medial malleolus and distal fibula with well maintained position and alignment     PROCEDURES PERFORMED:  Procedures    Dontae Stacy MD

## 2022-02-01 NOTE — PROGRESS NOTES
Daily Note     Today's date: 2022  Patient name: Moy Hager  : 1945  MRN: [id number]  Referring provider: Briana Tamayo MD  Dx:   Encounter Diagnosis     ICD-10-CM    1  Closed fracture of distal end of left fibula with routine healing, unspecified fracture morphology, subsequent encounter  S82 012S        Start Time: 0755          Subjective: Pt reports      Objective: See treatment diary below      Assessment: Tolerated treatment well  Patient would benefit from continued PT  Improved mobility in ankle w/ PROM  Pt tends to have ER of leg at rest  This session we progressed strengthening and ROM exercises  Plan: Continue per plan of care        Precautions: WBAT in CAM boot      Manuals             Gentle L ankle PROM [facility]                                                   Neuro Re-Ed             Towel scrunches             Wobble board all dir 20x sitting            Weight shift w/ neutral foot--> SLS             Ankle 4 way  ytb 20x ea dir                                                   Ther Ex             Bike 10'            Gastroc st w/ strap 30" 4x            Seated HR/TR 30x                                                                             Ther Activity                                       Gait Training             To decrease LLE ER                          Modalities

## 2022-02-03 ENCOUNTER — OFFICE VISIT (OUTPATIENT)
Dept: PHYSICAL THERAPY | Facility: CLINIC | Age: 77
End: 2022-02-03
Payer: MEDICARE

## 2022-02-03 DIAGNOSIS — S82.832D CLOSED FRACTURE OF DISTAL END OF LEFT FIBULA WITH ROUTINE HEALING, UNSPECIFIED FRACTURE MORPHOLOGY, SUBSEQUENT ENCOUNTER: Primary | ICD-10-CM

## 2022-02-03 PROCEDURE — 97112 NEUROMUSCULAR REEDUCATION: CPT

## 2022-02-03 PROCEDURE — 97110 THERAPEUTIC EXERCISES: CPT

## 2022-02-03 PROCEDURE — 97140 MANUAL THERAPY 1/> REGIONS: CPT

## 2022-02-03 NOTE — PROGRESS NOTES
Daily Note     Today's date: 2/3/2022  Patient name: Mitchell Nunes  : 1945  MRN: [id number]  Referring provider: Refugio Busby MD  Dx:   Encounter Diagnosis     ICD-10-CM    1  Closed fracture of distal end of left fibula with routine healing, unspecified fracture morphology, subsequent encounter  S82 832D        Start Time: 7963  Stop Time: 0840  Total time in clinic (min): 42 minutes    Subjective: pt reports his doctor apt went well  Reports that he does not have to wear the CAM boot anymore and can wear a shoe now  Reports that his foot feels a little achy secondary to wearing a shoe again  Objective: See treatment diary below      Assessment: Tolerated treatment well  Patient would benefit from continued PT  Progressed pt into standing exercises at tolerated this session  Cueing for technique t/o session  No increased sxs  Plan: Continue per plan of care        Precautions: WBAT in CAM boot      Manuals  2/3           Gentle L ankle PROM [facility]                                                  Neuro Re-Ed             Towel scrunches             Wobble board all dir 20x sitting 30 sitting           Weight shift w/ neutral foot--> SLS             Ankle 4 way  ytb 20x ea dir rtb 30x ea           Side stepping  At bar 3 laps           Tandem walk  At bar 3 laps                        Ther Ex             Bike 10' 10'           Gastroc st w/ strap 30" 4x 30" 4x           Seated HR/TR 30x 30x           Mini squat at // bar  20x                                                               Ther Activity                                       Gait Training             To decrease LLE ER                          Modalities

## 2022-02-08 ENCOUNTER — OFFICE VISIT (OUTPATIENT)
Dept: PHYSICAL THERAPY | Facility: CLINIC | Age: 77
End: 2022-02-08
Payer: MEDICARE

## 2022-02-08 DIAGNOSIS — S82.832D CLOSED FRACTURE OF DISTAL END OF LEFT FIBULA WITH ROUTINE HEALING, UNSPECIFIED FRACTURE MORPHOLOGY, SUBSEQUENT ENCOUNTER: Primary | ICD-10-CM

## 2022-02-08 PROCEDURE — 97140 MANUAL THERAPY 1/> REGIONS: CPT

## 2022-02-08 PROCEDURE — 97112 NEUROMUSCULAR REEDUCATION: CPT

## 2022-02-08 PROCEDURE — 97110 THERAPEUTIC EXERCISES: CPT

## 2022-02-08 NOTE — PROGRESS NOTES
Daily Note     Today's date: 2022  Patient name: Mitchell Nunes  : 1945  MRN: [id number]  Referring provider: Refugio Busby MD  Dx:   Encounter Diagnosis     ICD-10-CM    1  Closed fracture of distal end of left fibula with routine healing, unspecified fracture morphology, subsequent encounter  S82 832D        Start Time: 0803  Stop Time: 0845  Total time in clinic (min): 42 minutes    Subjective: Patient reports soreness in L foot most prominent when in dorsiflexion and descending stairs  Objective: See treatment diary below      Assessment: Visual and verbal cueing to ensure correct exercise technique  Cues during sidestepping and gait to avoid excess hip and foot ER with improvement  Added step up and overs to work on functional LE strengthening; denied any increases in pain  Progress as able  Plan: Continue with current POC to address pt deficits        Precautions: WBAT in CAM boot      Manuals 2/1 2/3 2/8          Gentle L ankle PROM [facility]                                                 Neuro Re-Ed             Towel scrunches   x30          Wobble board all dir 20x sitting 30 sitting x30 sitting a/p, m/l           Weight shift w/ neutral foot--> SLS             Ankle 4 way  ytb 20x ea dir rtb 30x ea RTB x30 ea dir          Side stepping  At bar 3 laps At bar 3 laps           Tandem walk  At bar 3 laps At bar 3 laps                        Ther Ex             Bike 10' 10' 10'          Gastroc st w/ strap 30" 4x 30" 4x 30"x4          Seated HR/TR 30x 30x 30x ea           Mini squat at // bar  20x x20          Step ups    Up and over 4" x15                                                 Ther Activity                                       Gait Training             To decrease LLE ER                          Modalities

## 2022-02-10 ENCOUNTER — EVALUATION (OUTPATIENT)
Dept: PHYSICAL THERAPY | Facility: CLINIC | Age: 77
End: 2022-02-10
Payer: MEDICARE

## 2022-02-10 DIAGNOSIS — S82.832D CLOSED FRACTURE OF DISTAL END OF LEFT FIBULA WITH ROUTINE HEALING, UNSPECIFIED FRACTURE MORPHOLOGY, SUBSEQUENT ENCOUNTER: Primary | ICD-10-CM

## 2022-02-10 PROCEDURE — 97140 MANUAL THERAPY 1/> REGIONS: CPT | Performed by: PHYSICAL THERAPIST

## 2022-02-10 PROCEDURE — 97110 THERAPEUTIC EXERCISES: CPT | Performed by: PHYSICAL THERAPIST
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
## 2022-02-15 ENCOUNTER — OFFICE VISIT (OUTPATIENT)
Dept: PHYSICAL THERAPY | Facility: CLINIC | Age: 77
End: 2022-02-15
Payer: MEDICARE

## 2022-02-15 DIAGNOSIS — S82.832D CLOSED FRACTURE OF DISTAL END OF LEFT FIBULA WITH ROUTINE HEALING, UNSPECIFIED FRACTURE MORPHOLOGY, SUBSEQUENT ENCOUNTER: Primary | ICD-10-CM

## 2022-02-15 PROCEDURE — 97112 NEUROMUSCULAR REEDUCATION: CPT

## 2022-02-15 PROCEDURE — 97140 MANUAL THERAPY 1/> REGIONS: CPT

## 2022-02-15 PROCEDURE — 97110 THERAPEUTIC EXERCISES: CPT

## 2022-02-15 NOTE — PROGRESS NOTES
Daily Note     Today's date: 2/15/2022  Patient name: Ginny Barillas  : 1945  MRN: [id number]  Referring provider: Violet Woods MD  Dx:   Encounter Diagnosis     ICD-10-CM    1  Closed fracture of distal end of left fibula with routine healing, unspecified fracture morphology, subsequent encounter  S82 832D        Start Time: 0800  Stop Time: 0845  Total time in clinic (min): 45 minutes    Subjective: pt reports he has discomfort under toes when the toes bend w/ stair climbing and walking  Objective: See treatment diary below      Assessment: Tolerated treatment well  Patient would benefit from continued PT  Increased resistance w/ ankle strengthening w/ good tolerance  Pt was challenged w/ stability w/ plyo box activity  Cueing for correct technique w/ TG HR  Plan: Continue per plan of care        Precautions: WBAT        POC Expires 3/10/22            Manuals 2/1 2/3 2/8 2/10 2/15        Gentle L ankle PROM [facility] TB KD [facility]                                               Neuro Re-Ed             Towel scrunches   x30          Wobble board all dir 20x sitting 30 sitting x30 sitting a/p, m/l           Weight shift w/ neutral foot--> SLS             Ankle 4 way  ytb 20x ea dir rtb 30x ea RTB x30 ea dir  grn 30x ea        Side stepping  At bar 3 laps At bar 3 laps   balance beam 5 laps        Tandem walk  At bar 3 laps At bar 3 laps   balance beam 5 laps                     Ther Ex    2/10         Bike 10' 10' 10'          Gastroc st w/ strap 30" 4x 30" 4x 30"x4 30"x 4 30" 4x        Seated HR/TR 30x 30x 30x ea           Mini squat at // bar  20x x20          Step ups    Up and over 4" x15  6" plyo box step up, walk, and over 10x            Re-assessed L ankle, re-assessed FOTO 56; Updated PT POC/goals         TG b/l squats, b/l HR     20x ea                     Ther Activity                                       Gait Training             To decrease LLE ER                          Modalities

## 2022-02-17 ENCOUNTER — OFFICE VISIT (OUTPATIENT)
Dept: PHYSICAL THERAPY | Facility: CLINIC | Age: 77
End: 2022-02-17
Payer: MEDICARE

## 2022-02-17 DIAGNOSIS — S82.832D CLOSED FRACTURE OF DISTAL END OF LEFT FIBULA WITH ROUTINE HEALING, UNSPECIFIED FRACTURE MORPHOLOGY, SUBSEQUENT ENCOUNTER: Primary | ICD-10-CM

## 2022-02-17 PROCEDURE — 97112 NEUROMUSCULAR REEDUCATION: CPT

## 2022-02-17 PROCEDURE — 97110 THERAPEUTIC EXERCISES: CPT

## 2022-02-17 PROCEDURE — 97140 MANUAL THERAPY 1/> REGIONS: CPT

## 2022-02-17 NOTE — PROGRESS NOTES
Daily Note     Today's date: 2022  Patient name: Sherif Morris  : 1945  MRN: [id number]  Referring provider: Avi Syed MD  Dx:   Encounter Diagnosis     ICD-10-CM    1  Closed fracture of distal end of left fibula with routine healing, unspecified fracture morphology, subsequent encounter  S82 832D        Start Time: 0800          Subjective: pt reports no new complaints upon arrival        Objective: See treatment diary below      Assessment: Tolerated treatment well  Patient would benefit from continued PT  Minor LOB w/ walking across plyobox  Cueing for correct form w/ TG HR  Added TG squats and side step ups w/ good tolerance  Plan: Continue per plan of care        Precautions: WBAT        POC Expires 3/10/22            Manuals 2/1 2/3 2/8 2/10 2/15 2/17       Gentle L ankle PROM [facility] TB KD [facility]                                              Neuro Re-Ed             Towel scrunches   x30          Wobble board all dir 20x sitting 30 sitting x30 sitting a/p, m/l           Weight shift w/ neutral foot--> SLS             Ankle 4 way  ytb 20x ea dir rtb 30x ea RTB x30 ea dir  grn 30x ea grn 30x ea       Side stepping  At bar 3 laps At bar 3 laps   balance beam 5 laps balance beam 5 laps       Tandem walk  At bar 3 laps At bar 3 laps   balance beam 5 laps balance beam 5 laps                    Ther Ex    2/10         Bike 10' 10' 10'   10'       Gastroc st w/ strap 30" 4x 30" 4x 30"x4 30"x 4 30" 4x 30" 4x       Seated HR/TR 30x 30x 30x ea           Mini squat at // bar  20x x20   TRX 20x       Step ups    Up and over 4" x15  6" plyo box step up, walk, and over 10x 6" plyo box step up, walk, and over 10x           Re-assessed L ankle, re-assessed FOTO 56; Updated PT POC/goals         TG b/l squats, b/l HR     20x ea 20x ea       Side step ups      6" 2x10       Ther Activity                                       Gait Training             To decrease LLE ER Modalities

## 2022-02-21 ENCOUNTER — OFFICE VISIT (OUTPATIENT)
Dept: PHYSICAL THERAPY | Facility: CLINIC | Age: 77
End: 2022-02-21
Payer: MEDICARE

## 2022-02-21 DIAGNOSIS — S82.832D CLOSED FRACTURE OF DISTAL END OF LEFT FIBULA WITH ROUTINE HEALING, UNSPECIFIED FRACTURE MORPHOLOGY, SUBSEQUENT ENCOUNTER: Primary | ICD-10-CM

## 2022-02-21 PROCEDURE — 97112 NEUROMUSCULAR REEDUCATION: CPT

## 2022-02-21 PROCEDURE — 97110 THERAPEUTIC EXERCISES: CPT

## 2022-02-21 NOTE — PROGRESS NOTES
Daily Note     Today's date: 2022  Patient name: Dunia Zabala  : 1945  MRN: [id number]  Referring provider: Eldridge Olszewski, MD  Dx:   Encounter Diagnosis     ICD-10-CM    1  Closed fracture of distal end of left fibula with routine healing, unspecified fracture morphology, subsequent encounter  S82 832D        Start Time: 0800  Stop Time: 0846  Total time in clinic (min): 46 minutes    Subjective: pt reports no new pain  Objective: See treatment diary below      Assessment: Tolerated treatment well  Patient would benefit from continued PT  Inversion/eversion limited  VC required for squats  Added hurdles to improve stability and proprioception  Pt  reports increased difficulty with side steps vs forward on hurdles  Plan: Continue per plan of care        Precautions: WBAT        POC Expires 3/10/22            Manuals 2/1 2/3 2/8 2/10 2/15 2/17 2/21      Gentle L ankle PROM [facility] TB KD [facility] JH/AD                                             Neuro Re-Ed             Towel scrunches   x30          Wobble board all dir 20x sitting 30 sitting x30 sitting a/p, m/l           Weight shift w/ neutral foot--> SLS             Ankle 4 way  ytb 20x ea dir rtb 30x ea RTB x30 ea dir  grn 30x ea grn 30x ea grn 30x ea      Side stepping  At bar 3 laps At bar 3 laps   balance beam 5 laps balance beam 5 laps balance beam 5 laps      Tandem walk  At bar 3 laps At bar 3 laps   balance beam 5 laps balance beam 5 laps balance beam 5 laps                   Ther Ex    10         Bike 10' 10' 10'   10' 10'      Gastroc st w/ strap 30" 4x 30" 4x 30"x4 30"x 4 30" 4x 30" 4x       Seated HR/TR 30x 30x 30x ea           Mini squat at // bar  20x x20   TRX 20x       Step ups    Up and over 4" x15  6" plyo box step up, walk, and over 10x 6" plyo box step up, walk, and over 10x           Re-assessed L ankle, re-assessed FOTO 56; Updated PT POC/goals         TG b/l squats, b/l HR     20x ea 20x ea 20x ea Hurdles/sideways       4 hrdls 5 laps ea      SLS       airex 10s 10x ea      TRX squats       20x      Side step ups      6" 2x10 6" 2x10      Ther Activity                                       Gait Training             To decrease LLE ER                          Modalities

## 2022-02-22 ENCOUNTER — APPOINTMENT (OUTPATIENT)
Dept: PHYSICAL THERAPY | Facility: CLINIC | Age: 77
End: 2022-02-22
Payer: MEDICARE

## 2022-02-24 ENCOUNTER — OFFICE VISIT (OUTPATIENT)
Dept: PHYSICAL THERAPY | Facility: CLINIC | Age: 77
End: 2022-02-24
Payer: MEDICARE

## 2022-02-24 DIAGNOSIS — S82.832D CLOSED FRACTURE OF DISTAL END OF LEFT FIBULA WITH ROUTINE HEALING, UNSPECIFIED FRACTURE MORPHOLOGY, SUBSEQUENT ENCOUNTER: Primary | ICD-10-CM

## 2022-02-24 PROCEDURE — 97110 THERAPEUTIC EXERCISES: CPT

## 2022-02-24 PROCEDURE — 97140 MANUAL THERAPY 1/> REGIONS: CPT

## 2022-02-24 PROCEDURE — 97112 NEUROMUSCULAR REEDUCATION: CPT

## 2022-02-24 NOTE — PROGRESS NOTES
Daily Note     Today's date: 2022  Patient name: Sherif Morris  : 1945  MRN: [id number]  Referring provider: Avi Syed MD  Dx:   Encounter Diagnosis     ICD-10-CM    1  Closed fracture of distal end of left fibula with routine healing, unspecified fracture morphology, subsequent encounter  S82 832D        Start Time: 0800          Subjective: pt reports feeling pain in ball of foot under toes  Objective: See treatment diary below      Assessment: Tolerated treatment well  Patient would benefit from continued PT  Stiffness w/ DF noted  Pt felt a good stretch under toes w/ off the step calf stretches  Added DF self-mob to help improve DF mobility  Minor LOB w/ hurdles  Pt was challenged w/ balance w/ stepping up on bosu and over  Plan: Continue per plan of care        Precautions: WBAT        POC Expires 3/10/22            Manuals 2/1 2/3 2/8 2/10 2/15 2/17 2/21 2/24     Gentle L ankle PROM [facility] JH TB KD [facility] JH/AD [facility]                                            Neuro Re-Ed             Hurdles fwd/side        5 laps     Wobble board all dir 20x sitting 30 sitting x30 sitting a/p, m/l           Step up and over bosu        10x     Ankle 4 way  ytb 20x ea dir rtb 30x ea RTB x30 ea dir  grn 30x ea grn 30x ea grn 30x ea grn 30x ea     Side stepping  At bar 3 laps At bar 3 laps   balance beam 5 laps balance beam 5 laps balance beam 5 laps      Tandem walk  At bar 3 laps At bar 3 laps   balance beam 5 laps balance beam 5 laps balance beam 5 laps      SLS        airex 10" 10x     Ther Ex    2/10         Bike 10' 10' 10'   10' 10' 10'     Gastroc st w/ strap 30" 4x 30" 4x 30"x4 30"x 4 30" 4x 30" 4x  Off step 30" 4x     Seated HR/TR 30x 30x 30x ea      standing airex 2x10     Mini squat at // bar  20x x20   TRX 20x       Step ups    Up and over 4" x15  6" plyo box step up, walk, and over 10x 6" plyo box step up, walk, and over 10x  8" fwd/side 20x ea         Re-assessed L ankle, re-assessed FOTO 56; Updated PT POC/goals         TG b/l squats, b/l HR     20x ea 20x ea 20x ea                   SLS       airex 10s 10x ea      TRX squats       20x      Self DF mob        2x10     Step up and over 8"        10x     Side step ups      6" 2x10 6" 2x10 8" 2x10     Ther Activity                                       Gait Training             To decrease LLE ER                          Modalities

## 2022-03-01 ENCOUNTER — OFFICE VISIT (OUTPATIENT)
Dept: PHYSICAL THERAPY | Facility: CLINIC | Age: 77
End: 2022-03-01
Payer: MEDICARE

## 2022-03-01 DIAGNOSIS — S82.832D CLOSED FRACTURE OF DISTAL END OF LEFT FIBULA WITH ROUTINE HEALING, UNSPECIFIED FRACTURE MORPHOLOGY, SUBSEQUENT ENCOUNTER: Primary | ICD-10-CM

## 2022-03-01 PROCEDURE — 97112 NEUROMUSCULAR REEDUCATION: CPT

## 2022-03-01 PROCEDURE — 97140 MANUAL THERAPY 1/> REGIONS: CPT

## 2022-03-01 PROCEDURE — 97110 THERAPEUTIC EXERCISES: CPT

## 2022-03-01 NOTE — PROGRESS NOTES
Daily Note     Today's date: 3/1/2022  Patient name: Dunia Zabala  : 1945  MRN: [id number]  Referring provider: Eldridge Olszewski, MD  Dx:   Encounter Diagnosis     ICD-10-CM    1  Closed fracture of distal end of left fibula with routine healing, unspecified fracture morphology, subsequent encounter  S82 [id number]        Start Time: 0800          Subjective: pt reports he has continued soreness in ball of foot under toes  Objective: See treatment diary below      Assessment: Tolerated treatment well  Patient would benefit from continued PT  Increased resistance w/ ankle for ways w/ good tolerance  Improved stability w/ stepping over hurdles  Improved balance w/ stepping up and over bosu  Plan: Continue per plan of care        Precautions: WBAT        POC Expires 3/10/22            Manuals 2/1 2/3 2/8 2/10 2/15 2/17 2/21 2/24 3/1    Gentle L ankle PROM [facility] TB KD [facility]                                           Neuro Re-Ed             Hurdles fwd/side        5 laps 5 laps    Wobble board all dir 20x sitting 30 sitting x30 sitting a/p, m/l           Step up and over bosu        10x 10x    Ankle 4 way  ytb 20x ea dir rtb 30x ea RTB x30 ea dir  grn 30x ea grn 30x ea grn 30x ea grn 30x ea [id number] ea     Side stepping  At bar 3 laps At bar 3 laps   balance beam 5 laps balance beam 5 laps balance beam 5 laps      Tandem walk  At bar 3 laps At bar 3 laps   balance beam 5 laps balance beam 5 laps balance beam 5 laps  balance beam 5 laps    SLS        airex 10" 10x airex 10" 10x    Ther Ex    2/10         Bike 10' 10' 10'   10' 10' 10' 10'    Gastroc st w/ strap 30" 4x 30" 4x 30"x4 30"x 4 30" 4x 30" 4x  Off step 30" 4x Off step 30" 4x    Seated HR/TR 30x 30x 30x ea      standing airex 2x10 standing airex 3x10    Mini squat at // bar  20x x20   TRX 20x       Step ups    Up and over 4" x15  6" plyo box step up, walk, and over 10x 6" plyo box step up, walk, and over 10x  8" fwd/side 20x ea 8" fwd/side 20x ea        Re-assessed L ankle, re-assessed FOTO 56; Updated PT POC/goals         TG b/l squats, b/l HR     20x ea 20x ea 20x ea                                TRX squats       20x  20x    Self DF mob        2x10 2x10    Step up and over 8"        10x 10x    Side step ups      6" 2x10 6" 2x10 8" 2x10 8" 2x10    Ther Activity                                       Gait Training             To decrease LLE ER                          Modalities

## 2022-03-03 ENCOUNTER — OFFICE VISIT (OUTPATIENT)
Dept: PHYSICAL THERAPY | Facility: CLINIC | Age: 77
End: 2022-03-03
Payer: MEDICARE

## 2022-03-03 DIAGNOSIS — S82.832D CLOSED FRACTURE OF DISTAL END OF LEFT FIBULA WITH ROUTINE HEALING, UNSPECIFIED FRACTURE MORPHOLOGY, SUBSEQUENT ENCOUNTER: Primary | ICD-10-CM

## 2022-03-03 PROCEDURE — 97140 MANUAL THERAPY 1/> REGIONS: CPT

## 2022-03-03 PROCEDURE — 97110 THERAPEUTIC EXERCISES: CPT

## 2022-03-03 PROCEDURE — 97112 NEUROMUSCULAR REEDUCATION: CPT

## 2022-03-03 NOTE — PROGRESS NOTES
Daily Note     Today's date: 3/3/2022  Patient name: Killian Vargas  : 1945  MRN: [id number]  Referring provider: Kathy Vergara MD  Dx:   Encounter Diagnosis     ICD-10-CM    1  Closed fracture of distal end of left fibula with routine healing, unspecified fracture morphology, subsequent encounter  S82 832D        Start Time: 0800  Stop Time: 0840  Total time in clinic (min): 40 minutes    Subjective: pt reports no new complaints upon arrival        Objective: See treatment diary below      Assessment: Tolerated treatment well  Patient would benefit from continued PT  Improved mobility w/ inversion/eversion  Improved balance w/ stepping over hurdles  Plan: Continue per plan of care        Precautions: WBAT        POC Expires 3/10/22            Manuals 2/1 2/3 2/8 2/10 2/15 2/17 2/21 2/24 3/1 3/3   Gentle L ankle PROM [facility] TB KD [facility]/AD [facility]                                          Neuro Re-Ed             Hurdles fwd/side        5 laps 5 laps 5 laps   Wobble board all dir 20x sitting 30 sitting x30 sitting a/p, m/l           Step up and over bosu        10x 10x 15x   Ankle 4 way  ytb 20x ea dir rtb 30x ea RTB x30 ea dir  grn 30x ea grn 30x ea grn 30x ea grn 30x ea [id number] ea  Blue 30x ea   Side stepping  At bar 3 laps At bar 3 laps   balance beam 5 laps balance beam 5 laps balance beam 5 laps      Tandem walk  At bar 3 laps At bar 3 laps   balance beam 5 laps balance beam 5 laps balance beam 5 laps  balance beam 5 laps    SLS        airex 10" 10x airex 10" 10x airex 10" 10x   Ther Ex    2/10         Bike 10' 10' 10'   10' 10' 10' 10' 10'   Gastroc st w/ strap 30" 4x 30" 4x 30"x4 30"x 4 30" 4x 30" 4x  Off step 30" 4x Off step 30" 4x    Seated HR/TR 30x 30x 30x ea      standing airex 2x10 standing airex 3x10 standing airex 3x10   Mini squat at // bar  20x x20   TRX 20x       Step ups    Up and over 4" x15  6" plyo box step up, walk, and over 10x 6" plyo box step up, walk, and over 10x  8" fwd/side 20x ea 8" fwd/side 20x ea 8" fwd/side 20x ea       Re-assessed L ankle, re-assessed FOTO 56; Updated PT POC/goals         TG b/l squats, b/l HR     20x ea 20x ea 20x ea                                TRX squats       20x  20x 30x   Self DF mob        2x10 2x10    Step up and over 8"        10x 10x 20x   Side step ups      6" 2x10 6" 2x10 8" 2x10 8" 2x10    Ther Activity                                       Gait Training             To decrease LLE ER                          Modalities

## 2022-03-08 ENCOUNTER — OFFICE VISIT (OUTPATIENT)
Dept: PHYSICAL THERAPY | Facility: CLINIC | Age: 77
End: 2022-03-08
Payer: MEDICARE

## 2022-03-08 DIAGNOSIS — S82.832D CLOSED FRACTURE OF DISTAL END OF LEFT FIBULA WITH ROUTINE HEALING, UNSPECIFIED FRACTURE MORPHOLOGY, SUBSEQUENT ENCOUNTER: Primary | ICD-10-CM

## 2022-03-08 PROCEDURE — 97112 NEUROMUSCULAR REEDUCATION: CPT

## 2022-03-08 PROCEDURE — 97530 THERAPEUTIC ACTIVITIES: CPT

## 2022-03-08 PROCEDURE — 97140 MANUAL THERAPY 1/> REGIONS: CPT

## 2022-03-08 NOTE — PROGRESS NOTES
Daily Note     Today's date: 3/8/2022  Patient name: Lillie Bamberger  : 1945  MRN: [id number]  Referring provider: Dayanna Gilmore MD  Dx:   Encounter Diagnosis     ICD-10-CM    1  Closed fracture of distal end of left fibula with routine healing, unspecified fracture morphology, subsequent encounter  S82 832D        Start Time: 08  Stop Time: 06  Total time in clinic (min): 45 minutes    Subjective: patient reports to therapy stating increased knee and big toe pain  Reports ankle has been feeling pain  Reports big toe pain with pressure      Objective: See treatment diary below      Assessment: patient was able to complete therapy without reported increase to baseline  Patient presents with increased external rotation of L LE in resting positions requiring cueing throughout to try to maintain neutral position with stepping and squatting activities  Patient is reliant on frequent UE tapping and support with step ups and SLS activities  Was able to introduce seated BAPS board for improved ankle motor control requiring cueing on controlled movements and addressing knee compensations  Plan: Continue per plan of care  Progress treatment as tolerated         Precautions: WBAT        POC Expires 3/10/22            Manuals 3/8    2/15 2/17 2/21 2/24 3/1 3/3   Gentle L ankle PROM [facility] J[facility]/AD [facility]                                          Neuro Re-Ed 3/8            Hurdles fwd/side        5 laps 5 laps 5 laps   Wobble board all dir             Step up and over bosu        10x 10x 15x   Ankle 4 way  BTB x20 ea    grn 30x ea grn 30x ea grn 30x ea grn 30x ea [id number] ea  Blue 30x ea   Side stepping On foam over hurdles x 5 laps    balance beam 5 laps balance beam 5 laps balance beam 5 laps      Tandem walk     balance beam 5 laps balance beam 5 laps balance beam 5 laps  balance beam 5 laps    BAPS  L2 x 10 6 way            SLS 30"4 w/min UE tapping       airex 10" 10x airex 10" 10x airex 10" 10x   Ther Ex 3/8            Bike 6'     10' 10' 10' 10' 10'   Gastroc st w/ strap Off step 4" x 2 mins    30" 4x 30" 4x  Off step 30" 4x Off step 30" 4x    Seated HR/TR standing 3x10 off step       standing airex 2x10 standing airex 3x10 standing airex 3x10                TG b/l squats, b/l HR     20x ea 20x ea 20x ea                                Self DF mob        2x10 2x10    Ther Activity             Step Ups Fwd/Lat 8"x20 ea            Step Up and Over 8"x20            TRX Squats x20            Gait Training             To decrease LLE ER                          Modalities

## 2022-03-10 ENCOUNTER — OFFICE VISIT (OUTPATIENT)
Dept: PHYSICAL THERAPY | Facility: CLINIC | Age: 77
End: 2022-03-10
Payer: MEDICARE

## 2022-03-10 DIAGNOSIS — S82.832D CLOSED FRACTURE OF DISTAL END OF LEFT FIBULA WITH ROUTINE HEALING, UNSPECIFIED FRACTURE MORPHOLOGY, SUBSEQUENT ENCOUNTER: Primary | ICD-10-CM

## 2022-03-10 PROCEDURE — 97140 MANUAL THERAPY 1/> REGIONS: CPT

## 2022-03-10 PROCEDURE — 97112 NEUROMUSCULAR REEDUCATION: CPT

## 2022-03-10 PROCEDURE — 97530 THERAPEUTIC ACTIVITIES: CPT

## 2022-03-10 NOTE — PROGRESS NOTES
Daily Note     Today's date: 3/10/2022  Patient name: Adam Fernando  : 1945  MRN: [id number]  Referring provider: Sneha Aguiar MD  Dx:   Encounter Diagnosis     ICD-10-CM    1  Closed fracture of distal end of left fibula with routine healing, unspecified fracture morphology, subsequent encounter  S82 832D        Start Time: 0803  Stop Time: 0845  Total time in clinic (min): 42 minutes    Subjective: patient reports continued big toe medial arch pain with ambulation most notabally with push off  Denies any new incidents or complaints      Objective: See treatment diary below      Assessment: patient was able to complete therapy without reported increase to baseline  Cueing throughout on maintaining neutral foot position  Introduce [place] machine Fwd/back word walking to address pain and discomfort with push off  Emphasis on maintaining 5 toe point of contact with walk outs and step up and over  Required regression of step height to address control  Performed squatting without use of TRX demonstrating good control and cueing on foot placement  Plan: Continue per plan of care  Progress treatment as tolerated         Precautions: WBAT        POC Expires 3/10/22 3/10         Manuals 3/8 3/10 2/15 2/17 2/21 2/24 3/1 3/3   Gentle L ankle PROM [address] [facility] J[facility]/AD [facility]                                    Neuro Re-Ed 3/8 3/10         Hurdles fwd/side      5 laps 5 laps 5 laps   Wobble board all dir           Step up and over bosu      10x 10x 15x   Ankle 4 way  BTB x20 ea BTB x20ea grn 30x ea grn 30x ea grn 30x ea grn 30x ea [id number] ea  Blue 30x ea   Side stepping On foam over hurdles x 5 laps  balance beam 5 laps balance beam 5 laps balance beam 5 laps      Tandem walk   balance beam 5 laps balance beam 5 laps balance beam 5 laps  balance beam 5 laps    BAPS  L2 x 10 6 way L2 x10         SLS 30"4 w/min UE tapping 30"x4w/min UE tap    airex 10" 10x airex 10" 10x airex 10" 10x   Ther Ex 3/8 3/10 Bike 6'   10' 10' 10' 10' 10'   Gastroc st w/ strap Off step 4" x 2 mins Off 4"xstep x2 mins 30" 4x 30" 4x  Off step 30" 4x Off step 30" 4x    Seated HR/TR standing 3x10 off step Standing 3x10 L dominant    standing airex 2x10 standing airex 3x10 standing airex 3x10              TG b/l squats, b/l HR   20x ea 20x ea 20x ea                            Self DF mob      2x10 2x10    Ther Activity  3/10         Step Ups Fwd/Lat 8"x20 ea          Step Up and Over 8"x20 4"x5/6"x5         Nick Walkout  Fwd/Back 10#x5 ea         STS   Low Mat 2x10         TRX Squats x20          Gait Training           To decrease LLE ER                      Modalities

## 2022-03-15 ENCOUNTER — EVALUATION (OUTPATIENT)
Dept: PHYSICAL THERAPY | Facility: CLINIC | Age: 77
End: 2022-03-15
Payer: MEDICARE

## 2022-03-15 DIAGNOSIS — S82.832D CLOSED FRACTURE OF DISTAL END OF LEFT FIBULA WITH ROUTINE HEALING, UNSPECIFIED FRACTURE MORPHOLOGY, SUBSEQUENT ENCOUNTER: Primary | ICD-10-CM

## 2022-03-15 PROCEDURE — 97110 THERAPEUTIC EXERCISES: CPT | Performed by: PHYSICAL THERAPIST

## 2022-03-15 NOTE — PROGRESS NOTES
PT Discharge    Today's date: 3/15/2022  Patient name: Jeanie Matute  : 1945  MRN: [id number]  Referring provider: Akin Jennings MD  Dx:   Encounter Diagnosis     ICD-10-CM    1  Closed fracture of distal end of left fibula with routine healing, unspecified fracture morphology, subsequent encounter  S82 832D        Start Time: 0800  Stop Time: 0825  Total time in clinic (min): 25 minutes    Assessment  Assessment details: Pt has been seen for 13 visits and has made excellent subjective/objective improvements since enrolling in therapy with improved FOTO score from 53 to 91 since initial evaluation  Pt has returned all normal ADLs and recreational activities with minimal to no pain or limitation and is appropriate for discharge to home exercise program at this time  Pt encouraged to continue with HEP on regular basis per tolerance and was educated on how to progress/regress exercises per symptoms/tolerance  Pt is in agreement with plan  Goals  ST  Patient will report 25% decrease in pain in 4 weeks  MET  2  Patient will demonstrate 25% improvement in ROM in 4 weeks  MET  3  Patient will demonstrate 1/2 grade improvement in strength in 4 weeks  MET    LT  Patient will be able to perform IADLS without restriction or pain by discharge  MET  2  Patient will be independent in HEP by discharge  MET  3  Patient will be able to return to recreational/work duties without restriction or pain by discharge  MET      Plan  Plan details: Discharge to HEP  Plan of Care beginning date: 3/10/2022  Plan of Care expiration date: 3/15/2022  Treatment plan discussed with: patient        Subjective Evaluation    History of Present Illness  Mechanism of injury: Pt has been seen for 13 visits and overall feels his ankle is doing better overall and not having any pain anymore unless he goes into a high heel raise   Pt states he has returned to all normal daily activities without limitation and is ready to be discharged to a home program at this time  Pain  Current pain ratin  At best pain ratin  At worst pain ratin  Quality: dull ache          Objective     General Comments:       Ankle/Foot Comments   Pt ambulates in regular shoes with significant bilateral hip and foot ER, slow gait    Left ankle AROM: DF 5* PF 40* Inv 12* Ev 4*    Left ankle MMT: DF 5-/5 PF 5/5 Inv 5-/5 Ev 5-/5    Palpation: No TTP about left foot/ankle    SLS on level surface: 8" w/ moderate ankle/hip strategy    FOTO: 91 (improved from 56 at IE)               Precautions: WBAT          POC Expires 3/10/22          Manuals 3/8 3/10 3/15 2/17 2/21 2/24 3/1 3/3   Gentle L ankle PROM [facility]                                    Neuro Re-Ed 3/8 3/10 3/15        Hurdles fwd/side      5 laps 5 laps 5 laps   Wobble board all dir           Step up and over bosu      10x 10x 15x   Ankle 4 way  BTB x20 ea BTB x20ea  grn 30x ea grn 30x ea grn 30x ea [id number] ea  Blue 30x ea   Side stepping On foam over hurdles x 5 laps   balance beam 5 laps balance beam 5 laps      Tandem walk    balance beam 5 laps balance beam 5 laps  balance beam 5 laps    BAPS  L2 x 10 6 way L2 x10         SLS 30"4 w/min UE tapping 30"x4w/min UE tap    airex 10" 10x airex 10" 10x airex 10" 10x   Ther Ex 3/8 3/10 3/15        Bike 6'   10' 10' 10' 10' 10'   Gastroc st w/ strap Off step 4" x 2 mins Off 4"xstep x2 mins  30" 4x  Off step 30" 4x Off step 30" 4x    Seated HR/TR standing 3x10 off step Standing 3x10 L dominant    standing airex 2x10 standing airex 3x10 standing airex 3x10      Re-assessed left ankle; re-assessed FOTO; HEP creation/instruction; educated on importance of continued HEP compliance        TG b/l squats, b/l HR    20x ea 20x ea                            Self DF mob      2x10 2x10    Ther Activity  3/10         Step Ups Fwd/Lat 8"x20 ea          Step Up and Over 8"x20 4"x5/6"x5         Nick Walkout  Fwd/Back 10#x5 ea         STS   Low Mat 2x10         TRX Squats x20          Gait Training           To decrease LLE ER                      Modalities

## 2022-03-17 ENCOUNTER — APPOINTMENT (OUTPATIENT)
Dept: PHYSICAL THERAPY | Facility: CLINIC | Age: 77
End: 2022-03-17
Payer: MEDICARE

## 2022-04-04 ENCOUNTER — OFFICE VISIT (OUTPATIENT)
Dept: OBGYN CLINIC | Facility: CLINIC | Age: 77
End: 2022-04-04
Payer: MEDICARE

## 2022-04-04 VITALS
BODY MASS INDEX: 34.79 KG/M2 | DIASTOLIC BLOOD PRESSURE: 84 MMHG | HEART RATE: 60 BPM | WEIGHT: 243 LBS | SYSTOLIC BLOOD PRESSURE: 163 MMHG | HEIGHT: 70 IN

## 2022-04-04 DIAGNOSIS — G89.29 CHRONIC RIGHT SHOULDER PAIN: ICD-10-CM

## 2022-04-04 DIAGNOSIS — M75.31 CALCIFIC TENDINITIS OF RIGHT SHOULDER: ICD-10-CM

## 2022-04-04 DIAGNOSIS — M75.01 ADHESIVE CAPSULITIS OF RIGHT SHOULDER: Primary | ICD-10-CM

## 2022-04-04 DIAGNOSIS — M25.511 CHRONIC RIGHT SHOULDER PAIN: ICD-10-CM

## 2022-04-04 PROCEDURE — 99213 OFFICE O/P EST LOW 20 MIN: CPT | Performed by: ORTHOPAEDIC SURGERY

## 2022-04-04 RX ORDER — MONTELUKAST SODIUM 10 MG/1
10 TABLET ORAL DAILY
COMMUNITY
Start: 2022-02-10 | End: 2022-05-16

## 2022-04-04 RX ORDER — LEVOFLOXACIN 500 MG/1
TABLET, FILM COATED ORAL
COMMUNITY
Start: 2022-02-10 | End: 2022-05-16

## 2022-04-04 RX ORDER — PEN NEEDLE, DIABETIC 29 G X1/2"
NEEDLE, DISPOSABLE MISCELLANEOUS
COMMUNITY
Start: 2022-03-22

## 2022-04-04 NOTE — PROGRESS NOTES
Patient Name:  Sondra Faith  MRN:  [id number]    [address] [place], [place] [address]     1  Adhesive capsulitis of right shoulder  -     Ambulatory Referral to Physical Therapy; Future    2  Calcific tendinitis of right shoulder  -     Ambulatory Referral to Physical Therapy; Future    3  Chronic right shoulder pain  -     Ambulatory Referral to Physical Therapy; Future      67-year-old male with right shoulder adhesive capsulitis, history of calcific tendinitis  Discussed returning to formal physical therapy for shoulder stretching and postural exercises and Toradol injection  Patient would like to hold off on injection at this time and get started in therapy  He may utilize OTC analgesics as needed for pain  Ice/heat as directed  We will plan to see him back in 8 weeks for repeat clinical evaluation, if no improvement can consider Toradol injection  History of the Present Illness   Sondra Faith is a 67 y o  male with right calcific tendinitis  Patient states that he was doing well up until the past 2 5-3 months ago when he began having increased right shoulder and upper arm pain without any injury or inciting event  He has been trying to use the [employer] provided gym previously from physical therapy but this has not been helping  Pain is worse with any overhead reaching  He also notes some numbness in the shoulder region  He is not taking any medications for this  Patient has had steroid injections in other sites in the past and states that his blood sugar went up into the 400s subsequently  Patient has been seeing Dr Lita Richmond recently for a left ankle bimalleolar fracture in doing physical therapy for this, they provided him with shoulder exercises well  Review of Systems     Review of Systems   Constitutional: Negative for appetite change and unexpected weight change  HENT: Negative for congestion and trouble swallowing  Eyes: Negative for visual disturbance     Respiratory: Negative for cough and shortness of breath  Cardiovascular: Negative for chest pain and palpitations  Gastrointestinal: Negative for nausea and vomiting  Endocrine: Negative for cold intolerance and heat intolerance  Musculoskeletal: Negative for gait problem and myalgias  Skin: Negative for rash  Neurological: Negative for syncope  Physical Exam     /84   Pulse 60   Ht 5' 10" (1 778 m)   Wt 110 kg (243 lb)   BMI 34 87 kg/m²     Right Shoulder: Active range of motion   130 degrees forward flexion  120 degrees abduction  50 degrees external rotation   SI joint internal rotation      Passive range of motion   140 degrees of forward flexion   Lacks 20 degrees of passive external rotation compared to contralateral side  There is no tenderness to palpation  There is 4+/5 strength with external rotation testing at the side  Empty can testing is negative   Belly press test is negative  Woo test is negative   [place]'s test is negative    Speed's test is Negative  The patient is neurovascularly intact distally in the extremity  Data Review     No new imaging to review today  Social History     Tobacco Use    Smoking status: Never Smoker    Smokeless tobacco: Never Used   Vaping Use    Vaping Use: Never used   Substance Use Topics    Alcohol use: Never    Drug use: No       No procedures performed today      Scribe Attestation    I,:  Ren Roberts am acting as a scribe while in the presence of the attending physician :       I,:  Niranjan Pepe, DO personally performed the services described in this documentation    as scribed in my presence :

## 2022-04-13 ENCOUNTER — EVALUATION (OUTPATIENT)
Dept: PHYSICAL THERAPY | Facility: CLINIC | Age: 77
End: 2022-04-13
Payer: MEDICARE

## 2022-04-13 DIAGNOSIS — M25.511 CHRONIC RIGHT SHOULDER PAIN: ICD-10-CM

## 2022-04-13 DIAGNOSIS — G89.29 CHRONIC RIGHT SHOULDER PAIN: ICD-10-CM

## 2022-04-13 DIAGNOSIS — M75.31 CALCIFIC TENDINITIS OF RIGHT SHOULDER: ICD-10-CM

## 2022-04-13 DIAGNOSIS — M75.01 ADHESIVE CAPSULITIS OF RIGHT SHOULDER: ICD-10-CM

## 2022-04-13 PROCEDURE — 97140 MANUAL THERAPY 1/> REGIONS: CPT | Performed by: PHYSICAL THERAPIST

## 2022-04-13 PROCEDURE — 97161 PT EVAL LOW COMPLEX 20 MIN: CPT | Performed by: PHYSICAL THERAPIST

## 2022-04-13 PROCEDURE — 97110 THERAPEUTIC EXERCISES: CPT | Performed by: PHYSICAL THERAPIST

## 2022-04-13 NOTE — PROGRESS NOTES
PT Evaluation     Today's date: 2022  Patient name: Ally Limon  : 1945  MRN: [id number]  Referring provider: Freddie Crawford DO  Dx:   Encounter Diagnosis     ICD-10-CM    1  Calcific tendinitis of right shoulder  M75 31 Ambulatory Referral to Physical Therapy   2  Chronic right shoulder pain  M25 511 Ambulatory Referral to Physical Therapy    G89 29    3  Adhesive capsulitis of right shoulder  M75 01 Ambulatory Referral to Physical Therapy       Start Time: 930  Stop Time: 101  Total time in clinic (min): 45 minutes    Assessment  Assessment details: Patient is a 67 y o  male who presents to physical therapy with c/o right shoulder pain consistent with subacromial pain syndrome  Patient presents to evaluation with pain, decreased range of motion, decreased strength, and decreased tolerance to activity  Patient demonstrates good tolerance to treatment and was provided with a written copy of their initial home exercise program focusing on shoulder/t-spine ROM and was encouraged to perform daily per tolerance  I discussed risks, benefits, and alternatives to treatment, and answered all patient questions to patient satisfaction  Patient presents with baseline FOTO score of 54 indicating limited tolerance/ability to complete ADLs  Patient is an appropriate candidate for skilled PT and would benefit from skilled PT services to address the aforementioned impairments, achieve goals, maximize function, and improve quality of life  Pt is in agreement with this plan      Patient Education: activity modifications as needed, pacing of activities, importance of HEP compliance, PT prognosis/POC    Impairments: abnormal or restricted ROM, activity intolerance, impaired physical strength, lacks appropriate home exercise program, pain with function, poor posture  and poor body mechanics  Understanding of Dx/Px/POC: good   Prognosis: good    Goals  ST weeks  Pt will demonstrate good understanding and compliance with HEP  Pt will decrease pain to 3-4/10    Pt will demonstrate improved postural awareness and ability to self-correct without reliance on external cues    LT weeks  Pt will improve FOTO score to > or = to 66 to indicate improved functional abilities   Pt will decrease pain to 0-1/10   Pt will increase shoulder strength to 4+/5 for improved tolerance/independence with ADLs  Pt will increase PROM to WNL to allow for return of AROM of affected shoulder  Pt will increase shoulder flexion/abduction to 150 degrees to increase independence with ADLs   Pt will increase functional IR to be able to don/doff belt and wash back without pain   Pt will increase functional ER to be able to wash hair independently without pain      Plan  Patient would benefit from: PT eval and skilled physical therapy  Planned modality interventions: cryotherapy and thermotherapy: hydrocollator packs  Planned therapy interventions: ADL training, body mechanics training, home exercise program, flexibility, functional ROM exercises, graded activity, graded exercise, strengthening, stretching, therapeutic activities, therapeutic exercise, self care, postural training, patient education, neuromuscular re-education and manual therapy  Frequency: 2x week  Duration in weeks: 8  Plan of Care beginning date: 2022  Plan of Care expiration date: 2022  Treatment plan discussed with: patient        Subjective Evaluation    History of Present Illness  Mechanism of injury: Pt reports to PT with c/o right shoulder pain that started about 3 years ago and has slowly worsened with time and starting to notice increasing pain and decreasing shoulder motion  Pt was planning on getting tx sooner but had fractured his foot and was going through treatment for this but now ready to enroll in therapy for shoulder  Pt denies hx of similar symptoms  Pt does report intermittent numbness into right arm that can travel to upper arm   Pt reports increased pain/limitation with reaching > shoulder height, behind his back, sleeping, heavy lifting/carrying       Aggravating factors: reaching > shoulder height, behind his back, sleeping, heavy lifting/carrying    Easing Factors:  rest    PLOF:  No hx of right shoulder pain    PMH: Diabetic, HTN, neuropathy  Pain  Current pain ratin  At best pain ratin  At worst pain rating: 10  Quality: dull ache and sharp    Hand dominance: right    Patient Goals  Patient goals for therapy: decreased pain, increased strength, independence with ADLs/IADLs, return to sport/leisure activities and increased motion          Objective     General Comments:      Shoulder Comments   Posture: FHP, RS, increased thoracic kyphosis    Cervical Screen: (-)    Right shoulder AROM: Flex 125* (pain) Abd 90* (pain) Functional IR to right buttock (pain) Functional ER to right upper trap (pain)    Right shoulder PROM: Flex 150* (pain) Abd 160* (pain) IR 45* ER 80* (IR/ER assessed at 90* abd)    Right shoulder MMT: Flex 4/5 (pain) Abd 4-/5 (pain) IR 4+/5 ER 4/5 (pain) - MMT assessed within available ranges    Light touch intact and symmetrical bilateral UEs    Bernabe: (+)  Empty can: (+)  Bennett's Compression: (-)  Hor Adduction: (-)  Apprehension/relocation: (-)  Speeds: (-)    FOTO:  54               Diagnosis: (R) subacromial pain syndrome    Precautions: Diabetic, HTN, neuropathy   POC Expires: 22   Re-evaluation Date: 22    FOTO Scores/Date: Goal - 66; 22 - 54   Visit Count 1/10       Manuals        R shoulder PROM all planes KD                                       Ther Ex        Supine cane I, Y 5x10" ea 2#       Seated thoracic extension W/ ball 15x       Wall wash 10x3" ea       IR belt stretch 10x10"                                        HEP Creation/instruction       Neuro Re-Ed                                                                                                                       Ther Act Modalities

## 2022-04-15 ENCOUNTER — OFFICE VISIT (OUTPATIENT)
Dept: PHYSICAL THERAPY | Facility: CLINIC | Age: 77
End: 2022-04-15
Payer: MEDICARE

## 2022-04-15 DIAGNOSIS — M75.31 CALCIFIC TENDINITIS OF RIGHT SHOULDER: Primary | ICD-10-CM

## 2022-04-15 DIAGNOSIS — G89.29 CHRONIC RIGHT SHOULDER PAIN: ICD-10-CM

## 2022-04-15 DIAGNOSIS — M75.01 ADHESIVE CAPSULITIS OF RIGHT SHOULDER: ICD-10-CM

## 2022-04-15 DIAGNOSIS — M25.511 CHRONIC RIGHT SHOULDER PAIN: ICD-10-CM

## 2022-04-15 PROCEDURE — 97140 MANUAL THERAPY 1/> REGIONS: CPT

## 2022-04-15 PROCEDURE — 97110 THERAPEUTIC EXERCISES: CPT

## 2022-04-15 NOTE — PROGRESS NOTES
Daily Note     Today's date: 4/15/2022  Patient name: Mendel Stock  : 1945  MRN: [id number]  Referring provider: Waldemar Quintero DO  Dx:   Encounter Diagnosis     ICD-10-CM    1  Calcific tendinitis of right shoulder  M75 31    2  Chronic right shoulder pain  M25 511     G89 29    3  Adhesive capsulitis of right shoulder  M75 01        Start Time: 1105  Stop Time: 1145  Total time in clinic (min): 40 minutes    Subjective: patient reports improved mobility since eval    Reports compliance with HEP  Reports R hip pain after twisting wrong working on tractor at home      Objective: See treatment diary below      Assessment:  Patient was able to complete therapy without onset of reported pain  Presents with near full PROM with some restriction into end ranges  Patient is most limited into IR  Was able to introduce standing AROM with eccentric focus into flexion and scaption  Required cueing on sequencing and holds required  Plan: Continue per plan of care  Progress treatment as tolerated         Diagnosis: (R) subacromial pain syndrome    Precautions: Diabetic, HTN, neuropathy   POC Expires: 22   Re-evaluation Date: 22    FOTO Scores/Date: Goal - 66; 22 - 47   Visit Count /10 2/10      Manuals 4/13 4/15      R shoulder PROM all planes KD CHERYL                                      Ther Ex 4/13 4/15      Supine cane I, Y 5x10" ea 2# 5"x10 ea      Seated thoracic extension W/ ball 15x W/ball x 20      Wall wash 10x3" ea 5"x10 flex/scap      IR belt stretch 10x10"  10"x10      Pullies  Flex/scap 5"x2 mins ea      Standing AROM  Flex/Scap w/focus on eccentric x10 ea      UBE   L1 3/3              HEP Creation/instruction       Neuro Re-Ed                                                                                                                       Ther Act                                         Modalities

## 2022-04-19 ENCOUNTER — OFFICE VISIT (OUTPATIENT)
Dept: PHYSICAL THERAPY | Facility: CLINIC | Age: 77
End: 2022-04-19
Payer: MEDICARE

## 2022-04-19 DIAGNOSIS — M25.511 CHRONIC RIGHT SHOULDER PAIN: ICD-10-CM

## 2022-04-19 DIAGNOSIS — G89.29 CHRONIC RIGHT SHOULDER PAIN: ICD-10-CM

## 2022-04-19 DIAGNOSIS — M75.31 CALCIFIC TENDINITIS OF RIGHT SHOULDER: Primary | ICD-10-CM

## 2022-04-19 PROCEDURE — 97140 MANUAL THERAPY 1/> REGIONS: CPT

## 2022-04-19 PROCEDURE — 97110 THERAPEUTIC EXERCISES: CPT

## 2022-04-19 NOTE — PROGRESS NOTES
Daily Note     Today's date: 2022  Patient name: Tommie Kapoor  : 1945  MRN: [id number]  Referring provider: Hailee Killian DO  Dx:   Encounter Diagnosis     ICD-10-CM    1  Calcific tendinitis of right shoulder  M75 31    2  Chronic right shoulder pain  M25 511     G89 29        Start Time: 1230          Subjective: pt reports his shoulder is still sore but feeling a little better w/ improved mobility  Objective: See treatment diary below      Assessment: Tolerated treatment well  Patient would benefit from continued PT  Improved mobility in shoulder in all planes noted today  Added ABCs w/ resistance w/ goo tolerance and no reported increased pain  Continued tightness at end ranges w/ PROM but improved towards the end  Plan: Continue per plan of care        Diagnosis: (R) subacromial pain syndrome    Precautions: Diabetic, HTN, neuropathy   POC Expires: 22   Re-evaluation Date: 22    FOTO Scores/Date: Goal - 66; 22 - 47   Visit Count 1/10 2/10 3/10     Manuals 4/13 4/15 4/19     R shoulder PROM all planes KD CHERYL [facility] CLAUDIO                                     Ther Ex 4/13 4/15      Supine cane I, Y 5x10" ea 2# 5"x10 ea 5" x 10 2#     Seated thoracic extension W/ ball 15x W/ball x 20 W/ball x 20     Wall wash 10x3" ea 5"x10 flex/scap 5"x10 flex/scap     IR belt stretch 10x10"  10"x10 10"x10     Pullies  Flex/scap 5"x2 mins ea Flex/scap 5"x2 mins ea     Standing AROM  Flex/Scap w/focus on eccentric x10 ea      UBE   L1 3/3 L1 3/3     Supine flexion to 90 deg w/ eccentric control   2# 2x10     ABCs   2# 1x     HEP Creation/instruction       Neuro Re-Ed                                                                                                                       Ther Act                                         Modalities

## 2022-04-21 ENCOUNTER — OFFICE VISIT (OUTPATIENT)
Dept: PODIATRY | Facility: CLINIC | Age: 77
End: 2022-04-21
Payer: MEDICARE

## 2022-04-21 VITALS
SYSTOLIC BLOOD PRESSURE: 130 MMHG | HEIGHT: 70 IN | DIASTOLIC BLOOD PRESSURE: 73 MMHG | WEIGHT: 259.2 LBS | BODY MASS INDEX: 37.11 KG/M2 | HEART RATE: 73 BPM

## 2022-04-21 DIAGNOSIS — B35.1 ONYCHOMYCOSIS: ICD-10-CM

## 2022-04-21 DIAGNOSIS — Z79.4 TYPE 2 DIABETES MELLITUS WITH DIABETIC NEUROPATHY, WITH LONG-TERM CURRENT USE OF INSULIN (HCC): Primary | ICD-10-CM

## 2022-04-21 DIAGNOSIS — E11.40 TYPE 2 DIABETES MELLITUS WITH DIABETIC NEUROPATHY, WITH LONG-TERM CURRENT USE OF INSULIN (HCC): Primary | ICD-10-CM

## 2022-04-21 DIAGNOSIS — I87.2 VENOUS INSUFFICIENCY OF BOTH LOWER EXTREMITIES: ICD-10-CM

## 2022-04-21 PROCEDURE — 99213 OFFICE O/P EST LOW 20 MIN: CPT | Performed by: PODIATRIST

## 2022-04-21 PROCEDURE — 11721 DEBRIDE NAIL 6 OR MORE: CPT | Performed by: PODIATRIST

## 2022-04-21 NOTE — PROGRESS NOTES
Radha Ramírez    1945    ASSESSMENT:     1  Type 2 diabetes mellitus with diabetic neuropathy, with long-term current use of insulin (HCC)     2  Venous insufficiency of both lower extremities     3  Onychomycosis  Debridement         PLAN:  1  Patient was counseled on the condition and diagnosis  Educated disease prevention and risks related to diabetes  Educated proper daily foot care and exam   Instructed proper skin care / protection and footwear  2  Recent blood work reviewed / discussed  Discussed proper blood glucose control with diet and exercise  Recommended him to follow-up with his PCP for new blood work  3   Discussed LE edema management with compression, elevation, and diet  4   Recommended periodic foot care for nail fungus  5  RA in 9 weeks  Procedure: All mycotic toenails were reduced and debrided in length, width, and girth using a nail nipper and dremel  Patient tolerated procedure(s) well without complications  Subjective:         HPI  The patient presents for diabetic foot evaluation  The blood glucose is under control  The patient denied any diabetic foot ulcer or related foot infection  Some paresthesia on his feet  Denied weakness or significant functional deficit  He has history of VSU on left leg  No wounds in his legs at this time  No acute pedal problems  He complained of thick toenails  He did not return to my office because he had fracture and ORIF of left leg last year  The following portions of the patient's history were reviewed and updated as appropriate: allergies, current medications, past family history, past medical history, past social history, past surgical history and problem list   All pertinent labs and images were reviewed      Past Medical History  Past Medical History:   Diagnosis Date    Diabetes mellitus ([address] )     Hiatal hernia     Hyperlipidemia     Hypertension     Leg pain     Neuropathy     PE (pulmonary thromboembolism) (HCC)     Shortness of breath     Venous insufficiency (chronic) (peripheral)        Past Surgical History  Past Surgical History:   Procedure Laterality Date    FRACTURE SURGERY  7/12/20    IR PE ENDOVASCULAR THERAPY  8/18/2020    JOINT REPLACEMENT Bilateral     knees    LEG SURGERY      ORTHOPEDIC SURGERY      CO COLONOSCOPY FLX DX W/COLLJ SPEC WHEN PFRMD N/A 6/30/2017    Procedure: EGD AND COLONOSCOPY;  Surgeon: Maggie Montana MD;  Location: [facility]; Service: General    TONSILLECTOMY          Allergies:  Patient has no known allergies  Medications:  Current Outpatient Medications   Medication Sig Dispense Refill    aspirin (ECOTRIN LOW STRENGTH) 81 mg EC tablet Take 81 mg by mouth daily      BD Insulin Syringe U/F 31G X 5/16" 0 5 ML MISC Use as directed      cholecalciferol (VITAMIN D3) 1,000 units tablet Take 2,000 Units by mouth daily      HUMALOG 100 UNIT/ML injection Inject under the skin as needed = Inject as per sliding scale:  if 0 - 149 = 0 units;  150 - 209 = 1 unit;  210 - 269 = 2 units;  270 - 329 = 3 units;  330 - 389 = 4 units;  390+ = 5 units Call MD if BS < 60 or > 390,  subcutaneously before meals for DM  3    Levemir 100 UNIT/ML subcutaneous injection       multivitamin-iron-minerals-folic acid (CENTRUM) chewable tablet Chew 1 tablet daily      Omega-3 Fatty Acids (FISH OIL CONCENTRATE PO) Take 1,000 mg by mouth 2 (two) times a day      ONETOUCH VERIO test strip TEST 2 TIMES A DAY DX E11 9  6    Empagliflozin (Jardiance) 25 MG TABS Take 1 tablet (25 mg total) by mouth every morning for 7 days 7 tablet 0    levofloxacin (LEVAQUIN) 500 mg tablet TAKE 1 TABLET BY MOUTH EVERY DAY FOR 10 DAYS      montelukast (SINGULAIR) 10 mg tablet Take 10 mg by mouth daily      simvastatin (ZOCOR) 40 mg tablet Take 1 tablet (40 mg total) by mouth daily at bedtime for 7 days 7 tablet 0     No current facility-administered medications for this visit  Social History:  Social History     Socioeconomic History    Marital status: /Civil Union     Spouse name: None    Number of children: None    Years of education: None    Highest education level: None   Occupational History    None   Tobacco Use    Smoking status: Never Smoker    Smokeless tobacco: Never Used   Vaping Use    Vaping Use: Never used   Substance and Sexual Activity    Alcohol use: Never    Drug use: No    Sexual activity: Not Currently     Partners: Female   Other Topics Concern    None   Social History Narrative    None     Social Determinants of Health     Financial Resource Strain: Not on file   Food Insecurity: No Food Insecurity    Worried About Running Out of Food in the Last Year: Never true    Justin of Food in the Last Year: Never true   Transportation Needs: No Transportation Needs    Lack of Transportation (Medical): No    Lack of Transportation (Non-Medical): No   Physical Activity: Not on file   Stress: Not on file   Social Connections: Not on file   Intimate Partner Violence: Not on file   Housing Stability: Low Risk     Unable to Pay for Housing in the Last Year: No    Number of Places Lived in the Last Year: 1    Unstable Housing in the Last Year: No        Review of Systems   Constitutional: Negative for chills and fever  Respiratory: Negative for cough and shortness of breath  Cardiovascular: Positive for leg swelling  Negative for chest pain  Gastrointestinal: Negative for constipation, diarrhea, nausea and vomiting  Skin: Negative for wound  Neurological: Negative for weakness  Psychiatric/Behavioral: Negative for confusion  Objective:      /73   Pulse 73   Ht 5' 10" (1 778 m) Comment: verbal  Wt 118 kg (259 lb 3 2 oz)   BMI 37 19 kg/m²          Physical Exam  Vitals reviewed  Constitutional:       General: He is not in acute distress  Appearance: He is well-developed  He is obese  He is not toxic-appearing  Cardiovascular:      Rate and Rhythm: Normal rate and regular rhythm  Pulses: no weak pulses          Dorsalis pedis pulses are 1+ on the right side and 1+ on the left side  Posterior tibial pulses are 1+ on the right side and 1+ on the left side  Comments: Chronic LE edema with venous stasis / varicosity, left worse than right  Pulmonary:      Effort: Pulmonary effort is normal  No respiratory distress  Musculoskeletal:         General: Deformity present  No tenderness or signs of injury  Right lower leg: Edema present  Left lower leg: Edema present  Comments: Decreased ROM bilateral foot  Bunion and hammertoe noted  Feet:      Right foot:      Skin integrity: Dry skin present  No ulcer, skin breakdown, erythema, warmth or callus  Left foot:      Skin integrity: Ulcer and dry skin present  No skin breakdown, erythema, warmth or callus  Skin:     General: Skin is warm  Capillary Refill: Capillary refill takes less than 2 seconds  Findings: No rash  Comments: Thick, mycotic nails X 6  He has class findings with lack of pedal hairs, skin discoloration, and atrophy  Neurological:      General: No focal deficit present  Mental Status: He is alert and oriented to person, place, and time  Cranial Nerves: No cranial nerve deficit  Sensory: No sensory deficit  Motor: No abnormal muscle tone  Coordination: Coordination normal       Comments: Sensation intact to 10 gm monofilament  Decreased vibratory sensation in his feet  Psychiatric:         Mood and Affect: Mood normal          Behavior: Behavior normal          Thought Content: Thought content normal          Judgment: Judgment normal          Diabetic Foot Exam    Patient's shoes and socks removed  Right Foot/Ankle   Right Foot Inspection  Skin Exam: skin normal, skin intact, dry skin and abnormal color   No warmth, no callus, no erythema, no maceration, no pre-ulcer, no ulcer and no callus  Toe Exam: right toe deformity  No swelling, no tenderness and erythema    Sensory   Vibration: absent  Proprioception: intact  Monofilament testing: intact    Vascular  Capillary refills: < 3 seconds  The right DP pulse is 1+  The right PT pulse is 1+  Left Foot/Ankle  Left Foot Inspection  Skin Exam: skin normal, skin intact, dry skin, abnormal color and ulcer  No warmth, no erythema, no maceration, no pre-ulcer and no callus  Toe Exam: left toe deformity  No swelling, no tenderness and no erythema  Sensory   Vibration: diminished  Proprioception: intact  Monofilament testing: intact    Vascular  Capillary refills: < 3 seconds  The left DP pulse is 1+  The left PT pulse is 1+       Assign Risk Category  Deformity present  No loss of protective sensation  No weak pulses  Risk: 1

## 2022-04-22 ENCOUNTER — OFFICE VISIT (OUTPATIENT)
Dept: PHYSICAL THERAPY | Facility: CLINIC | Age: 77
End: 2022-04-22
Payer: MEDICARE

## 2022-04-22 DIAGNOSIS — M25.511 CHRONIC RIGHT SHOULDER PAIN: ICD-10-CM

## 2022-04-22 DIAGNOSIS — G89.29 CHRONIC RIGHT SHOULDER PAIN: ICD-10-CM

## 2022-04-22 DIAGNOSIS — M75.31 CALCIFIC TENDINITIS OF RIGHT SHOULDER: Primary | ICD-10-CM

## 2022-04-22 DIAGNOSIS — M75.01 ADHESIVE CAPSULITIS OF RIGHT SHOULDER: ICD-10-CM

## 2022-04-22 PROCEDURE — 97110 THERAPEUTIC EXERCISES: CPT

## 2022-04-22 PROCEDURE — 97140 MANUAL THERAPY 1/> REGIONS: CPT

## 2022-04-22 PROCEDURE — 97112 NEUROMUSCULAR REEDUCATION: CPT

## 2022-04-22 NOTE — PROGRESS NOTES
Daily Note     Today's date: 2022  Patient name: Marine Cerda  : 1945  MRN: [id number]  Referring provider: Ulysses Gipson DO  Dx:   Encounter Diagnosis     ICD-10-CM    1  Calcific tendinitis of right shoulder  M75 31    2  Chronic right shoulder pain  M25 511     G89 29    3  Adhesive capsulitis of right shoulder  M75 01        Start Time: 1015  Stop Time: 1101  Total time in clinic (min): 46 minutes    Subjective: patient reports no pain or soreness post last therapy session  Reports continued improvement with ROM  Denies any new incidents or complaints        Objective: See treatment diary below      Assessment:  Patient was able to complete and progress in therapy without incident or onset of reported pain  Patient remains most limited into IR with manual interventions  Was able to introduce SL ER and ABD with 2# load with cueing on corrective mechanics  Patient demonstrates improved standing AROM without associated upper trap hike being able to perform with 1# load  Was able to introduce t-band rows and extension with cueing on mechanics and upper trap compensation to further challenge postural and scapular muscularity re-ed      Plan: Continue per plan of care  Progress treatment as tolerated         Diagnosis: (R) subacromial pain syndrome    Precautions: Diabetic, HTN, neuropathy   POC Expires: 22   Re-evaluation Date: 22    FOTO Scores/Date: Goal - 66; 22 - 47   Visit Count /10 210 3/10 4/10    Manuals 4/13 4/15 4/19 4/22    R shoulder PROM all planes KD CHERYL [facility]                                    Ther Ex 4/13 4/15  4/22    Supine cane I, Y 5x10" ea 2# 5"x10 ea 5" x 10 2# 5"x10 2#    Seated thoracic extension W/ ball 15x W/ball x 20 W/ball x 20     Wall wash 10x3" ea 5"x10 flex/scap 5"x10 flex/scap 5"x10 flex/scap    IR belt stretch 10x10"  10"x10 10"x10 10"x10    Pullies  Flex/scap 5"x2 mins ea Flex/scap 5"x2 mins ea     Standing AROM  Flex/Scap w/focus on eccentric x10 ea  Flex/scap 1# 2x10 ea    UBE   L1 3/3 L1 3/3 L1 3/3    Supine flexion to 90 deg w/ eccentric control   2# 2x10 2# 2x10    ABCs   2# 1x 2# 2x10    SL Abd    2x10 2#    SL ER    2x10 2#    HEP Creation/instruction       Neuro Re-Ed        T-band Rows/Ext    GTB 2x10 ea                                                                                                           Ther Act                                         Modalities

## 2022-04-26 ENCOUNTER — OFFICE VISIT (OUTPATIENT)
Dept: PHYSICAL THERAPY | Facility: CLINIC | Age: 77
End: 2022-04-26
Payer: MEDICARE

## 2022-04-26 DIAGNOSIS — M75.31 CALCIFIC TENDINITIS OF RIGHT SHOULDER: Primary | ICD-10-CM

## 2022-04-26 DIAGNOSIS — M75.01 ADHESIVE CAPSULITIS OF RIGHT SHOULDER: ICD-10-CM

## 2022-04-26 DIAGNOSIS — G89.29 CHRONIC RIGHT SHOULDER PAIN: ICD-10-CM

## 2022-04-26 DIAGNOSIS — M25.511 CHRONIC RIGHT SHOULDER PAIN: ICD-10-CM

## 2022-04-26 PROCEDURE — 97140 MANUAL THERAPY 1/> REGIONS: CPT

## 2022-04-26 PROCEDURE — 97110 THERAPEUTIC EXERCISES: CPT

## 2022-04-26 NOTE — PROGRESS NOTES
Daily Note     Today's date: 2022  Patient name: Marine Cerda  : 1945  MRN: [id number]  Referring provider: Ulysses Gipson DO  Dx:   Encounter Diagnosis     ICD-10-CM    1  Calcific tendinitis of right shoulder  M75 31    2  Chronic right shoulder pain  M25 511     G89 29    3  Adhesive capsulitis of right shoulder  M75 01                   Subjective: Patient reports his shoulder SPR is 2/10, he states his hips are a 5/10  Objective: See treatment diary below      Assessment: Tolerated treatment well  Patient cont to make progress with ROM  Grossly assessed WNL flex/abd  Patient demonstrates some difficulty maintaining shoulder stability with ABC's  SL flex was added today to increase middle trap use for stability  Able to increase weight with standing AROM with no compensation or c/o discomfort  He did exhibit slight UT compensation with rows/ext  Cues for scap retraction was able to decrease the UT use  Patient demonstrated fatigue post treatment, exhibited good technique with therapeutic exercises and would benefit from continued PT      Plan: Continue per plan of care        Diagnosis: (R) subacromial pain syndrome    Precautions: Diabetic, HTN, neuropathy   POC Expires: 22   Re-evaluation Date: 22    FOTO Scores/Date: Goal - 66; 22 -    Visit Count 1/10 2/10 3/10 4/10 5/10   Manuals 4/13 4/15 4/19 4/22 4/26   R shoulder PROM all planes KD CHERYL [facility] YOBANI                                   Ther Ex 4/13 4/15  4/22    Supine cane I, Y 5x10" ea 2# 5"x10 ea 5" x 10 2# 5"x10 2# 5" x10 ea 2#   Seated thoracic extension W/ ball 15x W/ball x 20 W/ball x 20     Wall wash 10x3" ea 5"x10 flex/scap 5"x10 flex/scap 5"x10 flex/scap 5" x10 ea  flex/scap   IR belt stretch 10x10"  10"x10 10"x10 10"x10 10"x10   Pullies  Flex/scap 5"x2 mins ea Flex/scap 5"x2 mins ea  Flex/Scap  5"x2' ea   Standing AROM  Flex/Scap w/focus on eccentric x10 ea  Flex/scap 1# 2x10 ea Flex/scap 2# 2x10 ea    UBE   L1 3/3 L1 3/3 L1 3/3 In use   Supine flexion to 90 deg w/ eccentric control   2# 2x10 2# 2x10 3# 2x10   ABCs   2# 1x 2# 2x10 3# 1x   SL Abd    2x10 2# 3# 2x10   SL ER    2x10 2# 3# 2x10   SL Flex     2# 2x10    HEP Creation/instruction       Neuro Re-Ed        T-band Rows/Ext    GTB 2x10 ea GTB 2x15 ea    Serratus punch     PB on wall 20x                                                                                                  Ther Act                                         Modalities

## 2022-04-27 ENCOUNTER — APPOINTMENT (EMERGENCY)
Dept: RADIOLOGY | Facility: HOSPITAL | Age: 77
End: 2022-04-27
Payer: MEDICARE

## 2022-04-27 ENCOUNTER — HOSPITAL ENCOUNTER (EMERGENCY)
Facility: HOSPITAL | Age: 77
Discharge: HOME | End: 2022-04-27
Attending: EMERGENCY MEDICINE | Admitting: EMERGENCY MEDICINE
Payer: MEDICARE

## 2022-04-27 VITALS
OXYGEN SATURATION: 96 % | SYSTOLIC BLOOD PRESSURE: 137 MMHG | RESPIRATION RATE: 19 BRPM | TEMPERATURE: 98.2 F | DIASTOLIC BLOOD PRESSURE: 63 MMHG | HEART RATE: 65 BPM

## 2022-04-27 DIAGNOSIS — M25.511 ACUTE PAIN OF RIGHT SHOULDER: Primary | ICD-10-CM

## 2022-04-27 PROCEDURE — 99284 EMERGENCY DEPT VISIT MOD MDM: CPT | Performed by: PHYSICIAN ASSISTANT

## 2022-04-27 PROCEDURE — 73060 X-RAY EXAM OF HUMERUS: CPT

## 2022-04-27 PROCEDURE — 73030 X-RAY EXAM OF SHOULDER: CPT

## 2022-04-27 PROCEDURE — 99283 EMERGENCY DEPT VISIT LOW MDM: CPT

## 2022-04-27 NOTE — DISCHARGE INSTRUCTIONS
Continue physical therapy as scheduled  May take Tylenol and apply Voltaren gel to the arm as needed for pain relief  Please return to the ED with any new or worsening symptoms

## 2022-04-28 NOTE — ED PROVIDER NOTES
History  Chief Complaint   Patient presents with    Shoulder Injury     Patient tripped and hit his right shoulder/arm on the garage door  Patient did not fall on the ground just hit his shoulder on the garage door  Mir Rivera is a 25-year-old male with past medical history including calcific tendinitis and adhesive capsulitis of the right shoulder, arriving ambulatory to the emergency department for evaluation with chief complaint of acute on chronic right shoulder pain in setting of recent injury  The patient reports that today, he had misstepped and struck his right arm against the garage door  He denies any head strike  He reports increased pain from his baseline  He denies any extremity numbness or weakness  Denies pain or injury elsewhere and offers no other complaints or concerns at this time  Prior to Admission Medications   Prescriptions Last Dose Informant Patient Reported? Taking?    BD Insulin Syringe U/F 31G X 5/16" 0 5 ML MISC  Self Yes No   Sig: Use as directed   Empagliflozin (Jardiance) 25 MG TABS  Self No No   Sig: Take 1 tablet (25 mg total) by mouth every morning for 7 days   HUMALOG 100 UNIT/ML injection  Self Yes No   Sig: Inject under the skin as needed = Inject as per sliding scale:  if 0 - 149 = 0 units;  150 - 209 = 1 unit;  210 - 269 = 2 units;  270 - 329 = 3 units;  330 - 389 = 4 units;  390+ = 5 units Call MD if BS < 60 or > 390,  subcutaneously before meals for DM   Levemir 100 UNIT/ML subcutaneous injection  Self Yes No   ONETOUCH VERIO test strip  Self Yes No   Sig: TEST 2 TIMES A DAY DX E11 9   Omega-3 Fatty Acids (FISH OIL CONCENTRATE PO)  Self Yes No   Sig: Take 1,000 mg by mouth 2 (two) times a day   aspirin (ECOTRIN LOW STRENGTH) 81 mg EC tablet  Self Yes No   Sig: Take 81 mg by mouth daily   cholecalciferol (VITAMIN D3) 1,000 units tablet  Self Yes No   Sig: Take 2,000 Units by mouth daily   levofloxacin (LEVAQUIN) 500 mg tablet  Self Yes No   Sig: TAKE 1 TABLET BY MOUTH EVERY DAY FOR 10 DAYS   montelukast (SINGULAIR) 10 mg tablet  Self Yes No   Sig: Take 10 mg by mouth daily   multivitamin-iron-minerals-folic acid (CENTRUM) chewable tablet  Self Yes No   Sig: Chew 1 tablet daily   simvastatin (ZOCOR) 40 mg tablet  Self No No   Sig: Take 1 tablet (40 mg total) by mouth daily at bedtime for 7 days      Facility-Administered Medications: None       Past Medical History:   Diagnosis Date    Diabetes mellitus ([address] )     Hiatal hernia     Hyperlipidemia     Hypertension     Leg pain     Neuropathy     PE (pulmonary thromboembolism) (HCC)     Shortness of breath     Venous insufficiency (chronic) (peripheral)        Past Surgical History:   Procedure Laterality Date    FRACTURE SURGERY  7/12/20    IR PE ENDOVASCULAR THERAPY  8/18/2020    JOINT REPLACEMENT Bilateral     knees    LEG SURGERY      ORTHOPEDIC SURGERY      FL COLONOSCOPY FLX DX W/COLLJ SPEC WHEN PFRMD N/A 6/30/2017    Procedure: EGD AND COLONOSCOPY;  Surgeon: Paul Trammell MD;  Location: [facility]; Service: General    TONSILLECTOMY         Family History   Problem Relation Age of Onset    Cancer Mother     Cancer Father      I have reviewed and agree with the history as documented  E-Cigarette/Vaping    E-Cigarette Use Never User      E-Cigarette/Vaping Substances    Nicotine No     THC No     CBD No     Flavoring No     Other No     Unknown No      Social History     Tobacco Use    Smoking status: Never Smoker    Smokeless tobacco: Never Used   Vaping Use    Vaping Use: Never used   Substance Use Topics    Alcohol use: Never    Drug use: No       Review of Systems   Musculoskeletal: Positive for arthralgias and myalgias  Right shoulder   Skin: Negative for color change and wound  Neurological: Negative for weakness and numbness  All other systems reviewed and are negative  Physical Exam  Physical Exam  Vitals and nursing note reviewed     Constitutional: General: He is not in acute distress  Appearance: Normal appearance  He is not ill-appearing, toxic-appearing or diaphoretic  HENT:      Head: Normocephalic and atraumatic  Right Ear: External ear normal       Left Ear: External ear normal    Eyes:      Conjunctiva/sclera: Conjunctivae normal    Cardiovascular:      Rate and Rhythm: Normal rate  Pulmonary:      Effort: Pulmonary effort is normal    Musculoskeletal:      Cervical back: Normal range of motion and neck supple  Comments: No obvious deformity on inspection of the patient's right shoulder joint  No significant tenderness upon palpation  Decreased range of motion of the right shoulder joint though patient reports his range of motion is limited at baseline  Symmetric deltoid sensation   strength 5/5 bilaterally  Radial pulse 2 +bilaterally, distal sensation intact  Skin:     General: Skin is warm and dry  Capillary Refill: Capillary refill takes less than 2 seconds  Neurological:      Mental Status: He is alert  Sensory: Sensation is intact  Motor: Motor function is intact  Gait: Gait is intact  Vital Signs  ED Triage Vitals [04/27/22 1214]   Temperature Pulse Respirations Blood Pressure SpO2   98 2 °F (36 8 °C) 65 19 137/63 96 %      Temp Source Heart Rate Source Patient Position - Orthostatic VS BP Location FiO2 (%)   Oral Monitor Sitting Left arm --      Pain Score       --           Vitals:    04/27/22 1214   BP: 137/63   Pulse: 65   Patient Position - Orthostatic VS: Sitting         Visual Acuity      ED Medications  Medications - No data to display    Diagnostic Studies  Results Reviewed     None                 XR shoulder 2+ views RIGHT   Final Result by Matias Romero MD (04/27 6077)      Moderate acromioclavicular and mild glenohumeral degenerative changes  Calcifications along the superolateral humeral head which may be indicative of calcific tendinitis  Workstation performed: [id number]         XR humerus RIGHT   Final Result by Franci Carl MD (04/27 1428)      No acute osseous abnormality  Workstation performed: [id number]                    Procedures  Procedures         ED Course                               SBIRT 20yo+      Most Recent Value   SBIRT (25 yo +)    In order to provide better care to our patients, we are screening all of our patients for alcohol and drug use  Would it be okay to ask you these screening questions? Yes Filed at: 04/27/2022 1223   Initial Alcohol Screen: US AUDIT-C     1  How often do you have a drink containing alcohol? 0 Filed at: 04/27/2022 1223   2  How many drinks containing alcohol do you have on a typical day you are drinking? 0 Filed at: 04/27/2022 1223   3a  Male UNDER 65: How often do you have five or more drinks on one occasion? 0 Filed at: 04/27/2022 1223   Audit-C Score 0 Filed at: 04/27/2022 1223   RAULITO: How many times in the past year have you    Used an illegal drug or used a prescription medication for non-medical reasons? Never Filed at: 04/27/2022 1223                    MDM  Number of Diagnoses or Management Options  Acute pain of right shoulder: new and requires workup  Diagnosis management comments: This is a 71-year-old male presenting for evaluation of right shoulder pain in setting of injury  He has a history of calcific tendinitis and arthritis of the right shoulder and had recently started physical therapy  He states that today he had misstepped and struck his right shoulder against the garage door which had increase his pain  He is neurovascularly intact  Expresses concern for possible fracture      Differential diagnosis includes but is not limited to:  X-ray to assess for fracture/dislocation; contusion, sprain, strain, tendinitis, bursitis, osteoarthritis, djd    Initial ED plan:  X-ray; offered Tylenol which patient declined    Final ED Assessment: Vital signs stable on ED presentation, examination as above  X-rays independently reviewed with no acute findings  X-ray results discussed with the patient  Recommended symptomatic management including rest, ice, Tylenol and Voltaren gel as needed for pain control and compliance with physical therapy  Parameters for ED return discussed at length  The patient had verbalized understanding and was comfortable and agreeable with disposition and care plan  He was discharged in stable condition and had ambulated independently from the emergency department without issue  Amount and/or Complexity of Data Reviewed  Tests in the radiology section of CPT®: ordered and reviewed  Review and summarize past medical records: yes  Independent visualization of images, tracings, or specimens: yes    Risk of Complications, Morbidity, and/or Mortality  Presenting problems: low  Diagnostic procedures: low  Management options: low    Patient Progress  Patient progress: stable      Disposition  Final diagnoses:   Acute pain of right shoulder     Time reflects when diagnosis was documented in both MDM as applicable and the Disposition within this note     Time User Action Codes Description Comment    4/27/2022  2:31 PM Miriam Karol Add [M25 511] Acute pain of right shoulder       ED Disposition     ED Disposition Condition Date/Time Comment    Discharge Stable Wed Apr 27, 2022 [address] [place] [address] discharge to home/self care              Follow-up Information     Follow up With Specialties Details Why Contact Info Additional Information    Elvin Salamanca MD Internal Medicine   [address] [place] [address] [address] [place] [address]  [phone] [address] [facility] Emergency Department Emergency Medicine  If symptoms worsen [address] [facility] [id number] [facility] Emergency Department, [address] [place], [place], [address]          Discharge Medication List as of 4/27/2022 2:32 PM      CONTINUE these medications which have NOT CHANGED    Details   aspirin (ECOTRIN LOW STRENGTH) 81 mg EC tablet Take 81 mg by mouth daily, Historical Med      BD Insulin Syringe U/F 31G X 5/16" 0 5 ML MISC Use as directed, Starting Tue 3/22/2022, Historical Med      cholecalciferol (VITAMIN D3) 1,000 units tablet Take 2,000 Units by mouth daily, Historical Med      Empagliflozin (Jardiance) 25 MG TABS Take 1 tablet (25 mg total) by mouth every morning for 7 days, Starting Fri 11/5/2021, Until Tue 12/21/2021, Normal      HUMALOG 100 UNIT/ML injection Inject under the skin as needed = Inject as per sliding scale:  if 0 - 149 = 0 units;  150 - 209 = 1 unit;  210 - 269 = 2 units;  270 - 329 = 3 units;  330 - 389 = 4 units;  390+ = 5 units Call MD if BS < 60 or > 390,  subcutaneously before meals for DM,  Starting Fri 5/24/2019, Historical Med      Levemir 100 UNIT/ML subcutaneous injection Starting Thu 11/25/2021, Historical Med      levofloxacin (LEVAQUIN) 500 mg tablet TAKE 1 TABLET BY MOUTH EVERY DAY FOR 10 DAYS, Historical Med      montelukast (SINGULAIR) 10 mg tablet Take 10 mg by mouth daily, Starting Thu 2/10/2022, Historical Med      multivitamin-iron-minerals-folic acid (CENTRUM) chewable tablet Chew 1 tablet daily, Historical Med      Omega-3 Fatty Acids (FISH OIL CONCENTRATE PO) Take 1,000 mg by mouth 2 (two) times a day, Historical Med      ONETOUCH VERIO test strip TEST 2 TIMES A DAY DX E11 9, Historical Med      simvastatin (ZOCOR) 40 mg tablet Take 1 tablet (40 mg total) by mouth daily at bedtime for 7 days, Starting Fri 11/5/2021, Until Tue 12/21/2021, Normal             No discharge procedures on file      PDMP Review       Value Time User    PDMP Reviewed  Yes 5/14/2021  2:19 PM Jennifer Krishnamurthy PA-C          ED Provider  Electronically Signed by           Linsey Nixon PA-C  04/27/22 4565

## 2022-04-29 ENCOUNTER — OFFICE VISIT (OUTPATIENT)
Dept: PHYSICAL THERAPY | Facility: CLINIC | Age: 77
End: 2022-04-29
Payer: MEDICARE

## 2022-04-29 DIAGNOSIS — G89.29 CHRONIC RIGHT SHOULDER PAIN: ICD-10-CM

## 2022-04-29 DIAGNOSIS — M25.511 CHRONIC RIGHT SHOULDER PAIN: ICD-10-CM

## 2022-04-29 DIAGNOSIS — M75.31 CALCIFIC TENDINITIS OF RIGHT SHOULDER: Primary | ICD-10-CM

## 2022-04-29 DIAGNOSIS — M75.01 ADHESIVE CAPSULITIS OF RIGHT SHOULDER: ICD-10-CM

## 2022-04-29 PROCEDURE — 97140 MANUAL THERAPY 1/> REGIONS: CPT

## 2022-04-29 PROCEDURE — 97110 THERAPEUTIC EXERCISES: CPT

## 2022-04-29 NOTE — PROGRESS NOTES
Daily Note     Today's date: 2022  Patient name: Micky Whittington  : 1945  MRN: [id number]  Referring provider: Cabrera Nash DO  Dx:   Encounter Diagnosis     ICD-10-CM    1  Calcific tendinitis of right shoulder  M75 31    2  Chronic right shoulder pain  M25 511     G89 29    3  Adhesive capsulitis of right shoulder  M75 01                   Subjective: Patient reports on Wed he tripped over a throw rug and fell into the door at about a 45* angle  He went the hospital and had x-rays which showed no fx  He now presents to PT with difficulty lifting the right arm actively  Objective: See treatment diary below      Assessment: Tolerated treatment well  Program modified today due to patient tolerance  No adverse effect with charted exercises  Patient was actively able to move his arm into flexion by end of session  Patient demonstrated fatigue post treatment and would benefit from continued PT      Plan: Continue per plan of care  Diagnosis: (R) subacromial pain syndrome    Precautions: Diabetic, HTN, neuropathy   POC Expires: 22   Re-evaluation Date: 22    FOTO Scores/Date: Goal - 66; 22 -    Visit Count 10  3/10 4/10 5/10   Manuals    R shoulder PROM all planes YOBANI  JH CHERYL YOBANI                                   Ther Ex        Supine cane I, Y 5"x10 ea 2#   5" x 10 2# 5"x10 2# 5" x10 ea 2#   Seated thoracic extension   W/ball x 20     Wall wash 5"x10 ea  5"x10 flex/scap 5"x10 flex/scap 5" x10 ea  flex/scap   IR belt stretch 10"x10   10"x10 10"x10 10"x10   Pullies Flex/scap 5" x 3' ea  Flex/scap 5"x2 mins ea  Flex/Scap  5"x2' ea   Standing AROM    Flex/scap 1# 2x10 ea Flex/scap 2# 2x10 ea    UBE    L1 3/3 L1 3/3 In use   Supine flexion to 90 deg w/ eccentric control held  2# 2x10 2# 2x10 3# 2x10   ABCs Held   2# 1x 2# 2x10 3# 1x   SL Abd 0# AA conc   A ecc 5x   2x10 2# 3# 2x10   SL ER 2# 20x   2x10 2# 3# 2x10   SL Flex 0# 20x    2# 2x10    HEP Neuro Re-Ed        T-band Rows/Ext GTB 2x15 ea   GTB 2x10 ea GTB 2x15 ea    Serratus punch held    PB on wall 20x                                                                                                Ther Act                                   Modalities

## 2022-05-03 ENCOUNTER — OFFICE VISIT (OUTPATIENT)
Dept: PHYSICAL THERAPY | Facility: CLINIC | Age: 77
End: 2022-05-03
Payer: MEDICARE

## 2022-05-03 DIAGNOSIS — M75.31 CALCIFIC TENDINITIS OF RIGHT SHOULDER: Primary | ICD-10-CM

## 2022-05-03 PROCEDURE — 97110 THERAPEUTIC EXERCISES: CPT

## 2022-05-03 PROCEDURE — 97140 MANUAL THERAPY 1/> REGIONS: CPT

## 2022-05-03 PROCEDURE — 97112 NEUROMUSCULAR REEDUCATION: CPT

## 2022-05-03 NOTE — PROGRESS NOTES
Daily Note     Today's date: 5/3/2022  Patient name: Josesito Josue  : 1945  MRN: [id number]  Referring provider: Cherie Frederick DO  Dx:   Encounter Diagnosis     ICD-10-CM    1  Calcific tendinitis of right shoulder  M75 31        Start Time: 0800          Subjective: Pt reports that his shoulder is still sore from falling on it last week when tripping over a throw rug  Pt stated he has a hard time lifting his shoulder up by himself  Objective: See treatment diary below      Assessment: Tolerated treatment well  Patient would benefit from continued PT  Pt can only lift arm up by himself to about 85-90 deg, but has normal mobility with pulleys  Slightly limited abd and ER PROM due to stiffness and pain  Pain reports pain down arm w/ supine cane flexion  Modified session today secondary to increased pain  Unable to tolerate s/l abduction due to pain  Plan: Continue per plan of care  Diagnosis: (R) subacromial pain syndrome    Precautions: Diabetic, HTN, neuropathy   POC Expires: 22   Re-evaluation Date: 22    FOTO Scores/Date:  - 66; 22 -    Visit Count 10 7/10   5/10   Manuals 4/29 5/3   4/26   R shoulder PROM all planes YOBANI JH   YOBANI                                   Ther Ex        Supine cane I, Y 5"x10 ea 2#  5"x10 ea 2#    5" x10 ea 2#   Seated thoracic extension        Wall wash 5"x10 ea 5" 10x flex/abd   5" x10 ea  flex/scap   IR belt stretch 10"x10  10" 10x   10"x10   Pullies Flex/scap 5" x 3' ea Flex/scap 5" x 3' ea   Flex/Scap  5"x2' ea   Standing AROM     Flex/scap 2# 2x10 ea    UBE   3/3   In use   Supine flexion to 90 deg w/ eccentric control held 0# 2x10   3# 2x10   ABCs Held  0# 1x   3# 1x   SL Abd 0# AA conc   A ecc 5x    3# 2x10   SL ER 2# 20x 20x   3# 2x10   SL Flex 0# 20x    2# 2x10    HEP        Neuro Re-Ed        T-band Rows/Ext GTB 2x15 ea gtb 2x15 ea   GTB 2x15 ea    Serratus punch held 0# supine 20x   PB on wall 20x Ther Act                             Modalities

## 2022-05-06 ENCOUNTER — TELEPHONE (OUTPATIENT)
Dept: OBGYN CLINIC | Facility: HOSPITAL | Age: 77
End: 2022-05-06

## 2022-05-06 ENCOUNTER — OFFICE VISIT (OUTPATIENT)
Dept: PHYSICAL THERAPY | Facility: CLINIC | Age: 77
End: 2022-05-06
Payer: MEDICARE

## 2022-05-06 DIAGNOSIS — M25.511 CHRONIC RIGHT SHOULDER PAIN: ICD-10-CM

## 2022-05-06 DIAGNOSIS — M24.811 INTERNAL DERANGEMENT OF RIGHT SHOULDER: Primary | ICD-10-CM

## 2022-05-06 DIAGNOSIS — R29.898 WEAKNESS OF RIGHT UPPER EXTREMITY: ICD-10-CM

## 2022-05-06 DIAGNOSIS — G89.29 CHRONIC RIGHT SHOULDER PAIN: ICD-10-CM

## 2022-05-06 DIAGNOSIS — M75.01 ADHESIVE CAPSULITIS OF RIGHT SHOULDER: ICD-10-CM

## 2022-05-06 DIAGNOSIS — M75.31 CALCIFIC TENDINITIS OF RIGHT SHOULDER: Primary | ICD-10-CM

## 2022-05-06 DIAGNOSIS — M25.511 RIGHT SHOULDER PAIN, UNSPECIFIED CHRONICITY: ICD-10-CM

## 2022-05-06 PROCEDURE — 97110 THERAPEUTIC EXERCISES: CPT

## 2022-05-06 PROCEDURE — 97140 MANUAL THERAPY 1/> REGIONS: CPT

## 2022-05-06 PROCEDURE — 97112 NEUROMUSCULAR REEDUCATION: CPT

## 2022-05-06 NOTE — PROGRESS NOTES
Daily Note     Today's date: 2022  Patient name: Kleber Fong  : 1945  MRN: [id number]  Referring provider: Onesimo Sever, DO  Dx:   Encounter Diagnosis     ICD-10-CM    1  Calcific tendinitis of right shoulder  M75 31    2  Chronic right shoulder pain  M25 511     G89 29    3  Adhesive capsulitis of right shoulder  M75 01                   Subjective: Patient reports that his shoulder continues to get "worse and worse " Notes that he will most likely be following up with a physician regarding his shoulder sometime soon  Objective: See treatment diary below      Assessment: Tolerated treatment well  Progressed patient through exercise program and tolerated this date  Noted discomfort with overhead activities although able to control motion eccentrically  Trialed cervical extensions this date to see if this alleviates any of patients subjectively reported symptoms  No relief noted  Discontinue cervical retractions  Progress as tolerated  Patient would benefit from continued PT      Plan: Continue per plan of care  Diagnosis: (R) subacromial pain syndrome    Precautions: Diabetic, HTN, neuropathy   POC Expires: 22   Re-evaluation Date: 22    FOTO Scores/Date: Goal - 66; 22 -    Visit Count [phone]  5/10   Manuals 4/29 5/3 5/6  4/26   R shoulder PROM all planes YOBANI JH EB  YOBANI   GH Mobs    EB AP, inf                              Ther Ex        Supine cane I, Y 5"x10 ea 2#  5"x10 ea 2#    5" x10 ea 2#   Seated thoracic extension   25x      Wall wash 5"x10 ea 5" 10x flex/abd   5" x10 ea  flex/scap   IR belt stretch 10"x10  10" 10x   10"x10   Pullies Flex/scap 5" x 3' ea Flex/scap 5" x 3' ea Flex/scap 2' ea   Flex/Scap  5"x2' ea   Standing AAROM   Dowel flex/scap/ext 10x ea direction slow eccentric   Flex/scap 2# 2x10 ea    UBE   3/3   In use   Supine flexion to 90 deg w/ eccentric control held 0# 2x10   3# 2x10   ABCs Held  0# 1x   3# 1x   SL Abd 0# AA conc   A ecc 5x 3# 2x10   SL ER 2# 20x 20x   3# 2x10   SL Flex 0# 20x    2# 2x10    HEP        Neuro Re-Ed        T-band Rows/Ext GTB 2x15 ea gtb 2x15 ea   GTB 2x15 ea    Serratus punch held 0# supine 20x   PB on wall 20x   Scap retractions    30x @ wall     No money    Against wall ytb 3x10     Cervical retractions    20x5"      Cervical extensions    Over table supine 20x                                                                 Ther Act                             Modalities

## 2022-05-06 NOTE — TELEPHONE ENCOUNTER
Patient called to advise he re injured his shoulder and it's worse then before  He does not have a follow up scheduled until June  He would like to know if you would see him sooner? Patient can be reached at [phone]

## 2022-05-10 ENCOUNTER — APPOINTMENT (OUTPATIENT)
Dept: PHYSICAL THERAPY | Facility: CLINIC | Age: 77
End: 2022-05-10
Payer: MEDICARE

## 2022-05-12 ENCOUNTER — HOSPITAL ENCOUNTER (OUTPATIENT)
Dept: MRI IMAGING | Facility: CLINIC | Age: 77
Discharge: HOME | End: 2022-05-12
Payer: MEDICARE

## 2022-05-12 DIAGNOSIS — R29.898 WEAKNESS OF RIGHT UPPER EXTREMITY: ICD-10-CM

## 2022-05-12 DIAGNOSIS — M25.511 RIGHT SHOULDER PAIN, UNSPECIFIED CHRONICITY: ICD-10-CM

## 2022-05-12 DIAGNOSIS — M24.811 INTERNAL DERANGEMENT OF RIGHT SHOULDER: ICD-10-CM

## 2022-05-12 PROCEDURE — G1004 CDSM NDSC: HCPCS

## 2022-05-12 PROCEDURE — 73221 MRI JOINT UPR EXTREM W/O DYE: CPT

## 2022-05-13 ENCOUNTER — OFFICE VISIT (OUTPATIENT)
Dept: PHYSICAL THERAPY | Facility: CLINIC | Age: 77
End: 2022-05-13
Payer: MEDICARE

## 2022-05-13 DIAGNOSIS — G89.29 CHRONIC RIGHT SHOULDER PAIN: ICD-10-CM

## 2022-05-13 DIAGNOSIS — M25.511 CHRONIC RIGHT SHOULDER PAIN: ICD-10-CM

## 2022-05-13 DIAGNOSIS — M75.31 CALCIFIC TENDINITIS OF RIGHT SHOULDER: Primary | ICD-10-CM

## 2022-05-13 PROCEDURE — 97110 THERAPEUTIC EXERCISES: CPT

## 2022-05-13 NOTE — PROGRESS NOTES
PT Re-Evaluation      Collection of Subjective/Objective data performed by Katty Diego PTA  Assessment, POC, and Goal attainment performed by Tristan Marks DPT  Today's date: 2022  Patient name: Ash Grier  : 1945  MRN: [id number]  Referring provider: James Johnson DO  Dx:   Encounter Diagnosis     ICD-10-CM    1  Calcific tendinitis of right shoulder  M75 31    2  Chronic right shoulder pain  M25 511     G89 29        Start Time: 0800          Assessment  Assessment details: Patient is a 67 y o  male with c/o right shoulder and has completed 9 visits to date with worsened FOTO score of 54 to 45 since last re-assessment  Pt had been progressing well with subjective reports of 70% improvement early on in therapy but unfortunately had a fall on  causing exacerbation of shoulder symptoms  Due to fall, pt now exhibiting worse ROM, strength, and pain since initial evaluation and had recent MRI of shoulder yesterday and currently awaiting these results  Patient continues to exhibit lingering deficits to include pain, decreased ROM, decreased strength, and limited activity tolerance that continues to impact tolerance/ability to perform ADLs and recreational activities  Patient will benefit from continued skilled PT intervention to address the aforementioned impairments, achieve goals, maximize function, and improve quality of life  Pt is in agreement with this plan             Impairments: abnormal or restricted ROM, activity intolerance, impaired physical strength, lacks appropriate home exercise program, pain with function, poor posture  and poor body mechanics  Understanding of Dx/Px/POC: good   Prognosis: good    Goals  ST weeks  Pt will demonstrate good understanding and compliance with HEP--MET  Pt will decrease pain to 3-4/10   --PROGRESSING  Pt will demonstrate improved postural awareness and ability to self-correct without reliance on external cues--PROGRESSING    LT weeks  Pt will improve FOTO score to > or = to 66 to indicate improved functional abilities--PROGRESSING   Pt will decrease pain to 0-1/10 --PROGRESSING  Pt will increase shoulder strength to 4+/5 for improved tolerance/independence with ADLs--PROGRESSING  Pt will increase PROM to WNL to allow for return of AROM of affected shoulder---PROGRESSING  Pt will increase shoulder flexion/abduction to 150 degrees to increase independence with ADLs --PROGRESSING  Pt will increase functional IR to be able to don/doff belt and wash back without pain --PROGRESSING  Pt will increase functional ER to be able to wash hair independently without pain--PROGRESSING      Plan  Plan details: Cont to tx per POC  Patient would benefit from: skilled physical therapy  Planned modality interventions: cryotherapy and thermotherapy: hydrocollator packs  Planned therapy interventions: ADL training, body mechanics training, home exercise program, flexibility, functional ROM exercises, graded activity, graded exercise, strengthening, stretching, therapeutic activities, therapeutic exercise, self care, postural training, patient education, neuromuscular re-education and manual therapy  Frequency: 2x week  Duration in weeks: 8  Plan of Care beginning date: 4/13/2022  Plan of Care expiration date: 6/8/2022  Treatment plan discussed with: patient        Subjective Evaluation    History of Present Illness  Mechanism of injury: CURRENT STATUS (5/13/22)  Pt reports he had 60-70% improvement in his shoulder until he fell on 4/27/22  Pt fell while tripping over a throw rug and fell into a wall right into the R shoulder  Pt went to the ER on 4/27/22 and they took an xray and found nothing was broken  Pt had an MRI yesterday on the shoulder but the results are not finalized yet  Since the fall pt has difficulty lifting arm OH by himself due to pain  Pt stated that his arm shoulder starts paining him in a resting position in bed and recliner after 30-40 sec  Pt unable to lift pitcher was water secondary to pain  Edith Solders stated that the pain in his shoulder sometimes wakes him up at night  Pt stated that he has no pain at resting as long as his shoulder is not rotated and his upper arm does not move  Pt see's the MD next Wednesday for f/u visit  STATUS as EVAL (22)  Pt reports to PT with c/o right shoulder pain that started about 3 years ago and has slowly worsened with time and starting to notice increasing pain and decreasing shoulder motion  Pt was planning on getting tx sooner but had fractured his foot and was going through treatment for this but now ready to enroll in therapy for shoulder  Pt denies hx of similar symptoms  Pt does report intermittent numbness into right arm that can travel to upper arm  Pt reports increased pain/limitation with reaching > shoulder height, behind his back, sleeping, heavy lifting/carrying       Aggravating factors: reaching > shoulder height, behind his back, sleeping, heavy lifting/carrying    Easing Factors:  rest    PLOF:  No hx of right shoulder pain    PMH: Diabetic, HTN, neuropathy  Pain  Current pain ratin (at rest)  At best pain ratin (at rest)  At worst pain rating: 10  Quality: dull ache and sharp    Hand dominance: right    Patient Goals  Patient goals for therapy: decreased pain, increased strength, independence with ADLs/IADLs, return to sport/leisure activities and increased motion          Objective     General Comments:      Shoulder Comments   Right shoulder AROM: Flex 82* (pain) Abd 55* (pain) Functional IR to right buttock (minor pain) Functional ER to right upper trap (pain)    Right shoulder PROM: Flex 145* (pain @ end range) Abd 140* (pain) IR 50* ER 70* (ER pain @ end range) (IR/ER assessed at 90* abd)    Right shoulder MMT: Flex 4-/5 (pain) Abd 3+/5 (pain) IR 4+/5 ER 4/5 - MMT assessed within available ranges      FOTO: 45  (Eval was 54)      KX MODIFIER      Diagnosis: (R) subacromial pain syndrome    Precautions: Diabetic, HTN, neuropathy   POC Expires: 6/8/22   Re-evaluation Date: 6/8/22   FOTO Scores/Date: Goal - 66; 4/13/22 - 47; 5/13/22 - 45   Visit Count 6/10 7/10 8/10 9/10 5/10   Manuals 4/29 5/3 5/6 5/13 4/26   R shoulder PROM all planes YOBANI JH EB JH YOBANI   GH Mobs    EB AP, inf                              Ther Ex        Supine cane I, Y 5"x10 ea 2#  5"x10 ea 2#   0# 10x ea dir 5" x10 ea 2#   Seated thoracic extension   25x      Wall wash 5"x10 ea 5" 10x flex/abd   5" x10 ea  flex/scap   IR belt stretch 10"x10  10" 10x   10"x10   Pullies Flex/scap 5" x 3' ea Flex/scap 5" x 3' ea Flex/scap 2' ea  Flex/scp 3' ea Flex/Scap  5"x2' ea   Standing AAROM   Dowel flex/scap/ext 10x ea direction slow eccentric   Flex/scap 2# 2x10 ea    UBE   3/3  3/3 In use   Supine flexion to 90 deg w/ eccentric control held 0# 2x10   3# 2x10   ABCs Held  0# 1x   3# 1x   SL Abd 0# AA conc   A ecc 5x    3# 2x10   SL ER 2# 20x 20x   3# 2x10   SL Flex 0# 20x    2# 2x10    HEP    FOTO;Measurements    Neuro Re-Ed        T-band Rows/Ext GTB 2x15 ea gtb 2x15 ea   GTB 2x15 ea    Serratus punch held 0# supine 20x   PB on wall 20x   Scap retractions    30x @ wall     No money    Against wall ytb 3x10     Cervical retractions    20x5"      Cervical extensions    Over table supine 20x                                                                 Ther Act                             Modalities

## 2022-05-13 NOTE — PROGRESS NOTES
Daily Note     Today's date: 2022  Patient name: Rachel Delacruz  : 1945  MRN: [id number]  Referring provider: Clemente Canales DO  Dx:   Encounter Diagnosis     ICD-10-CM    1  Calcific tendinitis of right shoulder  M75 31    2  Chronic right shoulder pain  M25 511     G89 29        Start Time: 0800          Subjective: Pt reports      Objective: See treatment diary below      Assessment: Tolerated treatment well  Patient would benefit from continued PT  Plan: Continue per plan of care  Diagnosis: (R) subacromial pain syndrome    Precautions: Diabetic, HTN, neuropathy   POC Expires: 22   Re-evaluation Date: 22    FOTO Scores/Date:  - ; 22 -    Visit Count 6/10 7/10 8/10 9/10 5/10   Manuals 4/29 5/3 5/6 5/13 4/26   R shoulder PROM all planes YOBANI JH EB  YOBANI   GH Mobs    EB AP, inf                              Ther Ex        Supine cane I, Y 5"x10 ea 2#  5"x10 ea 2#    5" x10 ea 2#   Seated thoracic extension   25x      Wall wash 5"x10 ea 5" 10x flex/abd   5" x10 ea  flex/scap   IR belt stretch 10"x10  10" 10x   10"x10   Pullies Flex/scap 5" x 3' ea Flex/scap 5" x 3' ea Flex/scap 2' ea   Flex/Scap  5"x2' ea   Standing AAROM   Dowel flex/scap/ext 10x ea direction slow eccentric   Flex/scap 2# 2x10 ea    UBE   3/3   In use   Supine flexion to 90 deg w/ eccentric control held 0# 2x10   3# 2x10   ABCs Held  0# 1x   3# 1x   SL Abd 0# AA conc   A ecc 5x    3# 2x10   SL ER 2# 20x 20x   3# 2x10   SL Flex 0# 20x    2# 2x10    HEP        Neuro Re-Ed        T-band Rows/Ext GTB 2x15 ea gtb 2x15 ea   GTB 2x15 ea    Serratus punch held 0# supine 20x   PB on wall 20x   Scap retractions    30x @ wall     No money    Against wall ytb 3x10     Cervical retractions    20x5"      Cervical extensions    Over table supine 20x                                                                 Ther Act                             Modalities

## 2022-05-16 ENCOUNTER — OFFICE VISIT (OUTPATIENT)
Dept: OBGYN CLINIC | Facility: CLINIC | Age: 77
End: 2022-05-16
Payer: MEDICARE

## 2022-05-16 VITALS
BODY MASS INDEX: 36.59 KG/M2 | HEART RATE: 60 BPM | WEIGHT: 255.6 LBS | DIASTOLIC BLOOD PRESSURE: 70 MMHG | SYSTOLIC BLOOD PRESSURE: 124 MMHG | HEIGHT: 70 IN

## 2022-05-16 DIAGNOSIS — M75.121 COMPLETE TEAR OF RIGHT ROTATOR CUFF, UNSPECIFIED WHETHER TRAUMATIC: ICD-10-CM

## 2022-05-16 DIAGNOSIS — M75.01 ADHESIVE CAPSULITIS OF RIGHT SHOULDER: Primary | ICD-10-CM

## 2022-05-16 DIAGNOSIS — M75.31 CALCIFIC TENDINITIS OF RIGHT SHOULDER: ICD-10-CM

## 2022-05-16 PROCEDURE — 99214 OFFICE O/P EST MOD 30 MIN: CPT | Performed by: ORTHOPAEDIC SURGERY

## 2022-05-16 RX ORDER — FLASH GLUCOSE SENSOR
KIT MISCELLANEOUS
COMMUNITY
Start: 2022-05-09

## 2022-05-16 NOTE — PROGRESS NOTES
Patient Name:  Tiffanie Gama  MRN:  [id number]    [address] [place] [address]     1  Adhesive capsulitis of right shoulder    2  Calcific tendinitis of right shoulder    3  Complete tear of right rotator cuff, unspecified whether traumatic        67 y o  male with Right shoulder adhesive capsulitis with past history of calcific tendonitis, new supraspinatus and possible partial subscapularis tendon tear  MRI reviewed today with patient  In depth conversation had with patient regarding nonoperative vs surgical management, degree of tear, function  Nonoperative treatment of Right shoulder discussed including continue outpatient PT and OTC oral analgesics  Risks of nonoperative treatment consisting of increased tear size, atrophy, rotator cuff arthropathy and pain  Surgical management discussed including Right shoulder rotator cuff repair vs reverse shoulder arthroplasty  Risks of surgical intervention of rotator cuff repair include stiffness, infection, progression of osteoarthritis, healing complications, failure of repair, need for additional procedure  Would move forward with Right shoulder rotator cuff repair secondary to minimal glenohumeral osteoarthritis and new rotator cuff tear without atrophy or retraction  At this time, patient would like to move forward with nonoperative treatment consisiting of outpatient PT to work on range of motion, deltoid strengthening, OTC oral analgesics, continued maintenance of blood glucose  Will follow up in office in 6-8 weeks for reevaluation and possible discussion of surgical intervention  History of the Present Illness   Tiffanie Gama is a 67 y o  male with Right shoulder adhesive capsulitis  Patient sustained injury to Right shoulder on 04/27/2022 and hit the shoulder off the garage door  Since, he noted moderate increase of pain and MRI was ordered prior to appointment  Today, patient reports continued pain since the fall and decreased range of motion   He is having a difficult time lifting up his water jug out of the fridge  Review of Systems     Review of Systems   Constitutional: Negative for chills and fever  HENT: Negative for ear pain and sore throat  Eyes: Negative for pain and visual disturbance  Respiratory: Negative for cough and shortness of breath  Cardiovascular: Negative for chest pain and palpitations  Gastrointestinal: Negative for abdominal pain and vomiting  Genitourinary: Negative for dysuria and hematuria  Musculoskeletal: Negative for arthralgias and back pain  Skin: Negative for color change and rash  Neurological: Negative for seizures and syncope  All other systems reviewed and are negative  Physical Exam     /70   Pulse 60   Ht 5' 10" (1 778 m)   Wt 116 kg (255 lb 9 6 oz)   BMI 36 67 kg/m²     Right Shoulder: Active range of motion   120 degrees forward flexion with pain  85 degrees abduction with pain   60 degrees external rotation   L4 internal rotation      Passive range of motion   150 degrees of forward flexion   There is minimal tenderness present over the greater tuberosity of humerus, deltoid  There is 4+/5 strength with external rotation testing at the side  Empty can testing with 4-/5 strength and pain  Belly press test elicits weakness  Bear hug test with weakness  Woo test is positive  Nicollet's test is negative    Speed's test is Negative  The patient is neurovascularly intact distally in the extremity  Data Review     I have personally reviewed pertinent films in PACS, and my interpretation follows  MRI of Right shoulder demonstrates full thickness tear of anterior supraspinatus tendon, mild glenohumeral osteoarthritis, no acute fracture or dislocation  Social History     Tobacco Use    Smoking status: Never Smoker    Smokeless tobacco: Never Used   Vaping Use    Vaping Use: Never used   Substance Use Topics    Alcohol use: Never    Drug use:  No Procedures  None      Ronal Villalobos PA-C

## 2022-05-17 ENCOUNTER — OFFICE VISIT (OUTPATIENT)
Dept: PHYSICAL THERAPY | Facility: CLINIC | Age: 77
End: 2022-05-17
Payer: MEDICARE

## 2022-05-17 DIAGNOSIS — G89.29 CHRONIC RIGHT SHOULDER PAIN: ICD-10-CM

## 2022-05-17 DIAGNOSIS — M25.511 CHRONIC RIGHT SHOULDER PAIN: ICD-10-CM

## 2022-05-17 DIAGNOSIS — M75.31 CALCIFIC TENDINITIS OF RIGHT SHOULDER: Primary | ICD-10-CM

## 2022-05-17 PROCEDURE — 97112 NEUROMUSCULAR REEDUCATION: CPT

## 2022-05-17 PROCEDURE — 97110 THERAPEUTIC EXERCISES: CPT

## 2022-05-17 PROCEDURE — 97140 MANUAL THERAPY 1/> REGIONS: CPT

## 2022-05-17 NOTE — PROGRESS NOTES
Daily Note     Today's date: 2022  Patient name: Ondina Sheppard  : 1945  MRN: [id number]  Referring provider: Luís Falcon DO  Dx:   Encounter Diagnosis     ICD-10-CM    1  Calcific tendinitis of right shoulder  M75 31    2  Chronic right shoulder pain  M25 511     G89 29        Start Time: 0800          Subjective: pt reports no change  Stated that he saw the MD yesterday and found out that he has a full thickness tear and Dr Tommy Chandra discussed surgical interventions but pt would like to try therapy a little longer  Objective: See treatment diary below      Assessment: Tolerated treatment well  Patient would benefit from continued PT  Improved shoulder mobility in flexion/abduction post PROM, minor pain at end ranges  Pt had difficulty w/ SL abd, required AA for concentric but did A for eccentric, pt had pain t/o  Plan: Continue per plan of care  KX MODIFIER      Diagnosis: (R) subacromial pain syndrome    Precautions: Diabetic, HTN, neuropathy   POC Expires: 22   Re-evaluation Date: 22   FOTO Scores/Date: Goal - 66; 22 - 47; 22 - 45   Visit Count 10 7/10 8/10 9/10 10/10   Manuals 4/29 5/3 5/6 5/13 5/17   R shoulder PROM all planes YOBANI  EB [facility] Mobs    EB AP, inf                              Ther Ex        Supine cane I, Y 5"x10 ea 2#  5"x10 ea 2#   0# 10x ea dir 0# 10x ea dir   Seated thoracic extension   25x      Wall wash 5"x10 ea 5" 10x flex/abd   5" 10x flex/abd   IR belt stretch 10"x10  10" 10x      Pullies Flex/scap 5" x 3' ea Flex/scap 5" x 3' ea Flex/scap 2' ea  Flex/scp 3' ea Flex/scp 3' ea   Standing AAROM   Dowel flex/scap/ext 10x ea direction slow eccentric      UBE   3/3  3/3 4/4   Supine flexion to 90 deg w/ eccentric control held 0# 2x10      ABCs Held  0# 1x      SL Abd 0# AA conc  A ecc 5x    0# AA conc   A ecc 5x   SL ER 2# 20x 20x   20x   SL Flex 0# 20x    20x   HEP    FOTO;Measurements    Neuro Re-Ed        T-band Rows/Ext GTB 2x15 ea gtb 2x15 ea      Serratus punch held 0# supine 20x   Supine 20x   Scap retractions    30x @ wall  30x @ wall   No money    Against wall ytb 3x10     Cervical retractions    20x5"      Cervical extensions    Over table supine 20x                                                                Ther Act                          Modalities

## 2022-05-20 ENCOUNTER — OFFICE VISIT (OUTPATIENT)
Dept: PHYSICAL THERAPY | Facility: CLINIC | Age: 77
End: 2022-05-20
Payer: MEDICARE

## 2022-05-20 DIAGNOSIS — G89.29 CHRONIC RIGHT SHOULDER PAIN: ICD-10-CM

## 2022-05-20 DIAGNOSIS — M75.31 CALCIFIC TENDINITIS OF RIGHT SHOULDER: Primary | ICD-10-CM

## 2022-05-20 DIAGNOSIS — M25.511 CHRONIC RIGHT SHOULDER PAIN: ICD-10-CM

## 2022-05-20 DIAGNOSIS — M75.01 ADHESIVE CAPSULITIS OF RIGHT SHOULDER: ICD-10-CM

## 2022-05-20 PROCEDURE — 97110 THERAPEUTIC EXERCISES: CPT | Performed by: PHYSICAL THERAPIST

## 2022-05-20 PROCEDURE — 97140 MANUAL THERAPY 1/> REGIONS: CPT | Performed by: PHYSICAL THERAPIST

## 2022-05-20 NOTE — PROGRESS NOTES
Daily Note     Today's date: 2022  Patient name: Umu Zelaya  : 1945  MRN: [id number]  Referring provider: Tiera Rivas DO  Dx:   Encounter Diagnosis     ICD-10-CM    1  Calcific tendinitis of right shoulder  M75 31    2  Chronic right shoulder pain  M25 511     G89 29    3  Adhesive capsulitis of right shoulder  M75 01        Start Time: 0800  Stop Time: 0848  Total time in clinic (min): 48 minutes    Subjective: Pt reports shoulder pain still feels painful and limited with motion, no real pain at rest but if he tries to lift arm pain becomes severe  Objective: See treatment diary below      Assessment: Pt continues to progress well with manual PROM all planes and nearly full with some limitations noted with end range flexion and IR but firm end feel with more reports of stiffness versus pain  Pt was able to complete all ROM exercises today with good tolerance and only slight reports of shoulder discomfort of 2-3/10 that abolish after completion of exercises  Pt with improving fluidity of mat table AROM exercises with less reports of pain and was able to load sidelying ER with 2# without provoking symptoms  Pt demonstrates improving exercise tolerance and shoulder ROM since fall and encouraged to continue with HEP daily as symptoms allow  Will continue to progress next visit as able  Plan: Continue per plan of care  Progress treatment as tolerated         KX MODIFIER      Diagnosis: (R) subacromial pain syndrome    Precautions: Diabetic, HTN, neuropathy   POC Expires: 22   Re-evaluation Date: 22   FOTO Scores/Date: Goal - 66; 22 - 47; 22 - 45   Visit Count 3/10 7/10 8/10 1/10 2/10   Manuals 5/20 5/3 5/6 5/13 5/17   R shoulder PROM all planes KD JH EB JH JH   GH Mobs    EB AP, inf                              Ther Ex        Supine cane I, Y 5"x10 ea 0#  5"x10 ea 2#   0# 10x ea dir 0# 10x ea dir   Seated thoracic extension   25x      Wall wash 5"x10 ea 5" 10x flex/abd   5" 10x flex/abd   IR belt stretch 10"x10  10" 10x      Pullies Flex/scap 5" x 3' ea Flex/scap 5" x 3' ea Flex/scap 2' ea  Flex/scp 3' ea Flex/scp 3' ea   Standing AAROM   Dowel flex/scap/ext 10x ea direction slow eccentric      UBE  4/4 3/3  3/3 4/4   Supine flexion to 90 deg w/ eccentric control  0# 2x10      ABCs Held  0# 1x      SL Abd 15x min A con, independent ecc    0# AA conc   A ecc 5x   SL ER 2# 2x10 20x   20x   SL Flex Supine punches and flexion 20x ea    20x   HEP    FOTO;Measurements    Neuro Re-Ed        T-band Rows/Ext  gtb 2x15 ea      Serratus punch   0# supine 20x   Supine 20x   Scap retractions    30x @ wall  30x @ wall   No money    Against wall ytb 3x10     Cervical retractions    20x5"      Cervical extensions    Over table supine 20x                                                                Ther Act                          Modalities

## 2022-05-24 ENCOUNTER — OFFICE VISIT (OUTPATIENT)
Dept: PHYSICAL THERAPY | Facility: CLINIC | Age: 77
End: 2022-05-24
Payer: MEDICARE

## 2022-05-24 DIAGNOSIS — M75.31 CALCIFIC TENDINITIS OF RIGHT SHOULDER: Primary | ICD-10-CM

## 2022-05-24 DIAGNOSIS — M25.511 CHRONIC RIGHT SHOULDER PAIN: ICD-10-CM

## 2022-05-24 DIAGNOSIS — M75.01 ADHESIVE CAPSULITIS OF RIGHT SHOULDER: ICD-10-CM

## 2022-05-24 DIAGNOSIS — G89.29 CHRONIC RIGHT SHOULDER PAIN: ICD-10-CM

## 2022-05-24 PROCEDURE — 97110 THERAPEUTIC EXERCISES: CPT

## 2022-05-24 PROCEDURE — 97140 MANUAL THERAPY 1/> REGIONS: CPT

## 2022-05-24 PROCEDURE — 97112 NEUROMUSCULAR REEDUCATION: CPT

## 2022-05-24 NOTE — PROGRESS NOTES
Daily Note     Today's date: 2022  Patient name: Sofi Kimbrough  : 1945  MRN: [id number]  Referring provider: Naima Lacy DO  Dx:   Encounter Diagnosis     ICD-10-CM    1  Calcific tendinitis of right shoulder  M75 31    2  Chronic right shoulder pain  M25 511     G89 29    3  Adhesive capsulitis of right shoulder  M75 01                   Subjective: Patient reports his shoulder is feeling worse since last session  Pt denied over doing activity this w/k  Objective: See treatment diary below      Assessment: Tolerated treatment well  Patient would benefit from continued PT  Modified session due to increased pain upon arrival  Pt c/o pain in lateral shoulder, mainly in bicep and mid deltoid region, w/ UBE  Pain in shoulder at end ranges  Focused on AAROM and scap stab exercises today w/ good tolerance  Pt had painful arc w/ supine cane flexion from ~20*-40*  Plan: Continue per plan of care  KX MODIFIER      Diagnosis: (R) subacromial pain syndrome    Precautions: Diabetic, HTN, neuropathy   POC Expires: 22   Re-evaluation Date: 22   FOTO Scores/Date: Goal - 66; 22 - 47; 22 - 39   Visit Count 3/10 4/10   2/10   Manuals    R shoulder PROM all planes KD JH + scap PROM   JH   GH Mobs                                 Ther Ex        Supine cane I, Y 5"x10 ea 0#  5"x10 ea 0#    0# 10x ea dir   Seated thoracic extension        Wall wash 5"x10 ea    5" 10x flex/abd   IR belt stretch 10"x10        Pullies Flex/scap 5" x 3' ea Flex/scap 5" x 3' ea   Flex/scp 3' ea   Standing AAROM        UBE  /4    Supine flexion to 90 deg w/ eccentric control        ABCs Held        SL Abd 15x min A con, independent ecc    0# AA conc   A ecc 5x   SL ER 2# 2x10    20x   SL Flex Supine punches and flexion 20x ea    20x   HEP        Neuro Re-Ed        T-band Rows/Ext        Serratus punch      Supine 20x   Scap retractions   Against wall 5" 30x   30x @ wall   No money         Cervical retractions         Cervical extensions                                                                   Ther Act                          Modalities

## 2022-05-26 ENCOUNTER — APPOINTMENT (OUTPATIENT)
Dept: PHYSICAL THERAPY | Facility: CLINIC | Age: 77
End: 2022-05-26
Payer: MEDICARE

## 2022-05-27 ENCOUNTER — APPOINTMENT (OUTPATIENT)
Dept: PHYSICAL THERAPY | Facility: CLINIC | Age: 77
End: 2022-05-27
Payer: MEDICARE

## 2022-05-31 ENCOUNTER — OFFICE VISIT (OUTPATIENT)
Dept: PHYSICAL THERAPY | Facility: CLINIC | Age: 77
End: 2022-05-31
Payer: MEDICARE

## 2022-05-31 DIAGNOSIS — M75.31 CALCIFIC TENDINITIS OF RIGHT SHOULDER: Primary | ICD-10-CM

## 2022-05-31 PROCEDURE — 97110 THERAPEUTIC EXERCISES: CPT

## 2022-05-31 PROCEDURE — 97112 NEUROMUSCULAR REEDUCATION: CPT

## 2022-05-31 PROCEDURE — 97140 MANUAL THERAPY 1/> REGIONS: CPT

## 2022-05-31 NOTE — PROGRESS NOTES
Daily Note     Today's date: 2022  Patient name: Argelia Walters  : 1945  MRN: [id number]  Referring provider: Jake Pablo DO  Dx:   Encounter Diagnosis     ICD-10-CM    1  Calcific tendinitis of right shoulder  M75 31        Start Time: 0800          Subjective: pt reports his shoulder is slightly better in terms of raising her his head but has tolerable pain  Raising out to the side is still problematic  Pt reports he has been on anti inflammatory for a couple days now, which seems to be helping  Objective: See treatment diary below      Assessment: Tolerated treatment well  Patient would benefit from continued PT  Pt tolerated POC slightly better  Able to do ABCs w/ main complaint about doing abduction  PROM has improved since last week  Continued difficulty w/ wall washes abduction but improved AROM  Plan: Continue per plan of care  KX MODIFIER      Diagnosis: (R) subacromial pain syndrome    Precautions: Diabetic, HTN, neuropathy   POC Expires: 22   Re-evaluation Date: 22   FOTO Scores/Date: Goal - 66; 22 - ; 22 - 39   Visit Count 3/10 4/10 5/10  2/10   Manuals    R shoulder PROM all planes KD JH + scap PROM JH  JH   GH Mobs                                 Ther Ex        Supine cane I, Y 5"x10 ea 0#  5"x10 ea 0#  5" 10x ea 0#  0# 10x ea dir   Seated thoracic extension        Wall wash 5"x10 ea  Flex/abd 5" 10x ea dir  5" 10x flex/abd   IR belt stretch 10"x10        Pullies Flex/scap 5" x 3' ea Flex/scap 5" x 3' ea Flex/scap 5" x 3' ea  Flex/scp 3' ea   Standing AAROM        UBE  /4 4/4 4/  4/4   Supine flexion to 90 deg w/ eccentric control        ABCs Held   0# 1 round     SL Abd 15x min A con, independent ecc    0# AA conc   A ecc 5x   SL ER 2# 2x10    20x   SL Flex Supine punches and flexion 20x ea    20x   HEP        Neuro Re-Ed        T-band Rows/Ext        Serratus punch    0# 2x10  Supine 20x   Scap retractions   Against wall 5" 30x Against wall 5" 30x  30x @ wall   No money         Cervical retractions         Cervical extensions                                                                   Ther Act                          Modalities

## 2022-06-07 ENCOUNTER — OFFICE VISIT (OUTPATIENT)
Dept: PHYSICAL THERAPY | Facility: CLINIC | Age: 77
End: 2022-06-07
Payer: MEDICARE

## 2022-06-07 DIAGNOSIS — M75.31 CALCIFIC TENDINITIS OF RIGHT SHOULDER: ICD-10-CM

## 2022-06-07 DIAGNOSIS — M25.511 CHRONIC RIGHT SHOULDER PAIN: Primary | ICD-10-CM

## 2022-06-07 DIAGNOSIS — G89.29 CHRONIC RIGHT SHOULDER PAIN: Primary | ICD-10-CM

## 2022-06-07 PROCEDURE — 97140 MANUAL THERAPY 1/> REGIONS: CPT

## 2022-06-07 PROCEDURE — 97110 THERAPEUTIC EXERCISES: CPT

## 2022-06-07 PROCEDURE — 97112 NEUROMUSCULAR REEDUCATION: CPT

## 2022-06-07 NOTE — PROGRESS NOTES
Daily Note     Today's date: 2022  Patient name: Marine Cerda  : 1945  MRN: [id number]  Referring provider: Ulysses Gipson DO  Dx:   Encounter Diagnosis     ICD-10-CM    1  Chronic right shoulder pain  M25 511     G89 29    2  Calcific tendinitis of right shoulder  M75 31        Start Time: 0800          Subjective: pt reports no new changes, feeling about the same  Pt stated he has f/u w/ doctor mid July  Objective: See treatment diary below      Assessment: Tolerated treatment well  Patient would benefit from continued PT  Pt c/o pain in bicep region w/ UBE going both directions  Added IR/ER isometrics w/ good tolerance and cueing for avoid compensation  Plan: Continue per plan of care  KX MODIFIER      Diagnosis: (R) subacromial pain syndrome    Precautions: Diabetic, HTN, neuropathy   POC Expires: 22   Re-evaluation Date: 22   FOTO Scores/Date:  - ; 22 - ; 22 -    Visit Count 3/10 4/10 5/10 6/10 2/10   Manuals    R shoulder PROM all planes KD JH + scap PROM JH JH JH   GH Mobs                                 Ther Ex        Supine cane I, Y 5"x10 ea 0#  5"x10 ea 0#  5" 10x ea 0# 5" 10x ea 0# 0# 10x ea dir   Seated thoracic extension        Wall wash 5"x10 ea  Flex/abd 5" 10x ea dir Flex/abd 5" 10x ea dir 5" 10x flex/abd   IR belt stretch 10"x10        Pullies Flex/scap 5" x 3' ea Flex/scap 5" x 3' ea Flex/scap 5" x 3' ea Flex/scap 5" x 3' ea Flex/scp 3' ea   Standing AAROM    Isometrics IR/ER 10x ea dir    UBE     Supine flexion to 90 deg w/ eccentric control        ABCs Held   0# 1 round 0# 1 round    SL Abd 15x min A con, independent ecc    0# AA conc   A ecc 5x   SL ER 2# 2x10    20x   SL Flex Supine punches and flexion 20x ea    20x   HEP        Neuro Re-Ed        T-band Rows/Ext        Serratus punch    0# 2x10 0# 2x10 Supine 20x   Scap retractions   Against wall 5" 30x Against wall 5" 30x Against wall 5" 30x 30x @ wall   No money         Cervical retractions         Cervical extensions                                                                   Ther Act                          Modalities

## 2022-06-10 ENCOUNTER — EVALUATION (OUTPATIENT)
Dept: PHYSICAL THERAPY | Facility: CLINIC | Age: 77
End: 2022-06-10
Payer: MEDICARE

## 2022-06-10 DIAGNOSIS — M75.01 ADHESIVE CAPSULITIS OF RIGHT SHOULDER: ICD-10-CM

## 2022-06-10 DIAGNOSIS — G89.29 CHRONIC RIGHT SHOULDER PAIN: Primary | ICD-10-CM

## 2022-06-10 DIAGNOSIS — M75.31 CALCIFIC TENDINITIS OF RIGHT SHOULDER: ICD-10-CM

## 2022-06-10 DIAGNOSIS — M25.511 CHRONIC RIGHT SHOULDER PAIN: Primary | ICD-10-CM

## 2022-06-10 PROCEDURE — 97110 THERAPEUTIC EXERCISES: CPT | Performed by: PHYSICAL THERAPIST

## 2022-06-10 PROCEDURE — 97140 MANUAL THERAPY 1/> REGIONS: CPT | Performed by: PHYSICAL THERAPIST

## 2022-06-10 NOTE — PROGRESS NOTES
PT Re-Evaluation  and PT Discharge     Today's date: 6/10/2022  Patient name: Leena Centeno  : 1945  MRN: [id number]  Referring provider: Doug Swift DO  Dx:   Encounter Diagnosis     ICD-10-CM    1  Chronic right shoulder pain  M25 511     G89 29    2  Calcific tendinitis of right shoulder  M75 31    3  Adhesive capsulitis of right shoulder  M75 01        Start Time: 0800  Stop Time: 0830  Total time in clinic (min): 30 minutes    Assessment  Assessment details: Pt has been seen for 15 visits and has made excellent subjective/objective improvements since enrolling in therapy with improved FOTO score from 54 to 69 since initial evaluation  Pt has been progressing well up until a month ago when he had a fall and was offered surgery but opted for continued conservative treatment but unfortunately isn't making progress and still having pain and weakness and is going to follow up with surgeon to discuss possible surgical intervention and will be discharged to HEP at this time  Pt encouraged to continue with HEP on regular basis per tolerance and was educated on how to progress/regress exercises per symptoms/tolerance  Pt is in agreement with plan        Goals  ST weeks  Pt will demonstrate good understanding and compliance with HEP--MET  Pt will decrease pain to 3-4/10   --MET  Pt will demonstrate improved postural awareness and ability to self-correct without reliance on external cues--MET    LT weeks  Pt will improve FOTO score to > or = to 66 to indicate improved functional abilities--MET  Pt will decrease pain to 0-1/10 --NOT MET  Pt will increase shoulder strength to 4+/5 for improved tolerance/independence with ADLs--NOT ASSESSED   Pt will increase PROM to WNL to allow for return of AROM of affected shoulder---NEARLY MET  Pt will increase shoulder flexion/abduction to 150 degrees to increase independence with ADLs --NOT MET  Pt will increase functional IR to be able to don/doff belt and wash back without pain --NOT MET  Pt will increase functional ER to be able to wash hair independently without pain--NOT MET      Plan  Plan details: Discharge to [facility]  Patient would benefit from: skilled physical therapy  Planned modality interventions: cryotherapy and thermotherapy: hydrocollator packs  Planned therapy interventions: ADL training, body mechanics training, home exercise program, flexibility, functional ROM exercises, graded activity, graded exercise, strengthening, stretching, therapeutic activities, therapeutic exercise, self care, postural training, patient education, neuromuscular re-education and manual therapy  Plan of Care beginning date: 6/8/2022  Plan of Care expiration date: 6/10/2022  Treatment plan discussed with: patient        Subjective Evaluation    History of Present Illness  Mechanism of injury: DISCHARGE:  Pt reports his shoulder had been doing excellently in therapy up until a month ago where he was back to about 100% for his shoulder when he had a fall and landed on his right shoulder causing significant increase in pain and limitation  Pt had set up another follow up with Dr Alba Campos after fall and MRI was ordered and told he would need a shoulder RC repair but wanted to to try and continue with conservative PT treatment  Despite attempts with continued PT pt remained limited and painful  Due to limited progression in PT and continued PT, pt is going to follow up with MD and possibly pursue surgical intervention at this time  CURRENT STATUS (5/13/22)  Pt reports he had 60-70% improvement in his shoulder until he fell on 4/27/22  Pt fell while tripping over a throw rug and fell into a wall right into the R shoulder  Pt went to the ER on 4/27/22 and they took an xray and found nothing was broken  Pt had an MRI yesterday on the shoulder but the results are not finalized yet  Since the fall pt has difficulty lifting arm OH by himself due to pain   Pt stated that his arm shoulder starts paining him in a resting position in bed and recliner after 30-40 sec  Pt unable to lift pitcher was water secondary to pain  Kenisha Serrato stated that the pain in his shoulder sometimes wakes him up at night  Pt stated that he has no pain at resting as long as his shoulder is not rotated and his upper arm does not move  Pt see's the MD next Wednesday for f/u visit  STATUS as EVAL (22)  Pt reports to PT with c/o right shoulder pain that started about 3 years ago and has slowly worsened with time and starting to notice increasing pain and decreasing shoulder motion  Pt was planning on getting tx sooner but had fractured his foot and was going through treatment for this but now ready to enroll in therapy for shoulder  Pt denies hx of similar symptoms  Pt does report intermittent numbness into right arm that can travel to upper arm  Pt reports increased pain/limitation with reaching > shoulder height, behind his back, sleeping, heavy lifting/carrying       Aggravating factors: reaching > shoulder height, behind his back, sleeping, heavy lifting/carrying    Easing Factors:  rest    PLOF:  No hx of right shoulder pain    PMH: Diabetic, HTN, neuropathy  Pain  Current pain ratin (at rest)  At best pain ratin (at rest)  At worst pain rating: 10  Quality: dull ache and sharp    Hand dominance: right          Objective     General Comments:      Shoulder Comments   Right shoulder AROM: Flex 140* (pain) Abd 90* (pain) Functional IR to L3 Functional ER to occiput (pain)    Right shoulder PROM: Flex 160* (pain) Abd 170* (pain) IR 50* ER 85* (ER pain @ end range) (IR/ER assessed at 90* abd)    Right shoulder MMT deferred 2* to pain and limited AROM but able to resist submaximally all planes    FOTO: 69  (improved from 54 at IE)      KX MODIFIER      Diagnosis: (R) subacromial pain syndrome    Precautions: Diabetic, HTN, neuropathy   POC Expires: 6/10/22   Re-evaluation Date: 6/10/22   FOTO Scores/Date: Goal - 66; 4/13/22 - 54; 5/13/22 - 45; 6/10/22 - 69   Visit Count 3/10 4/10 5/10 6/10 1/10   Manuals 5/20 5/24 5/31 6/7 6/10   R shoulder PROM all planes KD JH + scap PROM JH JH KD   GH Mobs                                 Ther Ex 5/20    6/10   Supine cane I, Y 5"x10 ea 0#  5"x10 ea 0#  5" 10x ea 0# 5" 10x ea 0#    Seated thoracic extension        Wall wash 5"x10 ea  Flex/abd 5" 10x ea dir     IR belt stretch 10"x10        Pullies Flex/scap 5" x 3' ea Flex/scap 5" x 3' ea Flex/scap 5" x 3' ea Flex/scap 5" x 3' ea    Standing AAROM    Isometrics IR/ER 10x ea dir    UBE  4/4 4/4 4/4 4/4    Supine flexion to 90 deg w/ eccentric control        ABCs Held   0# 1 round 0# 1 round    SL Abd 15x min A con, independent ecc       SL ER 2# 2x10       SL Flex Supine punches and flexion 20x ea       HEP     Re-assessed R shoulder and FOTO: HEP creation and instruction, educated on importance of maintaining ROM leading into possible surgery     Neuro Re-Ed        T-band Rows/Ext        Serratus punch    0# 2x10 0# 2x10    Scap retractions   Against wall 5" 30x Against wall 5" 30x Against wall 5" 30x    No money         Cervical retractions         Cervical extensions                                                                   Ther Act                          Modalities

## 2022-06-14 ENCOUNTER — APPOINTMENT (OUTPATIENT)
Dept: PHYSICAL THERAPY | Facility: CLINIC | Age: 77
End: 2022-06-14
Payer: MEDICARE

## 2022-06-17 ENCOUNTER — APPOINTMENT (OUTPATIENT)
Dept: PHYSICAL THERAPY | Facility: CLINIC | Age: 77
End: 2022-06-17
Payer: MEDICARE

## 2022-06-21 ENCOUNTER — APPOINTMENT (OUTPATIENT)
Dept: PHYSICAL THERAPY | Facility: CLINIC | Age: 77
End: 2022-06-21
Payer: MEDICARE

## 2022-06-24 ENCOUNTER — APPOINTMENT (OUTPATIENT)
Dept: PHYSICAL THERAPY | Facility: CLINIC | Age: 77
End: 2022-06-24
Payer: MEDICARE

## 2022-06-28 ENCOUNTER — APPOINTMENT (OUTPATIENT)
Dept: PHYSICAL THERAPY | Facility: CLINIC | Age: 77
End: 2022-06-28
Payer: MEDICARE

## 2022-06-28 ENCOUNTER — OFFICE VISIT (OUTPATIENT)
Dept: PODIATRY | Facility: CLINIC | Age: 77
End: 2022-06-28
Payer: MEDICARE

## 2022-06-28 VITALS — BODY MASS INDEX: 35.07 KG/M2 | WEIGHT: 245 LBS | HEIGHT: 70 IN

## 2022-06-28 DIAGNOSIS — E11.40 TYPE 2 DIABETES MELLITUS WITH DIABETIC NEUROPATHY, WITH LONG-TERM CURRENT USE OF INSULIN (HCC): Primary | ICD-10-CM

## 2022-06-28 DIAGNOSIS — Z79.4 TYPE 2 DIABETES MELLITUS WITH DIABETIC NEUROPATHY, WITH LONG-TERM CURRENT USE OF INSULIN (HCC): Primary | ICD-10-CM

## 2022-06-28 DIAGNOSIS — B35.1 ONYCHOMYCOSIS: ICD-10-CM

## 2022-06-28 PROCEDURE — 11721 DEBRIDE NAIL 6 OR MORE: CPT | Performed by: PODIATRIST

## 2022-06-28 RX ORDER — NAPROXEN 500 MG/1
500 TABLET ORAL 2 TIMES DAILY
COMMUNITY
Start: 2022-05-27

## 2022-06-28 NOTE — PROGRESS NOTES
Taz Poe  1945    ASSESSMENT/PLAN:  1  Type 2 diabetes mellitus with diabetic neuropathy, with long-term current use of insulin ([address] )     2  Onychomycosis  Debridement         Orders Placed This Encounter   Procedures    Debridement      Disease prevention and related risk factors of diabetes were identified and discussed  The patient was educated in proper foot wear for diabetics  Also educated in daily foot assessment and routine diabetic foot care  Discussed the importance of controlling BS through diet and exercise  The patient will follow up in 9 weeks for further diabetic foot exam and care     PROCEDURE:  All mycotic toenails were reduced and debrided in length, width, and girth using a nail nipper and dremel  Patient tolerated procedure(s) well without complications       HPI:  Willa Santiago is a 67 y  o year old male seen for diabetic foot exam   The patient has class findings with diabetes  Mild numbness / paresthesia  BS is under control  The patient complained of thick toenails  The patient denied any acute pedal disorder, redness, acute swelling, or recent injury  PAST MEDICAL HISTORY:  Past Medical History:   Diagnosis Date    Diabetes mellitus ([facility] [address] )     Hiatal hernia     Hyperlipidemia     Hypertension     Leg pain     Neuropathy     PE (pulmonary thromboembolism) (HCC)     Shortness of breath     Venous insufficiency (chronic) (peripheral)        PAST SURGICAL HISTORY:  Past Surgical History:   Procedure Laterality Date    FRACTURE SURGERY  7/12/20    IR PE ENDOVASCULAR THERAPY  8/18/2020    JOINT REPLACEMENT Bilateral     knees    LEG SURGERY      ORTHOPEDIC SURGERY      ID COLONOSCOPY FLX DX W/COLLJ SPEC WHEN PFRMD N/A 6/30/2017    Procedure: EGD AND COLONOSCOPY;  Surgeon: Sherri Louie MD;  Location: [facility]; Service: General    TONSILLECTOMY          ALLERGIES:  Patient has no known allergies      MEDICATIONS:  Current Outpatient Medications   Medication Sig Dispense Refill    aspirin (ECOTRIN LOW STRENGTH) 81 mg EC tablet Take 81 mg by mouth daily      BD Insulin Syringe U/F 31G X 5/16" 0 5 ML MISC Use as directed      cholecalciferol (VITAMIN D3) 1,000 units tablet Take 2,000 Units by mouth daily      Continuous Blood Gluc Sensor (FreeStyle Christian 2 Sensor) MISC E11 9 CHECK BLOOD SUGAR      HUMALOG 100 UNIT/ML injection Inject under the skin as needed = Inject as per sliding scale:  if 0 - 149 = 0 units;  150 - 209 = 1 unit;  210 - 269 = 2 units;  270 - 329 = 3 units;  330 - 389 = 4 units;  390+ = 5 units Call MD if BS < 60 or > 390,  subcutaneously before meals for DM  3    Levemir 100 UNIT/ML subcutaneous injection       multivitamin-iron-minerals-folic acid (CENTRUM) chewable tablet Chew 1 tablet daily      naproxen (NAPROSYN) 500 mg tablet Take 500 mg by mouth 2 (two) times a day      Omega-3 Fatty Acids (FISH OIL CONCENTRATE PO) Take 1,000 mg by mouth 2 (two) times a day      ONETOUCH VERIO test strip TEST 2 TIMES A DAY DX E11 9  6    Empagliflozin (Jardiance) 25 MG TABS Take 1 tablet (25 mg total) by mouth every morning for 7 days 7 tablet 0    simvastatin (ZOCOR) 40 mg tablet Take 1 tablet (40 mg total) by mouth daily at bedtime for 7 days 7 tablet 0     No current facility-administered medications for this visit         SOCIAL HISTORY:  Social History     Socioeconomic History    Marital status: /Civil Union     Spouse name: None    Number of children: None    Years of education: None    Highest education level: None   Occupational History    None   Tobacco Use    Smoking status: Never Smoker    Smokeless tobacco: Never Used   Vaping Use    Vaping Use: Never used   Substance and Sexual Activity    Alcohol use: Never    Drug use: No    Sexual activity: Not Currently     Partners: Female   Other Topics Concern    None   Social History Narrative    None     Social Determinants of Health Financial Resource Strain: Not on file   Food Insecurity: No Food Insecurity    Worried About Running Out of Food in the Last Year: Never true    Justin of Food in the Last Year: Never true   Transportation Needs: No Transportation Needs    Lack of Transportation (Medical): No    Lack of Transportation (Non-Medical): No   Physical Activity: Not on file   Stress: Not on file   Social Connections: Not on file   Intimate Partner Violence: Not on file   Housing Stability: Low Risk     Unable to Pay for Housing in the Last Year: No    Number of Places Lived in the Last Year: 1    Unstable Housing in the Last Year: No        REVIEW OF SYSTEMS:  GENERAL: No fever or chills  HEART: No chest pain, or palpitation  RESPIRATORY:  No acute SOB or cough  GI: No Nausea, vomit or diarrhea  NEUROLOGIC: No syncope or acute weakness    PHYSICAL EXAM:    Ht 5' 10" (1 778 m) Comment: verbal  Wt 111 kg (245 lb)   BMI 35 15 kg/m²     VASCULAR EXAM  Dorsalis pedis  1/4  Posterior tibial artery 1/4  The patient has class findings with skin atrophy, lack of digital hair, and nail dystrophy  There is +1 lower extremity edema bilaterally  Venous stasis skin changes noted BLE  NEUROLOGIC EXAM  Sensation is intact to light touch  Sensation is intact to 10gm monofilament  No focal neurologic deficit  DERMATOLOGIC EXAM:   No ulcer or cellulitis noted  The patient has hypertrophic toenails with discoloration, onycholysis, and subungal debris  No notable skin lesion  MUSCULOSKELETAL EXAM:   No acute joint pain, edema, or redness  No acute musculoskeletal problem

## 2022-07-04 ENCOUNTER — APPOINTMENT (EMERGENCY)
Dept: CT IMAGING | Facility: HOSPITAL | Age: 77
End: 2022-07-04
Payer: MEDICARE

## 2022-07-04 ENCOUNTER — HOSPITAL ENCOUNTER (EMERGENCY)
Facility: HOSPITAL | Age: 77
Discharge: HOME | End: 2022-07-04
Attending: EMERGENCY MEDICINE | Admitting: EMERGENCY MEDICINE
Payer: MEDICARE

## 2022-07-04 VITALS
DIASTOLIC BLOOD PRESSURE: 63 MMHG | OXYGEN SATURATION: 93 % | HEIGHT: 70 IN | TEMPERATURE: 98.7 F | SYSTOLIC BLOOD PRESSURE: 137 MMHG | BODY MASS INDEX: 35.07 KG/M2 | WEIGHT: 245 LBS | RESPIRATION RATE: 17 BRPM | HEART RATE: 54 BPM

## 2022-07-04 DIAGNOSIS — K11.5 PAROTID SIALOLITHIASIS: Primary | ICD-10-CM

## 2022-07-04 PROCEDURE — 70486 CT MAXILLOFACIAL W/O DYE: CPT

## 2022-07-04 PROCEDURE — 99283 EMERGENCY DEPT VISIT LOW MDM: CPT

## 2022-07-04 PROCEDURE — 99284 EMERGENCY DEPT VISIT MOD MDM: CPT | Performed by: EMERGENCY MEDICINE

## 2022-07-04 PROCEDURE — G1004 CDSM NDSC: HCPCS

## 2022-07-04 RX ORDER — AMOXICILLIN AND CLAVULANATE POTASSIUM 875; 125 MG/1; MG/1
1 TABLET, FILM COATED ORAL EVERY 12 HOURS
Qty: 14 TABLET | Refills: 0 | Status: SHIPPED | OUTPATIENT
Start: 2022-07-04 | End: 2022-07-11

## 2022-07-04 RX ORDER — AMOXICILLIN AND CLAVULANATE POTASSIUM 875; 125 MG/1; MG/1
1 TABLET, FILM COATED ORAL ONCE
Status: COMPLETED | OUTPATIENT
Start: 2022-07-04 | End: 2022-07-04

## 2022-07-04 RX ADMIN — AMOXICILLIN AND CLAVULANATE POTASSIUM 1 TABLET: 875; 125 TABLET, FILM COATED ORAL at 10:37

## 2022-07-04 NOTE — ED PROVIDER NOTES
History  Chief Complaint   Patient presents with    Facial Swelling     Patient co right sided facial swelling  (Near ear along jaw line) Patient states "I noticed little bump there last night and this morning I woke up and it was swollen "      66 yo male who woke up with isolated R submandibular swelling today  No significant pain  No trauma  No rash  No fever  No dysphonia  No drooling  No trouble swallowing  No dental pain  No intraoral swelling or sublingual swelling or discomfort  PMH significant for DM  Prior to Admission Medications   Prescriptions Last Dose Informant Patient Reported? Taking?    BD Insulin Syringe U/F 31G X 5/16" 0 5 ML MISC  Self Yes No   Sig: Use as directed   Continuous Blood Gluc Sensor (FreeStyle Christian 2 Sensor) MISC  Self Yes No   Sig: E11 9 CHECK BLOOD SUGAR   Empagliflozin (Jardiance) 25 MG TABS  Self No No   Sig: Take 1 tablet (25 mg total) by mouth every morning for 7 days   HUMALOG 100 UNIT/ML injection  Self Yes No   Sig: Inject under the skin as needed = Inject as per sliding scale:  if 0 - 149 = 0 units;  150 - 209 = 1 unit;  210 - 269 = 2 units;  270 - 329 = 3 units;  330 - 389 = 4 units;  390+ = 5 units Call MD if BS < 60 or > 390,  subcutaneously before meals for DM   Levemir 100 UNIT/ML subcutaneous injection  Self Yes No   ONETOUCH VERIO test strip  Self Yes No   Sig: TEST 2 TIMES A DAY DX E11 9   Omega-3 Fatty Acids (FISH OIL CONCENTRATE PO)  Self Yes No   Sig: Take 1,000 mg by mouth 2 (two) times a day   aspirin (ECOTRIN LOW STRENGTH) 81 mg EC tablet  Self Yes No   Sig: Take 81 mg by mouth daily   cholecalciferol (VITAMIN D3) 1,000 units tablet  Self Yes No   Sig: Take 2,000 Units by mouth daily   multivitamin-iron-minerals-folic acid (CENTRUM) chewable tablet  Self Yes No   Sig: Chew 1 tablet daily   naproxen (NAPROSYN) 500 mg tablet   Yes No   Sig: Take 500 mg by mouth 2 (two) times a day   simvastatin (ZOCOR) 40 mg tablet  Self No No   Sig: Take 1 tablet (40 mg total) by mouth daily at bedtime for 7 days      Facility-Administered Medications: None       Past Medical History:   Diagnosis Date    Diabetes mellitus ([address] )     Hiatal hernia     Hyperlipidemia     Hypertension     Leg pain     Neuropathy     PE (pulmonary thromboembolism) (HCC)     Shortness of breath     Venous insufficiency (chronic) (peripheral)        Past Surgical History:   Procedure Laterality Date    FRACTURE SURGERY  7/12/20    IR PE ENDOVASCULAR THERAPY  8/18/2020    JOINT REPLACEMENT Bilateral     knees    LEG SURGERY      ORTHOPEDIC SURGERY      ND COLONOSCOPY FLX DX W/COLLJ SPEC WHEN PFRMD N/A 6/30/2017    Procedure: EGD AND COLONOSCOPY;  Surgeon: José Miguel Hilario MD;  Location: [facility]; Service: General    TONSILLECTOMY         Family History   Problem Relation Age of Onset    Cancer Mother     Cancer Father      I have reviewed and agree with the history as documented  E-Cigarette/Vaping    E-Cigarette Use Never User      E-Cigarette/Vaping Substances    Nicotine No     THC No     CBD No     Flavoring No     Other No     Unknown No      Social History     Tobacco Use    Smoking status: Never Smoker    Smokeless tobacco: Never Used   Vaping Use    Vaping Use: Never used   Substance Use Topics    Alcohol use: Never    Drug use: No       Review of Systems   Constitutional: Negative for fever  HENT: Positive for facial swelling  Negative for congestion, dental problem, drooling, ear discharge, ear pain, hearing loss, mouth sores, nosebleeds, postnasal drip, rhinorrhea, sinus pressure, sinus pain, sneezing, sore throat, tinnitus, trouble swallowing and voice change  All other systems reviewed and are negative  Physical Exam  Physical Exam  Vitals and nursing note reviewed  Constitutional:       General: He is not in acute distress  Appearance: Normal appearance  He is well-developed  He is not ill-appearing, toxic-appearing or diaphoretic  HENT:      Head: Normocephalic and atraumatic  Mouth/Throat:      Mouth: Mucous membranes are moist       Pharynx: Oropharynx is clear  No oropharyngeal exudate or posterior oropharyngeal erythema  Comments: No sublingual tenderness or swelling  No trismus  There is moderate swelling isolated to the R submandibular area  The area is nontender to palpation  There is no warmth or fluctuance or crepitus  There is no swelling or tenderness over the R parotid gland  Eyes:      General: No scleral icterus  Right eye: No discharge  Left eye: No discharge  Conjunctiva/sclera: Conjunctivae normal       Pupils: Pupils are equal, round, and reactive to light  Neck:      Vascular: No carotid bruit or JVD  Pulmonary:      Effort: Pulmonary effort is normal  No respiratory distress  Breath sounds: No stridor  Musculoskeletal:         General: No deformity  Normal range of motion  Cervical back: Normal range of motion and neck supple  No rigidity or tenderness  Lymphadenopathy:      Cervical: No cervical adenopathy  Skin:     General: Skin is warm and dry  Capillary Refill: Capillary refill takes less than 2 seconds  Coloration: Skin is not jaundiced or pale  Findings: No bruising, erythema, lesion or rash  Neurological:      General: No focal deficit present  Mental Status: He is alert and oriented to person, place, and time  Cranial Nerves: No cranial nerve deficit  Sensory: No sensory deficit  Motor: No weakness or abnormal muscle tone        Coordination: Coordination normal          Vital Signs  ED Triage Vitals [07/04/22 0915]   Temperature Pulse Respirations Blood Pressure SpO2   98 7 °F (37 1 °C) (!) 54 17 137/63 93 %      Temp Source Heart Rate Source Patient Position - Orthostatic VS BP Location FiO2 (%)   Oral Monitor Sitting Left arm --      Pain Score       No Pain           Vitals:    07/04/22 0915   BP: 137/63   Pulse: (!) 54 Patient Position - Orthostatic VS: Sitting         Visual Acuity      ED Medications  Medications   amoxicillin-clavulanate (AUGMENTIN) 875-125 mg per tablet 1 tablet (1 tablet Oral Given 7/4/22 1037)       Diagnostic Studies  Results Reviewed     None                 CT facial bones without contrast   Final Result by Nirav Grey MD (07/04 2732)         1  1 2 x 1 5 cm right parotid gland nodule as described which could reflect a salivary gland neoplasm  Further evaluation with contrast-enhanced CT and/or ultrasound suggested for further evaluation which could be performed on a nonemergent basis  ENT    consultation also suggested  The study was [facility] in [facility] for immediate notification  Workstation performed: [id number]                    Procedures  Procedures         ED Course                                             MDM  Number of Diagnoses or Management Options  Parotid sialolithiasis  Diagnosis management comments: Facial swelling  Abnormal ct of parotid  Gave pt printed copy of ct and discussed ddx including possible malignancy, he is aware of need for short-term f/u w ENT  Amount and/or Complexity of Data Reviewed  Tests in the radiology section of CPT®: ordered and reviewed        Disposition  Final diagnoses:   Parotid sialolithiasis     Time reflects when diagnosis was documented in both MDM as applicable and the Disposition within this note     Time User Action Codes Description Comment    7/4/2022 10:55 AM Donn Youngblood Add [K11 5] Parotid sialolithiasis       ED Disposition     ED Disposition   Discharge    Condition   Stable    Date/Time   Mon Jul 4, 2022 10:55 AM    Comment   Augusto Brisk discharge to home/self care                 Follow-up Information     Follow up With Specialties Details Why [address] Ent Otolaryngology In 3 days  [address] [address] [place] [address]  [phone]            Discharge Medication List as of 7/4/2022 10:56 AM      START taking these medications    Details   amoxicillin-clavulanate (AUGMENTIN) 875-125 mg per tablet Take 1 tablet by mouth every 12 (twelve) hours for 7 days, Starting Mon 7/4/2022, Until Mon 7/11/2022, Print         CONTINUE these medications which have NOT CHANGED    Details   aspirin (ECOTRIN LOW STRENGTH) 81 mg EC tablet Take 81 mg by mouth daily, Historical Med      BD Insulin Syringe U/F 31G X 5/16" 0 5 ML MISC Use as directed, Starting Tue 3/22/2022, Historical Med      cholecalciferol (VITAMIN D3) 1,000 units tablet Take 2,000 Units by mouth daily, Historical Med      Continuous Blood Gluc Sensor (FreeStyle Christian 2 Sensor) MISC E11 9 CHECK BLOOD SUGAR, Historical Med      Empagliflozin (Jardiance) 25 MG TABS Take 1 tablet (25 mg total) by mouth every morning for 7 days, Starting Fri 11/5/2021, Until Tue 12/21/2021, Normal      HUMALOG 100 UNIT/ML injection Inject under the skin as needed = Inject as per sliding scale:  if 0 - 149 = 0 units;  150 - 209 = 1 unit;  210 - 269 = 2 units;  270 - 329 = 3 units;  330 - 389 = 4 units;  390+ = 5 units Call MD if BS < 60 or > 390,  subcutaneously before meals for DM,  Starting Fri 5/24/2019, Historical Med      Levemir 100 UNIT/ML subcutaneous injection Starting u 11/25/2021, Historical Med      multivitamin-iron-minerals-folic acid (CENTRUM) chewable tablet Chew 1 tablet daily, Historical Med      naproxen (NAPROSYN) 500 mg tablet Take 500 mg by mouth 2 (two) times a day, Starting Fri 5/27/2022, Historical Med      Omega-3 Fatty Acids (FISH OIL CONCENTRATE PO) Take 1,000 mg by mouth 2 (two) times a day, Historical Med      ONETOUCH VERIO test strip TEST 2 TIMES A DAY DX E11 9, Historical Med      simvastatin (ZOCOR) 40 mg tablet Take 1 tablet (40 mg total) by mouth daily at bedtime for 7 days, Starting Fri 11/5/2021, Until Tue 12/21/2021, Normal             No discharge procedures on file      PDMP Review       Value Time User    PDMP Reviewed  Yes 5/14/2021  2:19 PM Tani Broussard PA-C          ED Provider  Electronically Signed by           Ebony Grey MD  07/04/22 8074

## 2022-07-04 NOTE — DISCHARGE INSTRUCTIONS
1  1 2 x 1 5 cm right parotid gland nodule as described which could reflect a salivary gland neoplasm  Further evaluation with contrast-enhanced CT and/or ultrasound suggested for further evaluation which could be performed on a nonemergent basis  ENT   consultation also suggested

## 2022-07-08 PROCEDURE — 87077 CULTURE AEROBIC IDENTIFY: CPT | Performed by: OTOLARYNGOLOGY

## 2022-07-08 PROCEDURE — 87070 CULTURE OTHR SPECIMN AEROBIC: CPT | Performed by: OTOLARYNGOLOGY

## 2022-07-08 PROCEDURE — 87181 SC STD AGAR DILUTION PER AGT: CPT | Performed by: OTOLARYNGOLOGY

## 2022-07-08 PROCEDURE — 87205 SMEAR GRAM STAIN: CPT | Performed by: OTOLARYNGOLOGY

## 2022-07-08 PROCEDURE — 87075 CULTR BACTERIA EXCEPT BLOOD: CPT | Performed by: PHYSICIAN ASSISTANT

## 2022-07-17 ENCOUNTER — HOSPITAL ENCOUNTER (EMERGENCY)
Facility: HOSPITAL | Age: 77
Discharge: HOME | End: 2022-07-17
Attending: EMERGENCY MEDICINE
Payer: MEDICARE

## 2022-07-17 ENCOUNTER — APPOINTMENT (EMERGENCY)
Dept: VASCULAR ULTRASOUND | Facility: HOSPITAL | Age: 77
End: 2022-07-17
Payer: MEDICARE

## 2022-07-17 ENCOUNTER — APPOINTMENT (EMERGENCY)
Dept: RADIOLOGY | Facility: HOSPITAL | Age: 77
End: 2022-07-17
Payer: MEDICARE

## 2022-07-17 VITALS
TEMPERATURE: 98 F | RESPIRATION RATE: 18 BRPM | WEIGHT: 245 LBS | OXYGEN SATURATION: 96 % | DIASTOLIC BLOOD PRESSURE: 68 MMHG | HEART RATE: 53 BPM | BODY MASS INDEX: 35.07 KG/M2 | HEIGHT: 70 IN | SYSTOLIC BLOOD PRESSURE: 148 MMHG

## 2022-07-17 DIAGNOSIS — M25.561 ACUTE PAIN OF RIGHT KNEE: ICD-10-CM

## 2022-07-17 DIAGNOSIS — M79.604 RIGHT LEG PAIN: Primary | ICD-10-CM

## 2022-07-17 PROCEDURE — 99284 EMERGENCY DEPT VISIT MOD MDM: CPT

## 2022-07-17 PROCEDURE — 93971 EXTREMITY STUDY: CPT

## 2022-07-17 PROCEDURE — 93971 EXTREMITY STUDY: CPT | Performed by: SURGERY

## 2022-07-17 PROCEDURE — 99282 EMERGENCY DEPT VISIT SF MDM: CPT | Performed by: EMERGENCY MEDICINE

## 2022-07-17 PROCEDURE — 73502 X-RAY EXAM HIP UNI 2-3 VIEWS: CPT

## 2022-07-17 PROCEDURE — 73564 X-RAY EXAM KNEE 4 OR MORE: CPT

## 2022-07-17 NOTE — ED NOTES
Arrived to treatment area at this time  States that on Friday into Saturday night he has been experiencing right sided leg pain that starts in the knee  Denies trauma and falling  Ambulatory into department with cane, no difficulties noted  Bilateral knee replacements, history of blood clots in legs  States that the pain radiates down the right leg into the calf  No acute distress at this time  Will continue to monitor patient          Julee Boston RN  07/17/22 8347

## 2022-07-17 NOTE — DISCHARGE INSTRUCTIONS
Please follow up PCP  Recommend tylenol 650 mg and ibuprofen 600 mg every 6 hours as needed for pain  Please return for severe chest pain, significant shortness of breath, severely worsening symptoms, or any other concerning signs or symptoms  Please refer to the following documents for additional instructions and return precautions

## 2022-07-17 NOTE — ED PROVIDER NOTES
History  Chief Complaint   Patient presents with    Leg Pain     Right leg weakness and pain that stated a few days ago, no known injury pain started below knee cap and has traveled up to hip now      25-year-old male history of hypertension, diabetes, previous provoked PE only on aspirin not on anticoagulation presenting with right knee pain  Patient reports insidious onset of anterior right knee pain that began when he went to bed 2 nights ago  Since that time, pain has been getting worse and now extends from mid calf up through proximal thigh encompassing the entirety of his leg  Denies any exacerbating or relieving factors such as twisting or turning or weight-bearing  Denies any numbness or weakness  Denies any focal neurological changes  Denies any known injuries or falls  Denies any other complaints  Prior to Admission Medications   Prescriptions Last Dose Informant Patient Reported? Taking?    BD Insulin Syringe U/F 31G X 5/16" 0 5 ML MISC  Self Yes No   Sig: Use as directed   Continuous Blood Gluc Sensor (FreeStyle Christian 2 Sensor) MISC  Self Yes No   Sig: E11 9 CHECK BLOOD SUGAR   Empagliflozin (Jardiance) 25 MG TABS  Self No No   Sig: Take 1 tablet (25 mg total) by mouth every morning for 7 days   HUMALOG 100 UNIT/ML injection  Self Yes No   Sig: Inject under the skin as needed = Inject as per sliding scale:  if 0 - 149 = 0 units;  150 - 209 = 1 unit;  210 - 269 = 2 units;  270 - 329 = 3 units;  330 - 389 = 4 units;  390+ = 5 units Call MD if BS < 60 or > 390,  subcutaneously before meals for DM   Levemir 100 UNIT/ML subcutaneous injection  Self Yes No   ONETOUCH VERIO test strip  Self Yes No   Sig: TEST 2 TIMES A DAY DX E11 9   Omega-3 Fatty Acids (FISH OIL CONCENTRATE PO)  Self Yes No   Sig: Take 1,000 mg by mouth 2 (two) times a day   aspirin (ECOTRIN LOW STRENGTH) 81 mg EC tablet  Self Yes No   Sig: Take 81 mg by mouth daily   cholecalciferol (VITAMIN D3) 1,000 units tablet  Self Yes No Sig: Take 2,000 Units by mouth daily   clindamycin (CLEOCIN) 300 MG capsule   No No   Sig: Take 1 capsule (300 mg total) by mouth 3 (three) times a day for 10 days   multivitamin-iron-minerals-folic acid (CENTRUM) chewable tablet  Self Yes No   Sig: Chew 1 tablet daily   naproxen (NAPROSYN) 500 mg tablet   Yes No   Sig: Take 500 mg by mouth 2 (two) times a day   simvastatin (ZOCOR) 40 mg tablet  Self No No   Sig: Take 1 tablet (40 mg total) by mouth daily at bedtime for 7 days      Facility-Administered Medications: None       Past Medical History:   Diagnosis Date    Diabetes mellitus ([address] )     Hiatal hernia     Hyperlipidemia     Hypertension     Leg pain     Neuropathy     PE (pulmonary thromboembolism) (HCC)     Shortness of breath     Venous insufficiency (chronic) (peripheral)        Past Surgical History:   Procedure Laterality Date    FRACTURE SURGERY  7/12/20    IR PE ENDOVASCULAR THERAPY  8/18/2020    JOINT REPLACEMENT Bilateral     knees    LEG SURGERY      ORTHOPEDIC SURGERY      CT COLONOSCOPY FLX DX W/COLLJ SPEC WHEN PFRMD N/A 6/30/2017    Procedure: EGD AND COLONOSCOPY;  Surgeon: Stiven Root MD;  Location: [facility]; Service: General    TONSILLECTOMY         Family History   Problem Relation Age of Onset    Cancer Mother     Cancer Father      I have reviewed and agree with the history as documented  E-Cigarette/Vaping    E-Cigarette Use Never User      E-Cigarette/Vaping Substances    Nicotine No     THC No     CBD No     Flavoring No     Other No     Unknown No      Social History     Tobacco Use    Smoking status: Never Smoker    Smokeless tobacco: Never Used   Vaping Use    Vaping Use: Never used   Substance Use Topics    Alcohol use: Never    Drug use: No        Review of Systems   Constitutional: Negative for appetite change, chills, diaphoresis, fever and unexpected weight change  HENT: Negative for congestion and rhinorrhea      Eyes: Negative for photophobia and visual disturbance  Respiratory: Negative for cough, chest tightness and shortness of breath  Cardiovascular: Negative for chest pain, palpitations and leg swelling  Gastrointestinal: Negative for abdominal distention, abdominal pain, blood in stool, constipation, diarrhea, nausea and vomiting  Genitourinary: Negative for dysuria and hematuria  Musculoskeletal: Positive for arthralgias and myalgias  Negative for back pain, joint swelling, neck pain and neck stiffness  Skin: Negative for color change, pallor, rash and wound  Neurological: Negative for dizziness, syncope, weakness, light-headedness and headaches  Psychiatric/Behavioral: Negative for agitation  All other systems reviewed and are negative  Physical Exam  ED Triage Vitals [07/17/22 1438]   Temperature Pulse Respirations Blood Pressure SpO2   98 °F (36 7 °C) 67 18 133/62 93 %      Temp Source Heart Rate Source Patient Position - Orthostatic VS BP Location FiO2 (%)   Tympanic Monitor Sitting Left arm --      Pain Score       --             Orthostatic Vital Signs  Vitals:    07/17/22 1438 07/17/22 1649 07/17/22 1842   BP: 133/62 141/68 148/68   Pulse: 67 (!) 53 (!) 53   Patient Position - Orthostatic VS: Sitting Sitting Sitting       Physical Exam  Vitals and nursing note reviewed  Constitutional:       General: He is not in acute distress  Appearance: Normal appearance  He is well-developed  He is not ill-appearing, toxic-appearing or diaphoretic  HENT:      Head: Normocephalic and atraumatic  Nose: Nose normal  No congestion or rhinorrhea  Mouth/Throat:      Mouth: Mucous membranes are moist       Pharynx: Oropharynx is clear  No oropharyngeal exudate or posterior oropharyngeal erythema  Eyes:      General: No scleral icterus  Right eye: No discharge  Left eye: No discharge  Extraocular Movements: Extraocular movements intact        Conjunctiva/sclera: Conjunctivae normal  Pupils: Pupils are equal, round, and reactive to light  Neck:      Vascular: No JVD  Trachea: No tracheal deviation  Comments: Supple  Normal range of motion  Cardiovascular:      Rate and Rhythm: Normal rate and regular rhythm  Heart sounds: Normal heart sounds  No murmur heard  No friction rub  No gallop  Comments: Normal rate and regular rhythm  2+ DP PT pulses bilaterally and feet warm with normal cap refill  Pulmonary:      Effort: Pulmonary effort is normal  No respiratory distress  Breath sounds: Normal breath sounds  No stridor  No wheezing or rales  Comments: Clear to auscultation bilaterally  Chest:      Chest wall: No tenderness  Abdominal:      General: Bowel sounds are normal  There is no distension  Palpations: Abdomen is soft  Tenderness: There is no abdominal tenderness  There is no right CVA tenderness, left CVA tenderness, guarding or rebound  Comments: Soft, nontender, nondistended  Normal bowel sounds throughout   Musculoskeletal:         General: No swelling, tenderness, deformity or signs of injury  Normal range of motion  Cervical back: Normal range of motion and neck supple  No rigidity  No muscular tenderness  Right lower leg: No edema  Left lower leg: No edema  Comments: No calf swelling or tenderness  Mild right knee swelling without overlying tenderness or ecchymosis  ROM intact  No back TTP including midline   Lymphadenopathy:      Cervical: No cervical adenopathy  Skin:     General: Skin is warm and dry  Coloration: Skin is not pale  Findings: No erythema or rash  Neurological:      General: No focal deficit present  Mental Status: He is alert  Mental status is at baseline  Sensory: No sensory deficit  Motor: No weakness or abnormal muscle tone  Coordination: Coordination normal       Gait: Gait normal       Comments: Alert   Strength and sensation intact entirety bilateral lower extremities  Strength and sensation otherwise grossly intact  Ambulatory without difficulty at baseline  Psychiatric:         Behavior: Behavior normal          Thought Content: Thought content normal          ED Medications  Medications - No data to display    Diagnostic Studies  Results Reviewed     None                 VAS lower limb venous duplex study, unilateral/limited   Final Result by Talon Patterson MD (07/17 2215)      XR hip/pelv 2-3 vws right   Final Result by Blossom Rodriguez MD (07/18 7687)      No acute osseous abnormality  Workstation performed: [id number]         XR knee 4+ views Right injury   Final Result by Blossom Rodriguez MD (07/18 3976)      Unremarkable appearance of total knee arthroplasty  Workstation performed: [id number]               Procedures  Procedures      ED Course                             SBIRT 20yo+    Flowsheet Row Most Recent Value   SBIRT (25 yo +)    In order to provide better care to our patients, we are screening all of our patients for alcohol and drug use  Would it be okay to ask you these screening questions? No Filed at: 07/17/2022 1551                MDM  Number of Diagnoses or Management Options  Acute pain of right knee  Right leg pain  Diagnosis management comments: 59-year-old male history of hypertension, diabetes, previous provoked PE only on aspirin not on anticoagulation presenting with right knee pain  Worsening pain in setting of no injury not worsened with movement in setting of history of clots, concerning for possible DVT  No erythema or warmth or other changes concerning for infection  Plan for x-rays right knee and hip  RLE duplex  Patient refusing any pain medication at this time  Reassess  Xrays no acute process  Duplex no clot but did note small anterior knee effusion  Patient added that when he was lying flat for the studies, he had increasing anterior superior knee pain   Further evaluation revealed that patient now noticing increasing discomfort with stretching or firing of quads and resolution of discomfort when quads are relaxed  Suspect some component of quad spasm/strain  Able to ambulate at baseline  Discussed results and recommendations  Advised follow up PCP and ortho  Medication recommendations  Given instructions and return precautions  Patient/family at bedside acknowledged understanding of all written and verbal instructions and return precautions  Discharged  Amount and/or Complexity of Data Reviewed  Clinical lab tests: reviewed  Tests in the radiology section of CPT®: reviewed and ordered  Tests in the medicine section of CPT®: reviewed  Review and summarize past medical records: yes  Discuss the patient with other providers: yes  Independent visualization of images, tracings, or specimens: yes    Risk of Complications, Morbidity, and/or Mortality  Presenting problems: moderate  Diagnostic procedures: moderate  Management options: moderate    Patient Progress  Patient progress: stable      Disposition  Final diagnoses:   Right leg pain   Acute pain of right knee     Time reflects when diagnosis was documented in both MDM as applicable and the Disposition within this note     Time User Action Codes Description Comment    7/17/2022  4:01 PM Yessy Sims Add [U19 322] Right leg pain     7/17/2022  4:01 PM Yessy Sims Add [M25 561] Acute pain of right knee       ED Disposition     ED Disposition   Discharge    Condition   Stable    Date/Time   Sun Jul 17, 2022  7:15 PM    Comment   Zenaida Canavan discharge to home/self care                 Follow-up Information     Follow up With Specialties Details Why Contact Info Additional Information    Larry Eduardo MD Internal Medicine Schedule an appointment as soon as possible for a visit in 1 week  [facility] [address] [id number] Orthopedic Surgery Schedule an appointment as soon as possible for a visit in 3 days  [address] [facility]  [address] [facility] [address], [address], [place], [address] [facility], [address] [facility]          Discharge Medication List as of 7/17/2022  7:19 PM      CONTINUE these medications which have NOT CHANGED    Details   aspirin (ECOTRIN LOW STRENGTH) 81 mg EC tablet Take 81 mg by mouth daily, Historical Med      BD Insulin Syringe U/F 31G X 5/16" 0 5 ML MISC Use as directed, Starting Tue 3/22/2022, Historical Med      cholecalciferol (VITAMIN D3) 1,000 units tablet Take 2,000 Units by mouth daily, Historical Med      clindamycin (CLEOCIN) 300 MG capsule Take 1 capsule (300 mg total) by mouth 3 (three) times a day for 10 days, Starting Fri 7/8/2022, Until Mon 7/18/2022, Normal      Continuous Blood Gluc Sensor (FreeStyle Christian 2 Sensor) MISC E11 9 CHECK BLOOD SUGAR, Historical Med      Empagliflozin (Jardiance) 25 MG TABS Take 1 tablet (25 mg total) by mouth every morning for 7 days, Starting Fri 11/5/2021, Until Tue 12/21/2021, Normal      HUMALOG 100 UNIT/ML injection Inject under the skin as needed = Inject as per sliding scale:  if 0 - 149 = 0 units;  150 - 209 = 1 unit;  210 - 269 = 2 units;  270 - 329 = 3 units;  330 - 389 = 4 units;  390+ = 5 units Call MD if BS < 60 or > 390,  subcutaneously before meals for DM,  Starting Fri 5/24/2019, Historical Med      Levemir 100 UNIT/ML subcutaneous injection Starting Thu 11/25/2021, Historical Med      multivitamin-iron-minerals-folic acid (CENTRUM) chewable tablet Chew 1 tablet daily, Historical Med      naproxen (NAPROSYN) 500 mg tablet Take 500 mg by mouth 2 (two) times a day, Starting Fri 5/27/2022, Historical Med      Omega-3 Fatty Acids (FISH OIL CONCENTRATE PO) Take 1,000 mg by mouth 2 (two) times a day, Historical Med      ONETOUCH VERIO test strip TEST 2 TIMES A DAY DX E11 9, Historical Med simvastatin (ZOCOR) 40 mg tablet Take 1 tablet (40 mg total) by mouth daily at bedtime for 7 days, Starting Fri 11/5/2021, Until Tue 12/21/2021, Normal           No discharge procedures on file  PDMP Review       Value Time User    PDMP Reviewed  Yes 5/14/2021  2:19 PM Bill Mcgraw PA-C           ED Provider  Attending physically available and evaluated Sandi Olson  [place] managed the patient along with the ED Attending      Electronically Signed by         Xuan Amador MD  07/18/22 9360

## 2022-07-21 NOTE — ED PROVIDER NOTES
History  Chief Complaint   Patient presents with    Leg Pain     Right leg weakness and pain that stated a few days ago, no known injury pain started below knee cap and has traveled up to hip now      65-year-old male history of hypertension, diabetes, previous provoked PE only on aspirin not on anticoagulation presenting with right knee pain  Patient reports insidious onset of anterior right knee pain that began when he went to bed 2 nights ago  Since that time, pain has been getting worse and now extends from mid calf up through proximal thigh encompassing the entirety of his leg  Denies any exacerbating or relieving factors such as twisting or turning or weight-bearing  Denies any numbness or weakness  Denies any focal neurological changes  Denies any known injuries or falls  Denies any other complaints  Past Medical History:  No date: Diabetes mellitus ([facility] [address] )  No date: Hiatal hernia  No date: Hyperlipidemia  No date: Hypertension  No date: Leg pain  No date: Neuropathy  No date: PE (pulmonary thromboembolism) (HCC)  No date: Shortness of breath  No date: Venous insufficiency (chronic) (peripheral)  Family History: non-contributory  Social History        Leg Pain  Associated symptoms: no back pain, no fever and no neck pain        Prior to Admission Medications   Prescriptions Last Dose Informant Patient Reported? Taking?    BD Insulin Syringe U/F 31G X 5/16" 0 5 ML MISC  Self Yes No   Sig: Use as directed   Continuous Blood Gluc Sensor (FreeStyle Christian 2 Sensor) MISC  Self Yes No   Sig: E11 9 CHECK BLOOD SUGAR   Empagliflozin (Jardiance) 25 MG TABS  Self No No   Sig: Take 1 tablet (25 mg total) by mouth every morning for 7 days   HUMALOG 100 UNIT/ML injection  Self Yes No   Sig: Inject under the skin as needed = Inject as per sliding scale:  if 0 - 149 = 0 units;  150 - 209 = 1 unit;  210 - 269 = 2 units;  270 - 329 = 3 units;  330 - 389 = 4 units;  390+ = 5 units Call MD if BS < 60 or > 390, subcutaneously before meals for DM   Levemir 100 UNIT/ML subcutaneous injection  Self Yes No   ONETOUCH VERIO test strip  Self Yes No   Sig: TEST 2 TIMES A DAY DX E11 9   Omega-3 Fatty Acids (FISH OIL CONCENTRATE PO)  Self Yes No   Sig: Take 1,000 mg by mouth 2 (two) times a day   aspirin (ECOTRIN LOW STRENGTH) 81 mg EC tablet  Self Yes No   Sig: Take 81 mg by mouth daily   cholecalciferol (VITAMIN D3) 1,000 units tablet  Self Yes No   Sig: Take 2,000 Units by mouth daily   clindamycin (CLEOCIN) 300 MG capsule   No No   Sig: Take 1 capsule (300 mg total) by mouth 3 (three) times a day for 10 days   multivitamin-iron-minerals-folic acid (CENTRUM) chewable tablet  Self Yes No   Sig: Chew 1 tablet daily   naproxen (NAPROSYN) 500 mg tablet   Yes No   Sig: Take 500 mg by mouth 2 (two) times a day   simvastatin (ZOCOR) 40 mg tablet  Self No No   Sig: Take 1 tablet (40 mg total) by mouth daily at bedtime for 7 days      Facility-Administered Medications: None       Past Medical History:   Diagnosis Date    Diabetes mellitus (HCC)     Hiatal hernia     Hyperlipidemia     Hypertension     Leg pain     Neuropathy     PE (pulmonary thromboembolism) (HCC)     Shortness of breath     Venous insufficiency (chronic) (peripheral)        Past Surgical History:   Procedure Laterality Date    FRACTURE SURGERY  7/12/20    IR PE ENDOVASCULAR THERAPY  8/18/2020    JOINT REPLACEMENT Bilateral     knees    LEG SURGERY      ORTHOPEDIC SURGERY      [place] COLONOSCOPY FLX DX W/COLLJ SPEC WHEN PFRMD N/A 6/30/2017    Procedure: EGD AND COLONOSCOPY;  Surgeon: Abdirahman Loomis MD;  Location: [facility]; Service: General    TONSILLECTOMY         Family History   Problem Relation Age of Onset    Cancer Mother     Cancer Father      I have reviewed and agree with the history as documented      E-Cigarette/Vaping    E-Cigarette Use Never User      E-Cigarette/Vaping Substances    Nicotine No     THC No     CBD No     Flavoring No  Other No     Unknown No      Social History     Tobacco Use    Smoking status: Never Smoker    Smokeless tobacco: Never Used   Vaping Use    Vaping Use: Never used   Substance Use Topics    Alcohol use: Never    Drug use: No        Review of Systems   Constitutional: Negative for appetite change, chills, diaphoresis, fever and unexpected weight change  HENT: Negative for congestion and rhinorrhea  Eyes: Negative for photophobia and visual disturbance  Respiratory: Negative for cough, chest tightness and shortness of breath  Cardiovascular: Negative for chest pain, palpitations and leg swelling  Gastrointestinal: Negative for abdominal distention, abdominal pain, blood in stool, constipation, diarrhea, nausea and vomiting  Genitourinary: Negative for dysuria and hematuria  Musculoskeletal: Positive for arthralgias and myalgias  Negative for back pain, joint swelling, neck pain and neck stiffness  Skin: Negative for color change, pallor, rash and wound  Neurological: Negative for dizziness, syncope, weakness, light-headedness and headaches  Psychiatric/Behavioral: Negative for agitation  All other systems reviewed and are negative  Physical Exam  ED Triage Vitals [07/17/22 1438]   Temperature Pulse Respirations Blood Pressure SpO2   98 °F (36 7 °C) 67 18 133/62 93 %      Temp Source Heart Rate Source Patient Position - Orthostatic VS BP Location FiO2 (%)   Tympanic Monitor Sitting Left arm --      Pain Score       --             Orthostatic Vital Signs  Vitals:    07/17/22 1438 07/17/22 1649 07/17/22 1842   BP: 133/62 141/68 148/68   Pulse: 67 (!) 53 (!) 53   Patient Position - Orthostatic VS: Sitting Sitting Sitting       Physical Exam  Vitals and nursing note reviewed  Constitutional:       General: He is not in acute distress  Appearance: Normal appearance  He is well-developed  He is not ill-appearing, toxic-appearing or diaphoretic     HENT:      Head: Normocephalic and atraumatic  Nose: Nose normal  No congestion or rhinorrhea  Mouth/Throat:      Mouth: Mucous membranes are moist       Pharynx: Oropharynx is clear  No oropharyngeal exudate or posterior oropharyngeal erythema  Eyes:      General: No scleral icterus  Right eye: No discharge  Left eye: No discharge  Extraocular Movements: Extraocular movements intact  Conjunctiva/sclera: Conjunctivae normal       Pupils: Pupils are equal, round, and reactive to light  Neck:      Vascular: No JVD  Trachea: No tracheal deviation  Comments: Supple  Normal range of motion  Cardiovascular:      Rate and Rhythm: Normal rate and regular rhythm  Heart sounds: Normal heart sounds  No murmur heard  No friction rub  No gallop  Comments: Normal rate and regular rhythm  2+ DP PT pulses bilaterally and feet warm with normal cap refill  Pulmonary:      Effort: Pulmonary effort is normal  No respiratory distress  Breath sounds: Normal breath sounds  No stridor  No wheezing or rales  Comments: Clear to auscultation bilaterally  Chest:      Chest wall: No tenderness  Abdominal:      General: Bowel sounds are normal  There is no distension  Palpations: Abdomen is soft  Tenderness: There is no abdominal tenderness  There is no right CVA tenderness, left CVA tenderness, guarding or rebound  Comments: Soft, nontender, nondistended  Normal bowel sounds throughout   Musculoskeletal:         General: No swelling, tenderness, deformity or signs of injury  Normal range of motion  Cervical back: Normal range of motion and neck supple  No rigidity  No muscular tenderness  Right lower leg: No edema  Left lower leg: No edema  Comments: No calf swelling or tenderness  Mild right knee swelling without overlying tenderness or ecchymosis  ROM intact  No back TTP including midline   Lymphadenopathy:      Cervical: No cervical adenopathy     Skin: General: Skin is warm and dry  Coloration: Skin is not pale  Findings: No erythema or rash  Neurological:      General: No focal deficit present  Mental Status: He is alert  Mental status is at baseline  Sensory: No sensory deficit  Motor: No weakness or abnormal muscle tone  Coordination: Coordination normal       Gait: Gait normal       Comments: Alert  Strength and sensation intact entirety bilateral lower extremities  Strength and sensation otherwise grossly intact  Ambulatory without difficulty at baseline  Psychiatric:         Behavior: Behavior normal          Thought Content: Thought content normal          ED Medications  Medications - No data to display    Diagnostic Studies  Results Reviewed     None                 VAS lower limb venous duplex study, unilateral/limited   Final Result by Salvador Cartagena MD (07/17 2215)      XR hip/pelv 2-3 vws right   Final Result by Agustin Obregon MD (07/18 6955)      No acute osseous abnormality  Workstation performed: [id number]         XR knee 4+ views Right injury   Final Result by Agustin Obregon MD (07/18 6958)      Unremarkable appearance of total knee arthroplasty  Workstation performed: [id number]               Procedures  Procedures      ED Course                             SBIRT 22yo+    Flowsheet Row Most Recent Value   SBIRT (25 yo +)    In order to provide better care to our patients, we are screening all of our patients for alcohol and drug use  Would it be okay to ask you these screening questions? No Filed at: 07/17/2022 1551                [facility]  Number of Diagnoses or Management Options  Acute pain of right knee  Right leg pain  Diagnosis management comments: 60-year-old male history of hypertension, diabetes, previous provoked PE only on aspirin not on anticoagulation presenting with right knee pain    Worsening pain in setting of no injury not worsened with movement in setting of history of clots, concerning for possible DVT  No erythema or warmth or other changes concerning for infection  Plan for x-rays right knee and hip  RLE duplex  Patient refusing any pain medication at this time  Reassess      Xrays no acute process  Duplex no clot but did note small anterior knee effusion  Patient added that when he was lying flat for the studies, he had increasing anterior superior knee pain  Further evaluation revealed that patient now noticing increasing discomfort with stretching or firing of quads and resolution of discomfort when quads are relaxed  Suspect some component of quad spasm/strain  Able to ambulate at baseline  Discussed results and recommendations  Advised follow up PCP and ortho  Medication recommendations  Given instructions and return precautions  Patient/family at bedside acknowledged understanding of all written and verbal instructions and return precautions  Discharged         Amount and/or Complexity of Data Reviewed  Clinical lab tests: reviewed and ordered  Tests in the radiology section of CPT®: reviewed  Tests in the medicine section of CPT®: reviewed and ordered  Review and summarize past medical records: yes  Independent visualization of images, tracings, or specimens: yes    Risk of Complications, Morbidity, and/or Mortality  Presenting problems: moderate  Diagnostic procedures: moderate  Management options: moderate    Patient Progress  Patient progress: stable      Disposition  Final diagnoses:   Right leg pain   Acute pain of right knee     Time reflects when diagnosis was documented in both MDM as applicable and the Disposition within this note     Time User Action Codes Description Comment    7/17/2022  4:01 PM Brii Wyatt Add [T27 387] Right leg pain     7/17/2022  4:01 PM Brii Wyatt Add [M25 561] Acute pain of right knee       ED Disposition     ED Disposition   Discharge    Condition   Stable    Date/Time   Sun Jul 17, 2022  7:15 PM    Comment   Octavio Petit discharge to home/self care                 Follow-up Information     Follow up With Specialties Details Why Contact Info Additional Information    Rizwan Ocasio MD Internal Medicine Schedule an appointment as soon as possible for a visit in 1 week  [address] [facility] [phone] Orthopedic Surgery Schedule an appointment as soon as possible for a visit in 3 days  Carlos [address]  [address] [facility], [id number] [place] [address], [address] [facility]          Discharge Medication List as of 7/17/2022  7:19 PM      CONTINUE these medications which have NOT CHANGED    Details   aspirin (ECOTRIN LOW STRENGTH) 81 mg EC tablet Take 81 mg by mouth daily, Historical Med      BD Insulin Syringe U/F 31G X 5/16" 0 5 ML MISC Use as directed, Starting Tue 3/22/2022, Historical Med      cholecalciferol (VITAMIN D3) 1,000 units tablet Take 2,000 Units by mouth daily, Historical Med      clindamycin (CLEOCIN) 300 MG capsule Take 1 capsule (300 mg total) by mouth 3 (three) times a day for 10 days, Starting Fri 7/8/2022, Until Mon 7/18/2022, Normal      Continuous Blood Gluc Sensor (FreeStyle Christian 2 Sensor) MISC E11 9 CHECK BLOOD SUGAR, Historical Med      Empagliflozin (Jardiance) 25 MG TABS Take 1 tablet (25 mg total) by mouth every morning for 7 days, Starting Fri 11/5/2021, Until Tue 12/21/2021, Normal      HUMALOG 100 UNIT/ML injection Inject under the skin as needed = Inject as per sliding scale:  if 0 - 149 = 0 units;  150 - 209 = 1 unit;  210 - 269 = 2 units;  270 - 329 = 3 units;  330 - 389 = 4 units;  390+ = 5 units Call MD if BS < 60 or > 390,  subcutaneously before meals for DM,  Starting Fri 5/24/2019, Historical Med      Levemir 100 UNIT/ML subcutaneous injection Starting u 11/25/2021, Historical Med multivitamin-iron-minerals-folic acid (CENTRUM) chewable tablet Chew 1 tablet daily, Historical Med      naproxen (NAPROSYN) 500 mg tablet Take 500 mg by mouth 2 (two) times a day, Starting Fri 5/27/2022, Historical Med      Omega-3 Fatty Acids (FISH OIL CONCENTRATE PO) Take 1,000 mg by mouth 2 (two) times a day, Historical Med      ONETOUCH VERIO test strip TEST 2 TIMES A DAY DX E11 9, Historical Med      simvastatin (ZOCOR) 40 mg tablet Take 1 tablet (40 mg total) by mouth daily at bedtime for 7 days, Starting Fri 11/5/2021, Until Tue 12/21/2021, Normal           No discharge procedures on file  PDMP Review       Value Time User    PDMP Reviewed  Yes 5/14/2021  2:19 PM Julio Mccall PA-C           ED Provider  Attending physically available and evaluated Jeanie Matute  I managed the patient along with the ED Attending      Electronically Signed by         Gabby Ledesma MD  07/18/22 6570       Gabby Ledesma MD  07/21/22 8330

## 2022-09-30 NOTE — ED PROVIDER NOTES
History  Chief Complaint   Patient presents with    Chest Injury     pt fell last thursday, hit the side of the driveway and landed on left side of rib cage     50-year-old male with a history of diabetes, reports that he has problems with his lungs in uses an inhaler, presenting chief complaint of left chest injury  Patient states that he was in his driveway on Thursday he tripped and fell landing on the left side of his chest   He did not strike his head or lose consciousness, he does not take anticoagulants, he has no neck pain, he was able to ambulate anesthesia is sore in his left axilla, he woke up the next morning noticed that it was worse, his pain is localized to his left axilla, he describes as a pulled muscle sensation it is only present when he tries to push himself up out of a chair and lift his arm over his head, it is been constant since onset and mildly improved, his family member noticed that he had a small bruise on his left pec and instructed come the emergency department for further evaluation  Pain does not radiate into his back chest or neck, there are no other exacerbating remitting factors he has not taken anything for this, he denies being short of breath having cough production or hemoptysis he denies pleuritic pain it is not worse with taking a deep breath in he has no exertional symptoms he has no other injuries complaints or concerns otherwise denies a complete review systems is noted            Prior to Admission Medications   Prescriptions Last Dose Informant Patient Reported? Taking?    Cholecalciferol (VITAMIN D) 2000 units CAPS   Yes No   Sig: Take by mouth   Empagliflozin (JARDIANCE) 25 MG TABS   Yes No   Sig: Take 25 mg by mouth every morning   Insulin Degludec (TRESIBA FLEXTOUCH SC)   Yes No   Sig: Inject 20 Units under the skin daily at bedtime   Omega-3 Fatty Acids (FISH OIL CONCENTRATE PO)  Self Yes No   Sig: Take by mouth   amLODIPine (NORVASC) 10 mg tablet   Yes No   Sig: Take 10 mg by mouth daily   aspirin (ECOTRIN LOW STRENGTH) 81 mg EC tablet   Yes No   Sig: Take by mouth daily   fenofibrate (TRICOR) 54 MG tablet   Yes No   Sig: Take 54 mg by mouth daily   insulin regular (HumuLIN R,NovoLIN R) 100 units/mL injection   Yes No   Sig: Inject 12 Units under the skin once   multivitamin-iron-minerals-folic acid (CENTRUM) chewable tablet   Yes No   Sig: Chew 1 tablet daily   simvastatin (ZOCOR) 40 mg tablet   Yes No   Sig: Take 40 mg by mouth daily at bedtime      Facility-Administered Medications: None       Past Medical History:   Diagnosis Date    Diabetes mellitus ([facility] [place] [address] )     Hiatal hernia     Hyperlipidemia     Hypertension     Shortness of breath        Past Surgical History:   Procedure Laterality Date    JOINT REPLACEMENT Bilateral     knees    UT COLONOSCOPY FLX DX W/COLLJ SPEC WHEN PFRMD N/A 6/30/2017    Procedure: EGD AND COLONOSCOPY;  Surgeon: Neo Talbot MD;  Location: [facility]; Service: General    TONSILLECTOMY         History reviewed  No pertinent family history  I have reviewed and agree with the history as documented  Social History   Substance Use Topics    Smoking status: Never Smoker    Smokeless tobacco: Never Used    Alcohol use No        Review of Systems   Constitutional: Negative for activity change, appetite change, chills and fever  HENT: Negative for rhinorrhea, sore throat, trouble swallowing and voice change  Eyes: Negative for photophobia, pain and visual disturbance  Respiratory: Negative for cough, chest tightness, shortness of breath and wheezing  Cardiovascular: Positive for chest pain  Negative for palpitations  Gastrointestinal: Negative for abdominal pain, diarrhea, nausea and vomiting  Genitourinary: Negative for dysuria, frequency and urgency  Musculoskeletal: Negative for arthralgias, back pain, myalgias, neck pain and neck stiffness  Skin: Negative for color change and rash     Neurological: Negative for dizziness, weakness, light-headedness, numbness and headaches  Hematological: Negative for adenopathy  Does not bruise/bleed easily  Psychiatric/Behavioral: Negative for agitation and behavioral problems  All other systems reviewed and are negative  Physical Exam  Physical Exam   Constitutional: He is oriented to person, place, and time  He appears well-developed and well-nourished  No distress  HENT:   Head: Normocephalic and atraumatic  Eyes: EOM are normal  Pupils are equal, round, and reactive to light  Neck: Normal range of motion  Neck supple  No tracheal deviation present  Cardiovascular: Normal rate, regular rhythm and normal heart sounds  Exam reveals no gallop and no friction rub  No murmur heard  Pulmonary/Chest: Effort normal and breath sounds normal  He has no wheezes  He has no rales  He exhibits tenderness  Patient has mild tenderness in his left mid axilla with that reproduces symptoms with deep palpation he has a less than 1 cm minor ecchymosis on his left pectoralis muscle with no tenderness, the remainder of his chest exam is unremarkable he has clear and equal lungs with normal work of breathing he has no splinting,   Abdominal: Soft  Bowel sounds are normal  He exhibits no distension  There is no tenderness  There is no rebound and no guarding  Musculoskeletal: Normal range of motion  He exhibits no edema or tenderness  Neurological: He is alert and oriented to person, place, and time  No cranial nerve deficit  He exhibits normal muscle tone  Coordination normal    Skin: Skin is warm and dry  No rash noted  Psychiatric: He has a normal mood and affect  His behavior is normal    Nursing note and vitals reviewed        Vital Signs  ED Triage Vitals [05/29/18 1559]   Temperature Pulse Respirations Blood Pressure SpO2   98 8 °F (37 1 °C) 83 16 166/78 92 %      Temp Source Heart Rate Source Patient Position - Orthostatic VS BP Location FiO2 (%)   Oral Monitor Sitting Right arm --      Pain Score       No Pain           Vitals:    05/29/18 1559   BP: 166/78   Pulse: 83   Patient Position - Orthostatic VS: Sitting       Visual Acuity      ED Medications  Medications - No data to display    Diagnostic Studies  Results Reviewed     None                 XR ribs left w pa chest min 3 views   Final Result by Bjorn Singh MD (05/29 1657)      No active cardiopulmonary disease        No acute displaced fracture seen          Workstation performed: [id number]                    Procedures  Procedures       Phone Contacts  ED Phone Contact    ED Course  ED Course as of May 30 1050   Tue May 29, 2018   1610 High EKG reviewed normal sinus rhythm 71 beats per minute right access, bifascicular block, no acute ST segment or T-wave abnormality no previous EKGs    1707 Patient is 4 days from the incident he is mildly tender with deep palpation only in his left axilla his lung sounds are clear equal he has no subjective shortness of breath he has no pain with taking a deep breath in his rib series is negative, will treat symptomatically close return follow-up instructions patient agreeable plan                                MDM  Number of Diagnoses or Management Options  Contusion of left chest wall, initial encounter:   Diagnosis management comments: 76-year-old male with hypertension diabetes mechanical fall approximately 3 or 4 days ago landing on his left chest, some minimal discomfort localized to his left axilla that is only present when he lifts his arm over his head a goes from a seated to a standing position using his arms, his daughter noticed and minimal contusion on his left pectoralis muscle today instructed him to the emergency department he has no chest pain or shortness of breath he did not strike his head or lose consciousness he has no neck pain weakness paresthesias or anesthesia he is not anticoagulated he has no other muscle skeletal complaints or injuries otherwise denies a complete review systems on exam he is afebrile mildly elevated blood pressure otherwise clinically well-appearing his lungs are clear and equal he is not splinting he has minimal discomfort on his left lateral chest with deep palpation there is no crepitus, his x-ray reviewed at bedside no acute abnormality, he has 3-4 days from the incident he has no shortness of breath no other symptoms, will discharge with symptom control close return follow-up instructions, patient agreeable plan    CritCare Time    Disposition  Final diagnoses:   Contusion of left chest wall, initial encounter     Time reflects when diagnosis was documented in both MDM as applicable and the Disposition within this note     Time User Action Codes Description Comment    5/29/2018  5:23 PM Nathalie Shay Add [[id number]] Contusion of left chest wall, initial encounter       ED Disposition     ED Disposition Condition Comment    Discharge  Elizabeth Colder discharge to home/self care      Condition at discharge: Good        Follow-up Information     Follow up With Specialties Details Why Contact Info Additional [address] [facility] Emergency Department Emergency Medicine  If symptoms worsen [address] [address] ED, [address] [place] [address] Elvira Hooker MD Internal Medicine In 3 days As needed [address] [place] [address] [phone]             Discharge Medication List as of 5/29/2018  5:36 PM      START taking these medications    Details   cyclobenzaprine (FLEXERIL) 5 mg tablet Take 1 tablet (5 mg total) by mouth 3 (three) times a day as needed for muscle spasms for up to 7 days, Starting Tue 5/29/2018, Until Tue 6/5/2018, Print      naproxen (NAPROSYN) 375 mg tablet Take 1 tablet (375 mg total) by mouth 2 (two) times a day with meals, Starting Tue 5/29/2018, Print         CONTINUE these medications which have NOT CHANGED    Details   amLODIPine (NORVASC) 10 mg tablet Take 10 mg by mouth daily, Historical Med      aspirin (ECOTRIN LOW STRENGTH) 81 mg EC tablet Take by mouth daily, Historical Med      Cholecalciferol (VITAMIN D) 2000 units CAPS Take by mouth, Historical Med      Empagliflozin (JARDIANCE) 25 MG TABS Take 25 mg by mouth every morning, Historical Med      fenofibrate (TRICOR) 54 MG tablet Take 54 mg by mouth daily, Historical Med      Insulin Degludec (TRESIBA FLEXTOUCH SC) Inject 20 Units under the skin daily at bedtime, Historical Med      insulin regular (HumuLIN R,NovoLIN R) 100 units/mL injection Inject 12 Units under the skin once, Historical Med      multivitamin-iron-minerals-folic acid (CENTRUM) chewable tablet Chew 1 tablet daily, Historical Med      Omega-3 Fatty Acids (FISH OIL CONCENTRATE PO) Take by mouth, Historical Med      simvastatin (ZOCOR) 40 mg tablet Take 40 mg by mouth daily at bedtime, Historical Med           No discharge procedures on file      ED Provider  Electronically Signed by           Meghann Kam DO  05/30/18 1568 A/P: Pt is a 35 yo  at 39w1d presenting for elective induction of labor    Plan  1. Admit to LND. Routine Labs. IVF  2. IOL with pitocin   3. Fetus: Cat 1 tracing, Vertex, EFW 3855g   4. GBS status -, no abx indicated  5. Pain: epidural PRN    Discussed with Dr. Dax Dupree, MS3  Kelsi Renteria, PGY1         A/P: Pt is a 35 yo  at 39w1d presenting for elective induction of labor.    Plan  1. Admit to LND. Routine Labs. IVF  2. IOL with pitocin 4x4  3. Fetus: Cat 1 tracing, Vertex, EFW 3700 g  4. GBS neg, no abx indicated  5. Pain: epidural PRN    Discussed with Dr. Dax Dupree, MS3  Kelsi Renteria, PGY1

## 2023-11-11 ENCOUNTER — APPOINTMENT (EMERGENCY)
Dept: CT IMAGING | Facility: HOSPITAL | Age: 78
End: 2023-11-11
Payer: MEDICARE

## 2023-11-11 ENCOUNTER — HOSPITAL ENCOUNTER (EMERGENCY)
Facility: HOSPITAL | Age: 78
Discharge: HOME | End: 2023-11-11
Attending: EMERGENCY MEDICINE
Payer: MEDICARE

## 2023-11-11 ENCOUNTER — APPOINTMENT (EMERGENCY)
Dept: RADIOLOGY | Facility: HOSPITAL | Age: 78
End: 2023-11-11
Payer: MEDICARE

## 2023-11-11 VITALS
HEART RATE: 57 BPM | DIASTOLIC BLOOD PRESSURE: 62 MMHG | SYSTOLIC BLOOD PRESSURE: 142 MMHG | RESPIRATION RATE: 14 BRPM | TEMPERATURE: 98.2 F | OXYGEN SATURATION: 94 %

## 2023-11-11 DIAGNOSIS — S01.81XA: ICD-10-CM

## 2023-11-11 DIAGNOSIS — S81.812A LACERATION OF LEFT LEG, INITIAL ENCOUNTER: ICD-10-CM
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
## 2023-11-11 LAB
ANION GAP SERPL CALCULATED.3IONS-SCNC: 5 MMOL/L
APTT PPP: 32 SECONDS (ref 23–37)
ATRIAL RATE: 55 BPM
BASOPHILS # BLD AUTO: 0.05 THOUSANDS/ÂΜL (ref 0–0.1)
BASOPHILS NFR BLD AUTO: 1 % (ref 0–1)
BUN SERPL-MCNC: 19 MG/DL (ref 5–25)
CALCIUM SERPL-MCNC: 9.4 MG/DL (ref 8.4–10.2)
CHLORIDE SERPL-SCNC: 105 MMOL/L (ref 96–108)
CO2 SERPL-SCNC: 29 MMOL/L (ref 21–32)
CREAT SERPL-MCNC: 0.7 MG/DL (ref 0.6–1.3)
EOSINOPHIL # BLD AUTO: 0.25 THOUSAND/ÂΜL (ref 0–0.61)
EOSINOPHIL NFR BLD AUTO: 3 % (ref 0–6)
ERYTHROCYTE [DISTWIDTH] IN BLOOD BY AUTOMATED COUNT: 14.2 % (ref 11.6–15.1)
GFR SERPL CREATININE-BSD FRML MDRD: 90 ML/MIN/1.73SQ M
GLUCOSE SERPL-MCNC: 106 MG/DL (ref 65–140)
HCT VFR BLD AUTO: 43.2 % (ref 36.5–49.3)
HGB BLD-MCNC: 14.4 G/DL (ref 12–17)
IMM GRANULOCYTES # BLD AUTO: 0.02 THOUSAND/UL (ref 0–0.2)
IMM GRANULOCYTES NFR BLD AUTO: 0 % (ref 0–2)
INR PPP: 0.98 (ref 0.84–1.19)
LYMPHOCYTES # BLD AUTO: 3.47 THOUSANDS/ÂΜL (ref 0.6–4.47)
LYMPHOCYTES NFR BLD AUTO: 37 % (ref 14–44)
MCH RBC QN AUTO: 31.1 PG (ref 26.8–34.3)
MCHC RBC AUTO-ENTMCNC: 33.3 G/DL (ref 31.4–37.4)
MCV RBC AUTO: 93 FL (ref 82–98)
MONOCYTES # BLD AUTO: 0.99 THOUSAND/ÂΜL (ref 0.17–1.22)
MONOCYTES NFR BLD AUTO: 11 % (ref 4–12)
NEUTROPHILS # BLD AUTO: 4.64 THOUSANDS/ÂΜL (ref 1.85–7.62)
NEUTS SEG NFR BLD AUTO: 48 % (ref 43–75)
NRBC BLD AUTO-RTO: 0 /100 WBCS
P AXIS: 23 DEGREES
PLATELET # BLD AUTO: 185 THOUSANDS/UL (ref 149–390)
PMV BLD AUTO: 10.8 FL (ref 8.9–12.7)
POTASSIUM SERPL-SCNC: 4.4 MMOL/L (ref 3.5–5.3)
PR INTERVAL: 174 MS
PROTHROMBIN TIME: 13.6 SECONDS (ref 11.6–14.5)
QRS AXIS: 108 DEGREES
QRSD INTERVAL: 112 MS
QT INTERVAL: 452 MS
QTC INTERVAL: 432 MS
RBC # BLD AUTO: 4.63 MILLION/UL (ref 3.88–5.62)
SODIUM SERPL-SCNC: 139 MMOL/L (ref 135–147)
T WAVE AXIS: -2 DEGREES
VENTRICULAR RATE: 55 BPM
WBC # BLD AUTO: 9.42 THOUSAND/UL (ref 4.31–10.16)

## 2023-11-11 PROCEDURE — 99284 EMERGENCY DEPT VISIT MOD MDM: CPT

## 2023-11-11 PROCEDURE — 12004 RPR S/N/AX/GEN/TRK7.6-12.5CM: CPT

## 2023-11-11 PROCEDURE — 70450 CT HEAD/BRAIN W/O DYE: CPT

## 2023-11-11 PROCEDURE — 85730 THROMBOPLASTIN TIME PARTIAL: CPT

## 2023-11-11 PROCEDURE — 85610 PROTHROMBIN TIME: CPT

## 2023-11-11 PROCEDURE — 80048 BASIC METABOLIC PNL TOTAL CA: CPT

## 2023-11-11 PROCEDURE — 73590 X-RAY EXAM OF LOWER LEG: CPT

## 2023-11-11 PROCEDURE — 93005 ELECTROCARDIOGRAM TRACING: CPT

## 2023-11-11 PROCEDURE — 93010 ELECTROCARDIOGRAM REPORT: CPT | Performed by: INTERNAL MEDICINE

## 2023-11-11 PROCEDURE — G1004 CDSM NDSC: HCPCS

## 2023-11-11 PROCEDURE — 71045 X-RAY EXAM CHEST 1 VIEW: CPT

## 2023-11-11 PROCEDURE — 36415 COLL VENOUS BLD VENIPUNCTURE: CPT

## 2023-11-11 PROCEDURE — 12011 RPR F/E/E/N/L/M 2.5 CM/<: CPT

## 2023-11-11 PROCEDURE — 99285 EMERGENCY DEPT VISIT HI MDM: CPT

## 2023-11-11 PROCEDURE — 85025 COMPLETE CBC W/AUTO DIFF WBC: CPT

## 2023-11-11 PROCEDURE — 73610 X-RAY EXAM OF ANKLE: CPT

## 2023-11-11 RX ORDER — LIDOCAINE HYDROCHLORIDE AND EPINEPHRINE 10; 10 MG/ML; UG/ML
1 INJECTION, SOLUTION INFILTRATION; PERINEURAL ONCE
Status: COMPLETED | OUTPATIENT
Start: 2023-11-11 | End: 2023-11-11

## 2023-11-11 RX ORDER — TRAMADOL HYDROCHLORIDE 50 MG/1
50 TABLET ORAL EVERY 6 HOURS PRN
Qty: 12 TABLET | Refills: 0 | Status: SHIPPED | OUTPATIENT
Start: 2023-11-11 | End: 2023-11-14

## 2023-11-11 RX ORDER — CEPHALEXIN 500 MG/1
500 CAPSULE ORAL EVERY 6 HOURS SCHEDULED
Qty: 40 CAPSULE | Refills: 0 | Status: SHIPPED | OUTPATIENT
Start: 2023-11-11 | End: 2023-11-21

## 2023-11-11 RX ADMIN — LIDOCAINE HYDROCHLORIDE,EPINEPHRINE BITARTRATE 1 ML: 10; .01 INJECTION, SOLUTION INFILTRATION; PERINEURAL at 12:02

## 2023-11-11 RX ADMIN — LIDOCAINE HYDROCHLORIDE,EPINEPHRINE BITARTRATE 1 ML: 10; .01 INJECTION, SOLUTION INFILTRATION; PERINEURAL at 11:36

## 2023-11-11 NOTE — ED ATTENDING ATTESTATION
11/11/2023  I, Sheria Boas, MD, saw and evaluated the patient. I have discussed the patient with the resident/non-physician practitioner and agree with the resident's/non-physician practitioner's findings, Plan of Care, and MDM as documented in the resident's/non-physician practitioner's note, except where noted. All available labs and Radiology studies were reviewed. I was present for key portions of any procedure(s) performed by the resident/non-physician practitioner and I was immediately available to provide assistance. Seen and examined by me also  69-year-old male reports blowing leaves outside and tripped and fell striking the right side of his face with a laceration and cutting his left leg. Patient was able to walk and ambulate to get back to his house. Physical exam, alert and awake, laceration along the lower right eyebrow  No chest or abdominal tenderness, there is a curvilinear laceration on the patient's left lower leg. CT scan of the brain showed no acute pathology  Chest x-ray showed no acute pathology. Patient was repaired by the advanced practitioner. Discussed outpatient treatment and follow-up with The patient discussed suture removal discussed risk of infection with patient and his family discussed indications to return. At this point I agree with the current assessment done in the Emergency Department.   I have conducted an independent evaluation of this patient a history and physical is as follows:    ED Course         Critical Care Time  Procedures

## 2023-11-11 NOTE — DISCHARGE INSTRUCTIONS
Keep wounds clean and dry, can clean with running water and mild soap after 24 hours. Apply ice to right eye 20 minutes at a time to reduce swelling  Go to PCP or Urgent care for facial suture removal within 5-7 days, leg suture removal.  Take antibiotic as prescribed. Take tylenol for pain relief, tramadol as needed. Return to ER if headache, chest pain, palpitations, worsening redness of skin, discharge from lacerations, fever/chills, separation of wound edges, continuous bleeding.

## 2023-11-11 NOTE — ED PROVIDER NOTES
History  Chief Complaint   Patient presents with    Fall     Pt fell while blowing leaves landing on rocks today, +BT, laceration to forehead and left lower leg      HPI:  Lurdes Pike is a 65 y.o. male with PMH of CAD with stent on brillinta and aspirin, hyperlipidemia, type 2 DM, PVD, left tibia surgically fixated 2 years ago, h/o PE, presenting after a fall from standing during leaf blowing outside his home. He reports +head strike without loss of consciousness, and lacerations of his right eyebrow and left leg. The patient reports tripping over a rock while leaf blowing and landing on his face and right hip on the jesus/leaf-covered ground. He denies any pain, chest pain, SOB, palpitations, lightheadeness, dizziness, imbalance, vision changes, weakness, abdominal pain, back pain, or neck pain before, during or after the fall. He was able to walk following the fall, enter his home, and wrap the laceration on his left leg independently. He arrived to the hospital with his wife and daughter, and was ambulatory to the ER bed. He reports blood sugar before the incident was 115 after his breakfast, and reports not taking his insulin. He did take his brillinta this morning per regimen. He denies any known medication allergies and reports no pain at the time of initial evaluation. Review of Systems: All other systems reviewed and negative except as stated above in the HPI. Primary Exam  A: Patent  B: Equal breath sounds bilaterally  C: Pulses intact in all extremities, no active bleeding  D: No signs of neurologic impairment  E: exposure completed     Chest xray done: without acute abnormalities    Secondary Exam  Constitutional:  No acute distress, resting on bed holding gauze to right forehead  Vital signs and nursing notes reviewed  HEENT:  C-spine cleared during exam, c-collar deferred. Ecchymosis with laceration along right eyebrow, redness along central upper forehead. PERRL, EOMI.  Normal pharyngeal exam. TM intact  CV:  intermittent bradycardia in 50-60s with regular rhythm, no murmur. Peripheral pulses intact. Respiratory:  Lungs clear to auscultation bilaterally  Abdomen:  Soft, Non-tender, non-distended. Back:  No vertebral tenderness, step-offs or crepitus  Skin:  Normal color, warm and dry  Extremities:  Non-tender, no deformities. StrengthN /N  Neuro:  Oriented x3, no gross sensory deficits    FAST: deferred    RESULTS REVIEWED  EKG: unchanged from previous   CXR: No acute abnormalities  Radiology Results: CT head without contrast   Final Result        No acute intracranial abnormality. Right frontal scalp swelling without underlying fracture. Workstation performed: [id number]       XR tibia fibula 2 views LEFT    (Results Pending)  XR ankle 3+ views LEFT    (Results Pending)    ED Medications Administered: Medications  lidocaine-epinephrine (XYLOCAINE/EPINEPHRINE) 1 %-1:100,000 injection 1 mL (1 mL Infiltration Given by Other 11/11/23 1136)  lidocaine-epinephrine (XYLOCAINE/EPINEPHRINE) 1 %-1:100,000 injection 1 mL (1 mL Infiltration Given by Other 11/11/23 1202)    Consults: Trauma Surgery deferred       Fall  Associated symptoms: no abdominal pain, no back pain, no chest pain, no headaches, no nausea and no vomiting        Prior to Admission Medications   Prescriptions Last Dose Informant Patient Reported? Taking?    BD Insulin Syringe U/F 31G X 5/16" 0.5 ML MISC  Self Yes No   Sig: Use as directed   Continuous Blood Gluc Sensor (FreeStyle Christian 2 Sensor) MISC  Self Yes No   Sig: E11.9 CHECK BLOOD SUGAR   Empagliflozin (Jardiance) 25 MG TABS  Self No No   Sig: Take 1 tablet (25 mg total) by mouth every morning for 7 days   HUMALOG 100 UNIT/ML injection  Self Yes No   Sig: Inject under the skin as needed = Inject as per sliding scale:  if 0 - 149 = 0 units;  150 - 209 = 1 unit;  210 - 269 = 2 units;  270 - 329 = 3 units;  330 - 389 = 4 units;  390+ = 5 units Call MD if BS < 60 or > 390, subcutaneously before meals for DM   Levemir 100 UNIT/ML subcutaneous injection  Self Yes No   ONETOUCH VERIO test strip  Self Yes No   Sig: TEST 2 TIMES A DAY DX E11.9   Omega-3 Fatty Acids (FISH OIL CONCENTRATE PO)  Self Yes No   Sig: Take 1,000 mg by mouth 2 (two) times a day   aspirin (ECOTRIN LOW STRENGTH) 81 mg EC tablet  Self Yes No   Sig: Take 81 mg by mouth daily   cholecalciferol (VITAMIN D3) 1,000 units tablet  Self Yes No   Sig: Take 2,000 Units by mouth daily   multivitamin-iron-minerals-folic acid (CENTRUM) chewable tablet  Self Yes No   Sig: Chew 1 tablet daily   naproxen (NAPROSYN) 500 mg tablet   Yes No   Sig: Take 500 mg by mouth 2 (two) times a day   simvastatin (ZOCOR) 40 mg tablet  Self No No   Sig: Take 1 tablet (40 mg total) by mouth daily at bedtime for 7 days      Facility-Administered Medications: None       Past Medical History:   Diagnosis Date    Diabetes mellitus (HCC)     Hiatal hernia     Hyperlipidemia     Hypertension     Leg pain     Neuropathy     PE (pulmonary thromboembolism) (HCC)     Shortness of breath     Venous insufficiency (chronic) (peripheral)        Past Surgical History:   Procedure Laterality Date    FRACTURE SURGERY  7/12/20    IR PE ENDOVASCULAR THERAPY  8/18/2020    JOINT REPLACEMENT Bilateral     knees    LEG SURGERY      ORTHOPEDIC SURGERY      OK COLONOSCOPY FLX DX W/COLLJ SPEC WHEN PFRMD N/A 6/30/2017    Procedure: EGD AND COLONOSCOPY;  Surgeon: Aria Schmidt MD;  Location: [facility]; Service: General    TONSILLECTOMY         Family History   Problem Relation Age of Onset    Cancer Mother     Cancer Father      I have reviewed and agree with the history as documented.     E-Cigarette/Vaping    E-Cigarette Use Never User      E-Cigarette/Vaping Substances    Nicotine No     THC No     CBD No     Flavoring No     Other No     Unknown No      Social History     Tobacco Use    Smoking status: Never    Smokeless tobacco: Never   Vaping Use    Vaping Use: Never used   Substance Use Topics    Alcohol use: Never    Drug use: No       Review of Systems   Respiratory:  Negative for shortness of breath. Cardiovascular:  Negative for chest pain and palpitations. Gastrointestinal:  Negative for abdominal pain, nausea and vomiting. Musculoskeletal:  Negative for back pain. Neurological:  Negative for dizziness, syncope, weakness, light-headedness and headaches. Physical Exam  Physical Exam  Constitutional:       General: He is not in acute distress. Appearance: Normal appearance. HENT:      Head: Contusion present. No Avitia's sign. Eyes:      Extraocular Movements: Extraocular movements intact. Conjunctiva/sclera: Conjunctivae normal.      Pupils: Pupils are equal, round, and reactive to light. Cardiovascular:      Rate and Rhythm: Regular rhythm. Bradycardia present. Pulses: Normal pulses. Heart sounds: Normal heart sounds. Pulmonary:      Effort: Pulmonary effort is normal.      Breath sounds: Normal breath sounds. Abdominal:      General: Bowel sounds are normal.      Palpations: Abdomen is soft. Tenderness: There is no abdominal tenderness. Musculoskeletal:      Cervical back: Normal range of motion and neck supple. No tenderness. Right lower le+ Pitting Edema present. Left lower leg: Laceration present. 2+ Pitting Edema present. Legs:       Comments: Patient reports this LE edema is baseline   Skin:     General: Skin is warm and dry. Capillary Refill: Capillary refill takes less than 2 seconds. Neurological:      General: No focal deficit present. Mental Status: He is alert and oriented to person, place, and time. Cranial Nerves: No cranial nerve deficit. Sensory: No sensory deficit. Motor: No weakness.       Coordination: Coordination normal.      Gait: Gait normal.         Vital Signs  ED Triage Vitals [23 1002]   Temperature Pulse Respirations Blood Pressure SpO2 98.2 °F (36.8 °C) 61 17 154/71 95 %      Temp Source Heart Rate Source Patient Position - Orthostatic VS BP Location FiO2 (%)   Tympanic Monitor -- -- --      Pain Score       --           Vitals:    11/11/23 1002 11/11/23 1100   BP: 154/71 142/62   Pulse: 61 57         Visual Acuity  Visual Acuity      Flowsheet Row Most Recent Value   L Pupil Size (mm) 2   R Pupil Size (mm) 2            ED Medications  Medications   lidocaine-epinephrine (XYLOCAINE/EPINEPHRINE) 1 %-1:100,000 injection 1 mL (1 mL Infiltration Given by Other 11/11/23 1136)   lidocaine-epinephrine (XYLOCAINE/EPINEPHRINE) 1 %-1:100,000 injection 1 mL (1 mL Infiltration Given by Other 11/11/23 1202)       Diagnostic Studies  Results Reviewed       Procedure Component Value Units Date/Time    Basic metabolic panel [[id number]] Collected: 11/11/23 1041    Lab Status: Final result Specimen: Blood from Arm, Left Updated: 11/11/23 1115     Sodium 139 mmol/L      Potassium 4.4 mmol/L      Chloride 105 mmol/L      CO2 29 mmol/L      ANION GAP 5 mmol/L      BUN 19 mg/dL      Creatinine 0.70 mg/dL      Glucose 106 mg/dL      Calcium 9.4 mg/dL      eGFR 90 ml/min/1.73sq m     Narrative:      [place] guidelines for Chronic Kidney Disease (CKD):     Stage 1 with normal or high GFR (GFR > 90 mL/min/1.73 square meters)    Stage 2 Mild CKD (GFR = 60-89 mL/min/1.73 square meters)    Stage 3A Moderate CKD (GFR = 45-59 mL/min/1.73 square meters)    Stage 3B Moderate CKD (GFR = 30-44 mL/min/1.73 square meters)    Stage 4 Severe CKD (GFR = 15-29 mL/min/1.73 square meters)    Stage 5 End Stage CKD (GFR <15 mL/min/1.73 square meters)  Note: GFR calculation is accurate only with a steady state creatinine    Protime-INR [[id number]]  (Normal) Collected: 11/11/23 1041    Lab Status: Final result Specimen: Blood from Arm, Left Updated: 11/11/23 1112     Protime 13.6 seconds      INR 0.98    APTT [[id number]]  (Normal) Collected: 11/11/23 1041    Lab Status: Final result Specimen: Blood from Arm, Left Updated: 11/11/23 1112     PTT 32 seconds     CBC and differential [[id number]] Collected: 11/11/23 1041    Lab Status: Final result Specimen: Blood from Arm, Left Updated: 11/11/23 1051     WBC 9.42 Thousand/uL      RBC 4.63 Million/uL      Hemoglobin 14.4 g/dL      Hematocrit 43.2 %      MCV 93 fL      MCH 31.1 pg      MCHC 33.3 g/dL      RDW 14.2 %      MPV 10.8 fL      Platelets 360 Thousands/uL      nRBC 0 /100 WBCs      Neutrophils Relative 48 %      Immat GRANS % 0 %      Lymphocytes Relative 37 %      Monocytes Relative 11 %      Eosinophils Relative 3 %      Basophils Relative 1 %      Neutrophils Absolute 4.64 Thousands/µL      Immature Grans Absolute 0.02 Thousand/uL      Lymphocytes Absolute 3.47 Thousands/µL      Monocytes Absolute 0.99 Thousand/µL      Eosinophils Absolute 0.25 Thousand/µL      Basophils Absolute 0.05 Thousands/µL                    CT head without contrast   Final Result by Germán Miranda MD (11/11 1112)      No acute intracranial abnormality. Right frontal scalp swelling without underlying fracture. Workstation performed: [id number]         XR chest 1 view portable    (Results Pending)   XR tibia fibula 2 views LEFT    (Results Pending)   XR ankle 3+ views LEFT    (Results Pending)              Procedures  Universal Protocol:  Consent: Verbal consent obtained. Written consent not obtained.   Risks and benefits: risks, benefits and alternatives were discussed  Consent given by: patient  Patient understanding: patient states understanding of the procedure being performed  Patient consent: the patient's understanding of the procedure matches consent given  Procedure consent: procedure consent matches procedure scheduled  Relevant documents: relevant documents present and verified  Test results: test results available and properly labeled  Site marked: the operative site was marked  Radiology Images displayed and confirmed. If images not available, report reviewed: imaging studies available  Required items: required blood products, implants, devices, and special equipment available  Patient identity confirmed: verbally with patient  Laceration repair    Date/Time: 11/11/2023 3:57 PM    Performed by: Meredith Catherine PA-C  Authorized by: Merdeith Catherine PA-C  Body area: head/neck  Location details: right eyebrow  Laceration length: 2.5 cm  Foreign bodies: no foreign bodies  Tendon involvement: none  Nerve involvement: none  Vascular damage: no  Anesthesia: local infiltration    Anesthesia:  Local Anesthetic: lidocaine 1% with epinephrine  Anesthetic total: 5 mL    Sedation:  Patient sedated: no      Wound Dehiscence:  Superficial Wound Dehiscence: simple closure      Procedure Details:  Preparation: Patient was prepped and draped in the usual sterile fashion. Irrigation solution: saline  Irrigation method: syringe  Amount of cleaning: standard  Debridement: minimal  Degree of undermining: none  Skin closure: 5-0 nylon  Number of sutures: 6  Technique: knot tying and simple  Approximation: close  Approximation difficulty: simple  Dressing: gauze roll  Patient tolerance: patient tolerated the procedure well with no immediate complications      Universal Protocol:  Consent: Verbal consent obtained. Written consent not obtained. Risks and benefits: risks, benefits and alternatives were discussed  Consent given by: patient  Patient understanding: patient states understanding of the procedure being performed  Patient consent: the patient's understanding of the procedure matches consent given  Procedure consent: procedure consent matches procedure scheduled  Relevant documents: relevant documents present and verified  Test results: test results available and properly labeled  Site marked: the operative site was marked  Radiology Images displayed and confirmed.  If images not available, report reviewed: imaging studies available  Required items: required blood products, implants, devices, and special equipment available  Patient identity confirmed: verbally with patient  Laceration repair    Date/Time: 11/11/2023 3:59 PM    Performed by: Wilner Francis PA-C  Authorized by: Wilner Francis PA-C  Body area: lower extremity  Location details: left lower leg  Laceration length: 9 cm  Foreign bodies: no foreign bodies  Tendon involvement: none  Nerve involvement: none  Vascular damage: no  Anesthesia: local infiltration    Anesthesia:  Local Anesthetic: lidocaine 1% with epinephrine  Anesthetic total: 25 mL    Sedation:  Patient sedated: no      Wound Dehiscence:  Superficial Wound Dehiscence: simple closure      Procedure Details:  Preparation: Patient was prepped and draped in the usual sterile fashion. Irrigation solution: saline  Irrigation method: syringe  Amount of cleaning: standard  Debridement: none  Degree of undermining: none  Skin closure: 5-0 nylon  Number of sutures: 9  Technique: knot tying and simple  Approximation: close  Approximation difficulty: simple  Dressing: gauze roll  Patient tolerance: patient tolerated the procedure well with no immediate complications               ED Course                                             Medical Decision Making  Patient was hemodynamically stable and without complaint during his ER visit. CT revealed no intracranial hemorrhage, CXR was normal, Xrays of left ankle showed no gross fracture or dislocation, radiologist report pending. Blood work without abnormality. Lacerations were repaired without complication. Patient demonstrated ability to walk independently. Patient deemed safe for discharge home, and given prescriptions for antibiotic x 10 days as well as tramadol x 3 days as adjunct to tylenol if needed. Follow-up in 5-7 days for facial suture removal, and leg sutures to be removed near 10 days. Amount and/or Complexity of Data Reviewed  Labs: ordered.   Radiology: ordered. Risk  Prescription drug management. Disposition  Final diagnoses:   Simple laceration of face, initial encounter   Laceration of left leg, initial encounter   Fall, initial encounter     Time reflects when diagnosis was documented in both MDM as applicable and the Disposition within this note       Time User Action Codes Description Comment    11/11/2023 12:56 PM Sarwat Sánchez [email or web address] Mccauley Fall, initial encounter     11/11/2023 12:57 PM Sarwat Galvin Add [S01.81XA] Simple laceration of face, initial encounter     11/11/2023 12:57 PM Sarwat Galvin Add [P55.901X] Laceration of left leg, initial encounter     11/11/2023 12:57 PM Sarwat Galvin Modify [S01.81XA] Simple laceration of face, initial encounter     11/11/2023 12:57 PM Joe Gram [R31. KJEL] VPJP, initial encounter     11/11/2023 12:58 PM Sarwat Sánchez [W90. SMBK] Fall, initial encounter     11/11/2023 12:58 PM Sarwat Galvin Modify [S01.81XA] Simple laceration of face, initial encounter     11/11/2023 12:58 PM Sarwat Galvin Modify [S11. [place] Neigh, initial encounter           ED Disposition       ED Disposition   Discharge    Condition   Stable    Date/Time   Sat Nov 11, 2023 12:58 PM    Comment   Coretta Alcala discharge to home/self care.                    Follow-up Information       Follow up With Specialties Details Why Contact Info    Hudson Baptiste MD  In 1 week  [address]  [phone]              Discharge Medication List as of 11/11/2023  1:11 PM        START taking these medications    Details   cephalexin (KEFLEX) 500 mg capsule Take 1 capsule (500 mg total) by mouth every 6 (six) hours for 10 days, Starting Sat 11/11/2023, Until Tue 11/21/2023, Print      traMADol (Ultram) 50 mg tablet Take 1 tablet (50 mg total) by mouth every 6 (six) hours as needed for moderate pain for up to 3 days, Starting Sat 11/11/2023, Until Tue 11/14/2023 at 8993, Print           CONTINUE these medications which have NOT CHANGED    Details   aspirin (ECOTRIN LOW STRENGTH) 81 mg EC tablet Take 81 mg by mouth daily, Historical Med      BD Insulin Syringe U/F 31G X 5/16" 0.5 ML MISC Use as directed, Starting Tue 3/22/2022, Historical Med      cholecalciferol (VITAMIN D3) 1,000 units tablet Take 2,000 Units by mouth daily, Historical Med      Continuous Blood Gluc Sensor (FreeStyle Christian 2 Sensor) MISC E11.9 CHECK BLOOD SUGAR, Historical Med      Empagliflozin (Jardiance) 25 MG TABS Take 1 tablet (25 mg total) by mouth every morning for 7 days, Starting Fri 11/5/2021, Until Tue 12/21/2021, Normal      HUMALOG 100 UNIT/ML injection Inject under the skin as needed = Inject as per sliding scale:  if 0 - 149 = 0 units;  150 - 209 = 1 unit;  210 - 269 = 2 units;  270 - 329 = 3 units;  330 - 389 = 4 units;  390+ = 5 units Call MD if BS < 60 or > 390,  subcutaneously before meals for DM,  Starting Fri 5/24/2019, Historical Med      Levemir 100 UNIT/ML subcutaneous injection Starting u 11/25/2021, Historical Med      multivitamin-iron-minerals-folic acid (CENTRUM) chewable tablet Chew 1 tablet daily, Historical Med      naproxen (NAPROSYN) 500 mg tablet Take 500 mg by mouth 2 (two) times a day, Starting Fri 5/27/2022, Historical Med      Omega-3 Fatty Acids (FISH OIL CONCENTRATE PO) Take 1,000 mg by mouth 2 (two) times a day, Historical Med      ONETOUCH VERIO test strip TEST 2 TIMES A DAY DX E11.9, Historical Med      simvastatin (ZOCOR) 40 mg tablet Take 1 tablet (40 mg total) by mouth daily at bedtime for 7 days, Starting Fri 11/5/2021, Until Tue 12/21/2021, Normal             No discharge procedures on file.     PDMP Review         Value Time User    PDMP Reviewed  Yes 5/14/2021  2:19 PM Tracey Hamilton PA-C            ED Provider  Electronically Signed by             Laure Mendoza PA-C  11/11/23 [address] [place], PA-C  11/11/23 2761

## 2023-11-12 LAB
ATRIAL RATE: 227 BPM
QRS AXIS: 128 DEGREES
QRSD INTERVAL: 108 MS
QT INTERVAL: 424 MS
QTC INTERVAL: 416 MS
T WAVE AXIS: 3 DEGREES
VENTRICULAR RATE: 58 BPM